# Patient Record
Sex: FEMALE | Race: WHITE | NOT HISPANIC OR LATINO | Employment: FULL TIME | URBAN - METROPOLITAN AREA
[De-identification: names, ages, dates, MRNs, and addresses within clinical notes are randomized per-mention and may not be internally consistent; named-entity substitution may affect disease eponyms.]

---

## 2017-01-10 NOTE — PROGRESS NOTES
SUBJECTIVE:                                                    Yesika Grant is a 26 year old female who presents to clinic today for the following health issues:      F/u concussion     Her HA have improved quite a bit.  Still gets some when she's pushing fairly hard or working hard on focusing.  Will tend to have a hard time focusing and concentrating at times.      Working 4 hours/day.  Rarely gets HA with this.        Depression Followup    Status since last visit: Worsened     See PHQ-9 for current symptoms.  Other associated symptoms: yes, not able to control it     Complicating factors:   Significant life event:  No   Current substance abuse:  None  Anxiety or Panic symptoms:  Yes-  But uses xanax as needed     PHQ-9  English PHQ-9   Any Language        Her depression seemed to worsen quite a bit about a week ago.  Just has no interest in doing anything.  Actually cut back to 4 hrs/day at work when this worsened.  Has been battling these symptoms for months, but her ability to cope has really been sapped of late.      Still smoking.  Has tried patch and gum in the past.      Problem list and histories reviewed & adjusted, as indicated.  Additional history: as documented    Current Outpatient Prescriptions   Medication Sig Dispense Refill     VITAMIN D, CHOLECALCIFEROL, PO Take by mouth daily       citalopram (CELEXA) 40 MG tablet Take 1 tablet (40 mg) by mouth daily Due for office visit 90 tablet 1     multivitamin, therapeutic with minerals (MULTI-VITAMIN) TABS Take 1 tablet by mouth daily       Ferrous Sulfate (IRON SUPPLEMENT PO)        tiZANidine (ZANAFLEX) 4 MG tablet Take 1 tablet (4 mg) by mouth 3 times daily as needed for muscle spasms 90 tablet 0     ALPRAZolam (XANAX) 0.25 MG tablet Take 1 tablet (0.25 mg) by mouth nightly as needed for anxiety 10 tablet 1     Recent Labs   Lab Test  12/12/16   1444  08/19/16   1408  08/18/16   1521  06/04/15   1700  12/06/14   1410   01/13/09   0952   LDL    "--    --    --    --    --    --   112   HDL   --    --    --    --    --    --   59   TRIG   --    --    --    --    --    --   92   ALT   --    --   21   --   14   --    --    CR  0.68   --   0.70   --   0.69   < >   --    GFRESTIMATED  >90  Non  GFR Calc     --   >90  Non  GFR Calc     --   >90  Non  GFR Calc     < >   --    GFRESTBLACK  >90   GFR Calc     --   >90   GFR Calc     --   >90   GFR Calc     < >   --    POTASSIUM  3.8   --   4.0   --   4.0   < >   --    TSH   --   4.88*   --   3.16   --    < >   --     < > = values in this interval not displayed.      BP Readings from Last 3 Encounters:   01/13/17 120/82   12/14/16 100/60   12/12/16 122/68    Wt Readings from Last 3 Encounters:   01/13/17 224 lb (101.606 kg)   12/14/16 224 lb 4.8 oz (101.742 kg)   12/12/16 223 lb (101.152 kg)                 ROS:  Constitutional, HEENT, cardiovascular, pulmonary, gi and gu systems are negative, except as otherwise noted.  Easily chilled.    OBJECTIVE:                                                    /82 mmHg  Pulse 88  Temp(Src) 98.1  F (36.7  C) (Temporal)  Resp 16  Ht 5' 6.93\" (1.7 m)  Wt 224 lb (101.606 kg)  BMI 35.16 kg/m2  LMP 01/13/2017  Breastfeeding? No  Body mass index is 35.16 kg/(m^2).  GENERAL: healthy, alert and no distress  NECK: no adenopathy, no asymmetry, masses, or scars and thyroid normal to palpation  NECK: mild left neck tenderness and muscle spasm.  Does seem to be holding her head at times during the exam.  RESP: lungs clear to auscultation - no rales, rhonchi or wheezes  CV: regular rate and rhythm, normal S1 S2, no S3 or S4, no murmur, click or rub, no peripheral edema and peripheral pulses strong  ABDOMEN: soft, nontender, no hepatosplenomegaly, no masses and bowel sounds normal  MS: no gross musculoskeletal defects noted, no edema    Diagnostic Test Results:  Results for orders " "placed or performed in visit on 12/12/16   CBC with platelets   Result Value Ref Range    WBC 10.6 4.0 - 11.0 10e9/L    RBC Count 4.31 3.8 - 5.2 10e12/L    Hemoglobin 13.0 11.7 - 15.7 g/dL    Hematocrit 39.3 35.0 - 47.0 %    MCV 91 78 - 100 fl    MCH 30.2 26.5 - 33.0 pg    MCHC 33.1 31.5 - 36.5 g/dL    RDW 14.0 10.0 - 15.0 %    Platelet Count 339 150 - 450 10e9/L   Basic metabolic panel  (Ca, Cl, CO2, Creat, Gluc, K, Na, BUN)   Result Value Ref Range    Sodium 142 133 - 144 mmol/L    Potassium 3.8 3.4 - 5.3 mmol/L    Chloride 106 94 - 109 mmol/L    Carbon Dioxide 22 20 - 32 mmol/L    Anion Gap 14 3 - 14 mmol/L    Glucose 92 70 - 99 mg/dL    Urea Nitrogen 7 7 - 30 mg/dL    Creatinine 0.68 0.52 - 1.04 mg/dL    GFR Estimate >90  Non  GFR Calc   >60 mL/min/1.7m2    GFR Estimate If Black >90   GFR Calc   >60 mL/min/1.7m2    Calcium 8.7 8.5 - 10.1 mg/dL        ASSESSMENT/PLAN:                                                      Tobacco Cessation:   reports that she has been smoking Cigarettes.  She has been smoking about 1.00 pack per day. She has never used smokeless tobacco.  Tobacco Cessation Action Plan: Information offered: Patient not interested at this time    BMI:   Estimated body mass index is 35.16 kg/(m^2) as calculated from the following:    Height as of this encounter: 5' 6.93\" (1.7 m).    Weight as of this encounter: 224 lb (101.606 kg).   Weight management plan: Discussed healthy diet and exercise guidelines and patient will follow up in 6 months in clinic to re-evaluate.        ICD-10-CM    1. Postconcussion syndrome F07.81 TSH with free T4 reflex   2. Major depression in complete remission (H) F32.5 TSH with free T4 reflex     buPROPion (WELLBUTRIN XL) 150 MG 24 hr tablet   3. Anxiety F41.9 TSH with free T4 reflex   4. Tobacco use disorder F17.200 TSH with free T4 reflex     buPROPion (WELLBUTRIN XL) 150 MG 24 hr tablet   5. TSH elevation R94.6 TSH with free T4 reflex     " JUST IN CASE   6. Vitamin D deficiency E55.9 JUST IN CASE     Vitamin D Deficiency   7. Panic attacks F41.0 ALPRAZolam (XANAX) 0.25 MG tablet     1.  Does seem to be improving.  Unclear if her worsening moods are related to this or not.  Discussed continued management of this.  Will refer to PT to help with this.  2,3.  Discussed options.  Pt would like to try Wellbutrin to help with this.  F/u in 1-2 months.  4.  Discussed smoking cessation.  Pt will see if Wellbutrin helps with this.    5.  Previously noted.  Will check labs again.  6.  Will check control, consider other labs if needed.  7.  Currently Controlled.  Discussed that more than occasional use would require alternate treatment approach.  Continue current regimen.  Call/return if any problems or questions arise.             Patient Instructions   Thank you for visiting [facility]    Let's try Wellbutrin to help with your attention and depression.  Ideally, stop smoking about a week into treatment with this.    We'll let you know your lab results as soon as we can.     Get in to PT as we discussed.    Please see me in 1  month for follow up.       If you had imaging scheduled please refer to your radiology prep sheet.    Appointment    Date_______________     Time_____________    Day:   M TU W TH F    With____________________________    Location_________________________    If you need medication refills, please contact your pharmacy 3 days before your prescriptions runs out. If you are out of refills, your pharmacy will contact contact the clinic.    Contact us or return if questions or concerns.     -Your Care Team:  MD Justine Kunz PA-C Folake Falaki, MD Anoshirvan Mazhari, MD Kelly White, ZIA    General information about your clinic      Clinic hours:     Lab hours:  Phone [phone]  Monday 7:30 am-7 pm    Monday 8:30 am-6:30 pm  Tuesday-Friday 7:30 am-5 pm   Tuesday-Friday 8:30 am-4:30 pm    Pharmacy  hours:  Phone [phone]  Monday 8:30 am-7pm  Tuesday-Friday 8:30am-6 pm                                       Mychart assistance [phone]        We would like to hear from you, how was your visit today?    Solange Castillo  Patient Information Supervisor   Patient Care Supervisor  [place], [place], and [facility], [facility]  [phone]           Young Fletcher MD, MD  [facility]

## 2017-01-13 ENCOUNTER — OFFICE VISIT (OUTPATIENT)
Dept: FAMILY MEDICINE | Facility: OTHER | Age: 27
End: 2017-01-13
Payer: COMMERCIAL

## 2017-01-13 VITALS
HEIGHT: 67 IN | RESPIRATION RATE: 16 BRPM | BODY MASS INDEX: 35.16 KG/M2 | DIASTOLIC BLOOD PRESSURE: 82 MMHG | WEIGHT: 224 LBS | HEART RATE: 88 BPM | TEMPERATURE: 98.1 F | SYSTOLIC BLOOD PRESSURE: 120 MMHG

## 2017-01-13 DIAGNOSIS — F07.81 POSTCONCUSSION SYNDROME: Primary | ICD-10-CM

## 2017-01-13 DIAGNOSIS — E55.9 VITAMIN D DEFICIENCY: ICD-10-CM

## 2017-01-13 DIAGNOSIS — F32.5 MAJOR DEPRESSION IN COMPLETE REMISSION (H): ICD-10-CM

## 2017-01-13 DIAGNOSIS — F41.0 PANIC ATTACKS: ICD-10-CM

## 2017-01-13 DIAGNOSIS — F17.200 TOBACCO USE DISORDER: ICD-10-CM

## 2017-01-13 DIAGNOSIS — R79.89 TSH ELEVATION: ICD-10-CM

## 2017-01-13 DIAGNOSIS — F41.9 ANXIETY: ICD-10-CM

## 2017-01-13 PROCEDURE — 99214 OFFICE O/P EST MOD 30 MIN: CPT | Performed by: FAMILY MEDICINE

## 2017-01-13 PROCEDURE — 82306 VITAMIN D 25 HYDROXY: CPT | Performed by: FAMILY MEDICINE

## 2017-01-13 PROCEDURE — 84443 ASSAY THYROID STIM HORMONE: CPT | Performed by: FAMILY MEDICINE

## 2017-01-13 PROCEDURE — 36415 COLL VENOUS BLD VENIPUNCTURE: CPT | Performed by: FAMILY MEDICINE

## 2017-01-13 RX ORDER — BUPROPION HYDROCHLORIDE 150 MG/1
150 TABLET ORAL EVERY MORNING
Qty: 30 TABLET | Refills: 1 | Status: SHIPPED | OUTPATIENT
Start: 2017-01-13 | End: 2017-02-08

## 2017-01-13 RX ORDER — ALPRAZOLAM 0.25 MG
0.25 TABLET ORAL
Qty: 10 TABLET | Refills: 1 | Status: SHIPPED | OUTPATIENT
Start: 2017-01-13 | End: 2017-09-08

## 2017-01-13 ASSESSMENT — PAIN SCALES - GENERAL: PAINLEVEL: NO PAIN (0)

## 2017-01-13 NOTE — PATIENT INSTRUCTIONS
Thank you for visiting [facility]    Let's try Wellbutrin to help with your attention and depression.  Ideally, stop smoking about a week into treatment with this.    We'll let you know your lab results as soon as we can.     Get in to PT as we discussed.    Please see me in 1  month for follow up.       If you had imaging scheduled please refer to your radiology prep sheet.    Appointment    Date_______________     Time_____________    Day:   M TU W TH F    With____________________________    Location_________________________    If you need medication refills, please contact your pharmacy 3 days before your prescriptions runs out. If you are out of refills, your pharmacy will contact contact the clinic.    Contact us or return if questions or concerns.     -Your Care Team:  MD Justine Kunz PA-C Folake Falaki, MD Anoshirvan Mazhari, MD Kelly White, ZIA    General information about your clinic      Clinic hours:     Lab hours:  Phone [phone]  Monday 7:30 am-7 pm    Monday 8:30 am-6:30 pm  Tuesday-Friday 7:30 am-5 pm   Tuesday-Friday 8:30 am-4:30 pm    Pharmacy hours:  Phone [phone]  Monday 8:30 am-7pm  Tuesday-Friday 8:30am-6 pm                                       Mychart assistance [phone]        We would like to hear from you, how was your visit today?    Solange Castillo  Patient Information Supervisor   Patient Care Supervisor  [facility], and [facility], and [facility]  [phone]

## 2017-01-13 NOTE — Clinical Note
[facility]  [id number] [place] [address]  Phone: [phone]    January 13, 2017        Yesika Grant  [address] [facility] [address]          To whom it may concern:    RE: Yesika Grant    Patient was seen and treated today at our clinic and missed work.  She is still recovering and will only gradually be able to return to full time work over the next 30-60 days.  She is capable of working 4-5 hrs/day at this time.  Can probably try increasing her time at work by one hr/day each week.  Stop increasing if any worsening of symptoms.    Please contact me for questions or concerns.      Sincerely,        Young Fletcher MD

## 2017-01-13 NOTE — NURSING NOTE
"Chief Complaint   Patient presents with     Head Injury     Panel Management     MyChart, Flu Shot, Tobacco Cessation, PHQ-9/ZAN       Initial /82 mmHg  Pulse 88  Temp(Src) 98.1  F (36.7  C) (Temporal)  Resp 16  Ht 5' 6.93\" (1.7 m)  Wt 224 lb (101.606 kg)  BMI 35.16 kg/m2  LMP 01/13/2017  Breastfeeding? No Estimated body mass index is 35.16 kg/(m^2) as calculated from the following:    Height as of this encounter: 5' 6.93\" (1.7 m).    Weight as of this encounter: 224 lb (101.606 kg).  BP completed using cuff size: abimael Gale LPN      "

## 2017-01-13 NOTE — MR AVS SNAPSHOT
After Visit Summary   1/13/2017    Yesika Grant    MRN: [id number]           Patient Information     Date Of Birth          1990        Visit Information        Provider Department      1/13/2017 4:15 PM Young Fletcher MD [facility]        Today's Diagnoses     Postconcussion syndrome    -  1     Major depression in complete remission (H)         Anxiety         Tobacco use disorder         TSH elevation         Vitamin D deficiency         Panic attacks           Care Instructions    Thank you for visiting [facility]    Let's try Wellbutrin to help with your attention and depression.  Ideally, stop smoking about a week into treatment with this.    We'll let you know your lab results as soon as we can.     Get in to PT as we discussed.    Please see me in 1  month for follow up.       If you had imaging scheduled please refer to your radiology prep sheet.    Appointment    Date_______________     Time_____________    Day:   M TU W TH F    With____________________________    Location_________________________    If you need medication refills, please contact your pharmacy 3 days before your prescriptions runs out. If you are out of refills, your pharmacy will contact contact the clinic.    Contact us or return if questions or concerns.     -Your Care Team:  MD Justine Kunz PA-C Folake Falaki, MD Anoshirvan Mazhari, MD Kelly White, CNP    General information about your clinic      Clinic hours:     Lab hours:  Phone [phone]  Monday 7:30 am-7 pm    Monday 8:30 am-6:30 pm  Tuesday-Friday 7:30 am-5 pm   Tuesday-Friday 8:30 am-4:30 pm    Pharmacy hours:  Phone [phone]  Monday 8:30 am-7pm  Tuesday-Friday 8:30am-6 pm                                       Mychart assistance [phone]        We would like to hear from you, how was your visit today?    Solange Castillo  Patient Information  "Supervisor   Patient Care Supervisor  [facility], [facility], and [facility]  [phone]           Follow-ups after your visit        Your next 10 appointments already scheduled     2017  3:00 PM   Evaluation with Lily Mendoza, PT   [facility] ([facility])    [address] [facility] [address] [phone]              Who to contact     If you have questions or need follow up information about today's clinic visit or your schedule please contact [facility] directly at [phone].  Normal or non-critical lab and imaging results will be communicated to you by [employer], letter or phone within 4 business days after the clinic has received the results. If you do not hear from us within 7 days, please contact the clinic through [employer] or phone. If you have a critical or abnormal lab result, we will notify you by phone as soon as possible.  Submit refill requests through [employer] or call your pharmacy and they will forward the refill request to us. Please allow 3 business days for your refill to be completed.          Additional Information About Your Visit        [employer] Information     [employer] lets you send messages to your doctor, view your test results, renew your prescriptions, schedule appointments and more. To sign up, go to [email or web address] . Click on \"Log in\" on the left side of the screen, which will take you to the Welcome page. Then click on \"Sign up Now\" on the right side of the page.     You will be asked to enter the access code listed below, as well as some personal information. Please follow the directions to create your username and password.     Your access code is: [email or web address]  Expires: 3/14/2017 11:23 AM     Your access code will  in 90 days. If you need help or a new code, please call your [facility] or [phone].        Care EveryWhere ID  " "   This is your Care EveryWhere ID. This could be used by other organizations to access your [facility] medical records  [id number]        Your Vitals Were     Pulse Temperature Respirations Height BMI (Body Mass Index) Last Period    88 98.1  F (36.7  C) (Temporal) 16 5' 6.93\" (1.7 m) 35.16 kg/m2 01/13/2017    Breastfeeding?                   No            Blood Pressure from Last 3 Encounters:   01/13/17 120/82   12/14/16 100/60   12/12/16 122/68    Weight from Last 3 Encounters:   01/13/17 224 lb (101.606 kg)   12/14/16 224 lb 4.8 oz (101.742 kg)   12/12/16 223 lb (101.152 kg)              We Performed the Following     JUST IN CASE     TSH with free T4 reflex     Vitamin D Deficiency          Today's Medication Changes          These changes are accurate as of: 1/13/17  4:57 PM.  If you have any questions, ask your nurse or doctor.               Start taking these medicines.        Dose/Directions    buPROPion 150 MG 24 hr tablet   Commonly known as:  WELLBUTRIN XL   Used for:  Major depression in complete remission (H), Tobacco use disorder   Started by:  Young Fletcher MD        Dose:  150 mg   Take 1 tablet (150 mg) by mouth every morning   Quantity:  30 tablet   Refills:  1            Where to get your medicines      These medications were sent to [facility] [place] - [address]   [address] [place] [address]     Phone:  [phone]    - buPROPion 150 MG 24 hr tablet      Some of these will need a paper prescription and others can be bought over the counter.  Ask your nurse if you have questions.     Bring a paper prescription for each of these medications    - ALPRAZolam 0.25 MG tablet             Primary Care Provider Office Phone # Fax #    Justine Sánchez PA-C [phone]       [facility] [address] [place] [address]        Thank you!     Thank you for choosing [facility]  for your care. Our goal is " always to provide you with excellent care. Hearing back from our patients is one way we can continue to improve our services. Please take a few minutes to complete the written survey that you may receive in the mail after your visit with us. Thank you!             Your Updated Medication List - Protect others around you: Learn how to safely use, store and throw away your medicines at www.disposemymeds.org.          This list is accurate as of: 1/13/17  4:57 PM.  Always use your most recent med list.                   Brand Name Dispense Instructions for use    ALPRAZolam 0.25 MG tablet    XANAX    10 tablet    Take 1 tablet (0.25 mg) by mouth nightly as needed for anxiety       buPROPion 150 MG 24 hr tablet    WELLBUTRIN XL    30 tablet    Take 1 tablet (150 mg) by mouth every morning       citalopram 40 MG tablet    celeXA    90 tablet    Take 1 tablet (40 mg) by mouth daily Due for office visit       IRON SUPPLEMENT PO          Multi-vitamin Tabs tablet      Take 1 tablet by mouth daily       tiZANidine 4 MG tablet    ZANAFLEX    90 tablet    Take 1 tablet (4 mg) by mouth 3 times daily as needed for muscle spasms       VITAMIN D (CHOLECALCIFEROL) PO      Take by mouth daily

## 2017-01-14 LAB — TSH SERPL DL<=0.005 MIU/L-ACNC: 1.72 MU/L (ref 0.4–4)

## 2017-01-16 LAB — DEPRECATED CALCIDIOL+CALCIFEROL SERPL-MC: 33 UG/L (ref 20–75)

## 2017-01-17 NOTE — PROGRESS NOTES
Quick Note:    All of your labs were normal for you.    Have a nice day!    Dr. Fletcher     ______

## 2017-01-18 ENCOUNTER — HOSPITAL ENCOUNTER (OUTPATIENT)
Dept: PHYSICAL THERAPY | Facility: OTHER | Age: 27
Setting detail: THERAPIES SERIES
End: 2017-01-18
Attending: FAMILY MEDICINE
Payer: COMMERCIAL

## 2017-01-18 PROCEDURE — 40000718 ZZHC STATISTIC PT DEPARTMENT ORTHO VISIT: Performed by: PHYSICAL THERAPIST

## 2017-01-18 PROCEDURE — 97112 NEUROMUSCULAR REEDUCATION: CPT | Mod: GP | Performed by: PHYSICAL THERAPIST

## 2017-01-18 PROCEDURE — 97110 THERAPEUTIC EXERCISES: CPT | Mod: GP | Performed by: PHYSICAL THERAPIST

## 2017-01-18 PROCEDURE — 97162 PT EVAL MOD COMPLEX 30 MIN: CPT | Mod: GP | Performed by: PHYSICAL THERAPIST

## 2017-01-18 ASSESSMENT — ANXIETY QUESTIONNAIRES
IF YOU CHECKED OFF ANY PROBLEMS ON THIS QUESTIONNAIRE, HOW DIFFICULT HAVE THESE PROBLEMS MADE IT FOR YOU TO DO YOUR WORK, TAKE CARE OF THINGS AT HOME, OR GET ALONG WITH OTHER PEOPLE: NOT DIFFICULT AT ALL
GAD7 TOTAL SCORE: 7
7. FEELING AFRAID AS IF SOMETHING AWFUL MIGHT HAPPEN: NOT AT ALL
3. WORRYING TOO MUCH ABOUT DIFFERENT THINGS: NOT AT ALL
2. NOT BEING ABLE TO STOP OR CONTROL WORRYING: NOT AT ALL
5. BEING SO RESTLESS THAT IT IS HARD TO SIT STILL: SEVERAL DAYS
6. BECOMING EASILY ANNOYED OR IRRITABLE: MORE THAN HALF THE DAYS
1. FEELING NERVOUS, ANXIOUS, OR ON EDGE: SEVERAL DAYS

## 2017-01-18 ASSESSMENT — PATIENT HEALTH QUESTIONNAIRE - PHQ9: 5. POOR APPETITE OR OVEREATING: NEARLY EVERY DAY

## 2017-01-19 ASSESSMENT — PATIENT HEALTH QUESTIONNAIRE - PHQ9: SUM OF ALL RESPONSES TO PHQ QUESTIONS 1-9: 16

## 2017-01-19 ASSESSMENT — ANXIETY QUESTIONNAIRES: GAD7 TOTAL SCORE: 7
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
## 2017-01-20 ENCOUNTER — HOSPITAL ENCOUNTER (OUTPATIENT)
Dept: PHYSICAL THERAPY | Facility: OTHER | Age: 27
Setting detail: THERAPIES SERIES
End: 2017-01-20
Attending: FAMILY MEDICINE
Payer: COMMERCIAL

## 2017-01-20 PROCEDURE — 97530 THERAPEUTIC ACTIVITIES: CPT | Mod: GP | Performed by: PHYSICAL THERAPIST

## 2017-01-20 PROCEDURE — 97112 NEUROMUSCULAR REEDUCATION: CPT | Mod: GP | Performed by: PHYSICAL THERAPIST

## 2017-01-20 PROCEDURE — 97110 THERAPEUTIC EXERCISES: CPT | Mod: GP | Performed by: PHYSICAL THERAPIST

## 2017-01-20 PROCEDURE — 40000718 ZZHC STATISTIC PT DEPARTMENT ORTHO VISIT: Performed by: PHYSICAL THERAPIST

## 2017-01-23 ENCOUNTER — HOSPITAL ENCOUNTER (OUTPATIENT)
Dept: PHYSICAL THERAPY | Facility: OTHER | Age: 27
Setting detail: THERAPIES SERIES
End: 2017-01-23
Attending: FAMILY MEDICINE
Payer: COMMERCIAL

## 2017-01-23 PROCEDURE — 97035 APP MDLTY 1+ULTRASOUND EA 15: CPT | Mod: GP | Performed by: PHYSICAL THERAPIST

## 2017-01-23 PROCEDURE — 97112 NEUROMUSCULAR REEDUCATION: CPT | Mod: GP | Performed by: PHYSICAL THERAPIST

## 2017-01-23 PROCEDURE — 40000718 ZZHC STATISTIC PT DEPARTMENT ORTHO VISIT: Performed by: PHYSICAL THERAPIST

## 2017-01-23 PROCEDURE — 97110 THERAPEUTIC EXERCISES: CPT | Mod: GP | Performed by: PHYSICAL THERAPIST

## 2017-01-25 ENCOUNTER — HOSPITAL ENCOUNTER (OUTPATIENT)
Dept: PHYSICAL THERAPY | Facility: OTHER | Age: 27
Setting detail: THERAPIES SERIES
End: 2017-01-25
Attending: FAMILY MEDICINE
Payer: COMMERCIAL

## 2017-01-25 PROCEDURE — 40000718 ZZHC STATISTIC PT DEPARTMENT ORTHO VISIT: Performed by: PHYSICAL THERAPIST

## 2017-01-25 PROCEDURE — 97112 NEUROMUSCULAR REEDUCATION: CPT | Mod: GP | Performed by: PHYSICAL THERAPIST

## 2017-01-25 PROCEDURE — 97035 APP MDLTY 1+ULTRASOUND EA 15: CPT | Mod: GP | Performed by: PHYSICAL THERAPIST

## 2017-01-25 PROCEDURE — 97110 THERAPEUTIC EXERCISES: CPT | Mod: GP | Performed by: PHYSICAL THERAPIST

## 2017-02-01 ENCOUNTER — HOSPITAL ENCOUNTER (OUTPATIENT)
Dept: PHYSICAL THERAPY | Facility: OTHER | Age: 27
Setting detail: THERAPIES SERIES
End: 2017-02-01
Attending: FAMILY MEDICINE
Payer: COMMERCIAL

## 2017-02-01 PROCEDURE — 97530 THERAPEUTIC ACTIVITIES: CPT | Mod: GP | Performed by: PHYSICAL THERAPIST

## 2017-02-01 PROCEDURE — 40000718 ZZHC STATISTIC PT DEPARTMENT ORTHO VISIT: Performed by: PHYSICAL THERAPIST

## 2017-02-01 PROCEDURE — 97112 NEUROMUSCULAR REEDUCATION: CPT | Mod: GP | Performed by: PHYSICAL THERAPIST

## 2017-02-01 PROCEDURE — 97110 THERAPEUTIC EXERCISES: CPT | Mod: GP | Performed by: PHYSICAL THERAPIST

## 2017-02-03 ENCOUNTER — HOSPITAL ENCOUNTER (OUTPATIENT)
Dept: PHYSICAL THERAPY | Facility: OTHER | Age: 27
Setting detail: THERAPIES SERIES
End: 2017-02-03
Attending: FAMILY MEDICINE
Payer: COMMERCIAL

## 2017-02-03 PROCEDURE — 97112 NEUROMUSCULAR REEDUCATION: CPT | Mod: GP | Performed by: PHYSICAL THERAPIST

## 2017-02-03 PROCEDURE — 97110 THERAPEUTIC EXERCISES: CPT | Mod: GP | Performed by: PHYSICAL THERAPIST

## 2017-02-03 PROCEDURE — 97035 APP MDLTY 1+ULTRASOUND EA 15: CPT | Mod: GP | Performed by: PHYSICAL THERAPIST

## 2017-02-03 PROCEDURE — 40000718 ZZHC STATISTIC PT DEPARTMENT ORTHO VISIT: Performed by: PHYSICAL THERAPIST

## 2017-02-03 NOTE — PROGRESS NOTES
SUBJECTIVE:                                                    Yesika Grant is a 26 year old female who presents to clinic today for the following health issues:      Follow up Concussion    Pt notes improvement in her mood with the Wellbutrin.  Also smoking less.    Still having some trouble with her headaches, confusion, inability to multi-task.    PT is helping with some of her symptoms, but not really helping with her concussion symptoms.    Getting HA nearly every day.      PHQ-9 SCORE 12/14/2016 1/18/2017 2/8/2017   Total Score - - -   Total Score 11 16 14         Problem list and histories reviewed & adjusted, as indicated.  Additional history: as documented    Current Outpatient Prescriptions   Medication Sig Dispense Refill     buPROPion (WELLBUTRIN XL) 150 MG 24 hr tablet Take 1 tablet (150 mg) by mouth every morning 30 tablet 1     ALPRAZolam (XANAX) 0.25 MG tablet Take 1 tablet (0.25 mg) by mouth nightly as needed for anxiety 10 tablet 1     tiZANidine (ZANAFLEX) 4 MG tablet Take 1 tablet (4 mg) by mouth 3 times daily as needed for muscle spasms 90 tablet 0     VITAMIN D, CHOLECALCIFEROL, PO Take by mouth daily       citalopram (CELEXA) 40 MG tablet Take 1 tablet (40 mg) by mouth daily Due for office visit 90 tablet 1     multivitamin, therapeutic with minerals (MULTI-VITAMIN) TABS Take 1 tablet by mouth daily       Ferrous Sulfate (IRON SUPPLEMENT PO)        Recent Labs   Lab Test  01/13/17   1700  12/12/16   1444  08/19/16   1408  08/18/16   1521   12/06/14   1410   01/13/09   0952   LDL   --    --    --    --    --    --    --   112   HDL   --    --    --    --    --    --    --   59   TRIG   --    --    --    --    --    --    --   92   ALT   --    --    --   21   --   14   --    --    CR   --   0.68   --   0.70   --   0.69   < >   --    GFRESTIMATED   --   >90  Non  GFR Calc     --   >90  Non  GFR Calc     --   >90  Non  GFR Calc     < >   --   "  GFRESTBLACK   --   >90   GFR Calc     --   >90   GFR Calc     --   >90   GFR Calc     < >   --    POTASSIUM   --   3.8   --   4.0   --   4.0   < >   --    TSH  1.72   --   4.88*   --    < >   --    < >   --     < > = values in this interval not displayed.      BP Readings from Last 3 Encounters:   02/08/17 106/66   01/13/17 120/82   12/14/16 100/60    Wt Readings from Last 3 Encounters:   02/08/17 221 lb 9.6 oz (100.517 kg)   01/13/17 224 lb (101.606 kg)   12/14/16 224 lb 4.8 oz (101.742 kg)                 ROS:  Constitutional, HEENT, cardiovascular, pulmonary, gi and gu systems are negative, except as otherwise noted.    OBJECTIVE:                                                    /66 mmHg  Pulse 92  Temp(Src) 98.4  F (36.9  C) (Temporal)  Resp 16  Ht 5' 7\" (1.702 m)  Wt 221 lb 9.6 oz (100.517 kg)  BMI 34.70 kg/m2  LMP 01/13/2017  Body mass index is 34.7 kg/(m^2).  GENERAL: healthy, alert and no distress  NECK: no adenopathy, no asymmetry, masses, or scars and thyroid normal to palpation  RESP: lungs clear to auscultation - no rales, rhonchi or wheezes  CV: regular rate and rhythm, normal S1 S2, no S3 or S4, no murmur, click or rub, no peripheral edema and peripheral pulses strong  ABDOMEN: soft, nontender, no hepatosplenomegaly, no masses and bowel sounds normal  MS: no gross musculoskeletal defects noted, no edema    Diagnostic Test Results:  Results for orders placed or performed in visit on 01/13/17   TSH with free T4 reflex   Result Value Ref Range    TSH 1.72 0.40 - 4.00 mU/L   Vitamin D Deficiency   Result Value Ref Range    Vitamin D Deficiency screening 33 20 - 75 ug/L        ASSESSMENT/PLAN:                                                      Tobacco Cessation:   reports that she has been smoking Cigarettes.  She has been smoking about 1.00 pack per day. She has never used smokeless tobacco.  Tobacco Cessation Action Plan: Pharmacotherapies " ": Zyban/Wellbutrin    BMI:   Estimated body mass index is 34.7 kg/(m^2) as calculated from the following:    Height as of this encounter: 5' 7\" (1.702 m).    Weight as of this encounter: 221 lb 9.6 oz (100.517 kg).   Weight management plan: Discussed healthy diet and exercise guidelines and patient will follow up in 3 months in clinic to re-evaluate.        ICD-10-CM    1. Postconcussion syndrome F07.81 CONCUSSION  REFERRAL     OCCUPATIONAL THERAPY REFERRAL     SPEECH THERAPY REFERRAL     topiramate (TOPAMAX) 25 MG tablet   2. Major depression in complete remission (H) F32.5 buPROPion (WELLBUTRIN XL) 300 MG 24 hr tablet   3. Tobacco use disorder F17.200 buPROPion (WELLBUTRIN XL) 300 MG 24 hr tablet   4. New daily persistent headache G44.52 topiramate (TOPAMAX) 25 MG tablet   5. Need for prophylactic vaccination and inoculation against influenza Z23 FLU VAC, SPLIT VIRUS IM > 3 YO (QUADRIVALENT) [25537]     Vaccine Administration, Initial [51761]     1,4.  Slowly improving, but not as quickly as I'd like.  Will refer to additional therapies to help with this.  Will also try Topamax to help with her HA.  Follow up in one month.  Filled out [facility] paperwork for her.  2.  Somewhat improved.  Discussed some options.  She'd like to increase Wellbutrin.  Follow up in one month.  3.  Cravings reduced.  Still hasn't quit.  Will see if she's able to do so more easily on higher dose of Wellbutrin.  5.  Immunized.              Patient Instructions   Thank you for visiting [facility]    Let's increase Wellbutrin to 300 mg daily to help with mood.    Start Topamax gradually to help with headaches.  You should also not increase dose each week if having concerning side effects.    See occupational therapy and speech therapy to help with concussion recovery.    Contact us or return if questions or concerns.     Please see me in 1  month for follow up.       If you had imaging scheduled please refer to your " radiology prep sheet.    Appointment    Date_______________     Time_____________    Day:   M   TU W TH F    With____________________________    Location_________________________    If you need medication refills, please contact your pharmacy 3 days before your prescriptions runs out. If you are out of refills, your pharmacy will contact contact the clinic.    Contact us or return if questions or concerns.     -Your Care Team:  MD Justine Kunz PA-C Folake Falaki, MD Anoshirvan Mazhari, MD Kelly White, CNP    General information about your clinic      Clinic hours:     Lab hours:  Phone [phone]  Monday 7:30 am-7 pm    Monday 8:30 am-6:30 pm  Tuesday-Friday 7:30 am-5 pm   Tuesday-Friday 8:30 am-4:30 pm    Pharmacy hours:  Phone [phone]  Monday 8:30 am-7pm  Tuesday-Friday 8:30am-6 pm                                       Mychart assistance [phone]        We would like to hear from you, how was your visit today?    Solange Castillo  Patient Information Supervisor   Patient Care Supervisor  [facility], and \[facility]\"", and [facility]  [phone]           Young Fletcher MD, MD  [facility]

## 2017-02-08 ENCOUNTER — TELEPHONE (OUTPATIENT)
Dept: FAMILY MEDICINE | Facility: OTHER | Age: 27
End: 2017-02-08

## 2017-02-08 ENCOUNTER — OFFICE VISIT (OUTPATIENT)
Dept: FAMILY MEDICINE | Facility: OTHER | Age: 27
End: 2017-02-08
Payer: COMMERCIAL

## 2017-02-08 VITALS
BODY MASS INDEX: 34.78 KG/M2 | HEART RATE: 92 BPM | HEIGHT: 67 IN | WEIGHT: 221.6 LBS | DIASTOLIC BLOOD PRESSURE: 66 MMHG | SYSTOLIC BLOOD PRESSURE: 106 MMHG | RESPIRATION RATE: 16 BRPM | TEMPERATURE: 98.4 F

## 2017-02-08 DIAGNOSIS — F07.81 POSTCONCUSSION SYNDROME: Primary | ICD-10-CM

## 2017-02-08 DIAGNOSIS — F32.5 MAJOR DEPRESSION IN COMPLETE REMISSION (H): ICD-10-CM

## 2017-02-08 DIAGNOSIS — G44.52 NEW DAILY PERSISTENT HEADACHE: ICD-10-CM

## 2017-02-08 DIAGNOSIS — F17.200 TOBACCO USE DISORDER: ICD-10-CM

## 2017-02-08 DIAGNOSIS — Z23 NEED FOR PROPHYLACTIC VACCINATION AND INOCULATION AGAINST INFLUENZA: ICD-10-CM

## 2017-02-08 PROCEDURE — 90471 IMMUNIZATION ADMIN: CPT | Performed by: FAMILY MEDICINE

## 2017-02-08 PROCEDURE — 99214 OFFICE O/P EST MOD 30 MIN: CPT | Mod: 25 | Performed by: FAMILY MEDICINE

## 2017-02-08 PROCEDURE — 90686 IIV4 VACC NO PRSV 0.5 ML IM: CPT | Performed by: FAMILY MEDICINE

## 2017-02-08 RX ORDER — TOPIRAMATE 25 MG/1
TABLET, FILM COATED ORAL
Qty: 70 TABLET | Refills: 0 | Status: SHIPPED | OUTPATIENT
Start: 2017-02-08 | End: 2017-03-08

## 2017-02-08 RX ORDER — BUPROPION HYDROCHLORIDE 300 MG/1
300 TABLET ORAL EVERY MORNING
Qty: 30 TABLET | Refills: 1 | Status: SHIPPED | OUTPATIENT
Start: 2017-02-08 | End: 2017-04-14

## 2017-02-08 ASSESSMENT — ANXIETY QUESTIONNAIRES
1. FEELING NERVOUS, ANXIOUS, OR ON EDGE: SEVERAL DAYS
5. BEING SO RESTLESS THAT IT IS HARD TO SIT STILL: NOT AT ALL
7. FEELING AFRAID AS IF SOMETHING AWFUL MIGHT HAPPEN: NOT AT ALL
GAD7 TOTAL SCORE: 2
2. NOT BEING ABLE TO STOP OR CONTROL WORRYING: NOT AT ALL
6. BECOMING EASILY ANNOYED OR IRRITABLE: NOT AT ALL
3. WORRYING TOO MUCH ABOUT DIFFERENT THINGS: NOT AT ALL
IF YOU CHECKED OFF ANY PROBLEMS ON THIS QUESTIONNAIRE, HOW DIFFICULT HAVE THESE PROBLEMS MADE IT FOR YOU TO DO YOUR WORK, TAKE CARE OF THINGS AT HOME, OR GET ALONG WITH OTHER PEOPLE: NOT DIFFICULT AT ALL

## 2017-02-08 ASSESSMENT — PAIN SCALES - GENERAL: PAINLEVEL: MODERATE PAIN (4)

## 2017-02-08 ASSESSMENT — PATIENT HEALTH QUESTIONNAIRE - PHQ9: 5. POOR APPETITE OR OVEREATING: SEVERAL DAYS

## 2017-02-08 NOTE — PATIENT INSTRUCTIONS
Thank you for visiting [facility]    Let's increase Wellbutrin to 300 mg daily to help with mood.    Start Topamax gradually to help with headaches.  You should also not increase dose each week if having concerning side effects.    See occupational therapy and speech therapy to help with concussion recovery.    Contact us or return if questions or concerns.     Please see me in 1  month for follow up.       If you had imaging scheduled please refer to your radiology prep sheet.    Appointment    Date_______________     Time_____________    Day:   M TU W TH F    With____________________________    Location_________________________    If you need medication refills, please contact your pharmacy 3 days before your prescriptions runs out. If you are out of refills, your pharmacy will contact contact the clinic.    Contact us or return if questions or concerns.     -Your Care Team:  MD Justine Kunz PA-C Folake Falaki, MD Anoshirvan Mazhari, MD Kelly White, CNP    General information about your clinic      Clinic hours:     Lab hours:  Phone [phone]  Monday 7:30 am-7 pm    Monday 8:30 am-6:30 pm  Tuesday-Friday 7:30 am-5 pm   Tuesday-Friday 8:30 am-4:30 pm    Pharmacy hours:  Phone [phone]  Monday 8:30 am-7pm  Tuesday-Friday 8:30am-6 pm                                       Mychart assistance [phone]        We would like to hear from you, how was your visit today?    Solange Castillo  Patient Information Supervisor   Patient Care Supervisor  [facility], and [facility], and [facility]  [phone]

## 2017-02-08 NOTE — NURSING NOTE
"Chief Complaint   Patient presents with     Concussion     follow up     Panel Management     Flu       Initial /66 mmHg  Pulse 92  Temp(Src) 98.4  F (36.9  C) (Temporal)  Resp 16  Ht 5' 7\" (1.702 m)  Wt 221 lb 9.6 oz (100.517 kg)  BMI 34.70 kg/m2  LMP 01/13/2017 Estimated body mass index is 34.7 kg/(m^2) as calculated from the following:    Height as of this encounter: 5' 7\" (1.702 m).    Weight as of this encounter: 221 lb 9.6 oz (100.517 kg).  Medication Reconciliation: complete  Daniel Michaud, GARY    "

## 2017-02-08 NOTE — PROGRESS NOTES
Injectable Influenza Immunization Documentation    1.  Is the person to be vaccinated sick today?  No    2. Does the person to be vaccinated have an allergy to eggs or to a component of the vaccine?  No    3. Has the person to be vaccinated today ever had a serious reaction to influenza vaccine in the past?  No    4. Has the person to be vaccinated ever had Guillain-Foreston syndrome?  No     Form completed by Daniel Michaud CMA  Prior to injection verified patient identity using patient's name and date of birth.  Per orders of Dr. Fletcher, injection of Fluzone given by Daniel Michaud. Patient instructed to remain in clinic for 20 minutes afterwards, and to report any adverse reaction to me immediately.

## 2017-02-08 NOTE — TELEPHONE ENCOUNTER
Our goal is to have forms completed with 72 hours, however some forms may require a visit or additional information.    Who is the form from?: Patient  Where the form came from: Patient or family brought in     What clinic location was the form placed at?: [place]  Where the form was placed: [facility]   What number is listed as a contact on the form?: Patient's number: [phone]    Phone call message- patient request for a letter, form or note: [facility] paperwork completed, will  2/9/17    Date needed: as soon as possible  Patient will  at the clinic when completed  Has the patient signed a consent form for release of information? Not Applicable    Additional comments: N/A    Call taken on 2/8/2017 at 2:36 PM by Daniel Michaud    Type of letter, form or note: [facility]

## 2017-02-09 ASSESSMENT — PATIENT HEALTH QUESTIONNAIRE - PHQ9: SUM OF ALL RESPONSES TO PHQ QUESTIONS 1-9: 14

## 2017-02-09 ASSESSMENT — ANXIETY QUESTIONNAIRES: GAD7 TOTAL SCORE: 2

## 2017-02-10 ENCOUNTER — HOSPITAL ENCOUNTER (OUTPATIENT)
Dept: PHYSICAL THERAPY | Facility: OTHER | Age: 27
Setting detail: THERAPIES SERIES
End: 2017-02-10
Attending: FAMILY MEDICINE
Payer: COMMERCIAL

## 2017-02-10 PROCEDURE — 97140 MANUAL THERAPY 1/> REGIONS: CPT | Mod: GP | Performed by: PHYSICAL THERAPIST

## 2017-02-10 PROCEDURE — 97035 APP MDLTY 1+ULTRASOUND EA 15: CPT | Mod: GP | Performed by: PHYSICAL THERAPIST

## 2017-02-10 PROCEDURE — 40000718 ZZHC STATISTIC PT DEPARTMENT ORTHO VISIT: Performed by: PHYSICAL THERAPIST

## 2017-02-10 PROCEDURE — 97110 THERAPEUTIC EXERCISES: CPT | Mod: GP | Performed by: PHYSICAL THERAPIST

## 2017-02-15 ENCOUNTER — HOSPITAL ENCOUNTER (OUTPATIENT)
Dept: OCCUPATIONAL THERAPY | Facility: CLINIC | Age: 27
Setting detail: THERAPIES SERIES
End: 2017-02-15
Attending: FAMILY MEDICINE
Payer: COMMERCIAL

## 2017-02-15 PROCEDURE — 97166 OT EVAL MOD COMPLEX 45 MIN: CPT | Mod: GO | Performed by: OCCUPATIONAL THERAPIST

## 2017-02-15 PROCEDURE — 97535 SELF CARE MNGMENT TRAINING: CPT | Mod: GO | Performed by: OCCUPATIONAL THERAPIST

## 2017-02-15 PROCEDURE — 40000768 ZZHC STATISTIC OT CONCUSSION VISIT: Performed by: OCCUPATIONAL THERAPIST
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
## 2017-02-17 ENCOUNTER — HOSPITAL ENCOUNTER (OUTPATIENT)
Dept: PHYSICAL THERAPY | Facility: OTHER | Age: 27
Setting detail: THERAPIES SERIES
End: 2017-02-17
Attending: FAMILY MEDICINE
Payer: COMMERCIAL

## 2017-02-17 PROCEDURE — 40000185 ZZHC STATISTIC PT OUTPT VISIT: Performed by: PHYSICAL THERAPIST

## 2017-02-17 PROCEDURE — 97140 MANUAL THERAPY 1/> REGIONS: CPT | Mod: GP | Performed by: PHYSICAL THERAPIST

## 2017-02-17 PROCEDURE — 97035 APP MDLTY 1+ULTRASOUND EA 15: CPT | Mod: GP | Performed by: PHYSICAL THERAPIST

## 2017-02-17 PROCEDURE — 97112 NEUROMUSCULAR REEDUCATION: CPT | Mod: GP | Performed by: PHYSICAL THERAPIST

## 2017-02-24 ENCOUNTER — HOSPITAL ENCOUNTER (OUTPATIENT)
Dept: PHYSICAL THERAPY | Facility: OTHER | Age: 27
Setting detail: THERAPIES SERIES
End: 2017-02-24
Attending: FAMILY MEDICINE
Payer: COMMERCIAL

## 2017-02-24 PROCEDURE — 97140 MANUAL THERAPY 1/> REGIONS: CPT | Mod: GP | Performed by: PHYSICAL THERAPIST

## 2017-02-24 PROCEDURE — 40000185 ZZHC STATISTIC PT OUTPT VISIT: Performed by: PHYSICAL THERAPIST

## 2017-02-24 PROCEDURE — 97110 THERAPEUTIC EXERCISES: CPT | Mod: GP | Performed by: PHYSICAL THERAPIST

## 2017-02-24 PROCEDURE — 97035 APP MDLTY 1+ULTRASOUND EA 15: CPT | Mod: GP | Performed by: PHYSICAL THERAPIST

## 2017-03-03 ENCOUNTER — HOSPITAL ENCOUNTER (OUTPATIENT)
Dept: PHYSICAL THERAPY | Facility: OTHER | Age: 27
Setting detail: THERAPIES SERIES
End: 2017-03-03
Attending: FAMILY MEDICINE
Payer: COMMERCIAL

## 2017-03-03 PROCEDURE — 97112 NEUROMUSCULAR REEDUCATION: CPT | Mod: GP | Performed by: PHYSICAL THERAPIST

## 2017-03-03 PROCEDURE — 97110 THERAPEUTIC EXERCISES: CPT | Mod: GP | Performed by: PHYSICAL THERAPIST

## 2017-03-03 PROCEDURE — 40000185 ZZHC STATISTIC PT OUTPT VISIT: Performed by: PHYSICAL THERAPIST

## 2017-03-06 ENCOUNTER — HOSPITAL ENCOUNTER (OUTPATIENT)
Dept: SPEECH THERAPY | Facility: CLINIC | Age: 27
Setting detail: THERAPIES SERIES
End: 2017-03-06
Attending: FAMILY MEDICINE
Payer: COMMERCIAL

## 2017-03-06 PROCEDURE — 92523 SPEECH SOUND LANG COMPREHEN: CPT | Mod: GN | Performed by: SPEECH-LANGUAGE PATHOLOGIST

## 2017-03-06 PROCEDURE — 40000769 ZZHC STATISTIC SLP CONCUSSION VISIT: Performed by: SPEECH-LANGUAGE PATHOLOGIST

## 2017-03-06 NOTE — PROGRESS NOTES
SUBJECTIVE:                                                    Yesika Grant is a 26 year old female who presents to clinic today for the following health issues:      One month follow up concussion    She's been doing well with PT.  Has started with the Occupational Therapy and Speech Therapy.    Topamax has really helped with her HA.  Denies tingling, dizziness.  Does have some confusion.  Currently taking 50 mg TWICE DAILY for the past week.  Didn't really notice improvement until she got to current dose.      Sleep is OK.  Still gets tired in the middle of the day.  Not able to nap most of the time.  IF able, feels much better.     Feeling like she is able to do more, but still frequently tired, easily gets worn out.  Just physically worn out.      Sleeping OK, but feels about as tired regardless of how much sleep she's gotten.    She feels her mood is a lot better. Has much more of a drive.    PHQ-9 SCORE 12/14/2016 1/18/2017 2/8/2017   Total Score - - -   Total Score 11 16 14     ZAN-7 SCORE 12/14/2016 1/18/2017 2/8/2017   Total Score - - -   Total Score 8 7 2           Problem list and histories reviewed & adjusted, as indicated.  Additional history: as documented    Current Outpatient Prescriptions   Medication Sig Dispense Refill     buPROPion (WELLBUTRIN XL) 300 MG 24 hr tablet Take 1 tablet (300 mg) by mouth every morning 30 tablet 1     topiramate (TOPAMAX) 25 MG tablet Take 1 tablet at bedtime for 1 week, then 1 tablet twice daily for 1 week, then 1 tablet in AM and 2 in PM for 1 week, then 2 tablets BID. 70 tablet 0     ALPRAZolam (XANAX) 0.25 MG tablet Take 1 tablet (0.25 mg) by mouth nightly as needed for anxiety 10 tablet 1     tiZANidine (ZANAFLEX) 4 MG tablet Take 1 tablet (4 mg) by mouth 3 times daily as needed for muscle spasms 90 tablet 0     VITAMIN D, CHOLECALCIFEROL, PO Take by mouth daily       citalopram (CELEXA) 40 MG tablet Take 1 tablet (40 mg) by mouth daily Due for office visit  "90 tablet 1     multivitamin, therapeutic with minerals (MULTI-VITAMIN) TABS Take 1 tablet by mouth daily       Ferrous Sulfate (IRON SUPPLEMENT PO)        Recent Labs   Lab Test  01/13/17   1700  12/12/16   1444  08/19/16   1408  08/18/16   1521   12/06/14   1410   01/13/09   0952   LDL   --    --    --    --    --    --    --   112   HDL   --    --    --    --    --    --    --   59   TRIG   --    --    --    --    --    --    --   92   ALT   --    --    --   21   --   14   --    --    CR   --   0.68   --   0.70   --   0.69   < >   --    GFRESTIMATED   --   >90  Non  GFR Calc     --   >90  Non  GFR Calc     --   >90  Non  GFR Calc     < >   --    GFRESTBLACK   --   >90   GFR Calc     --   >90   GFR Calc     --   >90   GFR Calc     < >   --    POTASSIUM   --   3.8   --   4.0   --   4.0   < >   --    TSH  1.72   --   4.88*   --    < >   --    < >   --     < > = values in this interval not displayed.      BP Readings from Last 3 Encounters:   03/08/17 120/76   02/08/17 106/66   01/13/17 120/82    Wt Readings from Last 3 Encounters:   03/08/17 221 lb 4.8 oz (100.4 kg)   02/08/17 221 lb 9.6 oz (100.5 kg)   01/13/17 224 lb (101.6 kg)                    Reviewed and updated as needed this visit by clinical staff       Reviewed and updated as needed this visit by Provider         ROS:  Constitutional, HEENT, cardiovascular, pulmonary, gi and gu systems are negative, except as otherwise noted.  Feels cold more often lately.  See above.  Had her period about 2 weeks later than normal, was shorter than normal as well.  Admits possibility of being pregnant.  Her LMP was March 2nd.      OBJECTIVE:                                                    /76 (BP Location: Left arm, Patient Position: Chair, Cuff Size: Adult Large)  Pulse 80  Temp 97.7  F (36.5  C) (Temporal)  Resp 20  Ht 5' 7\" (1.702 m)  Wt 221 lb 4.8 oz (100.4 kg)  " "BMI 34.66 kg/m2  Body mass index is 34.66 kg/(m^2).  GENERAL: healthy, alert and no distress  NECK: some stiffness and tenderness. Improved from before.  RESP: lungs clear to auscultation - no rales, rhonchi or wheezes  CV: regular rate and rhythm, normal S1 S2, no S3 or S4, no murmur, click or rub, no peripheral edema and peripheral pulses strong  ABDOMEN: soft, nontender, no hepatosplenomegaly, no masses and bowel sounds normal  MS: low back tenderness.    Diagnostic Test Results:  Results for orders placed or performed in visit on 01/13/17   TSH with free T4 reflex   Result Value Ref Range    TSH 1.72 0.40 - 4.00 mU/L   Vitamin D Deficiency   Result Value Ref Range    Vitamin D Deficiency screening 33 20 - 75 ug/L        ASSESSMENT/PLAN:                                                      Tobacco Cessation:   reports that she has been smoking Cigarettes.  She has been smoking about 1.00 pack per day. She has never used smokeless tobacco.  Tobacco Cessation Action Plan: Pharmacotherapies : Zyban/Wellbutrin    BMI:   Estimated body mass index is 34.66 kg/(m^2) as calculated from the following:    Height as of this encounter: 5' 7\" (1.702 m).    Weight as of this encounter: 221 lb 4.8 oz (100.4 kg).   Weight management plan: Discussed healthy diet and exercise guidelines and patient will follow up in 1 month in clinic to re-evaluate.        ICD-10-CM    1. Postconcussion syndrome F07.81 topiramate (TOPAMAX) 50 MG tablet   2. New daily persistent headache G44.52 topiramate (TOPAMAX) 50 MG tablet     cyclobenzaprine (FLEXERIL) 10 MG tablet   3. Strain of neck muscle, subsequent encounter S16.1XXD cyclobenzaprine (FLEXERIL) 10 MG tablet   4. Chronic midline low back pain without sciatica M54.5 cyclobenzaprine (FLEXERIL) 10 MG tablet    G89.29    5. Irregular menses N92.6 HCG, qualitative     1.  Some improvement.  Continue current therapies.  Continue Topamax for now, but if continued potential side effects, will " consider tapering off of it.  2.  Improved with Topamax.  Follow for now.  3,4.  Improving with PT.  Resume previously-effective muscle relaxant, etc.  5.  Will check pregnancy test since her period was late.            Patient Instructions   Thank you for visiting [facility]    Keep up with the therapies to get you back to where you need to be.    Let's continue with Topamax for now.    Can change from tizanidine back to flexeril.    Contact us or return if questions or concerns.     Please see me in 1 month for follow up, could extend to 3 months if doing great.       If you had imaging scheduled please refer to your radiology prep sheet.    Appointment    Date_______________     Time_____________    Day:   M TU W TH F    With____________________________    Location_________________________    If you need medication refills, please contact your pharmacy 3 days before your prescriptions runs out. If you are out of refills, your pharmacy will contact contact the clinic.    Contact us or return if questions or concerns.     -Your Care Team:  MD Justine Kunz PA-C Folake Falaki, MD Anoshirvan Mazhari, MD Kelly White, ZIA    General information about your clinic      Clinic hours:     Lab hours:  Phone [phone]  Monday 7:30 am-7 pm    Monday 8:30 am-6:30 pm  Tuesday-Friday 7:30 am-5 pm   Tuesday-Friday 8:30 am-4:30 pm    Pharmacy hours:  Phone [phone]  Monday 8:30 am-7pm  Tuesday-Friday 8:30am-6 pm                                       Mychart assistance [phone]        We would like to hear from you, how was your visit today?    Solange Castillo  Patient Information Supervisor   Patient Care Supervisor  [facility], and [facility] [place], and [facility]  [phone]         Young Fletcher MD, MD  [facility]
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
## 2017-03-08 ENCOUNTER — OFFICE VISIT (OUTPATIENT)
Dept: FAMILY MEDICINE | Facility: OTHER | Age: 27
End: 2017-03-08
Payer: COMMERCIAL

## 2017-03-08 VITALS
TEMPERATURE: 97.7 F | RESPIRATION RATE: 20 BRPM | HEIGHT: 67 IN | WEIGHT: 221.3 LBS | SYSTOLIC BLOOD PRESSURE: 120 MMHG | DIASTOLIC BLOOD PRESSURE: 76 MMHG | BODY MASS INDEX: 34.73 KG/M2 | HEART RATE: 80 BPM

## 2017-03-08 DIAGNOSIS — F07.81 POSTCONCUSSION SYNDROME: Primary | ICD-10-CM

## 2017-03-08 DIAGNOSIS — N92.6 IRREGULAR MENSES: ICD-10-CM

## 2017-03-08 DIAGNOSIS — G44.52 NEW DAILY PERSISTENT HEADACHE: ICD-10-CM

## 2017-03-08 DIAGNOSIS — G89.29 CHRONIC MIDLINE LOW BACK PAIN WITHOUT SCIATICA: ICD-10-CM

## 2017-03-08 DIAGNOSIS — M54.50 CHRONIC MIDLINE LOW BACK PAIN WITHOUT SCIATICA: ICD-10-CM

## 2017-03-08 DIAGNOSIS — S16.1XXD STRAIN OF NECK MUSCLE, SUBSEQUENT ENCOUNTER: ICD-10-CM

## 2017-03-08 PROCEDURE — 99214 OFFICE O/P EST MOD 30 MIN: CPT | Performed by: FAMILY MEDICINE

## 2017-03-08 PROCEDURE — 84703 CHORIONIC GONADOTROPIN ASSAY: CPT | Performed by: FAMILY MEDICINE

## 2017-03-08 RX ORDER — TOPIRAMATE 50 MG/1
50 TABLET, FILM COATED ORAL 2 TIMES DAILY
Qty: 60 TABLET | Refills: 3 | Status: SHIPPED | OUTPATIENT
Start: 2017-03-08 | End: 2017-05-24

## 2017-03-08 RX ORDER — CYCLOBENZAPRINE HCL 10 MG
10 TABLET ORAL 3 TIMES DAILY PRN
Qty: 90 TABLET | Refills: 1 | Status: SHIPPED | OUTPATIENT
Start: 2017-03-08 | End: 2018-04-09

## 2017-03-08 ASSESSMENT — PAIN SCALES - GENERAL: PAINLEVEL: MODERATE PAIN (4)

## 2017-03-08 NOTE — MR AVS SNAPSHOT
After Visit Summary   3/8/2017    Yesika Grant    MRN: [id number]           Patient Information     Date Of Birth          1990        Visit Information        Provider Department      3/8/2017 3:15 PM Young Fletcher MD [facility]        Today's Diagnoses     Postconcussion syndrome    -  1    New daily persistent headache        Strain of neck muscle, subsequent encounter        Chronic midline low back pain without sciatica        Irregular menses          Care Instructions    Thank you for visiting [facility]    Keep up with the therapies to get you back to where you need to be.    Let's continue with Topamax for now.    Can change from tizanidine back to flexeril.    Contact us or return if questions or concerns.     Please see me in 1 month for follow up, could extend to 3 months if doing great.       If you had imaging scheduled please refer to your radiology prep sheet.    Appointment    Date_______________     Time_____________    Day:   M TU W TH F    With____________________________    Location_________________________    If you need medication refills, please contact your pharmacy 3 days before your prescriptions runs out. If you are out of refills, your pharmacy will contact contact the clinic.    Contact us or return if questions or concerns.     -Your Care Team:  MD Justine Kunz PA-C Folake Falaki, MD Anoshirvan Mazhari, MD Kelly White, CNP    General information about your clinic      Clinic hours:     Lab hours:  Phone [phone]  Monday 7:30 am-7 pm    Monday 8:30 am-6:30 pm  Tuesday-Friday 7:30 am-5 pm   Tuesday-Friday 8:30 am-4:30 pm    Pharmacy hours:  Phone [phone]  Monday 8:30 am-7pm  Tuesday-Friday 8:30am-6 pm                                       Mychart assistance [phone]        We would like to hear from you, how was your visit today?    Solange Rodríguezazeb Castillo  Patient  Information Supervisor   Patient Care Supervisor  [facility], [facility], and [facility]  [phone]           Follow-ups after your visit        Your next 10 appointments already scheduled     Mar 09, 2017  3:00 PM CST   Return Concussion with Tiffanie Davenport OT   [facility] ([facility] [place])    [address] [facility] [address] [phone]            Mar 10, 2017  3:15 PM CST   Treatment 45 with Young Dockery, PT   [facility]    [address]  [facility] [address] [phone]            Mar 17, 2017  3:15 PM CDT   Treatment 45 with Young Dockery, PT   [facility]    [address] [facility] [address] [phone]            Mar 27, 2017  2:30 PM CDT   Treatment 45 with Anay Bustamante, SLP   [facility] ([facility] [place] [place])    [address] [facility] [address] [phone]              Who to contact     If you have questions or need follow up information about today's clinic visit or your schedule please contact [facility] directly at [phone].  Normal or non-critical lab and imaging results will be communicated to you by MyChart, letter or phone within 4 business days after the clinic has received the results. If you do not hear from us within 7 days, please contact the clinic through Weavedhart or phone. If you have a critical or abnormal lab result, we will notify you by phone as soon as possible.  Submit refill requests through Jodange or call your pharmacy and they will forward the refill request to us. Please allow 3 business days for your refill to be completed.          Additional Information About Your Visit        Weavedhar591wed Information     Jodange lets you send messages to your doctor, view your test results, renew your  "prescriptions, schedule appointments and more. To sign up, go to [email or web address] . Click on \"Log in\" on the left side of the screen, which will take you to the Welcome page. Then click on \"Sign up Now\" on the right side of the page.     You will be asked to enter the access code listed below, as well as some personal information. Please follow the directions to create your username and password.     Your access code is: [email or web address]  Expires: 3/14/2017 11:23 AM     Your access code will  in 90 days. If you need help or a new code, please call your [facility] clinic or [phone].        Care EveryWhere ID     This is your Care EveryWhere ID. This could be used by other organizations to access your [facility] medical records  [id number]        Your Vitals Were     Pulse Temperature Respirations Height BMI (Body Mass Index)       80 97.7  F (36.5  C) (Temporal) 20 5' 7\" (1.702 m) 34.66 kg/m2        Blood Pressure from Last 3 Encounters:   17 120/76   17 106/66   17 120/82    Weight from Last 3 Encounters:   17 221 lb 4.8 oz (100.4 kg)   17 221 lb 9.6 oz (100.5 kg)   17 224 lb (101.6 kg)              We Performed the Following     HCG, qualitative          Today's Medication Changes          These changes are accurate as of: 3/8/17  3:48 PM.  If you have any questions, ask your nurse or doctor.               Start taking these medicines.        Dose/Directions    cyclobenzaprine 10 MG tablet   Commonly known as:  FLEXERIL   Used for:  New daily persistent headache, Strain of neck muscle, subsequent encounter, Chronic midline low back pain without sciatica   Started by:  Young Fletcher MD        Dose:  10 mg   Take 1 tablet (10 mg) by mouth 3 times daily as needed for muscle spasms   Quantity:  90 tablet   Refills:  1         These medicines have changed or have updated prescriptions.        Dose/Directions    topiramate 50 MG tablet   Commonly known as:  TOPAMAX "   This may have changed:    - medication strength  - how much to take  - how to take this  - when to take this  - additional instructions   Used for:  Postconcussion syndrome, New daily persistent headache   Changed by:  Young Fletcher MD        Dose:  50 mg   Take 1 tablet (50 mg) by mouth 2 times daily   Quantity:  60 tablet   Refills:  3         Stop taking these medicines if you haven't already. Please contact your care team if you have questions.     tiZANidine 4 MG tablet   Commonly known as:  ZANAFLEX   Stopped by:  Young Fletcher MD                Where to get your medicines      These medications were sent to [facility] [place] - [address]   [address] [place] [address]     Phone:  [phone]     cyclobenzaprine 10 MG tablet    topiramate 50 MG tablet                Primary Care Provider Office Phone # Fax #    Justine Sánchez PA-C [phone]       [facility] [address] [place] [address]        Thank you!     Thank you for choosing [facility]  for your care. Our goal is always to provide you with excellent care. Hearing back from our patients is one way we can continue to improve our services. Please take a few minutes to complete the written survey that you may receive in the mail after your visit with us. Thank you!             Your Updated Medication List - Protect others around you: Learn how to safely use, store and throw away your medicines at www.disposemymeds.org.          This list is accurate as of: 3/8/17  3:48 PM.  Always use your most recent med list.                   Brand Name Dispense Instructions for use    ALPRAZolam 0.25 MG tablet    XANAX    10 tablet    Take 1 tablet (0.25 mg) by mouth nightly as needed for anxiety       buPROPion 300 MG 24 hr tablet    WELLBUTRIN XL    30 tablet    Take 1 tablet (300 mg) by mouth every morning       citalopram 40 MG tablet    celeXA     90 tablet    Take 1 tablet (40 mg) by mouth daily Due for office visit       cyclobenzaprine 10 MG tablet    FLEXERIL    90 tablet    Take 1 tablet (10 mg) by mouth 3 times daily as needed for muscle spasms       IRON SUPPLEMENT PO          Multi-vitamin Tabs tablet      Take 1 tablet by mouth daily       topiramate 50 MG tablet    TOPAMAX    60 tablet    Take 1 tablet (50 mg) by mouth 2 times daily       VITAMIN D (CHOLECALCIFEROL) PO      Take by mouth daily

## 2017-03-08 NOTE — NURSING NOTE
"Chief Complaint   Patient presents with     RECHECK     Panel Management     Rogeliot, phq, david       Initial /76 (BP Location: Left arm, Patient Position: Chair, Cuff Size: Adult Large)  Pulse 80  Temp 97.7  F (36.5  C) (Temporal)  Resp 20  Ht 5' 7\" (1.702 m)  Wt 221 lb 4.8 oz (100.4 kg)  BMI 34.66 kg/m2 Estimated body mass index is 34.66 kg/(m^2) as calculated from the following:    Height as of this encounter: 5' 7\" (1.702 m).    Weight as of this encounter: 221 lb 4.8 oz (100.4 kg).  Medication Reconciliation: complete  Daniel Michaud CMA    "

## 2017-03-08 NOTE — PATIENT INSTRUCTIONS
Thank you for visiting [facility]    Keep up with the therapies to get you back to where you need to be.    Let's continue with Topamax for now.    Can change from tizanidine back to flexeril.    Contact us or return if questions or concerns.     Please see me in 1 month for follow up, could extend to 3 months if doing great.       If you had imaging scheduled please refer to your radiology prep sheet.    Appointment    Date_______________     Time_____________    Day:   M TU W TH F    With____________________________    Location_________________________    If you need medication refills, please contact your pharmacy 3 days before your prescriptions runs out. If you are out of refills, your pharmacy will contact contact the clinic.    Contact us or return if questions or concerns.     -Your Care Team:  MD Justine Kunz PA-C Folake Falaki, MD Anoshirvan Mazhari, MD Kelly White, CNP    General information about your clinic      Clinic hours:     Lab hours:  Phone [phone]  Monday 7:30 am-7 pm    Monday 8:30 am-6:30 pm  Tuesday-Friday 7:30 am-5 pm   Tuesday-Friday 8:30 am-4:30 pm    Pharmacy hours:  Phone [phone]  Monday 8:30 am-7pm  Tuesday-Friday 8:30am-6 pm                                       Mychart assistance [phone]        We would like to hear from you, how was your visit today?    Solange Castillo  Patient Information Supervisor   Patient Care Supervisor  [facility], and hospitals, and [facility]  [phone]

## 2017-03-09 ENCOUNTER — HOSPITAL ENCOUNTER (OUTPATIENT)
Dept: OCCUPATIONAL THERAPY | Facility: CLINIC | Age: 27
Setting detail: THERAPIES SERIES
End: 2017-03-09
Attending: FAMILY MEDICINE
Payer: COMMERCIAL

## 2017-03-09 LAB — HCG SERPL QL: NEGATIVE

## 2017-03-09 PROCEDURE — 40000768 ZZHC STATISTIC OT CONCUSSION VISIT: Performed by: OCCUPATIONAL THERAPIST

## 2017-03-09 PROCEDURE — 97532 ZZHC OT COGNITIVE SKILLS EA 15 MIN: CPT | Mod: GO,59 | Performed by: OCCUPATIONAL THERAPIST

## 2017-03-09 PROCEDURE — 97530 THERAPEUTIC ACTIVITIES: CPT | Mod: GO | Performed by: OCCUPATIONAL THERAPIST

## 2017-03-10 ENCOUNTER — HOSPITAL ENCOUNTER (OUTPATIENT)
Dept: PHYSICAL THERAPY | Facility: OTHER | Age: 27
Setting detail: THERAPIES SERIES
End: 2017-03-10
Attending: FAMILY MEDICINE
Payer: COMMERCIAL

## 2017-03-10 PROCEDURE — 40000185 ZZHC STATISTIC PT OUTPT VISIT: Performed by: PHYSICAL THERAPIST

## 2017-03-10 PROCEDURE — 97110 THERAPEUTIC EXERCISES: CPT | Mod: GP | Performed by: PHYSICAL THERAPIST

## 2017-03-10 PROCEDURE — 97112 NEUROMUSCULAR REEDUCATION: CPT | Mod: GP | Performed by: PHYSICAL THERAPIST

## 2017-03-10 ASSESSMENT — ANXIETY QUESTIONNAIRES
6. BECOMING EASILY ANNOYED OR IRRITABLE: SEVERAL DAYS
7. FEELING AFRAID AS IF SOMETHING AWFUL MIGHT HAPPEN: NOT AT ALL
GAD7 TOTAL SCORE: 5
5. BEING SO RESTLESS THAT IT IS HARD TO SIT STILL: NOT AT ALL
2. NOT BEING ABLE TO STOP OR CONTROL WORRYING: NOT AT ALL
3. WORRYING TOO MUCH ABOUT DIFFERENT THINGS: NOT AT ALL
1. FEELING NERVOUS, ANXIOUS, OR ON EDGE: SEVERAL DAYS
IF YOU CHECKED OFF ANY PROBLEMS ON THIS QUESTIONNAIRE, HOW DIFFICULT HAVE THESE PROBLEMS MADE IT FOR YOU TO DO YOUR WORK, TAKE CARE OF THINGS AT HOME, OR GET ALONG WITH OTHER PEOPLE: NOT DIFFICULT AT ALL

## 2017-03-10 ASSESSMENT — PATIENT HEALTH QUESTIONNAIRE - PHQ9: 5. POOR APPETITE OR OVEREATING: NEARLY EVERY DAY

## 2017-03-11 ASSESSMENT — ANXIETY QUESTIONNAIRES: GAD7 TOTAL SCORE: 5

## 2017-03-11 ASSESSMENT — PATIENT HEALTH QUESTIONNAIRE - PHQ9: SUM OF ALL RESPONSES TO PHQ QUESTIONS 1-9: 13

## 2017-03-15 ENCOUNTER — HOSPITAL ENCOUNTER (OUTPATIENT)
Dept: OCCUPATIONAL THERAPY | Facility: CLINIC | Age: 27
Setting detail: THERAPIES SERIES
End: 2017-03-15
Attending: FAMILY MEDICINE
Payer: COMMERCIAL

## 2017-03-15 PROCEDURE — 40000768 ZZHC STATISTIC OT CONCUSSION VISIT: Performed by: OCCUPATIONAL THERAPIST

## 2017-03-15 PROCEDURE — 97532 ZZHC OT COGNITIVE SKILLS EA 15 MIN: CPT | Mod: GO | Performed by: OCCUPATIONAL THERAPIST

## 2017-03-15 NOTE — PROGRESS NOTES
Update on Progress during Occupational Therapy Sessions.      03/15/17 1507   Signing Clinician's Name / Credentials   Signing clinician's name / credentials Tiffanie Davenport OTR/L   Session Number   Session Number 3   Progress/Recertification   Progress Note Due 05/15/17   Adult OT Eval Goals   OT Eval Goals (Adult) 1;2;3;4   OT Goal 1   Goal Identifier environmental modifications   Goal Description Pt will verbalize at least 3 strategies for minimizing concussion symptoms, including headphones/earplugs, decreasing visual clutter, changing work schedule, etc, to promote brain healing.   Target Date 05/15/17   OT Goal 2   Goal Identifier Concussion symptom assessment   Goal Description Pt will score <20 on the Concussion Symtpom Assessment from her initial score of 31, demonstrating improved symptoms.   Target Date 05/15/17   Date Met 03/15/17.  Score of 8 today.  Initial score was 31.   OT Goal 3   Goal Identifier attention   Goal Description Pt will demo 20 min sustained attention to cognitive worksheets, for improved concentration needed for return to work.   Target Date 05/15/17   Pt reports this is still a challenge.   OT Goal 4   Goal Identifier visual scanning   Goal Description Pt will demonstrate 10 min of dynavision visual scanning activity with no vision symptoms or dizziness.   Target Date 05/15/17  Tolerance has been limited mostly by neck pain. Tried task last week with answering questions while walking and bouncing a tennis ball, and HA/neck pain were exacerbated for the next day.   Subjective Report   Subjective Report pt reports that this week has been much better.  She did have a rebound headache after last weeks' OT session, with a headache lasting into the next day.  However, her mental clarity and concentration has improved. She feels that the current combination of meds is working well for her.   Objective Measures   Objective Measures Objective Measure 1;Objective Measure 2   Objective Measure  1   Objective Measure PASAT (Paced Auditory Serial Addition Test).  Test of cognitive processing ability.    Details form A: score 37 at 3 second rate, 25 at 2 second rate, and then 47 at 3 second rate.  Final score was 47/60, (78%)   Score is now within 1 SD of mean, though slightly below the average score of 50.    Objective Measure 2   Objective Measure Concussion Symptom assessment score   Details 8 total score (met goal for symptom rating under 20)   Self Care/Home Management   Minutes 45 Minutes   Skilled Intervention cognitive assessmnet and training of cognitive processing speed; memory recall strategies; attention strategies   Treatment Detail Pt completed 3 sets of PASAT. See scores above. Educated on improvements noted since last week and encouraged increased cognitive challenges to promote recovery of cognitive processing speed. Educated on expectation for some increase in headache, but this is ok as long as headache decreases within 3 hours.  Educated on need for symptom monitoring with doing cognitively challenging tasks.  Completed word recall activity, with 4-word list and education on strategies for increased information encoding (visualization, use of a storyline, etc.)  Initially, pt recalled 2/4 words using no particular strategy. With strategies, pt able to recall 4/4 words across 1 trial.  Educated on the general process for improving attention span, with focus this week on doing 15-20 minutes of continuous cognitive exerises every day.  Monitor for increase of symptoms, but try to push self to stay focused on reading/worksheets for a full 20 min each day.  Pt was provided with a variety of cognitive exercises to implement each day, with help from family as needed (i.e. Having family read word lists that she tries to remember).    Education   Education Notes see above   Plan   Plan for next session check in on progress with cognitive exercises at home, update as needed. Cont with PASAT testing  and divided attn tasks. May decrease frequency after next week.   Total Session Time   Total Session Time 45 min

## 2017-03-17 ENCOUNTER — HOSPITAL ENCOUNTER (OUTPATIENT)
Dept: PHYSICAL THERAPY | Facility: OTHER | Age: 27
Setting detail: THERAPIES SERIES
End: 2017-03-17
Attending: FAMILY MEDICINE
Payer: COMMERCIAL

## 2017-03-17 PROCEDURE — 97140 MANUAL THERAPY 1/> REGIONS: CPT | Mod: GP | Performed by: PHYSICAL THERAPIST

## 2017-03-17 PROCEDURE — 97112 NEUROMUSCULAR REEDUCATION: CPT | Mod: GP | Performed by: PHYSICAL THERAPIST

## 2017-03-17 PROCEDURE — 97035 APP MDLTY 1+ULTRASOUND EA 15: CPT | Mod: GP | Performed by: PHYSICAL THERAPIST

## 2017-03-17 PROCEDURE — 40000185 ZZHC STATISTIC PT OUTPT VISIT: Performed by: PHYSICAL THERAPIST

## 2017-03-22 ENCOUNTER — HOSPITAL ENCOUNTER (OUTPATIENT)
Dept: OCCUPATIONAL THERAPY | Facility: CLINIC | Age: 27
Setting detail: THERAPIES SERIES
End: 2017-03-22
Attending: FAMILY MEDICINE
Payer: COMMERCIAL

## 2017-03-22 PROCEDURE — 40000125 ZZHC STATISTIC OT OUTPT VISIT: Performed by: OCCUPATIONAL THERAPIST

## 2017-03-22 PROCEDURE — 97532 ZZHC OT COGNITIVE SKILLS EA 15 MIN: CPT | Mod: GO | Performed by: OCCUPATIONAL THERAPIST

## 2017-03-22 PROCEDURE — 40000768 ZZHC STATISTIC OT CONCUSSION VISIT: Performed by: OCCUPATIONAL THERAPIST

## 2017-03-22 PROCEDURE — 40000444 ZZHC STATISTIC OT PEDS VISIT: Performed by: OCCUPATIONAL THERAPIST

## 2017-03-22 NOTE — ADDENDUM NOTE
Encounter addended by: Tiffanie Davenport OT on: 3/22/2017  3:57 PM<BR>     Actions taken: Flowsheet accepted

## 2017-03-22 NOTE — ADDENDUM NOTE
Encounter addended by: Tiffanie Davenport, OT on: 3/22/2017  3:56 PM<BR>     Actions taken: Flowsheet data copied forward, Flowsheet accepted

## 2017-03-29 ENCOUNTER — HOSPITAL ENCOUNTER (OUTPATIENT)
Dept: OCCUPATIONAL THERAPY | Facility: CLINIC | Age: 27
Setting detail: THERAPIES SERIES
End: 2017-03-29
Attending: FAMILY MEDICINE
Payer: COMMERCIAL

## 2017-03-29 ENCOUNTER — TELEPHONE (OUTPATIENT)
Dept: FAMILY MEDICINE | Facility: OTHER | Age: 27
End: 2017-03-29

## 2017-03-29 PROCEDURE — 40000768 ZZHC STATISTIC OT CONCUSSION VISIT: Performed by: OCCUPATIONAL THERAPIST

## 2017-03-29 PROCEDURE — 97530 THERAPEUTIC ACTIVITIES: CPT | Mod: GO | Performed by: OCCUPATIONAL THERAPIST

## 2017-03-29 PROCEDURE — 97110 THERAPEUTIC EXERCISES: CPT | Mod: GO | Performed by: OCCUPATIONAL THERAPIST

## 2017-03-29 NOTE — TELEPHONE ENCOUNTER
"----- Message from Tiffanie Davenport, OT sent at 3/29/2017  3:47 PM CDT -----  Regarding: checking in regarding OT progress  Hi Dr. Fletcher,   I just wanted to update you on [place].  She has made some progress in symptom reduction and with scores on cognitive processing tests that we have done.  Her mood and alertness, however, continues to fluctuate.  Last week, she said she felt very energetic and was looking more alert or \"on.\" Today she was much more groggy, disconnected in conversation. Says she has a hard time waking up in the mornings. Concentration has been difficult, and she hasn't been very consistent with the cognitive homework I've been giving her.    I suspect that she is close to baseline due to her history of depression.  At times, she presents to me like a person with Asperger's, though I do not know if she has been assessed for this in the past.   Regardless, I do think that next week will be our final OT visit, as I don't see much potential for continued improvement.  I just wanted to let you know. Please let me know if you have any questions.     Tiffanie Davenport  Occupational Therapist  [facility] ~ [facility]  [address] [place] [address]  [email or web address]  Phone: [phone]    "

## 2017-04-03 ENCOUNTER — HOSPITAL ENCOUNTER (OUTPATIENT)
Dept: PHYSICAL THERAPY | Facility: OTHER | Age: 27
Setting detail: THERAPIES SERIES
End: 2017-04-03
Attending: FAMILY MEDICINE
Payer: COMMERCIAL

## 2017-04-03 PROCEDURE — 97112 NEUROMUSCULAR REEDUCATION: CPT | Mod: GP | Performed by: PHYSICAL THERAPIST

## 2017-04-03 PROCEDURE — 97140 MANUAL THERAPY 1/> REGIONS: CPT | Mod: GP | Performed by: PHYSICAL THERAPIST

## 2017-04-03 PROCEDURE — 40000185 ZZHC STATISTIC PT OUTPT VISIT: Performed by: PHYSICAL THERAPIST

## 2017-04-14 DIAGNOSIS — F32.5 MAJOR DEPRESSION IN COMPLETE REMISSION (H): ICD-10-CM

## 2017-04-14 DIAGNOSIS — F17.200 TOBACCO USE DISORDER: ICD-10-CM

## 2017-04-14 RX ORDER — BUPROPION HYDROCHLORIDE 300 MG/1
TABLET ORAL
Qty: 30 TABLET | Refills: 0 | Status: SHIPPED | OUTPATIENT
Start: 2017-04-14 | End: 2017-05-01

## 2017-04-14 NOTE — TELEPHONE ENCOUNTER
buPROPion (WELLBUTRIN XL) 300 MG 24 hr tablet         Last Written Prescription Date: 2/8/2017  Last Fill Quantity: 30; # refills: 1  Last Office Visit with [facility], [facility] or [facility] prescribing provider:  3/8/2017   Next 5 appointments (look out 90 days)     May 01, 2017  3:30 PM CDT   [facility]   [facility]    [address] [facility] [address] [phone]                   Last PHQ-9 score on record=   PHQ-9 SCORE 3/10/2017   Total Score 13       Lab Results   Component Value Date    AST 14 08/18/2016     Lab Results   Component Value Date    ALT 21 08/18/2016

## 2017-04-14 NOTE — TELEPHONE ENCOUNTER
Prescription approved per [facility] Refill Protocol.  Demond Anderson RN

## 2017-04-18 ENCOUNTER — HOSPITAL ENCOUNTER (OUTPATIENT)
Dept: PHYSICAL THERAPY | Facility: OTHER | Age: 27
Setting detail: THERAPIES SERIES
End: 2017-04-18
Attending: FAMILY MEDICINE
Payer: COMMERCIAL

## 2017-04-18 PROCEDURE — 97112 NEUROMUSCULAR REEDUCATION: CPT | Mod: GP | Performed by: PHYSICAL THERAPIST

## 2017-04-18 PROCEDURE — 40000185 ZZHC STATISTIC PT OUTPT VISIT: Performed by: PHYSICAL THERAPIST

## 2017-04-18 PROCEDURE — 97110 THERAPEUTIC EXERCISES: CPT | Mod: GP | Performed by: PHYSICAL THERAPIST

## 2017-04-19 NOTE — PROGRESS NOTES
"Outpatient Physical Therapy Progress Note and Discharge Note     Patient: Yesika Grant  : 1990    Beginning/End Dates of Reporting Period:  17 to 2017 (pt seen for 14 PT visits in that time)    Referring Provider: Young Fletcher MD    Therapy Diagnosis: mechanical neck and low back pain with bilateral upper extremity and headache referral, decreased functional level     Client Self Report: Overall things are managed better when pt reported at last visit on 17. She has learned a lot in PT and has a decrease in her overall sx in her back and neck, UE's and LE's. No serious full blown knee \"spasms\" since she started correcting her back posture. Pt having a lot of tension and stress at work. Ready to try things on her own.  In 2 weeks pt will start taking belly dancing classes again.    Objective Measurements:17  Objective Measure: average pain level (at eval on 17 neck and head 4/10)  Details: 2/10 neck, low back 2/10, shins 2/10, L>R hand  Objective Measure: frequency (was 100% at eval for neck, head, low back and 50% for UE's)  Details: no longer ankle sx, B shins 20%, lateral hips 20%, low back 20%. UE sx not constant, neck/upper traps 40% of day  Objective Measure: number of times waking due to pain (was 1-2 at eval on 17)  Details: most nights none  Objective Measure: reported functional level (60% at [facility] on 17)  Details: better, will join exercise class  Objective Measure: ndi (54% on 17)  Details: 34% today    Goals:  Goal Identifier pain   Goal Description pt will note a decrease in average neck pain from a 4/10 to a 2-3/10 or less so she will no longer wake 1-2X per night due to pain but have 3 or more nights a week without waking due to pain   Target Date 17   Date Met  17   Progress:     Goal Identifier function   Goal Description pt will note further reduction in symptoms and carry over to improved functional level as reported on NDI by " decreasing score from 54% to 34% or less   Target Date 04/17/17   Date Met  04/18/17   Progress:       Progress Toward Goals:   Progress this reporting period: pt has progressed well with original goals. Pt had multiple pain complaints so initial focus was on neck, head and UE sx and over the last month or so addressed low back and LE sx. Pt with chronic pain so sx not abolished but much more manageable and should continue to get better if follows home program.    Plan:  Discharge from therapy.    Discharge:    Reason for Discharge: Patient has met all goals.    Equipment Issued: none    Discharge Plan: Patient to continue home program.

## 2017-04-24 NOTE — PROGRESS NOTES
"  SUBJECTIVE:                                                    Yesika Grant is a 26 year old female who presents to clinic today for the following health issues:      Concussion f/u      Onset: 12/10/16    Description:   Location: bilateral in the frontal area, \"more migraine\"   Character: throbbing pain, squeezing pain  Frequency:  4-5 days a week  Duration:  \"couple hours\"    Intensity: mild    Progression of Symptoms:  Improving with Topomax, intensity is less    Accompanying Signs & Symptoms:  Stiff neck: YES- very little  Neck or upper back pain: YES- mild  Fever: no  Sinus pressure: no  Nausea or vomiting: YES- nausea  Dizziness: no   Numbness: no  Weakness: no  Visual changes: YES- with Bad headaches   History:   Head trauma: YES  Family history of migraines: no  Previous tests for headaches: no  Neurologist evaluations: no  Able to do daily activities: no  Wake with a headaches: no  Do headaches wake you up: no  Daily pain medication use: no  Work/school stressors/changes: no    Precipitating factors:   Does light make it worse: YES  Does sound make it worse: no    Alleviating factors:  Does sleep help: YES         Therapies Tried and outcome: Topomax    Pt continues with frequent HA.  Worse later in the workday, especially after 6-7 hours unless it's a lower stress day.  Usually averaging about 6-7 hrs of work/day due to this.  Topamax is definitely helping her symptoms.  No further side effects from it.  These seem to have subsided.  Still seeing Occupational Therapy.  PT is nearly done.      She does note HA in a particular area if trying to remember certain things.      Overall, feels she's continuing to make slow, but steady progress.    Has been taking flexeril most nights.      She downloaded a brain game - lumosity.  Has been trying to use that to improve her cognition.    Mood has been good.  NO complaints/concerns about that.  Did have some issues with HR and a person in another department, " but that has improved over the last few weeks.  She'd used a lot more Xanax related to this for a bit.        Problem list and histories reviewed & adjusted, as indicated.  Additional history: as documented    Current Outpatient Prescriptions   Medication Sig Dispense Refill     buPROPion (WELLBUTRIN XL) 300 MG 24 hr tablet TAKE ONE TABLET BY MOUTH EVERY MORNING 30 tablet 0     topiramate (TOPAMAX) 50 MG tablet Take 1 tablet (50 mg) by mouth 2 times daily 60 tablet 3     cyclobenzaprine (FLEXERIL) 10 MG tablet Take 1 tablet (10 mg) by mouth 3 times daily as needed for muscle spasms 90 tablet 1     ALPRAZolam (XANAX) 0.25 MG tablet Take 1 tablet (0.25 mg) by mouth nightly as needed for anxiety 10 tablet 1     VITAMIN D, CHOLECALCIFEROL, PO Take by mouth daily       citalopram (CELEXA) 40 MG tablet Take 1 tablet (40 mg) by mouth daily Due for office visit 90 tablet 1     multivitamin, therapeutic with minerals (MULTI-VITAMIN) TABS Take 1 tablet by mouth daily       Ferrous Sulfate (IRON SUPPLEMENT PO)        Recent Labs   Lab Test  01/13/17   1700  12/12/16   1444  08/19/16   1408  08/18/16   1521   12/06/14   1410   ALT   --    --    --   21   --   14   CR   --   0.68   --   0.70   --   0.69   GFRESTIMATED   --   >90  Non  GFR Calc     --   >90  Non  GFR Calc     --   >90  Non  GFR Calc     GFRESTBLACK   --   >90   GFR Calc     --   >90   GFR Calc     --   >90   GFR Calc     POTASSIUM   --   3.8   --   4.0   --   4.0   TSH  1.72   --   4.88*   --    < >   --     < > = values in this interval not displayed.      BP Readings from Last 3 Encounters:   05/01/17 116/68   03/08/17 120/76   02/08/17 106/66    Wt Readings from Last 3 Encounters:   05/01/17 222 lb (100.7 kg)   03/08/17 221 lb 4.8 oz (100.4 kg)   02/08/17 221 lb 9.6 oz (100.5 kg)                    Reviewed and updated as needed this visit by clinical staff      "  Reviewed and updated as needed this visit by Provider         ROS:  Constitutional, HEENT, cardiovascular, pulmonary, gi and gu systems are negative, except as otherwise noted.    OBJECTIVE:                                                    /68 (Cuff Size: Adult Large)  Pulse 64  Temp 97.9  F (36.6  C) (Temporal)  Resp 16  Ht 5' 7\" (1.702 m)  Wt 222 lb (100.7 kg)  BMI 34.77 kg/m2  Body mass index is 34.77 kg/(m^2).  GENERAL: healthy, alert and no distress  NECK: no adenopathy, no asymmetry, masses, or scars and thyroid normal to palpation  RESP: lungs clear to auscultation - no rales, rhonchi or wheezes  CV: regular rate and rhythm, normal S1 S2, no S3 or S4, no murmur, click or rub, no peripheral edema and peripheral pulses strong  ABDOMEN: soft, nontender, no hepatosplenomegaly, no masses and bowel sounds normal  MS: no gross musculoskeletal defects noted, no edema    Diagnostic Test Results:  Results for orders placed or performed in visit on 03/08/17   HCG, qualitative   Result Value Ref Range    HCG Qualitative Serum Negative NEG        ASSESSMENT/PLAN:                                                      BMI:   Estimated body mass index is 34.77 kg/(m^2) as calculated from the following:    Height as of this encounter: 5' 7\" (1.702 m).    Weight as of this encounter: 222 lb (100.7 kg).   Weight management plan: Discussed healthy diet and exercise guidelines and patient will follow up in 6 months in clinic to re-evaluate.        ICD-10-CM    1. Postconcussion syndrome F07.81    2. Chronic post-traumatic headache, not intractable G44.329 ondansetron (ZOFRAN ODT) 4 MG ODT tab   3. Major depression in complete remission (H) F32.5 buPROPion (WELLBUTRIN XL) 300 MG 24 hr tablet   4. Nausea R11.0 ondansetron (ZOFRAN ODT) 4 MG ODT tab   5. ZAN (generalized anxiety disorder) F41.1 citalopram (CELEXA) 40 MG tablet   6. Tobacco use disorder F17.200 buPROPion (WELLBUTRIN XL) 300 MG 24 hr tablet   7. New " daily persistent headache G44.52      1,2,7.  Continues to make slow progress.  Nearly done with therapies.  Offered neurology consultation, but pt feels she's continuing to make progress.  Will Rx Zofran for occasional use.  Will also increase Topamax if she continues to tolerate.  Follow up in one month.  3,5.  Currently Controlled.  Continue current regimen.  Call/return if any problems or questions arise.   4.  Occasional Zofran.  6.  Encouraged cessation.                Patient Instructions   Thank you for visiting [facility] [place]    Can try to increase Topamax to 100 mg at night for 1-2 weeks.  If no side effects, can increase to 100 mg twice daily.    Can use Zofran as needed when severe nausea.      Please see me in 1-2 months for follow up.       If you had imaging scheduled please refer to your radiology prep sheet.    Appointment    Date_______________     Time_____________    Day:   M TU W TH F    With____________________________    Location_________________________    If you need medication refills, please contact your pharmacy 3 days before your prescriptions runs out. If you are out of refills, your pharmacy will contact contact the clinic.    Contact us or return if questions or concerns.     -Your Care Team:  MD Justine Kunz PA-C Folake Falaki, MD Anoshirvan Mazhari, MD Kelly White, CNP    General information about your clinic      Clinic hours:     Lab hours:  Phone [phone]  Monday 7:30 am-7 pm    Monday 8:30 am-6:30 pm  Tuesday-Friday 7:30 am-5 pm   Tuesday-Friday 8:30 am-4:30 pm    Pharmacy hours:  Phone [phone]  Monday 8:30 am-7pm  Tuesday-Friday 8:30am-6 pm                                       Mychart assistance [phone]        We would like to hear from you, how was your visit today?    Solange Castillo  Patient Information Supervisor   Patient Care Supervisor  [place], and [facility] [place]  [place] [facility]  [phone]         Young Fletcher MD, MD  [facility]

## 2017-05-01 ENCOUNTER — OFFICE VISIT (OUTPATIENT)
Dept: FAMILY MEDICINE | Facility: OTHER | Age: 27
End: 2017-05-01
Payer: COMMERCIAL

## 2017-05-01 VITALS
HEIGHT: 67 IN | WEIGHT: 222 LBS | SYSTOLIC BLOOD PRESSURE: 116 MMHG | RESPIRATION RATE: 16 BRPM | TEMPERATURE: 97.9 F | HEART RATE: 64 BPM | DIASTOLIC BLOOD PRESSURE: 68 MMHG | BODY MASS INDEX: 34.84 KG/M2

## 2017-05-01 DIAGNOSIS — F41.1 GAD (GENERALIZED ANXIETY DISORDER): ICD-10-CM

## 2017-05-01 DIAGNOSIS — R11.0 NAUSEA: ICD-10-CM

## 2017-05-01 DIAGNOSIS — F17.200 TOBACCO USE DISORDER: ICD-10-CM

## 2017-05-01 DIAGNOSIS — F32.5 MAJOR DEPRESSION IN COMPLETE REMISSION (H): ICD-10-CM

## 2017-05-01 DIAGNOSIS — G44.52 NEW DAILY PERSISTENT HEADACHE: ICD-10-CM

## 2017-05-01 DIAGNOSIS — F07.81 POSTCONCUSSION SYNDROME: Primary | ICD-10-CM

## 2017-05-01 DIAGNOSIS — G44.329 CHRONIC POST-TRAUMATIC HEADACHE, NOT INTRACTABLE: ICD-10-CM

## 2017-05-01 PROCEDURE — 99214 OFFICE O/P EST MOD 30 MIN: CPT | Performed by: FAMILY MEDICINE

## 2017-05-01 RX ORDER — CITALOPRAM HYDROBROMIDE 40 MG/1
40 TABLET ORAL DAILY
Qty: 90 TABLET | Refills: 1 | Status: SHIPPED | OUTPATIENT
Start: 2017-05-01 | End: 2017-09-05

## 2017-05-01 RX ORDER — ONDANSETRON 4 MG/1
4 TABLET, ORALLY DISINTEGRATING ORAL EVERY 8 HOURS PRN
Qty: 10 TABLET | Refills: 0 | Status: SHIPPED | OUTPATIENT
Start: 2017-05-01 | End: 2017-06-29

## 2017-05-01 RX ORDER — BUPROPION HYDROCHLORIDE 300 MG/1
300 TABLET ORAL EVERY MORNING
Qty: 90 TABLET | Refills: 1 | Status: SHIPPED | OUTPATIENT
Start: 2017-05-01 | End: 2017-06-29

## 2017-05-01 ASSESSMENT — PAIN SCALES - GENERAL: PAINLEVEL: MILD PAIN (3)

## 2017-05-01 NOTE — MR AVS SNAPSHOT
After Visit Summary   5/1/2017    Yesika Grant    MRN: [id number]           Patient Information     Date Of Birth          1990        Visit Information        Provider Department      5/1/2017 3:30 PM Young Fletcher MD [facility]        Today's Diagnoses     Postconcussion syndrome    -  1    Chronic post-traumatic headache, not intractable        Major depression in complete remission (H)        Nausea        ZAN (generalized anxiety disorder)        Tobacco use disorder        New daily persistent headache          Care Instructions    Thank you for visiting [facility] [place]    Can try to increase Topamax to 100 mg at night for 1-2 weeks.  If no side effects, can increase to 100 mg twice daily.    Can use Zofran as needed when severe nausea.      Please see me in 1-2 months for follow up.       If you had imaging scheduled please refer to your radiology prep sheet.    Appointment    Date_______________     Time_____________    Day:   M TU W TH F    With____________________________    Location_________________________    If you need medication refills, please contact your pharmacy 3 days before your prescriptions runs out. If you are out of refills, your pharmacy will contact contact the clinic.    Contact us or return if questions or concerns.     -Your Care Team:  MD Justine Kunz PA-C Folake Falaki, MD Anoshirvan Mazhari, MD Kelly White, CNP    General information about your clinic      Clinic hours:     Lab hours:  Phone [phone]  Monday 7:30 am-7 pm    Monday 8:30 am-6:30 pm  Tuesday-Friday 7:30 am-5 pm   Tuesday-Friday 8:30 am-4:30 pm    Pharmacy hours:  Phone [phone]  Monday 8:30 am-7pm  Tuesday-Friday 8:30am-6 pm                                       Mychart assistance [phone]        We would like to hear from you, how was your visit today?    Solange Castillo  Patient Information  "Supervisor   Patient Care Supervisor  [facility], [facility], and [facility]  [phone]           Follow-ups after your visit        Who to contact     If you have questions or need follow up information about today's clinic visit or your schedule please contact [facility] directly at [phone].  Normal or non-critical lab and imaging results will be communicated to you by Tagitohart, letter or phone within 4 business days after the clinic has received the results. If you do not hear from us within 7 days, please contact the clinic through R2integratedt or phone. If you have a critical or abnormal lab result, we will notify you by phone as soon as possible.  Submit refill requests through Garmor or call your pharmacy and they will forward the refill request to us. Please allow 3 business days for your refill to be completed.          Additional Information About Your Visit        Garmor Information     Garmor lets you send messages to your doctor, view your test results, renew your prescriptions, schedule appointments and more. To sign up, go to [email or web address] . Click on \"Log in\" on the left side of the screen, which will take you to the Welcome page. Then click on \"Sign up Now\" on the right side of the page.     You will be asked to enter the access code listed below, as well as some personal information. Please follow the directions to create your username and password.     Your access code is: [email or web address]  Expires: 2017  4:10 PM     Your access code will  in 90 days. If you need help or a new code, please call your [place] clinic or [phone].        Care EveryWhere ID     This is your Care EveryWhere ID. This could be used by other organizations to access your [place] medical records  [id number]        Your Vitals Were     Pulse Temperature Respirations Height BMI (Body Mass Index)       64 97.9  F (36.6 " " C) (Temporal) 16 5' 7\" (1.702 m) 34.77 kg/m2        Blood Pressure from Last 3 Encounters:   05/01/17 116/68   03/08/17 120/76   02/08/17 106/66    Weight from Last 3 Encounters:   05/01/17 222 lb (100.7 kg)   03/08/17 221 lb 4.8 oz (100.4 kg)   02/08/17 221 lb 9.6 oz (100.5 kg)              Today, you had the following     No orders found for display         Today's Medication Changes          These changes are accurate as of: 5/1/17  4:10 PM.  If you have any questions, ask your nurse or doctor.               Start taking these medicines.        Dose/Directions    ondansetron 4 MG ODT tab   Commonly known as:  ZOFRAN ODT   Used for:  Nausea, Chronic post-traumatic headache, not intractable   Started by:  Young Fletcher MD        Dose:  4 mg   Take 1 tablet (4 mg) by mouth every 8 hours as needed for nausea   Quantity:  10 tablet   Refills:  0         These medicines have changed or have updated prescriptions.        Dose/Directions    buPROPion 300 MG 24 hr tablet   Commonly known as:  WELLBUTRIN XL   This may have changed:  See the new instructions.   Used for:  Major depression in complete remission (H), Tobacco use disorder   Changed by:  Young Fletcher MD        Dose:  300 mg   Take 1 tablet (300 mg) by mouth every morning   Quantity:  90 tablet   Refills:  1            Where to get your medicines      These medications were sent to [facility] [place] - [address]   [address] [place] [address]     Phone:  [phone]     buPROPion 300 MG 24 hr tablet    citalopram 40 MG tablet    ondansetron 4 MG ODT tab                Primary Care Provider Office Phone # Fax #    Justine Sánchez PA-C [phone]       [facility] [address] [place] [address]        Thank you!     Thank you for choosing [facility]  for your care. Our goal is always to provide you with excellent care. Hearing back from our patients " is one way we can continue to improve our services. Please take a few minutes to complete the written survey that you may receive in the mail after your visit with us. Thank you!             Your Updated Medication List - Protect others around you: Learn how to safely use, store and throw away your medicines at www.disposemymeds.org.          This list is accurate as of: 5/1/17  4:10 PM.  Always use your most recent med list.                   Brand Name Dispense Instructions for use    ALPRAZolam 0.25 MG tablet    XANAX    10 tablet    Take 1 tablet (0.25 mg) by mouth nightly as needed for anxiety       buPROPion 300 MG 24 hr tablet    WELLBUTRIN XL    90 tablet    Take 1 tablet (300 mg) by mouth every morning       citalopram 40 MG tablet    celeXA    90 tablet    Take 1 tablet (40 mg) by mouth daily Due for office visit       cyclobenzaprine 10 MG tablet    FLEXERIL    90 tablet    Take 1 tablet (10 mg) by mouth 3 times daily as needed for muscle spasms       IRON SUPPLEMENT PO          Multi-vitamin Tabs tablet      Take 1 tablet by mouth daily       ondansetron 4 MG ODT tab    ZOFRAN ODT    10 tablet    Take 1 tablet (4 mg) by mouth every 8 hours as needed for nausea       topiramate 50 MG tablet    TOPAMAX    60 tablet    Take 1 tablet (50 mg) by mouth 2 times daily       VITAMIN D (CHOLECALCIFEROL) PO      Take by mouth daily

## 2017-05-01 NOTE — LETTER
[facility]  [address] [place] [address]  [place] [address]  Phone: [phone]    May 1, 2017        Yesika Grant  [address] [facility] [address]          To whom it may concern:    RE: Yesika Grant    Patient was seen and treated today at our clinic and missed work.  She continues to slowly recover from the effects of her concussion.  She's making progress, but this is slow at this point.  Will likely need to be able to leave work after 6-7 hours due to recurrent headaches, but this should gradually improve over the next 2-4 months.    Please contact me for questions or concerns.      Sincerely,        Young Fletcher MD

## 2017-05-01 NOTE — PATIENT INSTRUCTIONS
Thank you for visiting [facility]    Can try to increase Topamax to 100 mg at night for 1-2 weeks.  If no side effects, can increase to 100 mg twice daily.    Can use Zofran as needed when severe nausea.      Please see me in 1-2 months for follow up.       If you had imaging scheduled please refer to your radiology prep sheet.    Appointment    Date_______________     Time_____________    Day:   M TU W TH F    With____________________________    Location_________________________    If you need medication refills, please contact your pharmacy 3 days before your prescriptions runs out. If you are out of refills, your pharmacy will contact contact the clinic.    Contact us or return if questions or concerns.     -Your Care Team:  MD Justine Kunz PA-C Folake Falaki, MD Anoshirvan Mazhari, MD Kelly White, ZIA    General information about your clinic      Clinic hours:     Lab hours:  Phone [phone]  Monday 7:30 am-7 pm    Monday 8:30 am-6:30 pm  Tuesday-Friday 7:30 am-5 pm   Tuesday-Friday 8:30 am-4:30 pm    Pharmacy hours:  Phone [phone]  Monday 8:30 am-7pm  Tuesday-Friday 8:30am-6 pm                                       Mychart assistance [phone]        We would like to hear from you, how was your visit today?    Solange Castillo  Patient Information Supervisor   Patient Care Supervisor  [facility] [place], and [facility] [place], and [facility]  [phone]

## 2017-05-23 ENCOUNTER — TELEPHONE (OUTPATIENT)
Dept: FAMILY MEDICINE | Facility: OTHER | Age: 27
End: 2017-05-23

## 2017-05-23 ENCOUNTER — OFFICE VISIT (OUTPATIENT)
Dept: FAMILY MEDICINE | Facility: OTHER | Age: 27
End: 2017-05-23
Payer: COMMERCIAL

## 2017-05-23 VITALS
WEIGHT: 220 LBS | SYSTOLIC BLOOD PRESSURE: 130 MMHG | HEART RATE: 80 BPM | RESPIRATION RATE: 20 BRPM | TEMPERATURE: 99.1 F | DIASTOLIC BLOOD PRESSURE: 82 MMHG | BODY MASS INDEX: 34.46 KG/M2

## 2017-05-23 DIAGNOSIS — R41.3 MEMORY LOSS: ICD-10-CM

## 2017-05-23 DIAGNOSIS — R25.1 TREMOR: ICD-10-CM

## 2017-05-23 DIAGNOSIS — F07.81 POSTCONCUSSION SYNDROME: ICD-10-CM

## 2017-05-23 DIAGNOSIS — H65.01 RIGHT ACUTE SEROUS OTITIS MEDIA, RECURRENCE NOT SPECIFIED: Primary | ICD-10-CM

## 2017-05-23 PROCEDURE — 99214 OFFICE O/P EST MOD 30 MIN: CPT | Performed by: NURSE PRACTITIONER

## 2017-05-23 RX ORDER — AMOXICILLIN 500 MG/1
500 CAPSULE ORAL 3 TIMES DAILY
Qty: 30 CAPSULE | Refills: 0 | Status: SHIPPED | OUTPATIENT
Start: 2017-05-23 | End: 2017-06-03

## 2017-05-23 ASSESSMENT — PAIN SCALES - GENERAL: PAINLEVEL: SEVERE PAIN (6)

## 2017-05-23 NOTE — PROGRESS NOTES
SUBJECTIVE:                                                    Yesika Grant is a 26 year old female who presents to clinic today for the following health issues:      HPI    Concern - Ear infection     Onset: 6 days    Description:   Ringing, pressure in ears    Intensity: moderate    Progression of Symptoms:  worsening    Accompanying Signs & Symptoms:  none       Previous history of similar problem:   none    Precipitating factors:   Worsened by: none    Alleviating factors:  Improved by: none       Therapies Tried and outcome: none    Patient is concerned about possible seizure yesterday as she had uncontrollable shaking in her entire body for 30 sec, no loss of consciousness. Had a concussion about 6 months ago and still having some effects. She has history of 3 concussions one in 2013 she did not loose conciousness, 2014 did not loose consciousness and 2017 she fell twice and hit her head both times stating she lost consciousness once.  States has had tremors and jerking since a teenager. Has not seen neurology. Feels like she may have been having seizures just unknown to her.  Has been taking Topamax 200 mg daily since 3/17  Living with parents currently.      New symptoms include; left eye and lip twitching, ear pain, pressure and tinnitus, has always had tremors but have worsened in last 6 days. Increased confusion.     Problem list and histories reviewed & adjusted, as indicated.  Additional history: as documented    BP Readings from Last 3 Encounters:   05/23/17 130/82   05/01/17 116/68   03/08/17 120/76    Wt Readings from Last 3 Encounters:   05/23/17 220 lb (99.8 kg)   05/01/17 222 lb (100.7 kg)   03/08/17 221 lb 4.8 oz (100.4 kg)        Labs reviewed in EPIC    ROS:  Constitutional, HEENT, cardiovascular, pulmonary, gi and gu systems are negative, except as otherwise noted.    OBJECTIVE:                                                    /82  Pulse 80  Temp 99.1  F (37.3  C) (Temporal)   Resp 20  Wt 220 lb (99.8 kg)  BMI 34.46 kg/m2  Body mass index is 34.46 kg/(m^2).  GENERAL: healthy, alert and no distress  EYES: Eyes grossly normal to inspection, PERRL and conjunctivae and sclerae normal  HENT: normal cephalic/atraumatic, right ear: erythematous, bulging membrane and mucopurulent effusion, nose and mouth without ulcers or lesions, nasal mucosa edematous , rhinorrhea green, oropharynx clear and oral mucous membranes moist  NECK: bilateral anterior cervical adenopathy, no asymmetry, masses, or scars and thyroid normal to palpation  RESP: lungs clear to auscultation - no rales, rhonchi or wheezes  CV: regular rate and rhythm, normal S1 S2, no S3 or S4, no murmur, click or rub, no peripheral edema and peripheral pulses strong  ABDOMEN: soft, nontender, no hepatosplenomegaly, no masses and bowel sounds normal  MS: no gross musculoskeletal defects noted, no edema  NEURO:  equal  strength, positive Romberg, DTR II/IV bilaterally (UE and LE), finger to nose normal, CRANIAL NERVES: Discs flat. Pupils equal, round and reactive to light. Extraocular movements full. Visual fields full. Face moves symmetrically. Tongue midline. Hearing intact to finger rubbing. CARDIOVASCULAR: S1, S2.   NEUROLOGIC: Motor strength 5/5, reflexes 2/4. Toe signs are down-going. Good finger-nose-finger, fine finger movement, and heel-shin maneuvers. Gait normal-based.  Positive tremor.   Diagnostic Test Results:  none      ASSESSMENT/PLAN:                                                      1. Right acute serous otitis media, recurrence not specified    - amoxicillin (AMOXIL) 500 MG capsule; Take 1 capsule (500 mg) by mouth 3 times daily  Dispense: 30 capsule; Refill: 0    2. Tremor    - CT Head w/o Contrast; Future  - NEUROLOGY ADULT REFERRAL    3. Memory loss    - CT Head w/o Contrast; Future  - NEUROLOGY ADULT REFERRAL    4. Postconcussion syndrome    - NEUROLOGY ADULT REFERRAL      Discussed concern for increasing  symptoms of tremors, jerking, memory loss. She has been in to see multiple provides making it difficult to determine symptoms changes. I recommend she see neurology for further evaluation.   Will treat her for the acute illness otitis this may ease her symptoms. She lives with her family (parents). She will go to ED or call 911 if symptoms worsen.     Patient Instructions   Please follow up with CT and neurology referral due to your recent changes.   Take antibiotic with probiotic or yogurt to protect the good bacteria in colon.    Return to clinic or emergency department if symptoms worsen or do not improve with treatment.     Tylenol for discomfort.     Thank  You  Clarissa Melendrez CNP

## 2017-05-23 NOTE — TELEPHONE ENCOUNTER
spoke with patient    Was at home last night.  Was shaking violently.  Dropped a cigarette  [place] like she didn't have control of muscles. Last about a minute. No symptoms following it.   Searched on the Internet on what a seizure was, thought might be epilepsy?  Thought she may have had more seizures in the past and didn't know it.  Does have some ear issues this past week.  Has appointment today for that.  Will bring up possible seizure with provider, but we did schedule her follow up with Dr. Fletcher in June.      Demond Anderson, RN, BSN

## 2017-05-23 NOTE — NURSING NOTE
"Chief Complaint   Patient presents with     Ear Problem     possible seizure       Initial /82  Pulse 80  Temp 99.1  F (37.3  C) (Temporal)  Resp 20  Wt 220 lb (99.8 kg)  BMI 34.46 kg/m2 Estimated body mass index is 34.46 kg/(m^2) as calculated from the following:    Height as of 5/1/17: 5' 7\" (1.702 m).    Weight as of this encounter: 220 lb (99.8 kg).  Medication Reconciliation: complete   Karla Humphries CMA    "
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
## 2017-05-23 NOTE — PATIENT INSTRUCTIONS
Please follow up with CT and neurology referral due to your recent changes.   Take antibiotic with probiotic or yogurt to protect the good bacteria in colon.    Return to clinic or emergency department if symptoms worsen or do not improve with treatment.     Tylenol for discomfort.     Thank  You  Clarissa Melendrez CNP

## 2017-05-24 ENCOUNTER — TELEPHONE (OUTPATIENT)
Dept: FAMILY MEDICINE | Facility: OTHER | Age: 27
End: 2017-05-24

## 2017-05-24 DIAGNOSIS — F07.81 POSTCONCUSSION SYNDROME: ICD-10-CM

## 2017-05-24 DIAGNOSIS — G44.52 NEW DAILY PERSISTENT HEADACHE: ICD-10-CM

## 2017-05-24 RX ORDER — TOPIRAMATE 100 MG/1
100 TABLET, FILM COATED ORAL 2 TIMES DAILY
Qty: 60 TABLET | Refills: 3 | Status: SHIPPED | OUTPATIENT
Start: 2017-05-24 | End: 2017-10-03

## 2017-05-24 NOTE — TELEPHONE ENCOUNTER
Per Jade from [facility], patient states she is taking 2 tablets by mouth twice daily, but Rx has sig listed as     topiramate (TOPAMAX) 50 MG tablet 60 tablet 3 3/8/2017  No   Sig: Take 1 tablet (50 mg) by mouth 2 times daily     Please clarify if necessary send new Rx, thanks  Iman Dias RT (R)

## 2017-05-26 ENCOUNTER — HOSPITAL ENCOUNTER (OUTPATIENT)
Dept: CT IMAGING | Facility: CLINIC | Age: 27
Discharge: HOME | End: 2017-05-26
Attending: NURSE PRACTITIONER | Admitting: NURSE PRACTITIONER
Payer: COMMERCIAL

## 2017-05-26 DIAGNOSIS — R41.3 MEMORY LOSS: ICD-10-CM

## 2017-05-26 DIAGNOSIS — R25.1 TREMOR: ICD-10-CM

## 2017-05-26 PROCEDURE — 70450 CT HEAD/BRAIN W/O DYE: CPT

## 2017-05-28 ENCOUNTER — APPOINTMENT (OUTPATIENT)
Dept: GENERAL RADIOLOGY | Facility: CLINIC | Age: 27
End: 2017-05-28
Attending: FAMILY MEDICINE
Payer: COMMERCIAL

## 2017-05-28 ENCOUNTER — HOSPITAL ENCOUNTER (EMERGENCY)
Facility: CLINIC | Age: 27
Discharge: HOME | End: 2017-05-28
Attending: FAMILY MEDICINE | Admitting: FAMILY MEDICINE
Payer: COMMERCIAL

## 2017-05-28 VITALS
HEART RATE: 101 BPM | TEMPERATURE: 99.1 F | DIASTOLIC BLOOD PRESSURE: 88 MMHG | OXYGEN SATURATION: 96 % | SYSTOLIC BLOOD PRESSURE: 113 MMHG | WEIGHT: 220 LBS | RESPIRATION RATE: 20 BRPM | BODY MASS INDEX: 34.46 KG/M2

## 2017-05-28 DIAGNOSIS — R55 SYNCOPE, UNSPECIFIED SYNCOPE TYPE: ICD-10-CM

## 2017-05-28 DIAGNOSIS — S82.831A CLOSED FRACTURE OF DISTAL END OF RIGHT FIBULA, UNSPECIFIED FRACTURE MORPHOLOGY, INITIAL ENCOUNTER: ICD-10-CM
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
## 2017-05-28 PROCEDURE — 25000132 ZZH RX MED GY IP 250 OP 250 PS 637: Performed by: FAMILY MEDICINE

## 2017-05-28 PROCEDURE — 29515 APPLICATION SHORT LEG SPLINT: CPT | Mod: RT | Performed by: FAMILY MEDICINE

## 2017-05-28 PROCEDURE — 99284 EMERGENCY DEPT VISIT MOD MDM: CPT | Mod: Z6 | Performed by: FAMILY MEDICINE

## 2017-05-28 PROCEDURE — 29515 APPLICATION SHORT LEG SPLINT: CPT | Mod: LT | Performed by: FAMILY MEDICINE

## 2017-05-28 PROCEDURE — 73610 X-RAY EXAM OF ANKLE: CPT | Mod: TC,RT

## 2017-05-28 PROCEDURE — 99284 EMERGENCY DEPT VISIT MOD MDM: CPT | Mod: 25 | Performed by: FAMILY MEDICINE

## 2017-05-28 RX ORDER — OXYCODONE AND ACETAMINOPHEN 5; 325 MG/1; MG/1
2 TABLET ORAL ONCE
Status: COMPLETED | OUTPATIENT
Start: 2017-05-28 | End: 2017-05-28

## 2017-05-28 RX ORDER — OXYCODONE AND ACETAMINOPHEN 5; 325 MG/1; MG/1
1-2 TABLET ORAL EVERY 6 HOURS PRN
Qty: 20 TABLET | Refills: 0 | Status: SHIPPED | OUTPATIENT
Start: 2017-05-28 | End: 2017-06-03

## 2017-05-28 RX ORDER — IBUPROFEN 800 MG/1
800 TABLET, FILM COATED ORAL ONCE
Status: COMPLETED | OUTPATIENT
Start: 2017-05-28 | End: 2017-05-28

## 2017-05-28 RX ADMIN — IBUPROFEN 800 MG: 800 TABLET ORAL at 22:39

## 2017-05-28 RX ADMIN — OXYCODONE HYDROCHLORIDE AND ACETAMINOPHEN 2 TABLET: 5; 325 TABLET ORAL at 22:39

## 2017-05-28 NOTE — ED AVS SNAPSHOT
[facility] Emergency Department    [address] [place] [address]    Phone:  [phone]    Fax:  [phone]                                       Yesika Grant   MRN: [id number]    Department:  [facility]   Date of Visit:  5/28/2017           Patient Information     Date Of Birth          1990        Your diagnoses for this visit were:     Syncope     Closed fracture of distal end of right fibula        You were seen by Janice Velásquez MD.      Follow-up Information     Follow up with Orthopedic surgery In 3 days.        Follow up with [facility].    Specialty:  EMERGENCY MEDICINE    Why:  If symptoms worsen    Contact information:    [address]   [place] [address] [phone]    Additional information:    From [address]: Exit at [address] on south side of [place]. Turn right on [address]. Turn left at stoplight on [address]. [facility] will be in view two blocks ahead        Discharge Instructions       Thank you for giving us the opportunity to see you. The impression is that you had a episode of syncope which resulted in a distal fibula fracture.  Please see the handout below.    Keep the right leg elevated as much as possible.  Keep the splint clean and dry.  Do not attempt to bear weight on the right leg.  Use crutches to help ambulate.    Take ibuprofen up to 800 mg 3 times a day with food.  Add Percocet for moderate to severe breakthrough pain.    Follow up with orthopedic surgery in 3-5 days.  A referral has been made to podiatry and orthopedic surgery, and he should receive a phone call in 2-3 days to schedule an appointment.    The following medications were given during your stay: Ibuprofen 800 mg, 2 Percocet tablets    After discharge, please closely monitor for any new or worsening symptoms. Return to the Emergency Department at any time if your symptoms  worsen.          Ankle Fracture, Distal Fibula  You have a fracture, or broken bone, of the end of the fibula bone. The fibula is one of two bones that support the ankle joint.    Home care    You will be given a splint, cast, or special boot to prevent movement at the injury site. Do not put weight on a splint. It will break. Follow your healthcare provider s advice about when to begin bearing weight on a cast or boot.    Keep your leg elevated when sitting or lying down. When sleeping, place a pillow under the injured leg. When sitting, support the injured leg so it is level with your waist. This is very important during the first 48 hours.    Keep the cast or splint completely dry at all times. When bathing, protect the cast or splint with 2 large plastic bags. Place 1 bag outside of the other. Tape each bag with duct tape at the top end. Water can still leak in even when the foot is covered. So it's best to keep the cast, splint, or boot away from water. If a fiberglass cast or splint gets wet, dry it with a hair dryer on a cool setting.    Place an ice pack over the injured area for no more than 15 to 20 minutes. Do this every 3 to 6 hours for the first 24 to 48 hours. Continue this 3 to 4 times a day as needed. To make an ice pack, put ice cubes in a plastic bag that seals at the top. Wrap the bag in a clean, thin towel or cloth. Never put ice or an ice pack directly on the skin. The ice pack can be put right on the cast or splint. As the ice melts, be careful that the cast or splint doesn t get wet.    You may use over-the-counter pain medicine to control pain, unless another pain medicine was prescribed. If you have chronic liver or kidney disease or ever had a stomach ulcer or GI bleeding, talk with your provider before using these medicines.  Follow-up care  Follow up with your healthcare provider in 1 week, or as advised. This is to be sure the bone is healing properly. If you were given a splint, it may  be changed to a cast after the swelling goes down.  If X-rays were taken, you will be told of any new findings that may affect your care.  When to seek medical advice  Call your healthcare provider right away if any of these occur:    The plaster cast or splint becomes wet or soft    The fiberglass cast or splint stays wet for more than 24 hours    There is increased tightness or pain under the cast or splint    Your toes become swollen, cold, blue, numb, or tingly    The cast becomes loose    The cast has a bad smell    Sore areas develop under the cast    The cast develops cracks or breaks     1259-3204 [employer]. [address] [place] [address]. All rights reserved. This information is not intended as a substitute for professional medical care. Always follow your healthcare professional's instructions.          Future Appointments        Provider Department Dept Phone Center    6/14/2017 2:45 PM Young Fletcher MD, MD [facility] [phone] [facility]    7/25/2017 1:00 PM Clifton Ames MD [facility] [phone] [place]      24 Hour Appointment Hotline       To make an appointment at any [facility], call [phone] ([phone]). If you don't have a family doctor or clinic, we will help you find one. [facility] clinics are conveniently located to serve the needs of you and your family.          ED Discharge Orders     Crutches       Use gait belt during crutch training.            ORTHO  REFERRAL       [facility] is referring you to the Orthopedic  Services at [facility].       The  Representative will assist you in the coordination of your Orthopedic and Musculoskeletal Care as prescribed by your physician.    The  Representative will call you within 1 business day to help schedule your appointment, or you may contact the  Representative  at:    All areas ~ [phone]     Type of Referral : [facility] / Foot & Ankle Surgery       Timeframe requested: 3 - 5 days    Coverage of these services is subject to the terms and limitations of your health insurance plan.  Please call member services at your health plan with any benefit or coverage questions.      If X-rays, CT or MRI's have been performed, please contact the facility where they were done to arrange for , prior to your scheduled appointment.  Please bring this referral request to your appointment and present it to your specialist.                     Review of your medicines      START taking        Dose / Directions Last dose taken    oxyCODONE-acetaminophen 5-325 MG per tablet   Commonly known as:  PERCOCET   Dose:  1-2 tablet   Quantity:  20 tablet        Take 1-2 tablets by mouth every 6 hours as needed for pain   Refills:  0          Our records show that you are taking the medicines listed below. If these are incorrect, please call your family doctor or clinic.        Dose / Directions Last dose taken    ALPRAZolam 0.25 MG tablet   Commonly known as:  XANAX   Dose:  0.25 mg   Quantity:  10 tablet        Take 1 tablet (0.25 mg) by mouth nightly as needed for anxiety   Refills:  1        amoxicillin 500 MG capsule   Commonly known as:  AMOXIL   Dose:  500 mg   Quantity:  30 capsule        Take 1 capsule (500 mg) by mouth 3 times daily   Refills:  0        buPROPion 300 MG 24 hr tablet   Commonly known as:  WELLBUTRIN XL   Dose:  300 mg   Quantity:  90 tablet        Take 1 tablet (300 mg) by mouth every morning   Refills:  1        citalopram 40 MG tablet   Commonly known as:  celeXA   Dose:  40 mg   Quantity:  90 tablet        Take 1 tablet (40 mg) by mouth daily Due for office visit   Refills:  1        cyclobenzaprine 10 MG tablet   Commonly known as:  FLEXERIL   Dose:  10 mg   Quantity:  90 tablet        Take 1 tablet (10 mg) by mouth 3 times daily as needed for muscle  spasms   Refills:  1        IRON SUPPLEMENT PO        Reported on 5/23/2017   Refills:  0        Multi-vitamin Tabs tablet   Dose:  1 tablet        Take 1 tablet by mouth daily Reported on 5/23/2017   Refills:  0        ondansetron 4 MG ODT tab   Commonly known as:  ZOFRAN ODT   Dose:  4 mg   Quantity:  10 tablet        Take 1 tablet (4 mg) by mouth every 8 hours as needed for nausea   Refills:  0        topiramate 100 MG tablet   Commonly known as:  TOPAMAX   Dose:  100 mg   Quantity:  60 tablet        Take 1 tablet (100 mg) by mouth 2 times daily   Refills:  3        VITAMIN D (CHOLECALCIFEROL) PO        Take by mouth daily   Refills:  0                Prescriptions were sent or printed at these locations (1 Prescription)                   [facility]   [address] [address]    Telephone:  [phone]   Fax:  [phone]   Hours:                  Printed at Department/Unit printer (1 of 1)         oxyCODONE-acetaminophen (PERCOCET) 5-325 MG per tablet                Procedures and tests performed during your visit     XR Ankle Right G/E 3 Views      Orders Needing Specimen Collection     None      Pending Results     No orders found from 5/26/2017 to 5/29/2017.            Pending Culture Results     No orders found from 5/26/2017 to 5/29/2017.            Pending Results Instructions     If you had any lab results that were not finalized at the time of your Discharge, you can call the ED Lab Result RN at [phone]. You will be contacted by this team for any positive Lab results or changes in treatment. The nurses are available 7 days a week from 10A to 6:30P.  You can leave a message 24 hours per day and they will return your call.        Thank you for choosing [facility]       Thank you for choosing [facility] for your care. Our goal is always to provide you with excellent care. Hearing back from our patients is one way we can continue to improve our services.  "Please take a few minutes to complete the written survey that you may receive in the mail after you visit with us. Thank you!        [employer] Information     Kingmaker lets you send messages to your doctor, view your test results, renew your prescriptions, schedule appointments and more. To sign up, go to [email or web address] . Click on \"Log in\" on the left side of the screen, which will take you to the Welcome page. Then click on \"Sign up Now\" on the right side of the page.     You will be asked to enter the access code listed below, as well as some personal information. Please follow the directions to create your username and password.     Your access code is: [email or web address]  Expires: 2017  4:10 PM     Your access code will  in 90 days. If you need help or a new code, please call your [facility] clinic or [phone].        Care EveryWhere ID     This is your Care EveryWhere ID. This could be used by other organizations to access your [facility] medical records  [id number]        After Visit Summary       This is your record. Keep this with you and show to your community pharmacist(s) and doctor(s) at your next visit.                  "

## 2017-05-28 NOTE — ED AVS SNAPSHOT
[facility] Emergency Department    [address] [place] [address]    Phone:  [phone]    Fax:  [phone]                                       Yesika Grant   MRN: [id number]    Department:  [facility] Emergency Department   Date of Visit:  5/28/2017           After Visit Summary Signature Page     I have received my discharge instructions, and my questions have been answered. I have discussed any challenges I see with this plan with the nurse or doctor.    ..........................................................................................................................................  Patient/Patient Representative Signature      ..........................................................................................................................................  Patient Representative Print Name and Relationship to Patient    ..................................................               ................................................  Date                                            Time    ..........................................................................................................................................  Reviewed by Signature/Title    ...................................................              ..............................................  Date                                                            Time

## 2017-05-29 NOTE — DISCHARGE INSTRUCTIONS
Thank you for giving us the opportunity to see you. The impression is that you had a episode of syncope which resulted in a distal fibula fracture.  Please see the handout below.    Keep the right leg elevated as much as possible.  Keep the splint clean and dry.  Do not attempt to bear weight on the right leg.  Use crutches to help ambulate.    Take ibuprofen up to 800 mg 3 times a day with food.  Add Percocet for moderate to severe breakthrough pain.    Follow up with orthopedic surgery in 3-5 days.  A referral has been made to podiatry and orthopedic surgery, and he should receive a phone call in 2-3 days to schedule an appointment.    The following medications were given during your stay: Ibuprofen 800 mg, 2 Percocet tablets    After discharge, please closely monitor for any new or worsening symptoms. Return to the Emergency Department at any time if your symptoms worsen.          Ankle Fracture, Distal Fibula  You have a fracture, or broken bone, of the end of the fibula bone. The fibula is one of two bones that support the ankle joint.    Home care    You will be given a splint, cast, or special boot to prevent movement at the injury site. Do not put weight on a splint. It will break. Follow your healthcare provider s advice about when to begin bearing weight on a cast or boot.    Keep your leg elevated when sitting or lying down. When sleeping, place a pillow under the injured leg. When sitting, support the injured leg so it is level with your waist. This is very important during the first 48 hours.    Keep the cast or splint completely dry at all times. When bathing, protect the cast or splint with 2 large plastic bags. Place 1 bag outside of the other. Tape each bag with duct tape at the top end. Water can still leak in even when the foot is covered. So it's best to keep the cast, splint, or boot away from water. If a fiberglass cast or splint gets wet, dry it with a hair dryer on a cool setting.    Place an  ice pack over the injured area for no more than 15 to 20 minutes. Do this every 3 to 6 hours for the first 24 to 48 hours. Continue this 3 to 4 times a day as needed. To make an ice pack, put ice cubes in a plastic bag that seals at the top. Wrap the bag in a clean, thin towel or cloth. Never put ice or an ice pack directly on the skin. The ice pack can be put right on the cast or splint. As the ice melts, be careful that the cast or splint doesn t get wet.    You may use over-the-counter pain medicine to control pain, unless another pain medicine was prescribed. If you have chronic liver or kidney disease or ever had a stomach ulcer or GI bleeding, talk with your provider before using these medicines.  Follow-up care  Follow up with your healthcare provider in 1 week, or as advised. This is to be sure the bone is healing properly. If you were given a splint, it may be changed to a cast after the swelling goes down.  If X-rays were taken, you will be told of any new findings that may affect your care.  When to seek medical advice  Call your healthcare provider right away if any of these occur:    The plaster cast or splint becomes wet or soft    The fiberglass cast or splint stays wet for more than 24 hours    There is increased tightness or pain under the cast or splint    Your toes become swollen, cold, blue, numb, or tingly    The cast becomes loose    The cast has a bad smell    Sore areas develop under the cast    The cast develops cracks or breaks     7185-7043 [employer]. [address] [place] [address], [place] [address]. All rights reserved. This information is not intended as a substitute for professional medical care. Always follow your healthcare professional's instructions.
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
## 2017-05-29 NOTE — ED PROVIDER NOTES
"                                                            [facility] ED Provider Note   CC:     Chief Complaint   Patient presents with     Loss of Consciousness     HPI:  Yesika Grant is a 26 year old female who presented to the emergency department with an apparent syncopal episode that occurred while she was in the shower this evening around 8:15 PM.  Patient was in the shower, and plugged her nose to try to equalize the pressure in her ears.  She has been feeling congested and has had ear pressure and pain for the past week.  She is currently on amoxicillin for \"ear infections.\"  Patient found herself on the floor of the shower stall, looking at her feet, with a dazed feeling in her head.  Her mother and boyfriend heard her fall, and she was ready awake when they entered the bathroom.  Patient does not think she hit her head and does not have pain other than for her right ankle.  She has a history of multiple ankle sprains in the past with the worse one 6 years ago.  She has not been able to walk on the right leg since tonight when she passed out.  She has a lot of swelling and pain over the right lateral ankle but denies any pain proximally or into the feet.  Patient is also in the process of being evaluated for possible seizure disorder.  She had a concussion 6 months ago when she was dancing and passed out.  She was drinking alcohol at that time and her passing out was attributed to that at that time.  She has been experiencing postconcussion headaches and is currently on Topamax for control and prevention of her headache.  Patient had a recent CT scan of the head 2 days ago showing no hemorrhage, mass lesion or focal area of acute infarction.  There was minimal membrane thickening in the ethmoid air cells.  Patient rates her current ankle pain 8/10 at rest.    Problem List:  Patient Active Problem List    Diagnosis     Vitamin D deficiency     Contraception     Concussion injury of body " "structure     Major depression in complete remission (H)     Anxiety     Tobacco use disorder     CARDIOVASCULAR SCREENING; LDL GOAL LESS THAN 160       MEDS:   Previous Medications    ALPRAZOLAM (XANAX) 0.25 MG TABLET    Take 1 tablet (0.25 mg) by mouth nightly as needed for anxiety    AMOXICILLIN (AMOXIL) 500 MG CAPSULE    Take 1 capsule (500 mg) by mouth 3 times daily    BUPROPION (WELLBUTRIN XL) 300 MG 24 HR TABLET    Take 1 tablet (300 mg) by mouth every morning    CITALOPRAM (CELEXA) 40 MG TABLET    Take 1 tablet (40 mg) by mouth daily Due for office visit    CYCLOBENZAPRINE (FLEXERIL) 10 MG TABLET    Take 1 tablet (10 mg) by mouth 3 times daily as needed for muscle spasms    FERROUS SULFATE (IRON SUPPLEMENT PO)    Reported on 5/23/2017    MULTIVITAMIN, THERAPEUTIC WITH MINERALS (MULTI-VITAMIN) TABS    Take 1 tablet by mouth daily Reported on 5/23/2017    ONDANSETRON (ZOFRAN ODT) 4 MG ODT TAB    Take 1 tablet (4 mg) by mouth every 8 hours as needed for nausea    TOPIRAMATE (TOPAMAX) 100 MG TABLET    Take 1 tablet (100 mg) by mouth 2 times daily    VITAMIN D, CHOLECALCIFEROL, PO    Take by mouth daily       ALLERGIES:    Allergies   Allergen Reactions     Atarax [Hydroxyzine] Other (See Comments)     rage     Vicodin [Hydrocodone-Acetaminophen] Nausea     \"violently angry\"     Zithromax [Azithromycin Dihydrate] Hives and Swelling       Past medical, surgical, family and social histories, triage and nursing notes were all reviewed.    Review of Systems   All other systems were reviewed and are negative    Physical Exam     Vitals were reviewed  Patient Vitals for the past 8 hrs:   BP Temp Temp src Pulse Resp SpO2 Weight   05/28/17 2150 (!) 141/98 99.1  F (37.3  C) Temporal 101 20 100 % 99.8 kg (220 lb)     GENERAL APPEARANCE: Alert, oriented ×3  HEAD: Atraumatic: No cephalhematoma  FACE: normal facies  EYES: Pupils are equal; reactive to light  HENT: normal external exam  NECK: no adenopathy or asymmetry; no " midline tenderness  RESP: normal respiratory effort; clear breath sounds bilaterally  CV: regular rate and rhythm; no significant murmurs, gallops or rubs  ABD: soft, obese, no tenderness; no rebound or guarding; bowel sounds are normal  MS: no gross deformities noted; normal muscle tone.  EXT: Right ankle swelling with exquisite tenderness.  No significant ecchymosis at this time.  Tenderness especially over the right lateral malleolus.  SKIN: no worrisome rash  NEURO: no facial droop; no focal deficits, speech is normal        Available Lab/Imaging Results     Results for orders placed or performed during the hospital encounter of 05/28/17 (from the past 24 hour(s))   XR Ankle Right G/E 3 Views    Narrative    RIGHT ANKLE THREE OR MORE VIEWS   5/28/2017 10:34 PM     HISTORY: Trauma.    COMPARISON: None.      Impression    IMPRESSION:  There is an oblique distal fibular fracture with  approximately 0.2 cm dorsal displacement and mild overriding of the  distal fragment. No other fractures. Soft tissue swelling is seen  around the lateral aspect of the ankle.     JASSI DE LA CRUZ MD         Impression     Final diagnoses:   Syncope   Closed fracture of distal end of right fibula       ED Course & Medical Decision Making   Yesika Grant is a 26 year old female who presented to the emergency department with an apparent syncopal episode this evening after she plugged her nose and try to equalize pressure in her ears.  She passed out in the shower, and woke up with pain in her right ankle.  She has not been able to bear weight since then.  She has significant tenderness over the lateral malleolus and reports pain level 8/10 even at rest.  Patient was seen shortly after arrival.  She had a history of possible seizures and is currently in the process of workup.  She saw her primary care doctor and has an adult neurology referral upcoming.  Patient has no evidence for head and neck injury.  She has no other injuries  except for her right ankle.  X-rays of the right ankle reveal a oblique distal fibular fracture with approximately 0.2 cm dorsal displacement and mild overriding of the distal fragment.  X-rays were personal reviewed by me and reviewed with the patient as well.  Patient was placed in a sugar tong lower leg splint using Ortho-Glass material.  Patient was instructed to keep the splint clean and dry, and to not bear any weight at all.  Patient was fitted with crutches.  She received ibuprofen and Percocet in the emergency department.  Keep the leg elevated at home and continue ibuprofen and Percocet for moderate to severe pain.  Follow-up with orthopedics versus podiatry in the next 3-5 days.  An orthopedic consult ears referral was initiated.  Return to the ED if symptoms worsen.  Patient has low risk for compartment syndrome given the mechanism of her injury but she is urged to return with any new or worsening symptoms.      Written after-visit summary and instructions were given at the time of discharge.      New Prescriptions    OXYCODONE-ACETAMINOPHEN (PERCOCET) 5-325 MG PER TABLET    Take 1-2 tablets by mouth every 6 hours as needed for pain         This note was completed in part using Dragon voice recognition, and may contain word and grammatical errors.        Janice Velásquez MD  05/28/17 6529

## 2017-05-30 ENCOUNTER — TELEPHONE (OUTPATIENT)
Dept: FAMILY MEDICINE | Facility: OTHER | Age: 27
End: 2017-05-30

## 2017-05-30 ENCOUNTER — HOSPITAL ENCOUNTER (OUTPATIENT)
Dept: PHYSICAL THERAPY | Facility: OTHER | Age: 27
Setting detail: THERAPIES SERIES
End: 2017-05-30
Attending: FAMILY MEDICINE
Payer: COMMERCIAL

## 2017-05-30 PROCEDURE — 40000185 ZZHC STATISTIC PT OUTPT VISIT: Performed by: PHYSICAL THERAPIST

## 2017-05-30 PROCEDURE — 97110 THERAPEUTIC EXERCISES: CPT | Mod: GP | Performed by: PHYSICAL THERAPIST

## 2017-05-30 PROCEDURE — 97140 MANUAL THERAPY 1/> REGIONS: CPT | Mod: GP | Performed by: PHYSICAL THERAPIST

## 2017-05-30 NOTE — TELEPHONE ENCOUNTER
----- Message from LASHAE Bell CNP sent at 5/26/2017  5:19 PM CDT -----  Please notify [place] that her CT scan is negative. Please follow up with neurology as discussed.     Thank you  Clarissa Melendrez CNP

## 2017-06-01 ENCOUNTER — TELEPHONE (OUTPATIENT)
Dept: FAMILY MEDICINE | Facility: OTHER | Age: 27
End: 2017-06-01

## 2017-06-01 NOTE — TELEPHONE ENCOUNTER
Unfortunately, due to the nature of this medication, would need to see pt.  ER MD or podiatry may be able to fill, but I haven't seen her for this problem and so would need to do so.  She can take ibuprofen for pain in the meantime.  Should continue with splinting and not bear weight at all.  May need to move up her podiatry appointment if pain isn't improving.

## 2017-06-01 NOTE — TELEPHONE ENCOUNTER
Oxycodone 5/325 mg    Patient has broken ankle and has appointment with Podiatry on 6/8/2017 but will run out before then. She is hoping you can give her a supply to last.   Last Written Prescription Date:  5/28/2017  Last Fill Quantity: 20,   # refills: 0  Last Office Visit with [facility] or [facility] prescribing provider: 5/23/2017  Future Office visit:    Next 5 appointments (look out 90 days)     Jun 14, 2017  2:45 PM CDT   Office Visit with Young Fletcher MD   [facility] ([facility])    [address] [address] [phone]                   Routing refill request to provider for review/approval because:  Drug not on the [facility] or [facility] refill protocol or controlled substance

## 2017-06-01 NOTE — TELEPHONE ENCOUNTER
Reason for Call:  Medication or medication refill:    Do you use a [facility]?  Name of the pharmacy and phone number for the current request:  [facility] [place] - [phone]    Name of the medication requested: Oxycodone     Other request: Pt has a broken ankle, has an appt with Podiatry on 6/8/17 but will run out before then. She is hoping you can give her a supply to last.     Can we leave a detailed message on this number? YES    Phone number patient can be reached at: [phone]    Best Time: any     Call taken on 6/1/2017 at 2:43 PM by Sadaf Coughlin

## 2017-06-03 ENCOUNTER — HOSPITAL ENCOUNTER (EMERGENCY)
Facility: CLINIC | Age: 27
Discharge: HOME | End: 2017-06-04
Attending: FAMILY MEDICINE | Admitting: FAMILY MEDICINE
Payer: COMMERCIAL

## 2017-06-03 DIAGNOSIS — M25.571 ACUTE RIGHT ANKLE PAIN: ICD-10-CM

## 2017-06-03 PROCEDURE — 29515 APPLICATION SHORT LEG SPLINT: CPT | Mod: RT | Performed by: FAMILY MEDICINE

## 2017-06-03 PROCEDURE — 99284 EMERGENCY DEPT VISIT MOD MDM: CPT | Mod: Z6 | Performed by: FAMILY MEDICINE

## 2017-06-03 PROCEDURE — 99284 EMERGENCY DEPT VISIT MOD MDM: CPT | Mod: 25 | Performed by: FAMILY MEDICINE

## 2017-06-03 RX ORDER — IBUPROFEN 200 MG
600 TABLET ORAL EVERY 6 HOURS PRN
COMMUNITY
Start: 2017-06-03 | End: 2020-01-23

## 2017-06-03 RX ORDER — OXYCODONE AND ACETAMINOPHEN 5; 325 MG/1; MG/1
1-2 TABLET ORAL EVERY 6 HOURS PRN
Qty: 20 TABLET | Refills: 0 | Status: SHIPPED | OUTPATIENT
Start: 2017-06-03 | End: 2017-06-08

## 2017-06-03 NOTE — ED AVS SNAPSHOT
[facility] Emergency Department    [address] [place] [address]    Phone:  [phone]    Fax:  [phone]                                       Yesika Grant   MRN: [id number]    Department:  [facility] Emergency Department   Date of Visit:  6/3/2017           Patient Information     Date Of Birth          1990        Your diagnoses for this visit were:     Acute right ankle pain due to tight, ill-fitting splint treating underlying fibula fracture       You were seen by Julian Schulz MD.      Follow-up Information     Follow up with Young Fletcher MD.    Specialty:  Family Practice    Contact information:    [facility]  [address] [place] [id number] [phone]          Follow up with Nate Beck DPM On 6/8/2017.    Specialty:  Podiatry    Why:  as scheduled    Contact information:    [address] [place] [address]  [phone]          Discharge Instructions       Nonweightbearing.  Use crutches as needed to get around.  You should not put any weight on the splint.  Elevate your right leg above heart level as much as possible to help decrease the swelling.  Keep your appointment with Dr. Beck on June 8 as scheduled.  Ibuprofen as needed for pain.  You may use the oxycodone sparingly as needed for more severe pain.  It was nice visiting with you tonight.   I hope you feel better soon.     Thank you for choosing [facility]. We appreciate the opportunity to meet your urgent medical needs. Please let us know if we could have done anything to make your stay more satisfying.    After discharge, please closely monitor for any new or worsening symptoms. Return to the Emergency Department if you develop any acute worsening signs or symptoms.    If you had lab work, cultures or imaging studies done during your stay, the final results may still be pending. We will call you if your plan of  care needs to change. However, if you are not improving as expected, please follow up with your primary care provider or clinic.     Start any prescription medications that were prescribed to you and take them as directed.     Please see additional handouts that may be pertinent to your condition.        Future Appointments        Provider Department Dept Phone Center    6/8/2017 8:00 AM Nate Beck DPM [facility] [phone] [facility]    6/14/2017 2:45 PM Young Fletcher MD, MD [facility] [phone] [facility]    7/25/2017 1:00 PM Clifton Ames MD [facility] [phone] [place]      24 Hour Appointment Hotline       To make an appointment at any [facility], call [phone] ([phone]). If you don't have a family doctor or clinic, we will help you find one. [facility] are conveniently located to serve the needs of you and your family.             Review of your medicines      START taking        Dose / Directions Last dose taken    ibuprofen 200 MG tablet   Commonly known as:  ADVIL/MOTRIN   Dose:  600 mg        Take 3 tablets (600 mg) by mouth every 6 hours as needed for pain   Refills:  0          Our records show that you are taking the medicines listed below. If these are incorrect, please call your family doctor or clinic.        Dose / Directions Last dose taken    ALPRAZolam 0.25 MG tablet   Commonly known as:  XANAX   Dose:  0.25 mg   Quantity:  10 tablet        Take 1 tablet (0.25 mg) by mouth nightly as needed for anxiety   Refills:  1        buPROPion 300 MG 24 hr tablet   Commonly known as:  WELLBUTRIN XL   Dose:  300 mg   Quantity:  90 tablet        Take 1 tablet (300 mg) by mouth every morning   Refills:  1        citalopram 40 MG tablet   Commonly known as:  celeXA   Dose:  40 mg   Quantity:  90 tablet        Take 1 tablet (40 mg) by mouth daily Due for office visit   Refills:  1         cyclobenzaprine 10 MG tablet   Commonly known as:  FLEXERIL   Dose:  10 mg   Quantity:  90 tablet        Take 1 tablet (10 mg) by mouth 3 times daily as needed for muscle spasms   Refills:  1        IRON SUPPLEMENT PO        Reported on 5/23/2017   Refills:  0        Multi-vitamin Tabs tablet   Dose:  1 tablet        Take 1 tablet by mouth daily Reported on 5/23/2017   Refills:  0        ondansetron 4 MG ODT tab   Commonly known as:  ZOFRAN ODT   Dose:  4 mg   Quantity:  10 tablet        Take 1 tablet (4 mg) by mouth every 8 hours as needed for nausea   Refills:  0        oxyCODONE-acetaminophen 5-325 MG per tablet   Commonly known as:  PERCOCET   Dose:  1-2 tablet   Quantity:  20 tablet        Take 1-2 tablets by mouth every 6 hours as needed for pain   Refills:  0        topiramate 100 MG tablet   Commonly known as:  TOPAMAX   Dose:  100 mg   Quantity:  60 tablet        Take 1 tablet (100 mg) by mouth 2 times daily   Refills:  3        VITAMIN D (CHOLECALCIFEROL) PO        Take by mouth daily   Refills:  0                Prescriptions were sent or printed at these locations (2 Prescriptions)                   [facility]   [address] [address]    Telephone:  [phone]   Fax:  [phone]   Hours:                  Not Printed or Sent (1 of 2)         ibuprofen (ADVIL/MOTRIN) 200 MG tablet                 Printed at Department/Unit printer (1 of 2)         oxyCODONE-acetaminophen (PERCOCET) 5-325 MG per tablet                Orders Needing Specimen Collection     None      Pending Results     No orders found from 6/1/2017 to 6/4/2017.            Pending Culture Results     No orders found from 6/1/2017 to 6/4/2017.            Pending Results Instructions     If you had any lab results that were not finalized at the time of your Discharge, you can call the ED Lab Result RN at [phone]. You will be contacted by this team for any positive Lab results or changes  "in treatment. The nurses are available 7 days a week from 10A to 6:30P.  You can leave a message 24 hours per day and they will return your call.        Thank you for choosing [facility]       Thank you for choosing [facility] for your care. Our goal is always to provide you with excellent care. Hearing back from our patients is one way we can continue to improve our services. Please take a few minutes to complete the written survey that you may receive in the mail after you visit with us. Thank you!        [employer] Information     [employer] lets you send messages to your doctor, view your test results, renew your prescriptions, schedule appointments and more. To sign up, go to [email or web address] . Click on \"Log in\" on the left side of the screen, which will take you to the Welcome page. Then click on \"Sign up Now\" on the right side of the page.     You will be asked to enter the access code listed below, as well as some personal information. Please follow the directions to create your username and password.     Your access code is: [email or web address]  Expires: 2017  4:10 PM     Your access code will  in 90 days. If you need help or a new code, please call your [facility] clinic or [phone].        Care EveryWhere ID     This is your Care EveryWhere ID. This could be used by other organizations to access your [facility] medical records  [id number]        After Visit Summary       This is your record. Keep this with you and show to your community pharmacist(s) and doctor(s) at your next visit.                  "

## 2017-06-03 NOTE — ED AVS SNAPSHOT
[facility] Emergency Department    [address] [place] [address]    Phone:  [phone]    Fax:  [phone]                                       Yesika Grant   MRN: [id number]    Department:  [facility] Emergency Department   Date of Visit:  6/3/2017           After Visit Summary Signature Page     I have received my discharge instructions, and my questions have been answered. I have discussed any challenges I see with this plan with the nurse or doctor.    ..........................................................................................................................................  Patient/Patient Representative Signature      ..........................................................................................................................................  Patient Representative Print Name and Relationship to Patient    ..................................................               ................................................  Date                                            Time    ..........................................................................................................................................  Reviewed by Signature/Title    ...................................................              ..............................................  Date                                                            Time

## 2017-06-04 VITALS
DIASTOLIC BLOOD PRESSURE: 87 MMHG | WEIGHT: 220 LBS | SYSTOLIC BLOOD PRESSURE: 116 MMHG | HEART RATE: 94 BPM | TEMPERATURE: 97.8 F | HEIGHT: 67 IN | BODY MASS INDEX: 34.53 KG/M2 | RESPIRATION RATE: 16 BRPM | OXYGEN SATURATION: 96 %

## 2017-06-04 NOTE — ED PROVIDER NOTES
History     Chief Complaint   Patient presents with     Ankle Pain     HPI  Yesika Grant is a 26 year old female who presents to ED with right ankle and foot pain.  She has been in a splint since suffering a distal fibular fracture on May 28.  She is slated to see orthopedics on June 8.  Hasn't been real diligent with elevation.  Has tried remain nonweightbearing and using crutches.  Pain is so severe that she hasn't been able to get out of bed.  Ran out of her Percocet.  No shortness of breath.  No calf pain.  No fevers.    Past Medical History:   Diagnosis Date     Moderate major depression (H) 5/3/2012     Ovarian cyst 11/05    small 2 cm simple left ovarian cyst vs dominant follicle on US     Tonsillitis      Urticaria     ?due to oat straw allergy       Past Surgical History:   Procedure Laterality Date     HC TOOTH EXTRACTION W/FORCEP Bilateral      TONSILLECTOMY, ADENOIDECTOMY ADULT, COMBINED  12/1/2011    Procedure:COMBINED TONSILLECTOMY, ADENOIDECTOMY ADULT; Adult Tonsillectomy & Adenoidectomy, Cauterization Of       TURBINOPLASTY  12/1/2011    Procedure:TURBINOPLASTY; Surgeon:GISELE SWENSON; Location: OR       Social History     Social History     Marital status: Single     Spouse name: N/A     Number of children: 0     Years of education: N/A     Occupational History      Unemployed     Social History Main Topics     Smoking status: Current Every Day Smoker     Packs/day: 1.00     Types: Cigarettes     Last attempt to quit: 5/4/2015     Smokeless tobacco: Never Used     Alcohol use Yes      Comment: monthly-2-3 drinks     Drug use: No     Sexual activity: Yes     Partners: Male     Birth control/ protection: Condom, IUD      Comment: mirena 5/13     Other Topics Concern     Parent/Sibling W/ Cabg, Mi Or Angioplasty Before 65f 55m? No     Social History Narrative    Home schooling, planning to obtain GED.          Allergies   Allergen Reactions     Atarax [Hydroxyzine] Other (See Comments)     " rage     Vicodin [Hydrocodone-Acetaminophen] Nausea     \"violently angry\"     Zithromax [Azithromycin Dihydrate] Hives and Swelling       Med List Reviewed         I have reviewed the Medications, Allergies, Past Medical and Surgical History, and Social History in the Epic system.    Review of Systems   All other systems reviewed and are negative.      Physical Exam   BP: (!) 141/105  Pulse: 97  Temp: 98.1  F (36.7  C)  Resp: (!) 156  Height: 170.2 cm (5' 7\")  Weight: 99.8 kg (220 lb)  SpO2: 97 %  Physical Exam   Constitutional: She is oriented to person, place, and time. She appears well-developed and well-nourished. She appears distressed (mild).   Cardiovascular: Normal rate, regular rhythm and normal heart sounds.    Pulses:       Dorsalis pedis pulses are 2+ on the right side        Posterior tibial pulses are 2+ on the right side   Pulmonary/Chest: Effort normal and breath sounds normal. No respiratory distress.   Musculoskeletal:        Right ankle: She exhibits swelling (mmoderate) and ecchymosis (Dependent).        Right lower leg: She exhibits no tenderness, no swelling and no edema.        Right foot: There is swelling.        Feet:    Neurological: She is alert and oriented to person, place, and time.   Skin: Skin is warm.   Dependent ecchymosis of the right foot and toes   Psychiatric: She has a normal mood and affect.       ED Course    I removed the splint and examined her foot.  There is no evidence of skin breakdown.  She still is a moderate amount of swelling and ecchymosis.      Short leg stirrup splint was replaced with Ortho-Glass with copious padding.  She was splinted with her foot dorsiflexed to 90 .  She states that position felt much better as she was fairly plantarflexed in the old splint.           ED Course     Procedures              Assessments & Plan (with Medical Decision Making)    (M25.723) Acute right ankle pain  Comment: due to tight, ill-fitting splint treating underlying " fibula fracture  Plan: Her splint was replaced and she reported improvement in her pain in a more neutral position.  I stressed the importance of elevation as much as possible above heart level.  She will keep her appointment with podiatry later this week.  She has excellent distal perfusion.  I am not concerned about DVT or compartment syndrome.        I have reviewed the nursing notes.    I have reviewed the findings, diagnosis, plan and need for follow up with the patient.    Discharge Medication List as of 6/3/2017 11:50 PM      START taking these medications    Details   ibuprofen (ADVIL/MOTRIN) 200 MG tablet Take 3 tablets (600 mg) by mouth every 6 hours as needed for pain, OTC             Final diagnoses:   Acute right ankle pain - due to tight, ill-fitting splint treating underlying fibula fracture       6/3/2017   [facility] EMERGENCY DEPARTMENT     Julian Schulz MD  06/04/17 0763

## 2017-06-04 NOTE — DISCHARGE INSTRUCTIONS
Nonweightbearing.  Use crutches as needed to get around.  You should not put any weight on the splint.  Elevate your right leg above heart level as much as possible to help decrease the swelling.  Keep your appointment with Dr. Beck on June 8 as scheduled.  Ibuprofen as needed for pain.  You may use the oxycodone sparingly as needed for more severe pain.  It was nice visiting with you tonight.   I hope you feel better soon.     Thank you for choosing [facility]. We appreciate the opportunity to meet your urgent medical needs. Please let us know if we could have done anything to make your stay more satisfying.    After discharge, please closely monitor for any new or worsening symptoms. Return to the Emergency Department if you develop any acute worsening signs or symptoms.    If you had lab work, cultures or imaging studies done during your stay, the final results may still be pending. We will call you if your plan of care needs to change. However, if you are not improving as expected, please follow up with your primary care provider or clinic.     Start any prescription medications that were prescribed to you and take them as directed.     Please see additional handouts that may be pertinent to your condition.

## 2017-06-04 NOTE — ED NOTES
Presents with right ankle in splint from breaking her ankle last week, did not call to schedule appt with ortho until 2 days later and states now they cannot get her in until 6/8 and she has been having an increase in pain.  Has not been able to get out of bed.

## 2017-06-04 NOTE — ED NOTES
Patient came in tonight for increase in pain, has not been into see orthopedics, appointment for June 8, toes are swollen and bruised, patient has pain when toes are touched.

## 2017-06-08 ENCOUNTER — TELEPHONE (OUTPATIENT)
Dept: PODIATRY | Facility: CLINIC | Age: 27
End: 2017-06-08

## 2017-06-08 ENCOUNTER — OFFICE VISIT (OUTPATIENT)
Dept: FAMILY MEDICINE | Facility: OTHER | Age: 27
End: 2017-06-08
Payer: COMMERCIAL

## 2017-06-08 ENCOUNTER — OFFICE VISIT (OUTPATIENT)
Dept: PODIATRY | Facility: CLINIC | Age: 27
End: 2017-06-08
Payer: COMMERCIAL

## 2017-06-08 VITALS
OXYGEN SATURATION: 98 % | DIASTOLIC BLOOD PRESSURE: 84 MMHG | HEART RATE: 85 BPM | TEMPERATURE: 98 F | BODY MASS INDEX: 34.46 KG/M2 | SYSTOLIC BLOOD PRESSURE: 118 MMHG | WEIGHT: 220 LBS | RESPIRATION RATE: 20 BRPM

## 2017-06-08 VITALS — TEMPERATURE: 97 F | BODY MASS INDEX: 34.53 KG/M2 | HEIGHT: 67 IN | WEIGHT: 220 LBS

## 2017-06-08 DIAGNOSIS — S06.0XAA: ICD-10-CM

## 2017-06-08 DIAGNOSIS — S82.891A ANKLE FRACTURE, RIGHT, CLOSED, INITIAL ENCOUNTER: Primary | ICD-10-CM

## 2017-06-08 DIAGNOSIS — S99.911A ANKLE INJURY, RIGHT, INITIAL ENCOUNTER: ICD-10-CM

## 2017-06-08 DIAGNOSIS — Z86.69 HX OF MIGRAINE HEADACHES: ICD-10-CM

## 2017-06-08 DIAGNOSIS — F32.5 MAJOR DEPRESSION IN COMPLETE REMISSION (H): ICD-10-CM

## 2017-06-08 DIAGNOSIS — Z87.820 HX OF CONCUSSION: ICD-10-CM

## 2017-06-08 DIAGNOSIS — F41.9 ANXIETY: ICD-10-CM

## 2017-06-08 DIAGNOSIS — Z01.818 PREOP GENERAL PHYSICAL EXAM: Primary | ICD-10-CM

## 2017-06-08 LAB
HBA1C MFR BLD: 5.2 % (ref 4.3–6)
HGB BLD-MCNC: 12.1 G/DL (ref 11.7–15.7)

## 2017-06-08 PROCEDURE — 83036 HEMOGLOBIN GLYCOSYLATED A1C: CPT | Performed by: FAMILY MEDICINE

## 2017-06-08 PROCEDURE — 36415 COLL VENOUS BLD VENIPUNCTURE: CPT | Performed by: FAMILY MEDICINE

## 2017-06-08 PROCEDURE — 99203 OFFICE O/P NEW LOW 30 MIN: CPT | Mod: 57 | Performed by: PODIATRIST

## 2017-06-08 PROCEDURE — 99214 OFFICE O/P EST MOD 30 MIN: CPT | Performed by: FAMILY MEDICINE

## 2017-06-08 PROCEDURE — 85018 HEMOGLOBIN: CPT | Performed by: FAMILY MEDICINE

## 2017-06-08 RX ORDER — CEFAZOLIN SODIUM 2 G/100ML
2 INJECTION, SOLUTION INTRAVENOUS
Status: CANCELLED | OUTPATIENT
Start: 2017-06-08

## 2017-06-08 RX ORDER — CEFAZOLIN SODIUM 1 G/3ML
1 INJECTION, POWDER, FOR SOLUTION INTRAMUSCULAR; INTRAVENOUS SEE ADMIN INSTRUCTIONS
Status: CANCELLED | OUTPATIENT
Start: 2017-06-08

## 2017-06-08 RX ORDER — OXYCODONE AND ACETAMINOPHEN 5; 325 MG/1; MG/1
1-2 TABLET ORAL EVERY 6 HOURS PRN
Qty: 20 TABLET | Refills: 0 | Status: SHIPPED | OUTPATIENT
Start: 2017-06-08 | End: 2017-06-15

## 2017-06-08 ASSESSMENT — PAIN SCALES - GENERAL: PAINLEVEL: SEVERE PAIN (7)

## 2017-06-08 NOTE — NURSING NOTE
"Chief Complaint   Patient presents with     Pre-Op Exam       Initial /84 (Cuff Size: Adult Regular)  Pulse 85  Temp 98  F (36.7  C) (Temporal)  Resp 20  Wt 220 lb (99.8 kg)  LMP 05/05/2017 (Approximate)  SpO2 98%  BMI 34.46 kg/m2 Estimated body mass index is 34.46 kg/(m^2) as calculated from the following:    Height as of an earlier encounter on 6/8/17: 5' 7\" (1.702 m).    Weight as of this encounter: 220 lb (99.8 kg).  Medication Reconciliation: complete   Karen Gtz CMA (AAMA)    "

## 2017-06-08 NOTE — PROGRESS NOTES
HPI:  Yesika Grant is a 26 year old female who is seen in consultation at the request of ED Dept - Julian Schulz MD.    Pt presents for eval of:   (Onset, Location, L/R, Character, Treatments, Injury if yes)     XR Right ankle 5/28/2017    DOI 5/28/2017, patient passed out and fell in shower and twisted Right ankle. Unable to weight bear since onset.   Stabbing, burning, throbbing, swelling, pain 7    NWB w/splint, elevation, percocet, ibuprofen    Works as a .    Weight management plan: Patient was referred to their PCP to discuss a diet and exercise plan.     ROS:  10 point ROS neg other than the symptoms noted above in the HPI.    PAST MEDICAL HISTORY:   Past Medical History:   Diagnosis Date     Moderate major depression (H) 5/3/2012     Ovarian cyst 11/05    small 2 cm simple left ovarian cyst vs dominant follicle on US     Tonsillitis      Urticaria     ?due to oat straw allergy        PAST SURGICAL HISTORY:   Past Surgical History:   Procedure Laterality Date     HC TOOTH EXTRACTION W/FORCEP Bilateral      TONSILLECTOMY, ADENOIDECTOMY ADULT, COMBINED  12/1/2011    Procedure:COMBINED TONSILLECTOMY, ADENOIDECTOMY ADULT; Adult Tonsillectomy & Adenoidectomy, Cauterization Of       TURBINOPLASTY  12/1/2011    Procedure:TURBINOPLASTY; Surgeon:GISELE SWENSON; Location:[facility] OR        MEDICATIONS:   Current Outpatient Prescriptions:      oxyCODONE-acetaminophen (PERCOCET) 5-325 MG per tablet, Take 1-2 tablets by mouth every 6 hours as needed for pain, Disp: 20 tablet, Rfl: 0     ibuprofen (ADVIL/MOTRIN) 200 MG tablet, Take 3 tablets (600 mg) by mouth every 6 hours as needed for pain, Disp: , Rfl:      topiramate (TOPAMAX) 100 MG tablet, Take 1 tablet (100 mg) by mouth 2 times daily, Disp: 60 tablet, Rfl: 3     ondansetron (ZOFRAN ODT) 4 MG ODT tab, Take 1 tablet (4 mg) by mouth every 8 hours as needed for nausea, Disp: 10 tablet, Rfl: 0     buPROPion (WELLBUTRIN XL) 300 MG 24 hr tablet,  "Take 1 tablet (300 mg) by mouth every morning, Disp: 90 tablet, Rfl: 1     citalopram (CELEXA) 40 MG tablet, Take 1 tablet (40 mg) by mouth daily Due for office visit, Disp: 90 tablet, Rfl: 1     cyclobenzaprine (FLEXERIL) 10 MG tablet, Take 1 tablet (10 mg) by mouth 3 times daily as needed for muscle spasms, Disp: 90 tablet, Rfl: 1     ALPRAZolam (XANAX) 0.25 MG tablet, Take 1 tablet (0.25 mg) by mouth nightly as needed for anxiety, Disp: 10 tablet, Rfl: 1     VITAMIN D, CHOLECALCIFEROL, PO, Take by mouth daily, Disp: , Rfl:      multivitamin, therapeutic with minerals (MULTI-VITAMIN) TABS, Take 1 tablet by mouth daily Reported on 5/23/2017, Disp: , Rfl:      Ferrous Sulfate (IRON SUPPLEMENT PO), Reported on 5/23/2017, Disp: , Rfl:      ALLERGIES:    Allergies   Allergen Reactions     Atarax [Hydroxyzine] Other (See Comments)     rage     Vicodin [Hydrocodone-Acetaminophen] Nausea     \"violently angry\"     Zithromax [Azithromycin Dihydrate] Hives and Swelling        SOCIAL HISTORY:   Social History     Social History     Marital status: Single     Spouse name: N/A     Number of children: 0     Years of education: N/A     Occupational History      Unemployed     Social History Main Topics     Smoking status: Current Every Day Smoker     Packs/day: 1.00     Types: Cigarettes     Last attempt to quit: 5/4/2015     Smokeless tobacco: Never Used     Alcohol use Yes      Comment: monthly-2-3 drinks     Drug use: No     Sexual activity: Yes     Partners: Male     Birth control/ protection: Condom, IUD      Comment: mirena 5/13     Other Topics Concern     Parent/Sibling W/ Cabg, Mi Or Angioplasty Before 65f 55m? No     Social History Narrative    Home schooling, planning to obtain GED.        FAMILY HISTORY:   Family History   Problem Relation Age of Onset     Alzheimer Disease Maternal Grandmother      Hypertension Maternal Grandmother      Thyroid Disease Maternal Grandmother      Arthritis Maternal Grandfather      " "CANCER Paternal Grandmother      Breast Cancer     Genitourinary Problems Paternal Grandmother      HEART DISEASE Paternal Grandfather      Hypertension Mother      Genitourinary Problems Sister      endometriosis     Respiratory Father 49     Pulmonary Embolism     DIABETES Paternal Uncle         EXAM:Vitals: Temp 97  F (36.1  C) (Temporal)  Ht 5' 7\" (1.702 m)  Wt 220 lb (99.8 kg)  LMP 05/05/2017 (Approximate)  BMI 34.46 kg/m2  BMI= Body mass index is 34.46 kg/(m^2).    General appearance: Patient is alert and fully cooperative with history & exam.  No sign of distress is noted during the visit.     Psychiatric: Affect is pleasant & appropriate.  Patient appears motivated to improve health.     Respiratory: Breathing is regular & unlabored while sitting.     HEENT: Hearing is intact to spoken word.  Speech is clear.  No gross evidence of visual impairment that would impact ambulation.     Vascular: DP & PT pulses are intact & regular bilaterally.  No significant edema or varicosities noted.  CFT and skin temperature is normal to both lower extremities.     Neurologic: Lower extremity sensation is intact to light touch.  No evidence of weakness or contracture in the lower extremities.  No evidence of neuropathy.    Dermatologic: Skin is intact to both lower extremities with adequate texture, turgor and tone about the integument.  No paronychia or evidence of soft tissue infection is noted.     Musculoskeletal: Patient is nonambulatory presenting in a sugar tong length of the right ankle. Moderate edema noted about the lateral right ankle. Pain with any palpation of the distal fibula anterior ankle. No pain throughout the foot Achilles or posterior tibial tendons.    Radiographs demonstrate oblique fracture starting at the joint line with posterior distraction and shortening of the fibula.     ASSESSMENT:       ICD-10-CM    1. Ankle fracture, right, closed, initial encounter S82.891A oxyCODONE-acetaminophen " (PERCOCET) 5-325 MG per tablet        PLAN:  Reviewed patient's chart in [facility].      6/8/2017   Is 10 days out.   We discussed treatment options and she would like to pursue any reasonable anatomic alignment to obtain best possible long-term outcome therefore we will  move forward with open reduction internal fixation of the right ankle fracture fibula.    All potential risks and complications were discussed with her. Postoperative care was discussed and all questions answered. Alternative treatment options also reviewed as well as expected outcomes. All questions were answered    Nate Beck DPM    Please schedule for surgery, pre op H&P, and post ops.    Surgery:  Patient Name:  Yesika Grant ([id number])  Procedure:   Right ankle open reduction internal fixation  Diagnosis:    Right ankle fracture  Assistant: first assist  Surgeon:  Nate Beck DPM  Anesthesia:  General with post op pop block  PT type:  Same Day Surgery  Time needed: 90 minutes  Patient position:  lying supine and with the calcaneus just off the end of table  Mini fluoro:  Yes  C-arm:  no  Equipment:  [employer] ankle fracture   Anticoagulation:  No  Vendor:  Request equipment rep Edwin Gupta with [employer] will be present for case, office [phone], cell [phone]  Surgeon Notes:     Post op appts:    5-7 days po  12-15 days po  approx 4 weeks  approx 8 weeks  just before 12 weeks    Expected work restrictions:  strict no weight bearing in cast or splint for one month followed by one month of protected weight bearing in a fracture boot    [facility] Discussed:  Not Applicable

## 2017-06-08 NOTE — MR AVS SNAPSHOT
After Visit Summary   6/8/2017    Yesika Grant    MRN: [id number]           Patient Information     Date Of Birth          1990        Visit Information        Provider Department      6/8/2017 8:00 AM Nate Beck DPM [facility]        Today's Diagnoses     Ankle fracture, right, closed, initial encounter    -  1      Care Instructions    Keep foot elevated above heart.          Follow-ups after your visit        Your next 10 appointments already scheduled     Jun 08, 2017  2:20 PM CDT   Pre-Op physical with Tammy [facility] MD   [facility]    [address] [facility] [address]   [phone]            Jun 14, 2017  2:45 PM CDT   Office Visit with Young Fletcher MD   [facility]    [address] [place] [address]   [phone]           Bring a current list of meds and any records pertaining to this visit.  For Physicals, please bring immunization records and any forms needing to be filled out.  Please arrive 10 minutes early to complete paperwork.            Justin 15, 2017  8:00 AM CDT   Return Visit with Nate Beck DPM   [facility]    [address] [place] [address]   [phone]            Jun 22, 2017  7:45 AM CDT   Return Visit with Nate Beck DPM   [facility]    [address] [place] [address]   [phone]            Jul 06, 2017  7:45 AM CDT   Return Visit with Nate Beck DPM   [facility]    [address] [place] [address]   [phone]            Jul 25, 2017  1:00 PM CDT   New Visit with Clifton Ames MD   [facility]    [address] [place] [address]   [phone]  "           Aug 03, 2017  7:45 AM CDT   [facility] Visit with Nate Beck DPM   [facility]    [address] [place] [address] [phone]            Aug 31, 2017  7:45 AM CDT   Return Visit with Nate Beck DPM   [facility]    [address] [place] [address] [phone]              Who to contact     If you have questions or need follow up information about today's clinic visit or your schedule please contact [facility] directly at [phone].  Normal or non-critical lab and imaging results will be communicated to you by SnoopWallhart, letter or phone within 4 business days after the clinic has received the results. If you do not hear from us within 7 days, please contact the clinic through SnoopWallhart or phone. If you have a critical or abnormal lab result, we will notify you by phone as soon as possible.  Submit refill requests through [employer] or call your pharmacy and they will forward the refill request to us. Please allow 3 business days for your refill to be completed.          Additional Information About Your Visit        MyChart Information     [employer] lets you send messages to your doctor, view your test results, renew your prescriptions, schedule appointments and more. To sign up, go to [email or web address] . Click on \"Log in\" on the left side of the screen, which will take you to the Welcome page. Then click on \"Sign up Now\" on the right side of the page.     You will be asked to enter the access code listed below, as well as some personal information. Please follow the directions to create your username and password.     Your access code is: [email or web address]  Expires: 2017  4:10 PM     Your access code will  in 90 days. If you need help or a new code, please call your [place] clinic or [phone].        Care EveryWhere ID     This is your Care " "EveryWhere ID. This could be used by other organizations to access your [facility] medical records  [id number]        Your Vitals Were     Temperature Height Last Period BMI (Body Mass Index)          97  F (36.1  C) (Temporal) 5' 7\" (1.702 m) 05/05/2017 (Approximate) 34.46 kg/m2         Blood Pressure from Last 3 Encounters:   06/04/17 116/87   05/28/17 113/88   05/23/17 130/82    Weight from Last 3 Encounters:   06/08/17 220 lb (99.8 kg)   06/03/17 220 lb (99.8 kg)   05/28/17 220 lb (99.8 kg)              Today, you had the following     No orders found for display         Where to get your medicines      Some of these will need a paper prescription and others can be bought over the counter.  Ask your nurse if you have questions.     Bring a paper prescription for each of these medications     oxyCODONE-acetaminophen 5-325 MG per tablet          Primary Care Provider Office Phone # Fax #    Young Fletcher [phone]       [facility] [address] [place] [address]        Thank you!     Thank you for choosing [facility]  for your care. Our goal is always to provide you with excellent care. Hearing back from our patients is one way we can continue to improve our services. Please take a few minutes to complete the written survey that you may receive in the mail after your visit with us. Thank you!             Your Updated Medication List - Protect others around you: Learn how to safely use, store and throw away your medicines at www.disposemymeds.org.          This list is accurate as of: 6/8/17  8:40 AM.  Always use your most recent med list.                   Brand Name Dispense Instructions for use    ALPRAZolam 0.25 MG tablet    XANAX    10 tablet    Take 1 tablet (0.25 mg) by mouth nightly as needed for anxiety       buPROPion 300 MG 24 hr tablet    WELLBUTRIN XL    90 tablet    Take 1 tablet (300 mg) by mouth every morning       citalopram 40 MG tablet    " celeXA    90 tablet    Take 1 tablet (40 mg) by mouth daily Due for office visit       cyclobenzaprine 10 MG tablet    FLEXERIL    90 tablet    Take 1 tablet (10 mg) by mouth 3 times daily as needed for muscle spasms       ibuprofen 200 MG tablet    ADVIL/MOTRIN     Take 3 tablets (600 mg) by mouth every 6 hours as needed for pain       IRON SUPPLEMENT PO      Reported on 5/23/2017       Multi-vitamin Tabs tablet      Take 1 tablet by mouth daily Reported on 5/23/2017       ondansetron 4 MG ODT tab    ZOFRAN ODT    10 tablet    Take 1 tablet (4 mg) by mouth every 8 hours as needed for nausea       oxyCODONE-acetaminophen 5-325 MG per tablet    PERCOCET    20 tablet    Take 1-2 tablets by mouth every 6 hours as needed for pain       topiramate 100 MG tablet    TOPAMAX    60 tablet    Take 1 tablet (100 mg) by mouth 2 times daily       VITAMIN D (CHOLECALCIFEROL) PO      Take by mouth daily

## 2017-06-08 NOTE — PROGRESS NOTES
[facility]  [address] [facility], [place], operated by [facility] [address]  [phone]  Dept: [phone]    PRE-OP EVALUATION:  Today's date: 2017    Yesika Grant (: 1990) presents for pre-operative evaluation assessment as requested by Dr. Beck.  She requires evaluation and anesthesia risk assessment prior to undergoing surgery/procedure for treatment of FX of the ankle .  Proposed procedure: OPEN REDUCTION INTERNAL FIXATION ANKLE- right    Date of Surgery/ Procedure: 17  Time of Surgery/ Procedure: 1045  Hospital/Surgical Facility: [facility]  Fax number for surgical facility:   Primary Physician: Young Fletcher  Type of Anesthesia Anticipated: General    Patient has a Health Care Directive or Living Will:  NO    1. NO - Do you have a history of heart attack, stroke, stent, bypass or surgery on an artery in the head, neck, heart or legs?  2. NO - Do you ever have any pain or discomfort in your chest?  3. NO - Do you have a history of  Heart Failure?  4. YES - ARE YOUR TROUBLED BY SHORTNESS OF BREATH WHEN WALKING ON THE LEVEL, UP A SLIGHT HILL OR AT NIGHT?   Sob when when overexert her self  5. NO - Do you currently have a cold, bronchitis or other respiratory infection?  6. YES - DO YOU HAVE A COUGH, SHORTNESS OF BREATH OR WHEEZING? Has had cough and sore throat for past couple weeks, getting a little bit better, no wheezing  7. YES - DO YOU SOMETIMES GET PAINS IN THE CALVES OF YOUR LEGS WHEN YOU WALK? burning in back of the legs, not too frequently, only go on long distances, try for a long walk  8. YES - DO YOU OR ANYONE IN YOUR FAMILY HAVE PREVIOUS HISTORY OF BLOOD CLOTS? Father had blood in lung, age 50, 5 yrars ago, not sure why he had blood clot, heavy smoker,   9. YES - DO YOU OR DOES ANYONE IN YOUR FAMILY HAVE A SERIOUS BLEEDING PROBLEM SUCH AS PROLONGED BLEEDING FOLLOWING SURGERIES OR CUTS? Father, in blood thinner  10. YES - HAVE YOU EVER HAD PROBLEMS WITH ANEMIA OR  BEEN TOLD TO TAKE IRON PILLS? Take Iron regularly, border line anemic  11. NO - Have you had any abnormal blood loss such as black, tarry or bloody stools, or abnormal vaginal bleeding?  12. NO - Have you ever had a blood transfusion?  13. YES - HAVE YOU OR ANY OF YOUR RELATIVES EVER HAD PROBLEMS WITH ANESTHESIA? Mom tells , she gets anxious with it and vomiting when had tonsilectomy  14. YES - DO YOU HAVE SLEEP APNEA, EXCESSIVE SNORING OR DAYTIME DROWSINESS? Snoring and drowsy during the day  15. NO - Do you have any prosthetic heart valves?  16. NO - Do you have prosthetic joints?  17. NO - Is there any chance that you may be pregnant? LMP yesterday 6/7/17      HPI:                                                      Brief HPI related to upcoming procedure:  undergoing surgery/procedure for treatment of FX of the ankle .  Proposed procedure: OPEN REDUCTION INTERNAL FIXATION ANKLE- right     See problem list for active medical problems.  Problems all longstanding and stable, except as noted/documented.  See ROS for pertinent symptoms related to these conditions.                                                                                                  .    MEDICAL HISTORY:                                                      Patient Active Problem List    Diagnosis Date Noted     Vitamin D deficiency 04/20/2016     Priority: Medium     Contraception 09/16/2015     Priority: Medium     Concussion injury of body structure 01/10/2014     Priority: Medium     Major depression in complete remission (H) 12/05/2012     Priority: Medium     Anxiety 05/03/2012     Priority: Medium     Tobacco use disorder 02/23/2012     Priority: Medium     CARDIOVASCULAR SCREENING; LDL GOAL LESS THAN 160 04/20/2011     Priority: Medium      Past Medical History:   Diagnosis Date     Moderate major depression (H) 5/3/2012     Ovarian cyst 11/05    small 2 cm simple left ovarian cyst vs dominant follicle on US     Tonsillitis       "Urticaria     ?due to oat straw allergy     Past Surgical History:   Procedure Laterality Date     HC TOOTH EXTRACTION W/FORCEP Bilateral      TONSILLECTOMY, ADENOIDECTOMY ADULT, COMBINED  12/1/2011    Procedure:COMBINED TONSILLECTOMY, ADENOIDECTOMY ADULT; Adult Tonsillectomy & Adenoidectomy, Cauterization Of       TURBINOPLASTY  12/1/2011    Procedure:TURBINOPLASTY; Surgeon:GISELE SWENSON; Location:[facility]     Current Outpatient Prescriptions   Medication Sig Dispense Refill     oxyCODONE-acetaminophen (PERCOCET) 5-325 MG per tablet Take 1-2 tablets by mouth every 6 hours as needed for pain 20 tablet 0     ibuprofen (ADVIL/MOTRIN) 200 MG tablet Take 3 tablets (600 mg) by mouth every 6 hours as needed for pain       topiramate (TOPAMAX) 100 MG tablet Take 1 tablet (100 mg) by mouth 2 times daily 60 tablet 3     ondansetron (ZOFRAN ODT) 4 MG ODT tab Take 1 tablet (4 mg) by mouth every 8 hours as needed for nausea 10 tablet 0     buPROPion (WELLBUTRIN XL) 300 MG 24 hr tablet Take 1 tablet (300 mg) by mouth every morning 90 tablet 1     citalopram (CELEXA) 40 MG tablet Take 1 tablet (40 mg) by mouth daily Due for office visit 90 tablet 1     cyclobenzaprine (FLEXERIL) 10 MG tablet Take 1 tablet (10 mg) by mouth 3 times daily as needed for muscle spasms 90 tablet 1     ALPRAZolam (XANAX) 0.25 MG tablet Take 1 tablet (0.25 mg) by mouth nightly as needed for anxiety 10 tablet 1     VITAMIN D, CHOLECALCIFEROL, PO Take by mouth daily       multivitamin, therapeutic with minerals (MULTI-VITAMIN) TABS Take 1 tablet by mouth daily Reported on 5/23/2017       Ferrous Sulfate (IRON SUPPLEMENT PO) Reported on 5/23/2017       OTC products: None, except as noted above    Allergies   Allergen Reactions     Atarax [Hydroxyzine] Other (See Comments)     rage     Vicodin [Hydrocodone-Acetaminophen] Nausea     \"violently angry\".  Patient states she does tolerate regular Tylenol/Acetaminophen     Zithromax [Azithromycin Dihydrate] " Hives and Swelling      Latex Allergy: NO    Social History   Substance Use Topics     Smoking status: Current Every Day Smoker     Packs/day: 1.00     Types: Cigarettes     Last attempt to quit: 5/4/2015     Smokeless tobacco: Never Used     Alcohol use Yes      Comment: monthly-2-3 drinks     History   Drug Use No       REVIEW OF SYSTEMS:                                                    C: NEGATIVE for fever, chills, change in weight  E/M: NEGATIVE for ear, mouth and throat problems  R: NEGATIVE for significant cough or SOB  CV: NEGATIVE for chest pain, palpitations or peripheral edema  GI: NEGATIVE for nausea, abdominal pain, heartburn, or change in bowel habits  : negative for, dysuria and hematuria  NEURO: NEGATIVE for weakness, dizziness or paresthesias  HEME/ALLERGY/IMMUNE: NEGATIVE for bleeding problems    EXAM:                                                    /84 (Cuff Size: Adult Regular)  Pulse 85  Temp 98  F (36.7  C) (Temporal)  Resp 20  Wt 220 lb (99.8 kg)  LMP 05/05/2017 (Approximate)  SpO2 98%  BMI 34.46 kg/m2  GENERAL APPEARANCE: healthy, alert and no distress  HENT: ear canals and TM's normal and nose and mouth without ulcers or lesions  RESP: lungs clear to auscultation - no rales, rhonchi or wheezes  CV: regular rate and rhythm, normal S1 S2, no S3 or S4 and no murmur, click or rub   ABDOMEN: soft, nontender, no HSM or masses and bowel sounds normal  NEURO: Normal strength and tone, sensory exam grossly normal, mentation intact and speech normal    DIAGNOSTICS:                                                    EKG: Not indicated due to non-vascular surgery and low risk of event (age <65 and without cardiac risk factors)    Recent Labs   Lab Test  12/12/16   1444  08/18/16   1521   HGB  13.0  14.0   PLT  339  325   NA  142  138   POTASSIUM  3.8  4.0   CR  0.68  0.70      IMPRESSION:                                                    Reason for surgery/procedure: ankle  surgery  Diagnosis/reason for consult: Preop exam    The proposed surgical procedure is considered INTERMEDIATE risk.    REVISED CARDIAC RISK INDEX  The patient has the following serious cardiovascular risks for perioperative complications such as (MI, PE, VFib and 3  AV Block):  No serious cardiac risks  INTERPRETATION: 0 risks: Class I (very low risk - 0.4% complication rate)    The patient has the following additional risks for perioperative complications:  No identified additional risks  The ASCVD Risk score (Danielgentry ARAUJO Jr, et al., 2013) failed to calculate for the following reasons:    The 2013 ASCVD risk score is only valid for ages 40 to 79      ICD-10-CM    1. Preop general physical exam Z01.818 Hemoglobin A1c     Hemoglobin   2. Anxiety F41.9    3. Ankle injury, right, initial encounter S99.911A    4. Major depression in complete remission (H) F32.5    5. Concussion injury of body structure S06.0X9A    6. Hx of migraine headaches Z86.69    7. Hx of concussion Z87.820        RECOMMENDATIONS:                                                      --Consult hospital rounder / IM to assist post-op medical management  --Topamax ( due to headaches and Hx of concussion, patient can take in morning, if ok with Anesthesiologist.  --Patient is to take all scheduled medications on the day of surgery.    APPROVAL GIVEN to proceed with proposed procedure, without further diagnostic evaluation       Signed Electronically by: Tammy Butler MD    Copy of this evaluation report is provided to requesting physician.    [place] Preop Guidelines

## 2017-06-08 NOTE — LETTER
[address] [address]  [phone]    2017      RE:  Yesika Grant  : 1990      To whom it may concern:    This patient must be released from work 17 through 6/15/17.  Then she may be able to tolerate light duty work at that time.  She may have challenges or limitations of driving for up to one month.  Will reevaluate 6/15/17.       Sincerely,          Nate Beck DPM

## 2017-06-08 NOTE — NURSING NOTE
"Chief Complaint   Patient presents with     Consult     Right fibula fracture, DOI 5/28/2017; new pt       Initial Temp 97  F (36.1  C) (Temporal)  Ht 5' 7\" (1.702 m)  Wt 220 lb (99.8 kg)  LMP 05/05/2017 (Approximate)  BMI 34.46 kg/m2 Estimated body mass index is 34.46 kg/(m^2) as calculated from the following:    Height as of this encounter: 5' 7\" (1.702 m).    Weight as of this encounter: 220 lb (99.8 kg).  BP completed using cuff size: NA (Not Taken)  Medication Reconciliation: complete    Teressa Buitrago CMA, June 8, 2017    "

## 2017-06-08 NOTE — LETTER
6/8/2017       RE: Yesika Grant  [address] [facility] [address]           Dear Colleague,    Thank you for referring your patient, Yesika Grant, to the [facility]. Please see a copy of my visit note below.    HPI:  Yesika Grant is a 26 year old female who is seen in consultation at the request of ED Dept - Julian Schulz MD.    Pt presents for eval of:   (Onset, Location, L/R, Character, Treatments, Injury if yes)     XR Right ankle 5/28/2017    DOI 5/28/2017, patient passed out and fell in shower and twisted Right ankle. Unable to weight bear since onset.   Stabbing, burning, throbbing, swelling, pain 7    NWB w/splint, elevation, percocet, ibuprofen    Works as a .    Weight management plan: Patient was referred to their PCP to discuss a diet and exercise plan.     ROS:  10 point ROS neg other than the symptoms noted above in the HPI.    PAST MEDICAL HISTORY:   Past Medical History:   Diagnosis Date     Moderate major depression (H) 5/3/2012     Ovarian cyst 11/05    small 2 cm simple left ovarian cyst vs dominant follicle on US     Tonsillitis      Urticaria     ?due to oat straw allergy        PAST SURGICAL HISTORY:   Past Surgical History:   Procedure Laterality Date     HC TOOTH EXTRACTION W/FORCEP Bilateral      TONSILLECTOMY, ADENOIDECTOMY ADULT, COMBINED  12/1/2011    Procedure:COMBINED TONSILLECTOMY, ADENOIDECTOMY ADULT; Adult Tonsillectomy & Adenoidectomy, Cauterization Of       TURBINOPLASTY  12/1/2011    Procedure:TURBINOPLASTY; Surgeon:GISELE SWENSON; Location[facility]        MEDICATIONS:   Current Outpatient Prescriptions:      oxyCODONE-acetaminophen (PERCOCET) 5-325 MG per tablet, Take 1-2 tablets by mouth every 6 hours as needed for pain, Disp: 20 tablet, Rfl: 0     ibuprofen (ADVIL/MOTRIN) 200 MG tablet, Take 3 tablets (600 mg) by mouth every 6 hours as needed for pain, Disp: , Rfl:      topiramate (TOPAMAX) 100 MG tablet, Take 1  "tablet (100 mg) by mouth 2 times daily, Disp: 60 tablet, Rfl: 3     ondansetron (ZOFRAN ODT) 4 MG ODT tab, Take 1 tablet (4 mg) by mouth every 8 hours as needed for nausea, Disp: 10 tablet, Rfl: 0     buPROPion (WELLBUTRIN XL) 300 MG 24 hr tablet, Take 1 tablet (300 mg) by mouth every morning, Disp: 90 tablet, Rfl: 1     citalopram (CELEXA) 40 MG tablet, Take 1 tablet (40 mg) by mouth daily Due for office visit, Disp: 90 tablet, Rfl: 1     cyclobenzaprine (FLEXERIL) 10 MG tablet, Take 1 tablet (10 mg) by mouth 3 times daily as needed for muscle spasms, Disp: 90 tablet, Rfl: 1     ALPRAZolam (XANAX) 0.25 MG tablet, Take 1 tablet (0.25 mg) by mouth nightly as needed for anxiety, Disp: 10 tablet, Rfl: 1     VITAMIN D, CHOLECALCIFEROL, PO, Take by mouth daily, Disp: , Rfl:      multivitamin, therapeutic with minerals (MULTI-VITAMIN) TABS, Take 1 tablet by mouth daily Reported on 5/23/2017, Disp: , Rfl:      Ferrous Sulfate (IRON SUPPLEMENT PO), Reported on 5/23/2017, Disp: , Rfl:      ALLERGIES:    Allergies   Allergen Reactions     Atarax [Hydroxyzine] Other (See Comments)     rage     Vicodin [Hydrocodone-Acetaminophen] Nausea     \"violently angry\"     Zithromax [Azithromycin Dihydrate] Hives and Swelling        SOCIAL HISTORY:   Social History     Social History     Marital status: Single     Spouse name: N/A     Number of children: 0     Years of education: N/A     Occupational History      Unemployed     Social History Main Topics     Smoking status: Current Every Day Smoker     Packs/day: 1.00     Types: Cigarettes     Last attempt to quit: 5/4/2015     Smokeless tobacco: Never Used     Alcohol use Yes      Comment: monthly-2-3 drinks     Drug use: No     Sexual activity: Yes     Partners: Male     Birth control/ protection: Condom, IUD      Comment: mirena 5/13     Other Topics Concern     Parent/Sibling W/ Cabg, Mi Or Angioplasty Before 65f 55m? No     Social History Narrative    Home schooling, planning to " "obtain GED.        FAMILY HISTORY:   Family History   Problem Relation Age of Onset     Alzheimer Disease Maternal Grandmother      Hypertension Maternal Grandmother      Thyroid Disease Maternal Grandmother      Arthritis Maternal Grandfather      CANCER Paternal Grandmother      Breast Cancer     Genitourinary Problems Paternal Grandmother      HEART DISEASE Paternal Grandfather      Hypertension Mother      Genitourinary Problems Sister      endometriosis     Respiratory Father 49     Pulmonary Embolism     DIABETES Paternal Uncle         EXAM:Vitals: Temp 97  F (36.1  C) (Temporal)  Ht 5' 7\" (1.702 m)  Wt 220 lb (99.8 kg)  LMP 05/05/2017 (Approximate)  BMI 34.46 kg/m2  BMI= Body mass index is 34.46 kg/(m^2).    General appearance: Patient is alert and fully cooperative with history & exam.  No sign of distress is noted during the visit.     Psychiatric: Affect is pleasant & appropriate.  Patient appears motivated to improve health.     Respiratory: Breathing is regular & unlabored while sitting.     HEENT: Hearing is intact to spoken word.  Speech is clear.  No gross evidence of visual impairment that would impact ambulation.     Vascular: DP & PT pulses are intact & regular bilaterally.  No significant edema or varicosities noted.  CFT and skin temperature is normal to both lower extremities.     Neurologic: Lower extremity sensation is intact to light touch.  No evidence of weakness or contracture in the lower extremities.  No evidence of neuropathy.    Dermatologic: Skin is intact to both lower extremities with adequate texture, turgor and tone about the integument.  No paronychia or evidence of soft tissue infection is noted.     Musculoskeletal: Patient is nonambulatory presenting in a sugar tong length of the right ankle. Moderate edema noted about the lateral right ankle. Pain with any palpation of the distal fibula anterior ankle. No pain throughout the foot Achilles or posterior tibial " tendons.    Radiographs demonstrate oblique fracture starting at the joint line with posterior distraction and shortening of the fibula.     ASSESSMENT:       ICD-10-CM    1. Ankle fracture, right, closed, initial encounter S82.891A oxyCODONE-acetaminophen (PERCOCET) 5-325 MG per tablet        PLAN:  Reviewed patient[facility] chart in [facility].      6/8/2017   Is 10 days out.   We discussed treatment options and she would like to pursue any reasonable anatomic alignment to obtain best possible long-term outcome therefore we will  move forward with open reduction internal fixation of the right ankle fracture fibula.    All potential risks and complications were discussed with her. Postoperative care was discussed and all questions answered. Alternative treatment options also reviewed as well as expected outcomes. All questions were answered    Nate Beck DPM    Please schedule for surgery, pre op H&P, and post ops.    Surgery:  Patient Name:  Yesika Grant ([id number])  Procedure:   Right ankle open reduction internal fixation  Diagnosis:    Right ankle fracture  Assistant: first assist  Surgeon:  Nate Beck DPM  Anesthesia:  General  PT type:  Same Day Surgery  Time needed: 90 minutes  Patient position:  lying supine and with the calcaneus just off the end of table  Mini fluoro:  Yes  C-arm:  no  Equipment:  [employer] ankle fracture   Anticoagulation:  No  Vendor:  Request equipment rep Edwin Gupta with [employer] will be present for case, office [phone], cell [phone]  Surgeon Notes:     Post op appts:    5-7 days po  12-15 days po  approx 4 weeks  approx 8 weeks  just before 12 weeks    Expected work restrictions:  strict no weight bearing in cast or splint for one month followed by one month of protected weight bearing in a fracture boot    [facility] Discussed:  Not Applicable        Again, thank you for allowing me to participate in the care of your patient.         Sincerely,              Nate Beck DPM

## 2017-06-08 NOTE — TELEPHONE ENCOUNTER
Surgery Scheduled    Date of Surgery 6/9/17 Time of Surgery   Procedure: ORIF right ankle  [facility]/Surgical Facility: [place]  Surgeon: Dr. Beck  Type of Anesthesia : General  Pre-op 6/8/17 with Dr. Butler          Surgery packet mailed to patient's home address. Patients instructed to arrive 1 hours prior to surgery. Patient understood and agrees to plan.    Swati Jones  Surgery Scheduler

## 2017-06-08 NOTE — MR AVS SNAPSHOT
After Visit Summary   6/8/2017    Yesika Grant    MRN: [id number]           Patient Information     Date Of Birth          1990        Visit Information        Provider Department      6/8/2017 2:20 PM Tammy Butler MD [facility]'s Diagnoses     Preop general physical exam    -  1      Care Instructions      Before Your Surgery      Call your surgeon if there is any change in your health. This includes signs of a cold or flu (such as a sore throat, runny nose, cough, rash or fever).    Do not smoke, drink alcohol or take over the counter medicine (unless your surgeon or primary care doctor tells you to) for the 24 hours before and after surgery.    If you take prescribed drugs: Follow your doctor s orders about which medicines to take and which to stop until after surgery.    Eating and drinking prior to surgery: follow the instructions from your surgeon    Take a shower or bath the night before surgery. Use the soap your surgeon gave you to gently clean your skin. If you do not have soap from your surgeon, use your regular soap. Do not shave or scrub the surgery site.  Wear clean pajamas and have clean sheets on your bed.           Follow-ups after your visit        Your next 10 appointments already scheduled     Jun 09, 2017   Procedure with Nate Beck DPM   [facility] ([facility])    [address] [place] [address] Dr Cyndy [place] [phone] [phone]           From [facility] [address]: Exit at [address] on [address] side of [place]. Turn right on [address]. Turn left at stoplight on [address]. [facility] will be in view two blocks ahead            Jun 09, 2017 10:45 AM CDT   XR SURGERY MILA LESS THAN 5 MIN FLUORO W STILLS with PHMINI   [facility] ([facility])    [address]  Cyn[place] [id number] [phone]           Please bring a list of your current  medicines to your exam. (Include vitamins, minerals and over-thecounter medicines.) Leave your valuables at home.  Tell your doctor if there is a chance you may be pregnant.  You do not need to do anything special for this exam.            Jun 14, 2017  2:45 PM CDT   Office Visit with Young Fletcher MD   [facility] ([facility])    [id number] [place] [id number] [phone]           Bring a current list of meds and any records pertaining to this visit.  For Physicals, please bring immunization records and any forms needing to be filled out.  Please arrive 10 minutes early to complete paperwork.            Justin 15, 2017  8:00 AM CDT   [facility] Visit with Nate Beck DPM   [facility]    [address] [place] [id number]   [phone]            Jun 22, 2017  7:45 AM CDT   Return Visit with Nate Beck DPM   [facility]    [address] [place] [address]   [phone]            Jul 06, 2017  7:45 AM CDT   Return Visit with Nate Beck DPM   [facility]    [address] [place] [phone]   [phone]            Jul 25, 2017  1:00 PM CDT   New Visit with Clifton Ames MD   [facility] ([facility])    [address] [place] [phone]   [phone]            Aug 03, 2017  7:45 AM CDT   Return Visit with Nate Beck DPM   [facility]    [address] [place] [address]   [phone]            Aug 31, 2017  7:45 AM CDT   Return Visit with Nate Beck DPM   [facility]    [address] [place] [phone]   [phone]              Future tests that were ordered for you today     Open Future  "Orders        Priority Expected Expires Ordered    XR Surgery MILA L/T 5 Min Fluoro w Stills Routine 2017            Who to contact     If you have questions or need follow up information about today's clinic visit or your schedule please contact [facility] directly at [phone].  Normal or non-critical lab and imaging results will be communicated to you by MyChart, letter or phone within 4 business days after the clinic has received the results. If you do not hear from us within 7 days, please contact the clinic through [facility] or phone. If you have a critical or abnormal lab result, we will notify you by phone as soon as possible.  Submit refill requests through [employer] or call your pharmacy and they will forward the refill request to us. Please allow 3 business days for your refill to be completed.          Additional Information About Your Visit        [employer] Information     [employer] lets you send messages to your doctor, view your test results, renew your prescriptions, schedule appointments and more. To sign up, go to [email or web address] . Click on \"Log in\" on the left side of the screen, which will take you to the Welcome page. Then click on \"Sign up Now\" on the right side of the page.     You will be asked to enter the access code listed below, as well as some personal information. Please follow the directions to create your username and password.     Your access code is: [email or web address]  Expires: 2017  4:10 PM     Your access code will  in 90 days. If you need help or a new code, please call your [facility] clinic or [phone].        Care EveryWhere ID     This is your Care EveryWhere ID. This could be used by other organizations to access your [facility] medical records  [id number]        Your Vitals Were     Pulse Temperature Respirations Last Period Pulse Oximetry BMI (Body Mass Index)    85 98  F (36.7  C) (Temporal) 20 2017 (Approximate) 98% " 34.46 kg/m2       Blood Pressure from Last 3 Encounters:   06/08/17 118/84   06/04/17 116/87   05/28/17 113/88    Weight from Last 3 Encounters:   06/08/17 220 lb (99.8 kg)   06/08/17 220 lb (99.8 kg)   06/03/17 220 lb (99.8 kg)              We Performed the Following     Hemoglobin A1c     Hemoglobin          Where to get your medicines      Some of these will need a paper prescription and others can be bought over the counter.  Ask your nurse if you have questions.     Bring a paper prescription for each of these medications     oxyCODONE-acetaminophen 5-325 MG per tablet          Primary Care Provider Office Phone # Fax #    Young Fletcher [phone]       [facility] [address] [place] [address]        Thank you!     Thank you for choosing [facility]  for your care. Our goal is always to provide you with excellent care. Hearing back from our patients is one way we can continue to improve our services. Please take a few minutes to complete the written survey that you may receive in the mail after your visit with us. Thank you!             Your Updated Medication List - Protect others around you: Learn how to safely use, store and throw away your medicines at www.disposemymeds.org.          This list is accurate as of: 6/8/17  3:23 PM.  Always use your most recent med list.                   Brand Name Dispense Instructions for use    ALPRAZolam 0.25 MG tablet    XANAX    10 tablet    Take 1 tablet (0.25 mg) by mouth nightly as needed for anxiety       buPROPion 300 MG 24 hr tablet    WELLBUTRIN XL    90 tablet    Take 1 tablet (300 mg) by mouth every morning       citalopram 40 MG tablet    celeXA    90 tablet    Take 1 tablet (40 mg) by mouth daily Due for office visit       cyclobenzaprine 10 MG tablet    FLEXERIL    90 tablet    Take 1 tablet (10 mg) by mouth 3 times daily as needed for muscle spasms       ibuprofen 200 MG tablet    ADVIL/MOTRIN     Take  3 tablets (600 mg) by mouth every 6 hours as needed for pain       IRON SUPPLEMENT PO      Reported on 5/23/2017       Multi-vitamin Tabs tablet      Take 1 tablet by mouth daily Reported on 5/23/2017       ondansetron 4 MG ODT tab    ZOFRAN ODT    10 tablet    Take 1 tablet (4 mg) by mouth every 8 hours as needed for nausea       oxyCODONE-acetaminophen 5-325 MG per tablet    PERCOCET    20 tablet    Take 1-2 tablets by mouth every 6 hours as needed for pain       topiramate 100 MG tablet    TOPAMAX    60 tablet    Take 1 tablet (100 mg) by mouth 2 times daily       VITAMIN D (CHOLECALCIFEROL) PO      Take by mouth daily

## 2017-06-08 NOTE — TELEPHONE ENCOUNTER
Successfully emailed 6/8/2017 workability letter from Dr. Beck  to the following email addresses that were provided and requested by patient:    [email or web address]    . Teressa Buitrago CMA, June 8, 2017

## 2017-06-09 ENCOUNTER — ANESTHESIA (OUTPATIENT)
Dept: SURGERY | Facility: CLINIC | Age: 27
End: 2017-06-09
Payer: COMMERCIAL

## 2017-06-09 ENCOUNTER — HOSPITAL ENCOUNTER (OUTPATIENT)
Facility: CLINIC | Age: 27
Discharge: HOME | End: 2017-06-09
Attending: PODIATRIST | Admitting: PODIATRIST
Payer: COMMERCIAL

## 2017-06-09 ENCOUNTER — ANESTHESIA EVENT (OUTPATIENT)
Dept: SURGERY | Facility: CLINIC | Age: 27
End: 2017-06-09
Payer: COMMERCIAL

## 2017-06-09 ENCOUNTER — HOSPITAL ENCOUNTER (OUTPATIENT)
Dept: GENERAL RADIOLOGY | Facility: CLINIC | Age: 27
End: 2017-06-09
Attending: PODIATRIST | Admitting: PODIATRIST
Payer: COMMERCIAL

## 2017-06-09 VITALS
DIASTOLIC BLOOD PRESSURE: 74 MMHG | HEIGHT: 67 IN | WEIGHT: 220 LBS | RESPIRATION RATE: 16 BRPM | BODY MASS INDEX: 34.53 KG/M2 | SYSTOLIC BLOOD PRESSURE: 114 MMHG | OXYGEN SATURATION: 96 % | TEMPERATURE: 97.8 F

## 2017-06-09 DIAGNOSIS — S82.891A CLOSED RIGHT ANKLE FRACTURE, INITIAL ENCOUNTER: Primary | ICD-10-CM

## 2017-06-09 DIAGNOSIS — S82.891A CLOSED RIGHT ANKLE FRACTURE: ICD-10-CM

## 2017-06-09 LAB — HCG UR QL: NEGATIVE

## 2017-06-09 PROCEDURE — 27211024 ZZHC OR SUPPLY OTHER OPNP: Performed by: PODIATRIST

## 2017-06-09 PROCEDURE — 27210794 ZZH OR GENERAL SUPPLY STERILE: Performed by: PODIATRIST

## 2017-06-09 PROCEDURE — 37000009 ZZH ANESTHESIA TECHNICAL FEE, EACH ADDTL 15 MIN: Performed by: PODIATRIST

## 2017-06-09 PROCEDURE — 81025 URINE PREGNANCY TEST: CPT | Performed by: NURSE ANESTHETIST, CERTIFIED REGISTERED

## 2017-06-09 PROCEDURE — S0020 INJECTION, BUPIVICAINE HYDRO: HCPCS | Performed by: PODIATRIST

## 2017-06-09 PROCEDURE — 25000125 ZZHC RX 250: Performed by: NURSE ANESTHETIST, CERTIFIED REGISTERED

## 2017-06-09 PROCEDURE — 71000014 ZZH RECOVERY PHASE 1 LEVEL 2 FIRST HR: Performed by: PODIATRIST

## 2017-06-09 PROCEDURE — 37000008 ZZH ANESTHESIA TECHNICAL FEE, 1ST 30 MIN: Performed by: PODIATRIST

## 2017-06-09 PROCEDURE — 25000566 ZZH SEVOFLURANE, EA 15 MIN: Performed by: PODIATRIST

## 2017-06-09 PROCEDURE — 27792 TREATMENT OF ANKLE FRACTURE: CPT | Mod: RT | Performed by: PODIATRIST

## 2017-06-09 PROCEDURE — 36000065 ZZH SURGERY LEVEL 4 W FLUORO 1ST 30 MIN: Performed by: PODIATRIST

## 2017-06-09 PROCEDURE — C1713 ANCHOR/SCREW BN/BN,TIS/BN: HCPCS | Performed by: PODIATRIST

## 2017-06-09 PROCEDURE — 25000128 H RX IP 250 OP 636: Performed by: NURSE ANESTHETIST, CERTIFIED REGISTERED

## 2017-06-09 PROCEDURE — 25000125 ZZHC RX 250: Performed by: PODIATRIST

## 2017-06-09 PROCEDURE — 25000128 H RX IP 250 OP 636: Performed by: PODIATRIST

## 2017-06-09 PROCEDURE — 71000027 ZZH RECOVERY PHASE 2 EACH 15 MINS: Performed by: PODIATRIST

## 2017-06-09 PROCEDURE — 40000277 XR SURGERY CARM FLUORO LESS THAN 5 MIN W STILLS

## 2017-06-09 PROCEDURE — 36000063 ZZH SURGERY LEVEL 4 EA 15 ADDTL MIN: Performed by: PODIATRIST

## 2017-06-09 PROCEDURE — 25000132 ZZH RX MED GY IP 250 OP 250 PS 637: Performed by: PODIATRIST

## 2017-06-09 PROCEDURE — 40000306 ZZH STATISTIC PRE PROC ASSESS II: Performed by: PODIATRIST

## 2017-06-09 DEVICE — IMPLANTABLE DEVICE
Type: IMPLANTABLE DEVICE | Site: FIBULA | Status: NON-FUNCTIONAL
Removed: 2018-08-31

## 2017-06-09 RX ORDER — METOCLOPRAMIDE 5 MG/1
10 TABLET ORAL EVERY 6 HOURS PRN
Status: DISCONTINUED | OUTPATIENT
Start: 2017-06-09 | End: 2017-06-09 | Stop reason: HOSPADM

## 2017-06-09 RX ORDER — NALOXONE HYDROCHLORIDE 0.4 MG/ML
.1-.4 INJECTION, SOLUTION INTRAMUSCULAR; INTRAVENOUS; SUBCUTANEOUS
Status: DISCONTINUED | OUTPATIENT
Start: 2017-06-09 | End: 2017-06-09 | Stop reason: HOSPADM

## 2017-06-09 RX ORDER — DEXAMETHASONE SODIUM PHOSPHATE 10 MG/ML
INJECTION, SOLUTION INTRAMUSCULAR; INTRAVENOUS PRN
Status: DISCONTINUED | OUTPATIENT
Start: 2017-06-09 | End: 2017-06-09

## 2017-06-09 RX ORDER — LIDOCAINE HYDROCHLORIDE 20 MG/ML
INJECTION, SOLUTION INFILTRATION; PERINEURAL PRN
Status: DISCONTINUED | OUTPATIENT
Start: 2017-06-09 | End: 2017-06-09

## 2017-06-09 RX ORDER — EPHEDRINE SULFATE 50 MG/ML
INJECTION, SOLUTION INTRAMUSCULAR; INTRAVENOUS; SUBCUTANEOUS PRN
Status: DISCONTINUED | OUTPATIENT
Start: 2017-06-09 | End: 2017-06-09

## 2017-06-09 RX ORDER — HYDRALAZINE HYDROCHLORIDE 20 MG/ML
2.5-5 INJECTION INTRAMUSCULAR; INTRAVENOUS EVERY 10 MIN PRN
Status: DISCONTINUED | OUTPATIENT
Start: 2017-06-09 | End: 2017-06-09 | Stop reason: HOSPADM

## 2017-06-09 RX ORDER — ONDANSETRON 4 MG/1
4 TABLET, ORALLY DISINTEGRATING ORAL
Status: DISCONTINUED | OUTPATIENT
Start: 2017-06-09 | End: 2017-06-09 | Stop reason: HOSPADM

## 2017-06-09 RX ORDER — MEPERIDINE HYDROCHLORIDE 50 MG/ML
12.5 INJECTION INTRAMUSCULAR; INTRAVENOUS; SUBCUTANEOUS
Status: DISCONTINUED | OUTPATIENT
Start: 2017-06-09 | End: 2017-06-09 | Stop reason: HOSPADM

## 2017-06-09 RX ORDER — KETOROLAC TROMETHAMINE 30 MG/ML
INJECTION, SOLUTION INTRAMUSCULAR; INTRAVENOUS PRN
Status: DISCONTINUED | OUTPATIENT
Start: 2017-06-09 | End: 2017-06-09

## 2017-06-09 RX ORDER — FENTANYL CITRATE 50 UG/ML
INJECTION, SOLUTION INTRAMUSCULAR; INTRAVENOUS PRN
Status: DISCONTINUED | OUTPATIENT
Start: 2017-06-09 | End: 2017-06-09

## 2017-06-09 RX ORDER — PROMETHAZINE HYDROCHLORIDE 25 MG/ML
12.5 INJECTION, SOLUTION INTRAMUSCULAR; INTRAVENOUS
Status: DISCONTINUED | OUTPATIENT
Start: 2017-06-09 | End: 2017-06-09 | Stop reason: HOSPADM

## 2017-06-09 RX ORDER — OXYCODONE AND ACETAMINOPHEN 5; 325 MG/1; MG/1
1-2 TABLET ORAL
Status: COMPLETED | OUTPATIENT
Start: 2017-06-09 | End: 2017-06-09

## 2017-06-09 RX ORDER — SODIUM CHLORIDE, SODIUM LACTATE, POTASSIUM CHLORIDE, CALCIUM CHLORIDE 600; 310; 30; 20 MG/100ML; MG/100ML; MG/100ML; MG/100ML
INJECTION, SOLUTION INTRAVENOUS CONTINUOUS
Status: DISCONTINUED | OUTPATIENT
Start: 2017-06-09 | End: 2017-06-09 | Stop reason: HOSPADM

## 2017-06-09 RX ORDER — CEFAZOLIN SODIUM 1 G/3ML
1 INJECTION, POWDER, FOR SOLUTION INTRAMUSCULAR; INTRAVENOUS SEE ADMIN INSTRUCTIONS
Status: DISCONTINUED | OUTPATIENT
Start: 2017-06-09 | End: 2017-06-09 | Stop reason: HOSPADM

## 2017-06-09 RX ORDER — ONDANSETRON 4 MG/1
4-8 TABLET, ORALLY DISINTEGRATING ORAL EVERY 8 HOURS PRN
Qty: 10 TABLET | Refills: 2 | Status: SHIPPED | OUTPATIENT
Start: 2017-06-09 | End: 2017-06-29

## 2017-06-09 RX ORDER — PROPOFOL 10 MG/ML
INJECTION, EMULSION INTRAVENOUS CONTINUOUS PRN
Status: DISCONTINUED | OUTPATIENT
Start: 2017-06-09 | End: 2017-06-09

## 2017-06-09 RX ORDER — ONDANSETRON 2 MG/ML
4 INJECTION INTRAMUSCULAR; INTRAVENOUS EVERY 30 MIN PRN
Status: DISCONTINUED | OUTPATIENT
Start: 2017-06-09 | End: 2017-06-09 | Stop reason: HOSPADM

## 2017-06-09 RX ORDER — ONDANSETRON 2 MG/ML
INJECTION INTRAMUSCULAR; INTRAVENOUS PRN
Status: DISCONTINUED | OUTPATIENT
Start: 2017-06-09 | End: 2017-06-09

## 2017-06-09 RX ORDER — ALBUTEROL SULFATE 0.83 MG/ML
2.5 SOLUTION RESPIRATORY (INHALATION) EVERY 4 HOURS PRN
Status: DISCONTINUED | OUTPATIENT
Start: 2017-06-09 | End: 2017-06-09 | Stop reason: HOSPADM

## 2017-06-09 RX ORDER — METOCLOPRAMIDE HYDROCHLORIDE 5 MG/ML
10 INJECTION INTRAMUSCULAR; INTRAVENOUS EVERY 6 HOURS PRN
Status: DISCONTINUED | OUTPATIENT
Start: 2017-06-09 | End: 2017-06-09 | Stop reason: HOSPADM

## 2017-06-09 RX ORDER — FENTANYL CITRATE 50 UG/ML
25-50 INJECTION, SOLUTION INTRAMUSCULAR; INTRAVENOUS
Status: DISCONTINUED | OUTPATIENT
Start: 2017-06-09 | End: 2017-06-09 | Stop reason: HOSPADM

## 2017-06-09 RX ORDER — OXYCODONE AND ACETAMINOPHEN 5; 325 MG/1; MG/1
1-2 TABLET ORAL EVERY 4 HOURS PRN
Qty: 60 TABLET | Refills: 0 | Status: SHIPPED | OUTPATIENT
Start: 2017-06-09 | End: 2017-06-22

## 2017-06-09 RX ORDER — CEFAZOLIN SODIUM 2 G/100ML
2 INJECTION, SOLUTION INTRAVENOUS
Status: COMPLETED | OUTPATIENT
Start: 2017-06-09 | End: 2017-06-09

## 2017-06-09 RX ORDER — DIMENHYDRINATE 50 MG/ML
INJECTION, SOLUTION INTRAMUSCULAR; INTRAVENOUS PRN
Status: DISCONTINUED | OUTPATIENT
Start: 2017-06-09 | End: 2017-06-09

## 2017-06-09 RX ORDER — AMOXICILLIN 250 MG
1-2 CAPSULE ORAL 2 TIMES DAILY
Qty: 30 TABLET | Refills: 3 | Status: SHIPPED | OUTPATIENT
Start: 2017-06-09 | End: 2017-08-03

## 2017-06-09 RX ORDER — PROPOFOL 10 MG/ML
INJECTION, EMULSION INTRAVENOUS PRN
Status: DISCONTINUED | OUTPATIENT
Start: 2017-06-09 | End: 2017-06-09

## 2017-06-09 RX ORDER — BUPIVACAINE HYDROCHLORIDE 5 MG/ML
INJECTION, SOLUTION PERINEURAL PRN
Status: DISCONTINUED | OUTPATIENT
Start: 2017-06-09 | End: 2017-06-09 | Stop reason: HOSPADM

## 2017-06-09 RX ORDER — ONDANSETRON 4 MG/1
4 TABLET, ORALLY DISINTEGRATING ORAL EVERY 30 MIN PRN
Status: DISCONTINUED | OUTPATIENT
Start: 2017-06-09 | End: 2017-06-09 | Stop reason: HOSPADM

## 2017-06-09 RX ADMIN — FENTANYL CITRATE 100 MCG: 50 INJECTION, SOLUTION INTRAMUSCULAR; INTRAVENOUS at 10:54

## 2017-06-09 RX ADMIN — SODIUM CHLORIDE, POTASSIUM CHLORIDE, SODIUM LACTATE AND CALCIUM CHLORIDE: 600; 310; 30; 20 INJECTION, SOLUTION INTRAVENOUS at 10:40

## 2017-06-09 RX ADMIN — SUCCINYLCHOLINE CHLORIDE 100 MG: 20 INJECTION, SOLUTION INTRAMUSCULAR; INTRAVENOUS at 10:50

## 2017-06-09 RX ADMIN — PROPOFOL 200 MG: 10 INJECTION, EMULSION INTRAVENOUS at 10:50

## 2017-06-09 RX ADMIN — Medication 10 MG: at 11:22

## 2017-06-09 RX ADMIN — LIDOCAINE HYDROCHLORIDE 40 MG: 20 INJECTION, SOLUTION INFILTRATION; PERINEURAL at 10:50

## 2017-06-09 RX ADMIN — FENTANYL CITRATE 50 MCG: 50 INJECTION, SOLUTION INTRAMUSCULAR; INTRAVENOUS at 13:45

## 2017-06-09 RX ADMIN — DEXAMETHASONE SODIUM PHOSPHATE 10 MG: 10 INJECTION, SOLUTION INTRAMUSCULAR; INTRAVENOUS at 10:56

## 2017-06-09 RX ADMIN — HYDROMORPHONE HYDROCHLORIDE 0.5 MG: 1 INJECTION, SOLUTION INTRAMUSCULAR; INTRAVENOUS; SUBCUTANEOUS at 12:01

## 2017-06-09 RX ADMIN — FENTANYL CITRATE 50 MCG: 50 INJECTION, SOLUTION INTRAMUSCULAR; INTRAVENOUS at 11:04

## 2017-06-09 RX ADMIN — SODIUM CHLORIDE, POTASSIUM CHLORIDE, SODIUM LACTATE AND CALCIUM CHLORIDE: 600; 310; 30; 20 INJECTION, SOLUTION INTRAVENOUS at 12:56

## 2017-06-09 RX ADMIN — ONDANSETRON 4 MG: 2 INJECTION INTRAMUSCULAR; INTRAVENOUS at 10:56

## 2017-06-09 RX ADMIN — HYDROMORPHONE HYDROCHLORIDE 0.5 MG: 1 INJECTION, SOLUTION INTRAMUSCULAR; INTRAVENOUS; SUBCUTANEOUS at 12:12

## 2017-06-09 RX ADMIN — PROPOFOL 50 MCG/KG/MIN: 10 INJECTION, EMULSION INTRAVENOUS at 10:53

## 2017-06-09 RX ADMIN — MIDAZOLAM HYDROCHLORIDE 2 MG: 1 INJECTION, SOLUTION INTRAMUSCULAR; INTRAVENOUS at 10:42

## 2017-06-09 RX ADMIN — Medication 10 MG: at 11:18

## 2017-06-09 RX ADMIN — KETOROLAC TROMETHAMINE 30 MG: 30 INJECTION, SOLUTION INTRAMUSCULAR at 12:01

## 2017-06-09 RX ADMIN — FENTANYL CITRATE 50 MCG: 50 INJECTION, SOLUTION INTRAMUSCULAR; INTRAVENOUS at 13:18

## 2017-06-09 RX ADMIN — CEFAZOLIN SODIUM 2 G: 2 INJECTION, SOLUTION INTRAVENOUS at 10:56

## 2017-06-09 RX ADMIN — OXYCODONE HYDROCHLORIDE AND ACETAMINOPHEN 2 TABLET: 5; 325 TABLET ORAL at 13:20

## 2017-06-09 RX ADMIN — DIMENHYDRINATE 25 MG: 50 INJECTION, SOLUTION INTRAMUSCULAR; INTRAVENOUS at 10:56

## 2017-06-09 RX ADMIN — FENTANYL CITRATE 100 MCG: 50 INJECTION, SOLUTION INTRAMUSCULAR; INTRAVENOUS at 10:48

## 2017-06-09 ASSESSMENT — LIFESTYLE VARIABLES: TOBACCO_USE: 1

## 2017-06-09 NOTE — ANESTHESIA POSTPROCEDURE EVALUATION
Patient: Yesika Grant    Procedure(s):  Open Reduction Internal Fixation Right Ankle - Wound Class: I-Clean    Diagnosis:right ankle fracture  Diagnosis Additional Information: No value filed.    Anesthesia Type:  General    Note:  Anesthesia Post Evaluation    Patient location during evaluation: Phase 2 and Bedside  Patient participation: Able to fully participate in evaluation  Level of consciousness: awake  Pain management: adequate  Airway patency: patent  Cardiovascular status: acceptable and hemodynamically stable  Respiratory status: acceptable, room air and nonlabored ventilation  Hydration status: stable  PONV: none     Anesthetic complications: None    Comments: Patient was happy with the anesthesia care received and no anesthesia related complications were noted.  I will follow up with the patient again if it is needed.        Last vitals:  Vitals:    06/09/17 1345 06/09/17 1400 06/09/17 1415   BP:  114/72 114/74   Resp:  16 16   Temp:      SpO2: 97% 96% 96%         Electronically Signed By: LASHAE Peres CRNA  June 9, 2017  3:53 PM

## 2017-06-09 NOTE — IP AVS SNAPSHOT
[facility] Phase [facility]    [address]     [place] [address]    Phone:  [phone]                                       After Visit Summary   6/9/2017    Yesika Grant    MRN: [id number]           After Visit Summary Signature Page     I have received my discharge instructions, and my questions have been answered. I have discussed any challenges I see with this plan with the nurse or doctor.    ..........................................................................................................................................  Patient/Patient Representative Signature      ..........................................................................................................................................  Patient Representative Print Name and Relationship to Patient    ..................................................               ................................................  Date                                            Time    ..........................................................................................................................................  Reviewed by Signature/Title    ...................................................              ..............................................  Date                                                            Time

## 2017-06-09 NOTE — DISCHARGE INSTRUCTIONS
[facility]  Same-Day Surgery  Adult Discharge Orders & Instructions    For 24 hours after surgery    1. Get plenty of rest.  A responsible adult must stay with you for at least 24 hours after you leave the hospital.   2. Do not drive or use heavy equipment.    3. Do not drink alcohol.  4. Avoid strenuous or risky activities.  Ask for help when climbing stairs.   5. You may feel lightheaded.  IF so, sit for a few minutes before standing.  Have someone help you get up.   6. If you have nausea (feel sick to your stomach): Drink only clear liquids such as apple juice, ginger ale, broth or 7-Up.  Rest may also help.  Be sure to drink enough fluids.  Move to a regular diet as you feel able.  7. You may have a slight fever. Call the doctor if your fever is over 100 F (37.7 C) (taken under the tongue) or lasts longer than 24 hours.  8. You may have a dry mouth, a sore throat, muscle aches or trouble sleeping.  These should go away after 24 hours.  9. Do not make important or legal decisions.   Call your doctor for any of the followin.  Signs of infection (fever, growing tenderness at the surgery site, a large amount of drainage or bleeding, severe pain, foul-smelling drainage, redness, swelling).    2. It has been over 8 to 10 hours since surgery and you are still not able to urinate (pass water).    3.  Headache for over 24 hours.      To contact your surgeon, call [phone] (Bone and Joint Service Line) or:      [phone] or [phone] Nurse Advice Line (answered 24 hours a day)

## 2017-06-09 NOTE — ANESTHESIA PREPROCEDURE EVALUATION
Anesthesia Evaluation     . Pt has had prior anesthetic.            ROS/MED HX    ENT/Pulmonary:     (+)MAJOR risk factors snores loudly, obese, daytime somnolence, tobacco use, Current use , . .    Neurologic:  - neg neurologic ROS     Cardiovascular:     (+) Dyslipidemia, ----. : . . . :. . No previous cardiac testing       METS/Exercise Tolerance:     Hematologic:     (+) Anemia, -      Musculoskeletal:  - neg musculoskeletal ROS       GI/Hepatic:  - neg GI/hepatic ROS       Renal/Genitourinary:  - ROS Renal section negative       Endo:  - neg endo ROS       Psychiatric:     (+) psychiatric history anxiety and depression      Infectious Disease:  - neg infectious disease ROS       Malignancy:      - no malignancy   Other:                     Physical Exam  Normal systems: cardiovascular, pulmonary and dental    Airway   Mallampati: II  TM distance: >3 FB  Neck ROM: full    Dental     Cardiovascular   Rhythm and rate: regular and normal      Pulmonary                     Anesthesia Plan      History & Physical Review  History and physical reviewed and following examination; no interval change.    ASA Status:  2 .    NPO Status:  > 8 hours    Plan for General and ETT with Propofol induction. Maintenance will be Balanced.    PONV prophylaxis:  Ondansetron (or other 5HT-3)  Pt verbalizes understanding of GETA anesthesia; denies further questions.        Postoperative Care  Postoperative pain management:  IV analgesics and Oral pain medications.      Consents  Anesthetic plan, risks, benefits and alternatives discussed with:  Patient.  Use of blood products discussed: No .   .                          .

## 2017-06-09 NOTE — IP AVS SNAPSHOT
MRN:[id number]                      After Visit Summary   6/9/2017    Yesika Grant    MRN: [id number]           Thank you!     Thank you for choosing [facility] for your care. Our goal is always to provide you with excellent care. Hearing back from our patients is one way we can continue to improve our services. Please take a few minutes to complete the written survey that you may receive in the mail after you visit with us. Thank you!        Patient Information     Date Of Birth          1990        About your hospital stay     You were admitted on:  June 9, 2017 You last received care in the:  [facility]    You were discharged on:  June 9, 2017       Who to Call     For medical emergencies, please call 911.  For non-urgent questions about your medical care, please call your primary care provider or clinic, [phone]  For questions related to your surgery, please call your surgery clinic        Attending Provider     Provider Nate Hampton DPM Podiatry       Primary Care Provider Office Phone # Fax #    Young Fletcher [phone]      After Care Instructions     Discharge Instructions       Review discharge instructions as directed by Provider.            Elevate affected extremity           Ice to affected area       Ice pack to affected area PRN (as needed).            No Alcohol       for 24 hours post-procedure            No dressing change       until follow up clinic appointment.            No driving or operating machinery       until the day after procedure            No weight bearing                 Your next 10 appointments already scheduled     Jun 14, 2017  2:45 PM CDT   Office Visit with Young Fletcher MD   [facility]    [address] [address] [phone]           Bring a current list of meds and any records pertaining to this visit.   For Physicals, please bring immunization records and any forms needing to be filled out.  Please arrive 10 minutes early to complete paperwork.            Justin 15, 2017  8:00 AM CDT   Return Visit with Nate Beck DPM   [facility]    [address] [place] [address]   [phone]            Jun 22, 2017  7:45 AM CDT   Return Visit with Nate Beck DPM   [facility]    [address] [place] [address]   [phone]            Jul 06, 2017  7:45 AM CDT   Return Visit with Nate Beck DPM   [facility]    [address] [place] [address]   [phone]            Jul 25, 2017  1:00 PM CDT   New Visit with Clifton Ames MD   [facility] ([facility])    [address] [place] [address]   [phone]            Aug 03, 2017  7:45 AM CDT   Return Visit with Nate Beck DPM   [facility])    [address] [place] [address]   [phone]            Aug 31, 2017  7:45 AM CDT   Return Visit with Nate Beck DPM   [facility] ([facility])    [address] [place] [address]   [phone]              Further instructions from your care team       [facility]  Same-Day Surgery  Adult Discharge Orders & Instructions    For 24 hours after surgery    1. Get plenty of rest.  A responsible adult must stay with you for at least 24 hours after you leave the hospital.   2. Do not drive or use heavy equipment.    3. Do not drink alcohol.  4. Avoid strenuous or risky activities.  Ask for help when climbing stairs.   5. You may feel lightheaded.  IF so, sit for a few minutes before standing.  Have someone help you get up.   6. If you have nausea (feel sick  "to your stomach): Drink only clear liquids such as apple juice, ginger ale, broth or 7-Up.  Rest may also help.  Be sure to drink enough fluids.  Move to a regular diet as you feel able.  7. You may have a slight fever. Call the doctor if your fever is over 100 F (37.7 C) (taken under the tongue) or lasts longer than 24 hours.  8. You may have a dry mouth, a sore throat, muscle aches or trouble sleeping.  These should go away after 24 hours.  9. Do not make important or legal decisions.   Call your doctor for any of the followin.  Signs of infection (fever, growing tenderness at the surgery site, a large amount of drainage or bleeding, severe pain, foul-smelling drainage, redness, swelling).    2. It has been over 8 to 10 hours since surgery and you are still not able to urinate (pass water).    3.  Headache for over 24 hours.      To contact your surgeon, call [phone] (Bone and Joint Service Line) or:      [phone] or [phone] Nurse Advice Line (answered 24 hours a day)          Pending Results     Date and Time Order Name Status Description    2017 1043 XR Surgery MILA L/T 5 Min Fluoro w Stills Preliminary             Admission Information     Date & Time Provider Department Dept. Phone    2017 Nate Beck DPM [facility] Phase [phone]      Your Vitals Were     Blood Pressure Temperature Respirations Height Weight Last Period    110/67 97.8  F (36.6  C) (Oral) 17 1.702 m (5' 7\") 99.8 kg (220 lb) 2017 (Approximate)    Pulse Oximetry BMI (Body Mass Index)                97% 34.46 kg/m2          [employer] Information     [employer] lets you send messages to your doctor, view your test results, renew your prescriptions, schedule appointments and more. To sign up, go to [email or web address] . Click on \"Log in\" on the left side of the screen, which will take you to the Welcome page. Then click on \"Sign up Now\" on the right side of the page.     You will be " asked to enter the access code listed below, as well as some personal information. Please follow the directions to create your username and password.     Your access code is: [email or web address]  Expires: 2017  4:10 PM     Your access code will  in 90 days. If you need help or a new code, please call your [facility] clinic or [phone].        Care EveryWhere ID     This is your Care EveryWhere ID. This could be used by other organizations to access your [facility] medical records  [id number]           Review of your medicines      START taking        Dose / Directions    senna-docusate 8.6-50 MG per tablet   Commonly known as:  SENOKOT-S;PERICOLACE   Used for:  Closed right ankle fracture, initial encounter   Notes to Patient:  Start the night of surgery.        Dose:  1-2 tablet   Take 1-2 tablets by mouth 2 times daily Take while on oral narcotics to prevent or treat constipation.   Quantity:  30 tablet   Refills:  3         CONTINUE these medicines which may have CHANGED, or have new prescriptions. If we are uncertain of the size of tablets/capsules you have at home, strength may be listed as something that might have changed.        Dose / Directions    * ondansetron 4 MG ODT tab   Commonly known as:  ZOFRAN ODT   This may have changed:  Another medication with the same name was added. Make sure you understand how and when to take each.   Used for:  Nausea, Chronic post-traumatic headache, not intractable        Dose:  4 mg   Take 1 tablet (4 mg) by mouth every 8 hours as needed for nausea   Quantity:  10 tablet   Refills:  0       * ondansetron 4 MG ODT tab   Commonly known as:  ZOFRAN-ODT   This may have changed:  You were already taking a medication with the same name, and this prescription was added. Make sure you understand how and when to take each.   Used for:  Closed right ankle fracture, initial encounter        Dose:  4-8 mg   Take 1-2 tablets (4-8 mg) by mouth every 8 hours as needed for nausea  Dissolve ON the tongue.   Quantity:  10 tablet   Refills:  2       * oxyCODONE-acetaminophen 5-325 MG per tablet   Commonly known as:  PERCOCET   This may have changed:  Another medication with the same name was added. Make sure you understand how and when to take each.   Used for:  Ankle fracture, right, closed, initial encounter        Dose:  1-2 tablet   Take 1-2 tablets by mouth every 6 hours as needed for pain   Quantity:  20 tablet   Refills:  0       * oxyCODONE-acetaminophen 5-325 MG per tablet   Commonly known as:  PERCOCET   This may have changed:  You were already taking a medication with the same name, and this prescription was added. Make sure you understand how and when to take each.   Used for:  Closed right ankle fracture, initial encounter        Dose:  1-2 tablet   Take 1-2 tablets by mouth every 4 hours as needed for pain (moderate to severe)   Quantity:  60 tablet   Refills:  0       * Notice:  This list has 4 medication(s) that are the same as other medications prescribed for you. Read the directions carefully, and ask your doctor or other care provider to review them with you.      CONTINUE these medicines which have NOT CHANGED        Dose / Directions    ALPRAZolam 0.25 MG tablet   Commonly known as:  XANAX   Used for:  Panic attacks        Dose:  0.25 mg   Take 1 tablet (0.25 mg) by mouth nightly as needed for anxiety   Quantity:  10 tablet   Refills:  1       buPROPion 300 MG 24 hr tablet   Commonly known as:  WELLBUTRIN XL   Used for:  Major depression in complete remission (H), Tobacco use disorder        Dose:  300 mg   Take 1 tablet (300 mg) by mouth every morning   Quantity:  90 tablet   Refills:  1       citalopram 40 MG tablet   Commonly known as:  celeXA   Used for:  ZAN (generalized anxiety disorder)        Dose:  40 mg   Take 1 tablet (40 mg) by mouth daily Due for office visit   Quantity:  90 tablet   Refills:  1       cyclobenzaprine 10 MG tablet   Commonly known as:  FLEXERIL    Used for:  New daily persistent headache, Strain of neck muscle, subsequent encounter, Chronic midline low back pain without sciatica        Dose:  10 mg   Take 1 tablet (10 mg) by mouth 3 times daily as needed for muscle spasms   Quantity:  90 tablet   Refills:  1       ibuprofen 200 MG tablet   Commonly known as:  ADVIL/MOTRIN   Notes to Patient:  May start anytime after surgery.        Dose:  600 mg   Take 3 tablets (600 mg) by mouth every 6 hours as needed for pain   Refills:  0       IRON SUPPLEMENT PO        Reported on 5/23/2017   Refills:  0       Multi-vitamin Tabs tablet        Dose:  1 tablet   Take 1 tablet by mouth daily Reported on 5/23/2017   Refills:  0       topiramate 100 MG tablet   Commonly known as:  TOPAMAX   Used for:  Postconcussion syndrome, New daily persistent headache        Dose:  100 mg   Take 1 tablet (100 mg) by mouth 2 times daily   Quantity:  60 tablet   Refills:  3       VITAMIN D (CHOLECALCIFEROL) PO        Take by mouth daily   Refills:  0            Where to get your medicines      These medications were sent to [facility] - [id number]  [address] [address]     Phone:  [phone]     ondansetron 4 MG ODT tab    senna-docusate 8.6-50 MG per tablet         Some of these will need a paper prescription and others can be bought over the counter. Ask your nurse if you have questions.     Bring a paper prescription for each of these medications     oxyCODONE-acetaminophen 5-325 MG per tablet                Protect others around you: Learn how to safely use, store and throw away your medicines at www.disposemymeds.org.             Medication List: This is a list of all your medications and when to take them. Check marks below indicate your daily home schedule. Keep this list as a reference.      Medications           Morning Afternoon Evening Bedtime As Needed    ALPRAZolam 0.25 MG tablet   Commonly known as:  XANAX   Take  1 tablet (0.25 mg) by mouth nightly as needed for anxiety                                buPROPion 300 MG 24 hr tablet   Commonly known as:  WELLBUTRIN XL   Take 1 tablet (300 mg) by mouth every morning                                citalopram 40 MG tablet   Commonly known as:  celeXA   Take 1 tablet (40 mg) by mouth daily Due for office visit                                cyclobenzaprine 10 MG tablet   Commonly known as:  FLEXERIL   Take 1 tablet (10 mg) by mouth 3 times daily as needed for muscle spasms                                ibuprofen 200 MG tablet   Commonly known as:  ADVIL/MOTRIN   Take 3 tablets (600 mg) by mouth every 6 hours as needed for pain   Notes to Patient:  May start anytime after surgery.                                IRON SUPPLEMENT PO   Reported on 5/23/2017                                Multi-vitamin Tabs tablet   Take 1 tablet by mouth daily Reported on 5/23/2017                                * ondansetron 4 MG ODT tab   Commonly known as:  ZOFRAN ODT   Take 1 tablet (4 mg) by mouth every 8 hours as needed for nausea                                * ondansetron 4 MG ODT tab   Commonly known as:  ZOFRAN-ODT   Take 1-2 tablets (4-8 mg) by mouth every 8 hours as needed for nausea Dissolve ON the tongue.                                * oxyCODONE-acetaminophen 5-325 MG per tablet   Commonly known as:  PERCOCET   Take 1-2 tablets by mouth every 6 hours as needed for pain   Last time this was given:  2 tablets on 6/9/2017  1:20 PM                 5:20PM               * oxyCODONE-acetaminophen 5-325 MG per tablet   Commonly known as:  PERCOCET   Take 1-2 tablets by mouth every 4 hours as needed for pain (moderate to severe)   Last time this was given:  2 tablets on 6/9/2017  1:20 PM                                senna-docusate 8.6-50 MG per tablet   Commonly known as:  SENOKOT-S;PERICOLACE   Take 1-2 tablets by mouth 2 times daily Take while on oral narcotics to prevent or treat  constipation.   Notes to Patient:  Start the night of surgery.                                topiramate 100 MG tablet   Commonly known as:  TOPAMAX   Take 1 tablet (100 mg) by mouth 2 times daily                                VITAMIN D (CHOLECALCIFEROL) PO   Take by mouth daily                                * Notice:  This list has 4 medication(s) that are the same as other medications prescribed for you. Read the directions carefully, and ask your doctor or other care provider to review them with you.              More Information        Having Ankle Fracture Open Reduction and Internal Fixation (ORIF)  Open reduction and internal fixation (ORIF) is a type of treatment to fix a broken bone. It puts the pieces of a broken bone back together so they can heal. Open reduction means the bones are put back in place during a surgery. Internal fixation means that special hardware is used to hold the bone pieces together. This helps the bone heals correctly. The procedure is done by an orthopedic surgeon. This is a doctor with special training in treating bone, joint, and muscle problems.  What to tell your healthcare provider  Make sure you tell the healthcare provider about all medicines you take, including over-the-counter medicines, such as aspirin. Tell him or her about all vitamins, herbs, and other supplements you take. Also tell the provider the last time you had something to eat or drink. And tell your provider if you:    Have had any recent changes in your health, such as an infection or fever    Are sensitive or allergic to any medicines, latex, tape, or anesthetic medicines (local and general)    Are pregnant or think you may be pregnant  Tests before your surgery  You may have an X-ray or a CT scan to look at your tibia and fibula.  Getting ready for your surgery  ORIF often takes place as emergency surgery after an accident or injury. Before this procedure, a healthcare provider will ask about your health  history and give you a physical exam.  In some cases, ankle fracture ORIF is planned. Your surgery may be done after the swelling in your ankle has gone down. You might need to have your ankle held in place while you wait for your surgery. Talk with your healthcare provider how to get ready for your surgery. You may need to stop taking some medicines before the procedure, such as blood thinners and aspirin. If you smoke, you may need to stop before your surgery. Smoking can delay healing. Talk with your healthcare provider if you need help to stop smoking.  Also, make sure to:    Ask a family member or friend to take you home from the hospital. You cannot drive yourself.    Plan some changes at home to help you recover. You may need help at home.    Not eat or drink after midnight the night before your surgery.    Follow all other instructions from your healthcare provider.  You may be asked to sign a consent form that gives your permission to do the procedure. Read the form carefully. Ask questions if something is not clear.  On the day of surgery  Your surgeon will explain the details of your surgery. These details will depend on where your injury is and how serious it is. An orthopedic surgeon and a team of specialized nurses will do the surgery. The preparation and surgery may take a couple of hours. In general, you can expect the following:    You will likely have general anesthesia.This is medicine to prevent pain and make you sleep through the surgery. Or you may have regional anesthesia to numb the area and medicine to help you relax and sleep through the surgery.    A healthcare provider watches your vital signs, like your heart rate and blood pressure, during the surgery.    After cleaning the skin, your surgeon will make a cut (incision) through the skin and muscle of your ankle.    The surgeon will put the pieces of your ankle bones back into alignment (reduction).    The pieces of the broken bones will  be secured to each other (fixation). Your doctor may use screws, metal plates, wires, or pins.    Other repairs are made to the area as needed.    The layers of muscle and skin around your ankle will be closed with stitches (sutures).  After your surgery  Talk with your surgeon about what you can expect after your surgery. You may go home the same day. Or you may stay overnight in the hospital. Before leaving the hospital, you will likely have X-rays taken of your ankle. This is to check the repair.  You will have some pain after the surgery. Your doctor will tell you what pain medicine you can take to help reduce the pain. Avoid certain over-the-counter medicines for pain as instructed. Some of these may interfere with bone healing. You can also use ice packs to help lessen pain and swelling.  You may be told to keep your ankle elevated for a period of time after your surgery. You ll also need to not move your ankle for a while. Often, this means wearing a brace, perhaps for several weeks. You ll get instructions about how to move your leg and when you can put weight on it. Your surgeon may also tell you to eat foods high in calcium and vitamin D to help with bone healing. You may need to take medicine to prevent blood clots (blood thinner) for a little while after your surgery. Follow all your doctor s instructions carefully.  Follow-up care  Make sure to keep all of your follow-up appointments. You may need to have your stitches removed a week or so after your surgery.  You may have physical therapy to improve the strength and movement of your ankle. The therapy may include treatments and exercises. The therapy improves your chances of a full recovery. Most people are able to return to all their normal activities within a few months.     When to call your healthcare provider  Call your healthcare provider right away if you have any of these:    Fever of 100.4 F (38 C) or higher    Redness, swelling, or fluid  leaking from your incision that gets worse    Pain that gets worse    Loss of feeling in your foot or leg     9569-7205 [employer]. [address] [place] [address], [place] [address]. All rights reserved. This information is not intended as a substitute for professional medical care. Always follow your healthcare professional's instructions.

## 2017-06-09 NOTE — ANESTHESIA CARE TRANSFER NOTE
Patient: Yesika Grant    Procedure(s):  Open Reduction Internal Fixation Right Ankle - Wound Class: I-Clean    Diagnosis: right ankle fracture  Diagnosis Additional Information: No value filed.    Anesthesia Type:   General     Note:  Airway :Face Mask  Patient transferred to:PACU        Vitals: (Last set prior to Anesthesia Care Transfer)    CRNA VITALS  6/9/2017 1142 - 6/9/2017 1219      6/9/2017             Pulse: 74    SpO2: 94 %                Electronically Signed By: LASHAE Hawkins CRNA  June 9, 2017  12:19 PM

## 2017-06-09 NOTE — OP NOTE
DATE OF PROCEDURE:  06/09/2017      PREOPERATIVE DIAGNOSIS:  Right ankle fracture.      POSTOPERATIVE DIAGNOSIS:  Right ankle fracture.      PLANNED PROCEDURE:  Open reduction internal fixation, right ankle.      SURGEON:  Nate Beck DPM      DESCRIPTION OF PROCEDURE:  With Ms. Yesika Grant in the preoperative holding area  informed consent was obtained.  We discussed potential risks and complications and alternative treatment options.  No guarantees were given or implied.  We discussed alternative options as well as postoperative care.  She wishes to proceed with surgery.  No contraindications were noted.  She was brought in the operating room, placed on the operating table in supine position.  She was given general anesthesia by the Anesthesia Department.  A well-padded right thigh cuff was placed.  Foot was prepped and draped in the usual sterile fashion.  Foot was exsanguinated with an Esmarch, the cuff was inflated to 350 mmHg, and the following procedure was performed.        A 15 blade was utilized to make a linear incision over the fibula from skin through bone approximately 1 cm distal to the lateral malleolus extending proximally 7 cm.  Neurovascular structures were retracted medially and laterally.  Small areas of bleeding were cauterized utilizing a Bovie.. A 15 blade was utilized to reflect the periosteum from the fibula, anterior and posteriorly.  The fracture site was identified immediately.  The peroneal tendons were identified and protected throughout the procedure.  A sharp bone clamp was utilized to grasp the distal fragment and distract the ankle.  A curet was utilized to remove the hematoma from the fracture site.  Wound was flushed with copious amounts of sterile saline being certain to remove all hematoma from the fracture site.  The mechanism of injury was reversed under distraction and the fracture was reduced quite easily; a sharp bone clamp was then utilized to hold this position.   An interfragmentary compression screw was then applied from anterior to posterior on the lateral fibula.  Good compression was achieved.  The screw was not aggravating the peroneal tendon.  An Arthrex plate was then placed across the fibula with 3 screws proximally with purchasing 6 cortices and 3 locking screws.   The two 7 screws were placed distally.  Fluoroscan was then utilized to confirm position of hardware and reduction of the ankle.  Under live fluoroscopy, the ankle was stressed and negative hook test was noted negative diastasis throughout the distal tib-fib syndesmosis.  Surgical site was then flushed with copious amounts of sterile saline and 0 Vicryl was utilized to close deep soft tissue structures, 3-0 Vicryl was utilized to close the skin and a 4-0 Prolene, 30 cc of 0.5% Marcaine plain were injected in and around the surgical site and ankle joint.  Adaptic and a bulky sterile compression dressing and a posterior splint were applied.  Tourniquet was released after 48 minutes followed by immediate hyperemia to all 5 digits of the right foot.        The patient tolerated the procedure and anesthesia well.         NASREEN BLANCAS DPM             D: 2017 11:50   T: 2017 13:29   MT: EMELINA#[id number]      Name:     ENRIQUE BARROS   MRN:      [id number]        Account:        [id number]   :      1990           Procedure Date: 2017      Document: [id number]

## 2017-06-12 ENCOUNTER — TELEPHONE (OUTPATIENT)
Dept: FAMILY MEDICINE | Facility: OTHER | Age: 27
End: 2017-06-12

## 2017-06-12 NOTE — LETTER
[facility]  [address] [place] [address]  [place] [address]  Phone: [phone]          June 13, 2017    Yesika Grant  [address] [facility] [address]          Dear [place],      LAB RESULTS:     The results of your recent Hemoglobin and A1C were NORMAL.  If you have any further questions or problems, please contact our office.          Sincerely,      Tammy Butler MD

## 2017-06-12 NOTE — TELEPHONE ENCOUNTER
LMTC, please advise message below, thanks  Iman Dias RT (R)      Notes Recorded by Tammy Butler MD on 6/9/2017 at 7:55 PM  Lab test were with in normal range.    If any question, I can call.    Electronically signed:  Tammy Butler M.D.

## 2017-06-14 ENCOUNTER — TELEPHONE (OUTPATIENT)
Dept: FAMILY MEDICINE | Facility: OTHER | Age: 27
End: 2017-06-14

## 2017-06-14 NOTE — LETTER
[facility]  [address] [place] [address]  [place] [address] [phone]        Lenore 15, 2017    Yesika Grant  [address] [facility] [address]              Dear Yesika Grant    This is to remind you that you have an appointment scheduled with Dr Fletcher on 6/29/2017 at 2:30 pm    You may call our office at [phone] if you have any questions or need to reschedule.        Sincerely,        Young Fletcher MD

## 2017-06-14 NOTE — TELEPHONE ENCOUNTER
Reason for Call:  Same Day Appointment, Requested Provider:  Young Fletcher MD    PCP: Young Fletcher    Reason for visit: to discuss changing medication/fu on seizure    Duration of symptoms: n/a    Have you been treated for this in the past? Yes    Additional comments: patient would like to get worked in     Can we leave a detailed message on this number? YES    Phone number patient can be reached at: Home number on file [phone] (home)    Best Time: any    Call taken on 6/14/2017 at 3:54 PM by Alissa Bustamante

## 2017-06-15 ENCOUNTER — OFFICE VISIT (OUTPATIENT)
Dept: PODIATRY | Facility: CLINIC | Age: 27
End: 2017-06-15
Payer: COMMERCIAL

## 2017-06-15 VITALS — BODY MASS INDEX: 34.53 KG/M2 | TEMPERATURE: 97 F | WEIGHT: 220 LBS | HEIGHT: 67 IN

## 2017-06-15 DIAGNOSIS — S82.891A CLOSED RIGHT ANKLE FRACTURE, INITIAL ENCOUNTER: Primary | ICD-10-CM

## 2017-06-15 PROCEDURE — 99024 POSTOP FOLLOW-UP VISIT: CPT | Performed by: PODIATRIST

## 2017-06-15 ASSESSMENT — PAIN SCALES - GENERAL: PAINLEVEL: SEVERE PAIN (6)

## 2017-06-15 NOTE — TELEPHONE ENCOUNTER
Appointment scheduled for 6/29/2017 at 2:30pm with Dr Fletcher, appointment reminder letter has been sent   Closing encounter  Iman Dias RT (R)

## 2017-06-15 NOTE — NURSING NOTE
"Chief Complaint   Patient presents with     Surgical Followup     (6 days) NWB, pain 6, 2 falls, no fever; DOS 6/9/2017 - Open Reduction Internal Fixation Right Ankle       Initial Temp 97  F (36.1  C) (Temporal)  Ht 5' 7\" (1.702 m)  Wt 220 lb (99.8 kg)  LMP 05/05/2017 (Approximate)  BMI 34.46 kg/m2 Estimated body mass index is 34.46 kg/(m^2) as calculated from the following:    Height as of this encounter: 5' 7\" (1.702 m).    Weight as of this encounter: 220 lb (99.8 kg).  BP completed using cuff size: NA (Not Taken)  Medication Reconciliation: complete    Teressa Buitrago CMA, Lenore 15, 2017    "

## 2017-06-15 NOTE — MR AVS SNAPSHOT
After Visit Summary   6/15/2017    Yesika Grant    MRN: [id number]           Patient Information     Date Of Birth          1990        Visit Information        Provider Department      6/15/2017 8:15 AM Nate Beck DPM [facility]        Today's Diagnoses     Closed right ankle fracture, initial encounter    -  1       Follow-ups after your visit        Your next 10 appointments already scheduled     Jun 22, 2017  7:45 AM CDT   Return Visit with Nate Beck DPM   [facility] ([facility])    [address] [place] [address] [phone]            Jul 06, 2017  7:45 AM CDT   Return Visit with Nate Beck DPM   [facility] ([facility])    [address] [place] [address] [phone]            Jul 25, 2017  1:00 PM CDT   New Visit with Clifton Ames MD   [facility] ([facility])    [address] [place] [address]   [phone]            Aug 03, 2017  7:45 AM CDT   Return Visit with Nate Beck DPM   [facility] ([facility])    [address] [place] [id number] [phone]            Aug 31, 2017  7:45 AM CDT   Return Visit with Nate Beck DPM   [facility] ([facility])    [address] [place] [id number] [phone]              Who to contact     If you have questions or need follow up information about today's clinic visit or your schedule please contact [facility] directly at [phone].  Normal or non-critical lab and imaging results will be communicated to you by MyChart, letter or phone within 4 business days after the clinic has received the results. If you do not hear from us within 7 days, please contact the clinic through MyChart or phone. If you have a critical or  "abnormal lab result, we will notify you by phone as soon as possible.  Submit refill requests through [employer] or call your pharmacy and they will forward the refill request to us. Please allow 3 business days for your refill to be completed.          Additional Information About Your Visit        MyChart Information     [employer] lets you send messages to your doctor, view your test results, renew your prescriptions, schedule appointments and more. To sign up, go to [email or web address] . Click on \"Log in\" on the left side of the screen, which will take you to the Welcome page. Then click on \"Sign up Now\" on the right side of the page.     You will be asked to enter the access code listed below, as well as some personal information. Please follow the directions to create your username and password.     Your access code is: [email or web address]  Expires: 2017  4:10 PM     Your access code will  in 90 days. If you need help or a new code, please call your [facility] or [phone].        Care EveryWhere ID     This is your Care EveryWhere ID. This could be used by other organizations to access your [place] medical records  [id number]        Your Vitals Were     Temperature Height Last Period BMI (Body Mass Index)          97  F (36.1  C) (Temporal) 5' 7\" (1.702 m) 2017 (Approximate) 34.46 kg/m2         Blood Pressure from Last 3 Encounters:   17 114/74   17 118/84   17 116/87    Weight from Last 3 Encounters:   06/15/17 220 lb (99.8 kg)   17 220 lb (99.8 kg)   17 220 lb (99.8 kg)              Today, you had the following     No orders found for display       Primary Care Provider Office Phone # Fax #    Young Fletcher [phone]       [facility] [address] [place] [address]        Thank you!     Thank you for choosing [facility]  for your care. Our goal is always to provide you with excellent care. Hearing back " from our patients is one way we can continue to improve our services. Please take a few minutes to complete the written survey that you may receive in the mail after your visit with us. Thank you!             Your Updated Medication List - Protect others around you: Learn how to safely use, store and throw away your medicines at www.disposemymeds.org.          This list is accurate as of: 6/15/17  8:24 AM.  Always use your most recent med list.                   Brand Name Dispense Instructions for use    ALPRAZolam 0.25 MG tablet    XANAX    10 tablet    Take 1 tablet (0.25 mg) by mouth nightly as needed for anxiety       buPROPion 300 MG 24 hr tablet    WELLBUTRIN XL    90 tablet    Take 1 tablet (300 mg) by mouth every morning       citalopram 40 MG tablet    celeXA    90 tablet    Take 1 tablet (40 mg) by mouth daily Due for office visit       cyclobenzaprine 10 MG tablet    FLEXERIL    90 tablet    Take 1 tablet (10 mg) by mouth 3 times daily as needed for muscle spasms       ibuprofen 200 MG tablet    ADVIL/MOTRIN     Take 3 tablets (600 mg) by mouth every 6 hours as needed for pain       IRON SUPPLEMENT PO      Reported on 5/23/2017       Multi-vitamin Tabs tablet      Take 1 tablet by mouth daily Reported on 5/23/2017       * ondansetron 4 MG ODT tab    ZOFRAN ODT    10 tablet    Take 1 tablet (4 mg) by mouth every 8 hours as needed for nausea       * ondansetron 4 MG ODT tab    ZOFRAN-ODT    10 tablet    Take 1-2 tablets (4-8 mg) by mouth every 8 hours as needed for nausea Dissolve ON the tongue.       oxyCODONE-acetaminophen 5-325 MG per tablet    PERCOCET    60 tablet    Take 1-2 tablets by mouth every 4 hours as needed for pain (moderate to severe)       senna-docusate 8.6-50 MG per tablet    SENOKOT-S;PERICOLACE    30 tablet    Take 1-2 tablets by mouth 2 times daily Take while on oral narcotics to prevent or treat constipation.       topiramate 100 MG tablet    TOPAMAX    60 tablet    Take 1 tablet  (100 mg) by mouth 2 times daily       VITAMIN D (CHOLECALCIFEROL) PO      Take by mouth daily       * Notice:  This list has 2 medication(s) that are the same as other medications prescribed for you. Read the directions carefully, and ask your doctor or other care provider to review them with you.

## 2017-06-15 NOTE — TELEPHONE ENCOUNTER
Pt no-showed for her appointment 6/14.  I have appointments available in 2 weeks.  If needed, we can order an EEG.  Has she seen neurology yet?

## 2017-06-15 NOTE — LETTER
"  6/15/2017       RE: Yesika Grant  [address] [facility] [address]           Dear Colleague,    Thank you for referring your patient, Yesika Grant, to the [facility]. Please see a copy of my visit note below.    Chief Complaint   Patient presents with     Surgical Followup     (6 days) NWB, pain 6, 2 falls, no fever; DOS 6/9/2017 - Open Reduction Internal Fixation Right Ankle     Weight management plan: Patient was referred to their PCP to discuss a diet and exercise plan.    Subjective: Patient reports for followup of DOS 6/9/2017 - Open Reduction Internal Fixation Right Ankle procedure.  Patient continues pain medication but is tapering off. Patient denies nausea vomiting fever or chills.     EXAM:  No apparent distress, patient is relaxed and comfortable.   Vitals: Temp 97  F (36.1  C) (Temporal)  Ht 5' 7\" (1.702 m)  Wt 220 lb (99.8 kg)  LMP 05/05/2017 (Approximate)  BMI 34.46 kg/m2  Vasc:  DP and PT pulses palpable bilateral, CFT immediate to all digits and surrounding the surgical site.  Neuro:  Light touch sensation intact about the digits. There is minimal to no appreciable numbness around the surgical incision with examination.  Derm: The incision is well approximated and dry. Sutures are intact. Mild edema as expected. There is no surrounding erythema, heat, drainage or other signs of infection or hematoma.  Musc: Adequate reduction of deformity identified. No complications.    Radiographs: Intraoperative/postoperative films reviewed with the patient. No complications noted.     Assessment:      ICD-10-CM    1. Closed right ankle fracture, initial encounter S82.891A        Plan:    6/15/2017  The dressing was removed and surgical site inspected.   A bulky sterile dressing was applied with compression.   Patient instructed to reduce or discontinue pain medications as tolerated. Manage pain with reduced activity.   Continue ice and elevation as tolerated.   Continue " restrictions of  nonweightbearing.  Follow-up in one week for suture removal.      Nate Beck DPM      Again, thank you for allowing me to participate in the care of your patient.        Sincerely,              Nate Beck DPM

## 2017-06-15 NOTE — PROGRESS NOTES
"Chief Complaint   Patient presents with     Surgical Followup     (6 days) NWB, pain 6, 2 falls, no fever; DOS 6/9/2017 - Open Reduction Internal Fixation Right Ankle     Weight management plan: Patient was referred to their PCP to discuss a diet and exercise plan.    Subjective: Patient reports for followup of DOS 6/9/2017 - Open Reduction Internal Fixation Right Ankle procedure.  Patient continues pain medication but is tapering off. Patient denies nausea vomiting fever or chills.     EXAM:  No apparent distress, patient is relaxed and comfortable.   Vitals: Temp 97  F (36.1  C) (Temporal)  Ht 5' 7\" (1.702 m)  Wt 220 lb (99.8 kg)  LMP 05/05/2017 (Approximate)  BMI 34.46 kg/m2  Vasc:  DP and PT pulses palpable bilateral, CFT immediate to all digits and surrounding the surgical site.  Neuro:  Light touch sensation intact about the digits. There is minimal to no appreciable numbness around the surgical incision with examination.  Derm: The incision is well approximated and dry. Sutures are intact. Mild edema as expected. There is no surrounding erythema, heat, drainage or other signs of infection or hematoma.  Musc: Adequate reduction of deformity identified. No complications.    Radiographs: Intraoperative/postoperative films reviewed with the patient. No complications noted.     Assessment:      ICD-10-CM    1. Closed right ankle fracture, initial encounter S82.891A        Plan:    6/15/2017  The dressing was removed and surgical site inspected.   A bulky sterile dressing was applied with compression.   Patient instructed to reduce or discontinue pain medications as tolerated. Manage pain with reduced activity.   Continue ice and elevation as tolerated.   Continue restrictions of  nonweightbearing.  Follow-up in one week for suture removal.      Nate Beck DPM    "

## 2017-06-22 ENCOUNTER — OFFICE VISIT (OUTPATIENT)
Dept: PODIATRY | Facility: CLINIC | Age: 27
End: 2017-06-22
Payer: COMMERCIAL

## 2017-06-22 ENCOUNTER — TELEPHONE (OUTPATIENT)
Dept: PODIATRY | Facility: CLINIC | Age: 27
End: 2017-06-22

## 2017-06-22 VITALS — BODY MASS INDEX: 34.53 KG/M2 | HEIGHT: 67 IN | WEIGHT: 220 LBS | TEMPERATURE: 96.4 F

## 2017-06-22 DIAGNOSIS — S82.891A CLOSED RIGHT ANKLE FRACTURE, INITIAL ENCOUNTER: ICD-10-CM

## 2017-06-22 PROCEDURE — 99024 POSTOP FOLLOW-UP VISIT: CPT | Performed by: PODIATRIST

## 2017-06-22 RX ORDER — OXYCODONE AND ACETAMINOPHEN 5; 325 MG/1; MG/1
1-2 TABLET ORAL EVERY 4 HOURS PRN
Qty: 40 TABLET | Refills: 0 | Status: SHIPPED | OUTPATIENT
Start: 2017-06-22 | End: 2017-08-03

## 2017-06-22 ASSESSMENT — PAIN SCALES - GENERAL: PAINLEVEL: MODERATE PAIN (4)

## 2017-06-22 NOTE — MR AVS SNAPSHOT
After Visit Summary   6/22/2017    Yesika Grant    MRN: [id number]           Patient Information     Date Of Birth          1990        Visit Information        Provider Department      6/22/2017 7:45 AM Nate Beck DPM [facility]        Today's Diagnoses     Closed right ankle fracture, initial encounter          Care Instructions    Kindig and partial weightbearing after discontinuing narcotics.          Follow-ups after your visit        Your next 10 appointments already scheduled     Jun 29, 2017  2:30 PM CDT   Office Visit with Young Fletcher MD   [facility] ([facility])    [address] [place] [id number] [phone]           Bring a current list of meds and any records pertaining to this visit.  For Physicals, please bring immunization records and any forms needing to be filled out.  Please arrive 10 minutes early to complete paperwork.            Jul 06, 2017  7:45 AM CDT   Return Visit with Nate Beck DPM   [facility]    [address] [place] [phone]            Jul 25, 2017  1:00 PM CDT   New Visit with Clifton Ames MD   [facility] ([facility])    [address] [place] [phone] [phone]            Aug 03, 2017  7:45 AM CDT   Return Visit with Nate Beck DPM   [facility]    [address] [place] [address] [phone]            Aug 31, 2017  7:45 AM CDT   Return Visit with Nate Beck DPM   [facility]    [address] [place] [address] [phone]              Who to contact     If you have questions or need follow up information about today's clinic visit or your schedule please contact [facility]  "directly at [phone].  Normal or non-critical lab and imaging results will be communicated to you by [employer], letter or phone within 4 business days after the clinic has received the results. If you do not hear from us within 7 days, please contact the clinic through [employer] or phone. If you have a critical or abnormal lab result, we will notify you by phone as soon as possible.  Submit refill requests through [employer] or call your pharmacy and they will forward the refill request to us. Please allow 3 business days for your refill to be completed.          Additional Information About Your Visit        [employer] Information     [employer] lets you send messages to your doctor, view your test results, renew your prescriptions, schedule appointments and more. To sign up, go to [email or web address] . Click on \"Log in\" on the left side of the screen, which will take you to the Welcome page. Then click on \"Sign up Now\" on the right side of the page.     You will be asked to enter the access code listed below, as well as some personal information. Please follow the directions to create your username and password.     Your access code is: [email or web address]  Expires: 2017  4:10 PM     Your access code will  in 90 days. If you need help or a new code, please call your [place] clinic or [phone].        Care EveryWhere ID     This is your Care EveryWhere ID. This could be used by other organizations to access your [place] medical records  [id number]        Your Vitals Were     Temperature Height Last Period BMI (Body Mass Index)          96.4  F (35.8  C) (Temporal) 5' 7\" (1.702 m) 2017 (Approximate) 34.46 kg/m2         Blood Pressure from Last 3 Encounters:   17 114/74   17 118/84   17 116/87    Weight from Last 3 Encounters:   17 220 lb (99.8 kg)   06/15/17 220 lb (99.8 kg)   17 220 lb (99.8 kg)              Today, you had the following     No orders found for display    "      Today's Medication Changes          These changes are accurate as of: 6/22/17  8:54 AM.  If you have any questions, ask your nurse or doctor.               Start taking these medicines.        Dose/Directions    order for DME   Used for:  Closed right ankle fracture, initial encounter   Started by:  Nate Beck DPM        One - rollabout or bent knee scooter/wheeled walker with bent knee for non weight bearing status.  Please call [phone] to schedule delivery of the Roll About.   Quantity:  1 Units   Refills:  0            Where to get your medicines      Some of these will need a paper prescription and others can be bought over the counter.  Ask your nurse if you have questions.     Bring a paper prescription for each of these medications     order for DME    oxyCODONE-acetaminophen 5-325 MG per tablet                Primary Care Provider Office Phone # Fax #    Young Fletcher [phone]        [facility] [address] [place] [address]        Equal Access to Services     [facility] Center[facility] AH: Hadii arina ku hadasho Soomaali, waaxda luqadaha, qaybta kaalmada adeegyada, waxay nadiain haynickn alyssia deleon . So [facility] [phone].    ATENCIÓN: Si habla español, tiene a palacios disposición servicios gratuitos de asistencia lingüística. Llame al [phone].    We comply with applicable federal civil rights laws and [place] laws. We do not discriminate on the basis of race, color, national origin, age, disability sex, sexual orientation or gender identity.            Thank you!     Thank you for choosing [facility]  for your care. Our goal is always to provide you with excellent care. Hearing back from our patients is one way we can continue to improve our services. Please take a few minutes to complete the written survey that you may receive in the mail after your visit with us. Thank you!             Your Updated Medication List - Protect others around  you: Learn how to safely use, store and throw away your medicines at www.disposemymeds.org.          This list is accurate as of: 6/22/17  8:54 AM.  Always use your most recent med list.                   Brand Name Dispense Instructions for use Diagnosis    ALPRAZolam 0.25 MG tablet    XANAX    10 tablet    Take 1 tablet (0.25 mg) by mouth nightly as needed for anxiety    Panic attacks       buPROPion 300 MG 24 hr tablet    WELLBUTRIN XL    90 tablet    Take 1 tablet (300 mg) by mouth every morning    Major depression in complete remission (H), Tobacco use disorder       citalopram 40 MG tablet    celeXA    90 tablet    Take 1 tablet (40 mg) by mouth daily Due for office visit    ZAN (generalized anxiety disorder)       cyclobenzaprine 10 MG tablet    FLEXERIL    90 tablet    Take 1 tablet (10 mg) by mouth 3 times daily as needed for muscle spasms    New daily persistent headache, Strain of neck muscle, subsequent encounter, Chronic midline low back pain without sciatica       ibuprofen 200 MG tablet    ADVIL/MOTRIN     Take 3 tablets (600 mg) by mouth every 6 hours as needed for pain        IRON SUPPLEMENT PO      Reported on 5/23/2017        Multi-vitamin Tabs tablet      Take 1 tablet by mouth daily Reported on 5/23/2017        * ondansetron 4 MG ODT tab    ZOFRAN ODT    10 tablet    Take 1 tablet (4 mg) by mouth every 8 hours as needed for nausea    Nausea, Chronic post-traumatic headache, not intractable       * ondansetron 4 MG ODT tab    ZOFRAN-ODT    10 tablet    Take 1-2 tablets (4-8 mg) by mouth every 8 hours as needed for nausea Dissolve ON the tongue.    Closed right ankle fracture, initial encounter       order for DME     1 Units    One - rollabout or bent knee scooter/wheeled walker with bent knee for non weight bearing status.  Please call [phone] to schedule delivery of the Roll About.    Closed right ankle fracture, initial encounter       oxyCODONE-acetaminophen 5-325 MG per tablet     PERCOCET    40 tablet    Take 1-2 tablets by mouth every 4 hours as needed for pain (moderate to severe)    Closed right ankle fracture, initial encounter       senna-docusate 8.6-50 MG per tablet    SENOKOT-S;PERICOLACE    30 tablet    Take 1-2 tablets by mouth 2 times daily Take while on oral narcotics to prevent or treat constipation.    Closed right ankle fracture, initial encounter       topiramate 100 MG tablet    TOPAMAX    60 tablet    Take 1 tablet (100 mg) by mouth 2 times daily    Postconcussion syndrome, New daily persistent headache       VITAMIN D (CHOLECALCIFEROL) PO      Take by mouth daily        * Notice:  This list has 2 medication(s) that are the same as other medications prescribed for you. Read the directions carefully, and ask your doctor or other care provider to review them with you.

## 2017-06-22 NOTE — NURSING NOTE
Dispensed 1 Pneumatic Walking Brace, Size Medium, with [facility] agreement signed by patient. Teressa Buitrago CMA, June 22, 2017

## 2017-06-22 NOTE — PROGRESS NOTES
"Chief Complaint   Patient presents with     Surgical Followup     (1 week, 6 days) NWB w/splint, pain 4, 2 falls, no fever; DOS 6/9/2017 - Open Reduction Internal Fixation Right Ankle; LOV 6/15/2017       Weight management plan: Patient was referred to their PCP to discuss a diet and exercise plan.     Subjective: Patient reports for followup of DOS 6/9/2017 - Open Reduction Internal Fixation Right Ankle procedure.  Patient continues pain medication but is tapering off. Patient denies nausea vomiting fever or chills.     EXAM:  No apparent distress, patient is relaxed and comfortable.   Vitals: Temp 96.4  F (35.8  C) (Temporal)  Ht 5' 7\" (1.702 m)  Wt 220 lb (99.8 kg)  LMP 05/05/2017 (Approximate)  BMI 34.46 kg/m2  Vasc:  DP and PT pulses palpable bilateral, CFT immediate to all digits and surrounding the surgical site.  Neuro:  Light touch sensation intact about the digits. There is minimal to no appreciable numbness around the surgical incision with examination.  Derm: The incision is well approximated and dry. Sutures are intact. Mild edema as expected. There is no surrounding erythema, heat, drainage or other signs of infection or hematoma.  Musc: Adequate reduction of deformity identified. No complications.    Assessment:      ICD-10-CM    1. Closed right ankle fracture, initial encounter S82.891A oxyCODONE-acetaminophen (PERCOCET) 5-325 MG per tablet     order for DME       Plan:    6/15/2017  The dressing was removed and surgical site inspected.   A bulky sterile dressing was applied with compression.   Patient instructed to reduce or discontinue pain medications as tolerated. Manage pain with reduced activity.   Continue ice and elevation as tolerated.   Continue restrictions of  nonweightbearing.  Follow-up in one week for suture removal.      6/22/2017  Sutures were removed  Refill Atarax and narcotic  Kindig and weightbearing when no longer utilizing pain medication in the fracture boot dispensed " today  Continue compression dressing  The fracture boot in place even during sleep to be removed only for bathing or showering but no soaking  Follow-up in 2 weeks  Or a Roll-A-Bout as she is quite frustrated by crutches.      Nate Beck DPM

## 2017-06-22 NOTE — NURSING NOTE
"Chief Complaint   Patient presents with     Surgical Followup     (1 week, 6 days) NWB w/splint, pain 4, 2 falls, no fever; DOS 6/9/2017 - Open Reduction Internal Fixation Right Ankle; LOV 6/15/2017       Initial Temp 96.4  F (35.8  C) (Temporal)  Ht 5' 7\" (1.702 m)  Wt 220 lb (99.8 kg)  LMP 05/05/2017 (Approximate)  BMI 34.46 kg/m2 Estimated body mass index is 34.46 kg/(m^2) as calculated from the following:    Height as of this encounter: 5' 7\" (1.702 m).    Weight as of this encounter: 220 lb (99.8 kg).  BP completed using cuff size: NA (Not Taken)  Medication Reconciliation: complete    Teressa Buitrago CMA, June 22, 2017    "

## 2017-06-23 NOTE — TELEPHONE ENCOUNTER
From 6/22/17 note: 6/22/2017  Sutures were removed  Refill Atarax and narcotic  Kindig and weightbearing when no longer utilizing pain medication in the fracture boot dispensed today  Continue compression dressing  The fracture boot in place even during sleep to be removed only for bathing or showering but no soaking  Follow-up in 2 weeks  Or a Roll-A-Bout as she is quite frustrated by crutches.      Tried calling insurance now. Office was closed. Will need to try again Monday.

## 2017-06-23 NOTE — TELEPHONE ENCOUNTER
Reason for Call:  Other call back    Detailed comments: Patients home care called regarding the scooter order. States patient insurance has changes and being the crutches were already billed through the insurance, the scooter will not be covered unless a specific reason is specified why the crutches are not working. Please call Kamila at [phone] - hit option to speak with Customer Service.  -     Phone Number Patient can be reached at: Other phone number:  [phone]    Best Time: today if possible     Can we leave a detailed message on this number? YES    Call taken on 6/23/2017 at 12:30 PM by Fartun Amaro

## 2017-06-27 NOTE — PROGRESS NOTES
"  SUBJECTIVE:                                                    Yesika Grant is a 26 year old female who presents to clinic today for the following health issues:    Has been home for the past month too not working.  Concern - seizure follow up  Onset: First one she noticed was on May 22, 2017    Description:   Whole upper body locked up and her arms were shaking, no control over whole body, was just sitting there smoking a cigarette, had dropped that    Intensity: mild    Progression of Symptoms:  A little less lately, her life has been very unstable the past month    Accompanying Signs & Symptoms:  Talking mid sentence and will lose ability to speak, mind goes completely blank, and wont know what someone is saying when they're talking to her and when she comes back she goes to where she left off, gets random body jerks, leg spasms at night, controlled by topiramate and flexeril    Previous history of similar problem:   Maybe as a teenager but doesn't think she really had them again until recently    Precipitating factors:   Worsened by: stress triggers    Alleviating factors:  Improved by: none    Therapies Tried and outcome: none    Pt has had a very eventful time since she last saw me.  Crashed her car.  Passed out in the shower, injuring/fracturing her ankle.      Associated this with vasovagal episode after trying to clear her sinuses in the shower.  Had been taking some     There was at least 10 seconds between the fall and her screaming.      She had surgery for her ankle.  This is improving adequately.      Has appointment with neurology on July 25.      Her headaches have largely resolved.  She had initially felt better when she increased her Topamax to 100 mg twice daily, but most of these issues did seem to start within a month of when this was increased.  Does have word-finding difficulty.  Will feel like things \"pause\" at times.      Feels her panic attacks have worsened of late.  Not taking " Xanax if she can avoid it.      Her boyfriend's father , then her uncle passed away.  Then, her boyfriend dumped her.      She's been sleeping a lot, not eating well.      She hasn't been working since her ankle fracture.        Problem list and histories reviewed & adjusted, as indicated.  Additional history: as documented    Current Outpatient Prescriptions   Medication Sig Dispense Refill     oxyCODONE-acetaminophen (PERCOCET) 5-325 MG per tablet Take 1-2 tablets by mouth every 4 hours as needed for pain (moderate to severe) 40 tablet 0     order for [employer] - rollabout or bent knee scooter/wheeled walker with bent knee for non weight bearing status.    Please call [phone] to schedule delivery of the Roll About. 1 Units 0     senna-docusate (SENOKOT-S;PERICOLACE) 8.6-50 MG per tablet Take 1-2 tablets by mouth 2 times daily Take while on oral narcotics to prevent or treat constipation. 30 tablet 3     ibuprofen (ADVIL/MOTRIN) 200 MG tablet Take 3 tablets (600 mg) by mouth every 6 hours as needed for pain       topiramate (TOPAMAX) 100 MG tablet Take 1 tablet (100 mg) by mouth 2 times daily 60 tablet 3     buPROPion (WELLBUTRIN XL) 300 MG 24 hr tablet Take 1 tablet (300 mg) by mouth every morning 90 tablet 1     citalopram (CELEXA) 40 MG tablet Take 1 tablet (40 mg) by mouth daily Due for office visit 90 tablet 1     cyclobenzaprine (FLEXERIL) 10 MG tablet Take 1 tablet (10 mg) by mouth 3 times daily as needed for muscle spasms 90 tablet 1     ALPRAZolam (XANAX) 0.25 MG tablet Take 1 tablet (0.25 mg) by mouth nightly as needed for anxiety 10 tablet 1     VITAMIN D, CHOLECALCIFEROL, PO Take by mouth daily       multivitamin, therapeutic with minerals (MULTI-VITAMIN) TABS Take 1 tablet by mouth daily Reported on 2017       Ferrous Sulfate (IRON SUPPLEMENT PO) Reported on 2017       Recent Labs   Lab Test  17   1525  17   1700  16   1444  16   1408  16   1521    12/06/14   1410   A1C  5.2   --    --    --    --    --    --    ALT   --    --    --    --   21   --   14   CR   --    --   0.68   --   0.70   --   0.69   GFRESTIMATED   --    --   >90  Non  GFR Calc     --   >90  Non  GFR Calc     --   >90  Non  GFR Calc     GFRESTBLACK   --    --   >90   GFR Calc     --   >90   GFR Calc     --   >90   GFR Calc     POTASSIUM   --    --   3.8   --   4.0   --   4.0   TSH   --   1.72   --   4.88*   --    < >   --     < > = values in this interval not displayed.      BP Readings from Last 3 Encounters:   06/29/17 114/80   06/09/17 114/74   06/08/17 118/84    Wt Readings from Last 3 Encounters:   06/22/17 220 lb (99.8 kg)   06/15/17 220 lb (99.8 kg)   06/09/17 220 lb (99.8 kg)                    Reviewed and updated as needed this visit by clinical staff       Reviewed and updated as needed this visit by Provider         ROS:  Constitutional, HEENT, cardiovascular, pulmonary, gi and gu systems are negative, except as otherwise noted.  GI upset, hot flashes from time to time.    OBJECTIVE:     /80 (BP Location: Left arm, Patient Position: Chair, Cuff Size: Adult Large)  Pulse 104  Temp 98.2  F (36.8  C) (Temporal)  Resp 16  LMP 06/01/2017 (Approximate)  There is no height or weight on file to calculate BMI.  GENERAL: healthy, alert and no distress  NECK: no adenopathy, no asymmetry, masses, or scars and thyroid normal to palpation  RESP: lungs clear to auscultation - no rales, rhonchi or wheezes  CV: regular rate and rhythm, normal S1 S2, no S3 or S4, no murmur, click or rub, no peripheral edema and peripheral pulses strong  ABDOMEN: soft, nontender, no hepatosplenomegaly, no masses and bowel sounds normal  MS: no gross musculoskeletal defects noted, no edema    Diagnostic Test Results:  Results for orders placed or performed during the hospital encounter of 06/09/17   HCG  "qualitative urine   Result Value Ref Range    HCG Qual Urine Negative NEG       ASSESSMENT/PLAN:     Tobacco Cessation:   reports that she has been smoking Cigarettes.  She has been smoking about 1.00 pack per day. She has never used smokeless tobacco.  Tobacco Cessation Action Plan: Pharmacotherapies : Zyban/Wellbutrin    BMI:   Estimated body mass index is 34.46 kg/(m^2) as calculated from the following:    Height as of 6/22/17: 5' 7\" (1.702 m).    Weight as of 6/22/17: 220 lb (99.8 kg).   Weight management plan: Discussed healthy diet and exercise guidelines and patient will follow up in 3 months in clinic to re-evaluate.        ICD-10-CM    1. Moderate episode of recurrent major depressive disorder (H) F33.1 MENTAL HEALTH REFERRAL     FLUoxetine (PROZAC) 20 MG capsule   2. Anxiety F41.9 MENTAL HEALTH REFERRAL     FLUoxetine (PROZAC) 20 MG capsule   3. Vasovagal syncope R55    4. Postconcussion syndrome F07.81    5. Tobacco use disorder F17.200 buPROPion (WELLBUTRIN XL) 150 MG 24 hr tablet     1,2.  Discussed various options to getting her mood under better control.  She would like to try a different SSRI.  Will transition from citalopram to fluoxetine.  Will also reduce her dose of Wellbutrin due to minor concern for possible seizures being present, but I am not convinced of that.  Hasn't been particularly helpful for quitting smoking yet either.  Follow up in a few weeks.  3.  Will see what neurology has to say on this.  I am not convinced that this is seizure disorder.  Could be simple syncope (most likely for the event that let to the ER visit), but other episodes may be medication side effects.  Will continue to follow and adjust her other medications to attempt to minimize side effects while maximizing pt well-being.  4.  Symptoms seem to gradually be improving.  Neurology may have something further to offer with this as well.  As symptoms improve, can try reducing her Topamax dosing over time.  5.  " "Encouraged smoking cessation efforts.              Patient Instructions   Thank you for visiting [facility]    Let's try cutting your Wellbutrin back to 150 mg daily.    If you don't notice any improvement in your word-finding and confusion symptoms with this within a week, then we can cut your Topamax to 50 mg twice daily.    For your mood, let's add fluoxetine to your current regimen and cut your citalopram dose to 20 mg daily.  If you'd like, we can increase your fluoxetine dose to 40 mg in 2-4 weeks.    Let's what neurology has to say.     Please see me in 1 month for follow up. Options are       (a) office visit       (b) scheduled phone visit (check with your insurance about coverage)       (c) E-visit (need to \"request an E-visit\" through [employer])      If you had imaging scheduled please refer to your radiology prep sheet.    Appointment    Date_______________     Time_____________    Day:   M TU W TH F    With____________________________    Location_________________________    If you need medication refills, please contact your pharmacy 3 days before your prescriptions runs out. If you are out of refills, your pharmacy will contact contact the clinic.    Contact us or return if questions or concerns.     -Your Care Team:  MD Justine Kunz PA-C Folake Falaki, MD Anoshirvan Mazhari, MD Kelly White, CNP    General information about your clinic      Clinic hours:     Lab hours:  Phone [phone]  Monday 7:30 am-7 pm    Monday 8:30 am-6:30 pm  Tuesday-Friday 7:30 am-5 pm   Tuesday-Friday 8:30 am-4:30 pm    Pharmacy hours:  Phone [phone]  Monday 8:30 am-7pm  Tuesday-Friday 8:30am-6 pm                                       Mychart assistance [phone]        We would like to hear from you, how was your visit today?    Solange Castillo  Patient Information Supervisor   Patient Care Supervisor  [facility] [place] " [facility], [facility], and [facility]  [phone]         Young Fletcher MD, MD  [facility]

## 2017-06-29 ENCOUNTER — OFFICE VISIT (OUTPATIENT)
Dept: FAMILY MEDICINE | Facility: OTHER | Age: 27
End: 2017-06-29
Payer: COMMERCIAL

## 2017-06-29 VITALS
SYSTOLIC BLOOD PRESSURE: 114 MMHG | RESPIRATION RATE: 16 BRPM | HEART RATE: 104 BPM | DIASTOLIC BLOOD PRESSURE: 80 MMHG | TEMPERATURE: 98.2 F

## 2017-06-29 DIAGNOSIS — F33.1 MODERATE EPISODE OF RECURRENT MAJOR DEPRESSIVE DISORDER (H): Primary | ICD-10-CM

## 2017-06-29 DIAGNOSIS — F41.9 ANXIETY: ICD-10-CM

## 2017-06-29 DIAGNOSIS — F17.200 TOBACCO USE DISORDER: ICD-10-CM

## 2017-06-29 DIAGNOSIS — R55 VASOVAGAL SYNCOPE: ICD-10-CM

## 2017-06-29 DIAGNOSIS — F07.81 POSTCONCUSSION SYNDROME: ICD-10-CM

## 2017-06-29 PROCEDURE — 99214 OFFICE O/P EST MOD 30 MIN: CPT | Performed by: FAMILY MEDICINE

## 2017-06-29 RX ORDER — BUPROPION HYDROCHLORIDE 150 MG/1
150 TABLET ORAL EVERY MORNING
Qty: 30 TABLET | Refills: 1 | Status: SHIPPED | OUTPATIENT
Start: 2017-06-29 | End: 2017-09-05

## 2017-06-29 ASSESSMENT — PAIN SCALES - GENERAL: PAINLEVEL: MILD PAIN (3)

## 2017-06-29 NOTE — NURSING NOTE
"Chief Complaint   Patient presents with     Seizures     Panel Management     mychart        Initial /80 (BP Location: Left arm, Patient Position: Chair, Cuff Size: Adult Large)  Pulse 104  Temp 98.2  F (36.8  C) (Temporal)  Resp 16  LMP 06/01/2017 (Approximate) Estimated body mass index is 34.46 kg/(m^2) as calculated from the following:    Height as of 6/22/17: 5' 7\" (1.702 m).    Weight as of 6/22/17: 220 lb (99.8 kg).  Medication Reconciliation: complete  Daniel Michaud, CMA    "

## 2017-06-29 NOTE — PATIENT INSTRUCTIONS
"Thank you for visiting [facility]    Let's try cutting your Wellbutrin back to 150 mg daily.    If you don't notice any improvement in your word-finding and confusion symptoms with this within a week, then we can cut your Topamax to 50 mg twice daily.    For your mood, let's add fluoxetine to your current regimen and cut your citalopram dose to 20 mg daily.  If you'd like, we can increase your fluoxetine dose to 40 mg in 2-4 weeks.    Let's what neurology has to say.     Please see me in 1 month for follow up. Options are       (a) office visit       (b) scheduled phone visit (check with your insurance about coverage)       (c) E-visit (need to \"request an E-visit\" through [employer])      If you had imaging scheduled please refer to your radiology prep sheet.    Appointment    Date_______________     Time_____________    Day:   M TU W TH F    With____________________________    Location_________________________    If you need medication refills, please contact your pharmacy 3 days before your prescriptions runs out. If you are out of refills, your pharmacy will contact contact the clinic.    Contact us or return if questions or concerns.     -Your Care Team:  MD Justine Kunz PA-C Folake Falaki, MD Anoshirvan Mazhari, MD Kelly White, CNP    General information about your clinic      Clinic hours:     Lab hours:  Phone [phone]  Monday 7:30 am-7 pm    Monday 8:30 am-6:30 pm  Tuesday-Friday 7:30 am-5 pm   Tuesday-Friday 8:30 am-4:30 pm    Pharmacy hours:  Phone [phone]  Monday 8:30 am-7pm  Tuesday-Friday 8:30am-6 pm                                       Mychart assistance [phone]        We would like to hear from you, how was your visit today?    Solange Castillo  Patient Information Supervisor   Patient Care Supervisor  [facility] [place], and [facility], [place], and [facility]  [phone]     "

## 2017-06-29 NOTE — LETTER
[facility]  [address] [facility] [address]  Phone: [phone]    June 29, 2017        Yesika Grant  [address] [place] [address]          To whom it may concern:    RE: Yesika Grant    Patient was seen and treated today at our clinic.  Patient may return to work 6/29/2017  with the following:  No working or lifting restrictions (unless indicated by podiatry - must have clearance for her ankle from them)    Please contact me for questions or concerns.      Sincerely,        Young Fletcher MD

## 2017-06-29 NOTE — MR AVS SNAPSHOT
"              After Visit Summary   6/29/2017    Yesika Grant    MRN: [id number]           Patient Information     Date Of Birth          1990        Visit Information        Provider Department      6/29/2017 2:30 PM Young Fletcher MD [facility]        Today's Diagnoses     Moderate episode of recurrent major depressive disorder (H)    -  1    Anxiety        Vasovagal syncope        Postconcussion syndrome        Major depression in complete remission (H)        Tobacco use disorder          Care Instructions    Thank you for visiting [facility]    Let's try cutting your Wellbutrin back to 150 mg daily.    If you don't notice any improvement in your word-finding and confusion symptoms with this within a week, then we can cut your Topamax to 50 mg twice daily.    For your mood, let's add fluoxetine to your current regimen and cut your citalopram dose to 20 mg daily.  If you'd like, we can increase your fluoxetine dose to 40 mg in 2-4 weeks.    Let's what neurology has to say.     Please see me in 1 month for follow up. Options are       (a) office visit       (b) scheduled phone visit (check with your insurance about coverage)       (c) E-visit (need to \"request an E-visit\" through [employer] ([email or web address]))      If you had imaging scheduled please refer to your radiology prep sheet.    Appointment    Date_______________     Time_____________    Day:   M TU W TH F    With____________________________    Location_________________________    If you need medication refills, please contact your pharmacy 3 days before your prescriptions runs out. If you are out of refills, your pharmacy will contact contact the clinic.    Contact us or return if questions or concerns.     -Your Care Team:  MD Justine Kunz PA-C Folake Falaki, MD Anoshirvan Mazhari, MD Kelly White, ZIA    General information about your clinic      Clinic hours:     Lab hours:  Phone " [phone]  Monday 7:30 am-7 pm    Monday 8:30 am-6:30 pm  Tuesday-Friday 7:30 am-5 pm   Tuesday-Friday 8:30 am-4:30 pm    Pharmacy hours:  Phone [phone]  Monday 8:30 am-7pm  Tuesday-Friday 8:30am-6 pm                                       Anival assistance [phone]        We would like to hear from you, how was your visit today?    Solange Castillo  Patient Information Supervisor   Patient Care Supervisor  [facility] and [facility]  [phone]             Follow-ups after your visit        Additional Services     MENTAL HEALTH REFERRAL       Your provider has referred you to: [facility]: [facility] - Counseling (Individual/Couples/Family) - [facility] [phone]   [email or web address]   *Please call to schedule an appointment.  [facility] [phone]   [email or web address]   *Please call to schedule an appointment.    All scheduling is subject to the client's specific insurance plan & benefits, provider/location availability, and provider clinical specialities.  Please arrive 15 minutes early for your first appointment and bring your completed paperwork.    Please be aware that coverage of these services is subject to the terms and limitations of your health insurance plan.  Call member services at your health plan with any benefit or coverage questions.                  Your next 10 appointments already scheduled     Jul 06, 2017  7:45 AM CDT   [facility] D[facility]    [address] [place] [address] [phone]            Jul 25, 2017  1:00 PM CDT   [facility]    [id number]  "[facility] [place] [address]   [phone]            Aug 03, 2017  7:45 AM CDT   [facility] Visit with [facility] Beck DPM   [facility]    [address] [place] [address] [phone]            Aug 31, 2017  7:45 AM CDT   Return Visit with Nate Beck DPM   [facility]    [address] [place] [address] [phone]              Who to contact     If you have questions or need follow up information about today's clinic visit or your schedule please contact [facility] directly at [phone].  Normal or non-critical lab and imaging results will be communicated to you by MyChart, letter or phone within 4 business days after the clinic has received the results. If you do not hear from us within 7 days, please contact the clinic through MyChart or phone. If you have a critical or abnormal lab result, we will notify you by phone as soon as possible.  Submit refill requests through Rogate or call your pharmacy and they will forward the refill request to us. Please allow 3 business days for your refill to be completed.          Additional Information About Your Visit        [facility] Information     Rogate lets you send messages to your doctor, view your test results, renew your prescriptions, schedule appointments and more. To sign up, go to [email or web address] . Click on \"Log in\" on the left side of the screen, which will take you to the Welcome page. Then click on \"Sign up Now\" on the right side of the page.     You will be asked to enter the access code listed below, as well as some personal information. Please follow the directions to create your username and password.     Your access code is: [email or web address]  Expires: 2017  4:10 PM     Your access code will  in 90 days. If you need help or a new code, please call your [place] clinic or [phone].   "      Care EveryWhere ID     This is your Care EveryWhere ID. This could be used by other organizations to access your [place] medical records  [id number]        Your Vitals Were     Pulse Temperature Respirations Last Period          104 98.2  F (36.8  C) (Temporal) 16 06/01/2017 (Approximate)         Blood Pressure from Last 3 Encounters:   06/29/17 114/80   06/09/17 114/74   06/08/17 118/84    Weight from Last 3 Encounters:   06/22/17 220 lb (99.8 kg)   06/15/17 220 lb (99.8 kg)   06/09/17 220 lb (99.8 kg)              We Performed the Following     MENTAL HEALTH REFERRAL          Today's Medication Changes          These changes are accurate as of: 6/29/17  3:12 PM.  If you have any questions, ask your nurse or doctor.               Start taking these medicines.        Dose/Directions    FLUoxetine 20 MG capsule   Commonly known as:  PROzac   Used for:  Moderate episode of recurrent major depressive disorder (H), Anxiety   Started by:  Young Fletcher MD        Dose:  20 mg   Take 1 capsule (20 mg) by mouth daily   Quantity:  30 capsule   Refills:  1         These medicines have changed or have updated prescriptions.        Dose/Directions    buPROPion 150 MG 24 hr tablet   Commonly known as:  WELLBUTRIN XL   This may have changed:    - medication strength  - how much to take   Used for:  Major depression in complete remission (H), Tobacco use disorder   Changed by:  Young Fletcher MD        Dose:  150 mg   Take 1 tablet (150 mg) by mouth every morning   Quantity:  30 tablet   Refills:  1            Where to get your medicines      These medications were sent to [facility]  [facility] - [address]  [address] [place] [address]     Phone:  [phone]     buPROPion 150 MG 24 hr tablet    FLUoxetine 20 MG capsule                Primary Care Provider Office Phone # Fax #    Young Fletcher [phone]        [facility] [address] [place] [address]        Equal Access to Services     [facility] : Hadii arina cassidy briarenita Soberylali, waaxda luqadaha, qaybta kaalmapo newby, monica beckham. So [facility] [phone].    ATENCIÓN: Si habla español, tiene a palacios disposición servicios gratuitos de asistencia lingüística. [facility] [phone].    We comply with applicable federal civil rights laws and [place] laws. We do not discriminate on the basis of race, color, national origin, age, disability sex, sexual orientation or gender identity.            Thank you!     Thank you for choosing [facility]  for your care. Our goal is always to provide you with excellent care. Hearing back from our patients is one way we can continue to improve our services. Please take a few minutes to complete the written survey that you may receive in the mail after your visit with us. Thank you!             Your Updated Medication List - Protect others around you: Learn how to safely use, store and throw away your medicines at www.disposemymeds.org.          This list is accurate as of: 6/29/17  3:12 PM.  Always use your most recent med list.                   Brand Name Dispense Instructions for use Diagnosis    ALPRAZolam 0.25 MG tablet    XANAX    10 tablet    Take 1 tablet (0.25 mg) by mouth nightly as needed for anxiety    Panic attacks       buPROPion 150 MG 24 hr tablet    WELLBUTRIN XL    30 tablet    Take 1 tablet (150 mg) by mouth every morning    Major depression in complete remission (H), Tobacco use disorder       citalopram 40 MG tablet    celeXA    90 tablet    Take 1 tablet (40 mg) by mouth daily Due for office visit    ZAN (generalized anxiety disorder)       cyclobenzaprine 10 MG tablet    FLEXERIL    90 tablet    Take 1 tablet (10 mg) by mouth 3 times daily as needed for muscle spasms    New daily persistent headache, Strain of neck muscle, subsequent encounter, Chronic midline low back pain without  sciatica       FLUoxetine 20 MG capsule    PROzac    30 capsule    Take 1 capsule (20 mg) by mouth daily    Moderate episode of recurrent major depressive disorder (H), Anxiety       ibuprofen 200 MG tablet    ADVIL/MOTRIN     Take 3 tablets (600 mg) by mouth every 6 hours as needed for pain        IRON SUPPLEMENT PO      Reported on 5/23/2017        Multi-vitamin Tabs tablet      Take 1 tablet by mouth daily Reported on 5/23/2017        order for DME     1 Units    One - rollabout or bent knee scooter/wheeled walker with bent knee for non weight bearing status.  Please call [phone] to schedule delivery of the Roll About.    Closed right ankle fracture, initial encounter       oxyCODONE-acetaminophen 5-325 MG per tablet    PERCOCET    40 tablet    Take 1-2 tablets by mouth every 4 hours as needed for pain (moderate to severe)    Closed right ankle fracture, initial encounter       senna-docusate 8.6-50 MG per tablet    SENOKOT-S;PERICOLACE    30 tablet    Take 1-2 tablets by mouth 2 times daily Take while on oral narcotics to prevent or treat constipation.    Closed right ankle fracture, initial encounter       topiramate 100 MG tablet    TOPAMAX    60 tablet    Take 1 tablet (100 mg) by mouth 2 times daily    Postconcussion syndrome, New daily persistent headache       VITAMIN D (CHOLECALCIFEROL) PO      Take by mouth daily

## 2017-06-30 ASSESSMENT — ANXIETY QUESTIONNAIRES
6. BECOMING EASILY ANNOYED OR IRRITABLE: SEVERAL DAYS
GAD7 TOTAL SCORE: 13
3. WORRYING TOO MUCH ABOUT DIFFERENT THINGS: SEVERAL DAYS
7. FEELING AFRAID AS IF SOMETHING AWFUL MIGHT HAPPEN: SEVERAL DAYS
5. BEING SO RESTLESS THAT IT IS HARD TO SIT STILL: NEARLY EVERY DAY
1. FEELING NERVOUS, ANXIOUS, OR ON EDGE: MORE THAN HALF THE DAYS
IF YOU CHECKED OFF ANY PROBLEMS ON THIS QUESTIONNAIRE, HOW DIFFICULT HAVE THESE PROBLEMS MADE IT FOR YOU TO DO YOUR WORK, TAKE CARE OF THINGS AT HOME, OR GET ALONG WITH OTHER PEOPLE: VERY DIFFICULT
2. NOT BEING ABLE TO STOP OR CONTROL WORRYING: MORE THAN HALF THE DAYS

## 2017-06-30 ASSESSMENT — PATIENT HEALTH QUESTIONNAIRE - PHQ9: 5. POOR APPETITE OR OVEREATING: NEARLY EVERY DAY

## 2017-07-01 ASSESSMENT — ANXIETY QUESTIONNAIRES: GAD7 TOTAL SCORE: 13

## 2017-07-01 ASSESSMENT — PATIENT HEALTH QUESTIONNAIRE - PHQ9: SUM OF ALL RESPONSES TO PHQ QUESTIONS 1-9: 15

## 2017-07-06 ENCOUNTER — OFFICE VISIT (OUTPATIENT)
Dept: PODIATRY | Facility: CLINIC | Age: 27
End: 2017-07-06
Payer: COMMERCIAL

## 2017-07-06 VITALS — WEIGHT: 220 LBS | HEIGHT: 67 IN | BODY MASS INDEX: 34.53 KG/M2 | TEMPERATURE: 97 F

## 2017-07-06 DIAGNOSIS — S82.891A CLOSED RIGHT ANKLE FRACTURE, INITIAL ENCOUNTER: Primary | ICD-10-CM

## 2017-07-06 PROCEDURE — 99024 POSTOP FOLLOW-UP VISIT: CPT | Performed by: PODIATRIST

## 2017-07-06 ASSESSMENT — PAIN SCALES - GENERAL: PAINLEVEL: MILD PAIN (3)

## 2017-07-06 NOTE — MR AVS SNAPSHOT
After Visit Summary   7/6/2017    Yesika Grant    MRN: [id number]           Patient Information     Date Of Birth          1990        Visit Information        Provider Department      7/6/2017 7:45 AM Nate Beck DPM [facility]        Today's Diagnoses     Closed right ankle fracture, initial encounter    -  1       Follow-ups after your visit        Your next 10 appointments already scheduled     Jul 25, 2017  1:00 PM CDT   [facility] Visit with Cl[facility]ton [facility]   [facility]    [address] [place] [address]   [phone]            Aug 03, 2017  7:45 AM CDT   Return Visit with Nate Beck DPM   [facility]    [address] [place] [id number] [phone]            Aug 04, 2017  2:00 PM CDT   [facility] Visit with Young Fletcher MD   [facility]    [id number] [place] [address] [phone]           Bring a current list of meds and any records pertaining to this visit.  For Physicals, please bring immunization records and any forms needing to be filled out.  Please arrive 10 minutes early to complete paperwork.            Aug 31, 2017  7:45 AM CDT   Return Visit with Nate Beck DPM   [facility]    [address] [place] [id number] [phone]              Who to contact     If you have questions or need follow up information about today's clinic visit or your schedule please contact [facility] directly at [phone].  Normal or non-critical lab and imaging results will be communicated to you by MyChart, letter or phone within 4 business days after the clinic has received the results. If you do not hear from us within 7 days, please contact the clinic through MyChart or phone. If you  "have a critical or abnormal lab result, we will notify you by phone as soon as possible.  Submit refill requests through Jooce or call your pharmacy and they will forward the refill request to us. Please allow 3 business days for your refill to be completed.          Additional Information About Your Visit        [employer] Information     Jooce lets you send messages to your doctor, view your test results, renew your prescriptions, schedule appointments and more. To sign up, go to [email or web address] . Click on \"Log in\" on the left side of the screen, which will take you to the Welcome page. Then click on \"Sign up Now\" on the right side of the page.     You will be asked to enter the access code listed below, as well as some personal information. Please follow the directions to create your username and password.     Your access code is: [email or web address]  Expires: 2017  4:10 PM     Your access code will  in 90 days. If you need help or a new code, please call your [place] clinic or [phone].        Care EveryWhere ID     This is your Care EveryWhere ID. This could be used by other organizations to access your [place] medical records  [id number]        Your Vitals Were     Temperature Height Last Period BMI (Body Mass Index)          97  F (36.1  C) (Temporal) 5' 7\" (1.702 m) 2017 (Approximate) 34.46 kg/m2         Blood Pressure from Last 3 Encounters:   17 114/80   17 114/74   17 118/84    Weight from Last 3 Encounters:   17 220 lb (99.8 kg)   17 220 lb (99.8 kg)   06/15/17 220 lb (99.8 kg)              Today, you had the following     No orders found for display       Primary Care Provider Office Phone # Fax #    Young Fletcher [phone]       [facility] [address] [place] [address]        Equal Access to Services     [facility] CELESTE : Reggie Leiva, malik mckee, monica mitchell " eugenie kharidaryl karopallavi lacharmainespike ah. So [facility] [phone].    ATENCIÓN: Si korila kalin, tiene a palacios disposición servicios gratuitos de asistencia lingüística. [address] [phone].    We comply with applicable federal civil rights laws and [place] laws. We do not discriminate on the basis of race, color, national origin, age, disability sex, sexual orientation or gender identity.            Thank you!     Thank you for choosing [facility]  for your care. Our goal is always to provide you with excellent care. Hearing back from our patients is one way we can continue to improve our services. Please take a few minutes to complete the written survey that you may receive in the mail after your visit with us. Thank you!             Your Updated Medication List - Protect others around you: Learn how to safely use, store and throw away your medicines at www.disposemymeds.org.          This list is accurate as of: 7/6/17  8:20 AM.  Always use your most recent med list.                   Brand Name Dispense Instructions for use Diagnosis    ALPRAZolam 0.25 MG tablet    XANAX    10 tablet    Take 1 tablet (0.25 mg) by mouth nightly as needed for anxiety    Panic attacks       buPROPion 150 MG 24 hr tablet    WELLBUTRIN XL    30 tablet    Take 1 tablet (150 mg) by mouth every morning    Tobacco use disorder       citalopram 40 MG tablet    celeXA    90 tablet    Take 1 tablet (40 mg) by mouth daily Due for office visit    ZAN (generalized anxiety disorder)       cyclobenzaprine 10 MG tablet    FLEXERIL    90 tablet    Take 1 tablet (10 mg) by mouth 3 times daily as needed for muscle spasms    New daily persistent headache, Strain of neck muscle, subsequent encounter, Chronic midline low back pain without sciatica       FLUoxetine 20 MG capsule    PROzac    30 capsule    Take 1 capsule (20 mg) by mouth daily    Moderate episode of recurrent major depressive disorder (H), Anxiety       ibuprofen 200 MG tablet     ADVIL/MOTRIN     Take 3 tablets (600 mg) by mouth every 6 hours as needed for pain        IRON SUPPLEMENT PO      Reported on 5/23/2017        Multi-vitamin Tabs tablet      Take 1 tablet by mouth daily Reported on 5/23/2017        order for DME     1 Units    One - rollabout or bent knee scooter/wheeled walker with bent knee for non weight bearing status.  Please call [phone] to schedule delivery of the Roll About.    Closed right ankle fracture, initial encounter       oxyCODONE-acetaminophen 5-325 MG per tablet    PERCOCET    40 tablet    Take 1-2 tablets by mouth every 4 hours as needed for pain (moderate to severe)    Closed right ankle fracture, initial encounter       senna-docusate 8.6-50 MG per tablet    SENOKOT-S;PERICOLACE    30 tablet    Take 1-2 tablets by mouth 2 times daily Take while on oral narcotics to prevent or treat constipation.    Closed right ankle fracture, initial encounter       topiramate 100 MG tablet    TOPAMAX    60 tablet    Take 1 tablet (100 mg) by mouth 2 times daily    Postconcussion syndrome, New daily persistent headache       VITAMIN D (CHOLECALCIFEROL) PO      Take by mouth daily

## 2017-07-06 NOTE — LETTER
[address] [address]  [phone]    2017      RE:  Yesika Grant  : 1990      To whom it may concern:    This patient may return to work now.  She must remain in the fracture boot mostly light duty work for 4 weeks and then no restrictions.   She will require PT visits over the next month or two.     Sincerely,          Nate Beck DPM

## 2017-07-06 NOTE — PROGRESS NOTES
"Chief Complaint   Patient presents with     Surgical Followup     (3 week, 6 days) NWB w/fracture boot with some WB w/fracture boot, pain 3, no fever; DOS 6/9/2017 - Open Reduction Internal Fixation Right Ankle; LOV 6/22/2017       Weight management plan: Patient was referred to their PCP to discuss a diet and exercise plan.    Subjective: Patient reports for followup of DOS 6/9/2017 - Open Reduction Internal Fixation Right Ankle procedure.  Patient continues pain medication but is tapering off. Patient denies nausea vomiting fever or chills.     EXAM:  No apparent distress, patient is relaxed and comfortable.   Vitals: Temp 97  F (36.1  C) (Temporal)  Ht 5' 7\" (1.702 m)  Wt 220 lb (99.8 kg)  LMP 06/01/2017 (Approximate)  BMI 34.46 kg/m2  Vasc:  DP and PT pulses palpable bilateral, CFT immediate to all digits and surrounding the surgical site.  Neuro:  Light touch sensation intact about the digits. There is minimal to no appreciable numbness around the surgical incision with examination.  Derm: The incision is well approximated and dry. Sutures are intact. Mild edema as expected. There is no surrounding erythema, heat, drainage or other signs of infection or hematoma.  Musc: Adequate reduction of deformity identified. No complications.    Assessment:      ICD-10-CM    1. Closed right ankle fracture, initial encounter S82.891A        Plan:    6/15/2017  The dressing was removed and surgical site inspected.   A bulky sterile dressing was applied with compression.   Patient instructed to reduce or discontinue pain medications as tolerated. Manage pain with reduced activity.   Continue ice and elevation as tolerated.   Continue restrictions of  nonweightbearing.  Follow-up in one week for suture removal.      6/22/2017  Sutures were removed  Refill Atarax and narcotic  Kindig and weightbearing when no longer utilizing pain medication in the fracture boot dispensed today  Continue compression dressing  The fracture " boot in place even during sleep to be removed only for bathing or showering but no soaking  Follow-up in 2 weeks  Or a Roll-A-Bout as she is quite frustrated by crutches.    7/6/2017  Begin PT and progress WB as tolerated. At home she is to remain in the boot for WB and sleep until 6 weeks then progress out of boot at tolerated.    By week 6 start coming out of boot at home.   Works as a  would like to return to desk work. Letter today to return light duty.  Expect no restrictions or limitations 10-12 weeks.       Nate Beck DPM

## 2017-07-06 NOTE — NURSING NOTE
"Chief Complaint   Patient presents with     Surgical Followup     (3 week, 6 days) NWB w/fracture boot with some WB w/fracture boot, pain 3, no fever; DOS 6/9/2017 - Open Reduction Internal Fixation Right Ankle; LOV 6/22/2017       Initial Temp 97  F (36.1  C) (Temporal)  Ht 5' 7\" (1.702 m)  Wt 220 lb (99.8 kg)  LMP 06/01/2017 (Approximate)  BMI 34.46 kg/m2 Estimated body mass index is 34.46 kg/(m^2) as calculated from the following:    Height as of this encounter: 5' 7\" (1.702 m).    Weight as of this encounter: 220 lb (99.8 kg).  BP completed using cuff size: NA (Not Taken)  Medication Reconciliation: complete    Teressa Buitrago CMA, July 6, 2017    "

## 2017-07-17 ENCOUNTER — HOSPITAL ENCOUNTER (OUTPATIENT)
Dept: PHYSICAL THERAPY | Facility: OTHER | Age: 27
Setting detail: THERAPIES SERIES
End: 2017-07-17
Attending: PODIATRIST
Payer: COMMERCIAL

## 2017-07-17 PROCEDURE — 40000718 ZZHC STATISTIC PT DEPARTMENT ORTHO VISIT: Performed by: PHYSICAL THERAPIST

## 2017-07-17 PROCEDURE — 97110 THERAPEUTIC EXERCISES: CPT | Mod: GP | Performed by: PHYSICAL THERAPIST

## 2017-07-17 PROCEDURE — 97161 PT EVAL LOW COMPLEX 20 MIN: CPT | Mod: GP | Performed by: PHYSICAL THERAPIST

## 2017-07-17 NOTE — PROGRESS NOTES
07/17/17 0900   General Information   Type of Visit Initial OP Ortho PT Evaluation   Start of Care Date 07/17/17   Referring Physician Nate Beck DPM   Patient/Family Goals Statement get back to walking normally, eventually running   Orders Evaluate and Treat   Date of Order 07/06/17   Insurance Type [employer]   Medical Diagnosis closed right ankle fracture   Body Part(s)   Body Part(s) Ankle/Foot   Presentation and Etiology   Pertinent history of current problem (include personal factors and/or comorbidities that impact the POC) pt passed out in shower on 5/28/17 and eventually led to ORIF R ankle on 6/9/17 and notes she has a plate and 6 screws. Pt has been off crutches X 1 week. She is no longer wearing boot to sleep. Wearing boot most of the day but will take it off at night for short walking in house.  Pt is familiar to PT as she has been seen post concussion/neck/HA's 12/10/16.   Impairments A. Pain;B. Decreased WB tolerance;D. Decreased ROM;F. Decreased strength and endurance;G. Impaired balance;H. Impaired gait   Functional Limitations perform required work activities;perform activities of daily living;perform desired leisure / sports activities   Symptom Location mainly R posterior ankle, some medial at times, cramping/spasms R calf   How/Where did it occur With a fall;At home   Onset date of current episode/exacerbation 06/09/17   Chronicity New   Pain rating (0-10 point scale) Best (/10)   Best (/10) average pain 3/10   Worst (/10) 5/10   Pain quality A. Sharp;B. Dull;C. Aching;G. Cramping   Frequency of pain/symptoms B. Intermittent  (sx 60% of day)   Pain/symptoms are: Other   Pain symptoms comment the end of day is worst or if prolonged sitting at computer   Pain/symptoms exacerbated by A. Sitting;B. Walking;G. Certain positions;J. ADL;K. Home tasks;L. Work tasks;M. Other   Pain exacerbation comment standing   Pain/symptoms eased by E. Changing positions;J. Braces/supports;I. OTC  medication(s);H. Cold   Progression of symptoms since onset: Improved   Prior Level of Function   Functional Level Prior Comment no concerns prior   Current Level of Function   Patient role/employment history A. Employed   Employment Comments , back to full time   Fall Risk Screen   Fall screen completed by PT   Per patient - Fall 2 or more times in past year? Yes   Per patient - Fall with injury in past year? Yes   Is patient a fall risk? No   Functional Scales   Functional Scales Other   Other Scales  LEFS 32/80, reported functional level 50%   Ankle/Foot Objective Findings   Side (if bilateral, select both right and left) Right;Left   Observation minimal pain behaviors   Integumentary scar well healed, minimal swelling, atrophy R calf, quad. LE soft tissue   Gait/Locomotion no assistive device, wearing boot   Right DF (Knee Ext) AROM 0 degrees   Right PF AROM 40   Right Calcanceal Inversion AROM 22   Right Calcaneal Eversion AROM 5   Right DF/Inversion Strength 4/5   Right DF/Eversion Strength 4/5   Right PF/Inversion Strength 4/5   Right PF/Eversion Strength 4/5   Right PF Strength not tested today in standing   Left DF (Knee Ext) AROM 15   Left PF AROM 75   Left Calcanceal Inversion AROM 48   Left Calcaneal Eversion AROM 22   Left DF/Inversion Strength 5/5   Left DF/Eversion Strength 5/5   Left PF/Inversion Strength 5/5   Left PF/Eversion Strength 5/5   Left PF Strength 5/5   Planned Therapy Interventions   Planned Therapy Interventions gait training;balance training;ROM;strengthening;neuromuscular re-education   Planned Therapy Interventions Comment manual therapy if needed   Clinical Impression   Criteria for Skilled Therapeutic Interventions Met yes, treatment indicated   PT Diagnosis s/p R ankle ORIF 6/9/17 leading to decreased R ankle ROM, weakness, altered gait pattern and decreased functional level   Influenced by the following impairments pain, ROM, weakness, imbalance   Functional  "limitations due to impairments standing, walking, wt bearing, adl's, increased activity   Clinical Presentation Stable/Uncomplicated   Clinical Presentation Rationale 32/80 LEFS, 26 y.o.approximately 6 weeks post op   Clinical Decision Making (Complexity) Low complexity   Therapy Frequency 1 time/week   Predicted Duration of Therapy Intervention (days/wks) 8 visits over 3 months, decrease as able   Risk & Benefits of therapy have been explained Yes   Patient, Family & other staff in agreement with plan of care Yes   Education Assessment   Preferred Learning Style Listening;Reading   Barriers to Learning No barriers   ORTHO GOALS   PT Ortho Eval Goals 1;2   Ortho Goal 1   Goal Identifier balance   Goal Description pt will improve SLS to 30\" or greater to help with balance and decrease fall risk in future   Target Date 08/17/17   Ortho Goal 2   Goal Identifier function   Goal Description pt will imrpove R ankle ROM and strength and carry over to improved functional level as reported by improved LEFS score from 32/80 to 52/80 or higher   Target Date 08/28/17   Total Evaluation Time   Total Evaluation Time 20     "

## 2017-07-18 ENCOUNTER — OFFICE VISIT (OUTPATIENT)
Dept: URGENT CARE | Facility: URGENT CARE | Age: 27
End: 2017-07-18
Payer: COMMERCIAL

## 2017-07-18 ENCOUNTER — PRE VISIT (OUTPATIENT)
Dept: NEUROLOGY | Facility: CLINIC | Age: 27
End: 2017-07-18

## 2017-07-18 VITALS
WEIGHT: 210.9 LBS | BODY MASS INDEX: 33.03 KG/M2 | DIASTOLIC BLOOD PRESSURE: 94 MMHG | SYSTOLIC BLOOD PRESSURE: 141 MMHG | HEART RATE: 98 BPM | OXYGEN SATURATION: 97 % | TEMPERATURE: 98.5 F
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
## 2017-07-18 DIAGNOSIS — T19.2XXA RETAINED TAMPON, INITIAL ENCOUNTER: Primary | ICD-10-CM

## 2017-07-18 PROCEDURE — 99212 OFFICE O/P EST SF 10 MIN: CPT | Performed by: FAMILY MEDICINE

## 2017-07-18 ASSESSMENT — PAIN SCALES - GENERAL: PAINLEVEL: SEVERE PAIN (6)

## 2017-07-18 NOTE — NURSING NOTE
"Chief Complaint   Patient presents with     Personal Problem     Patient inserted tampon into vagina area and now she cant find it.       Initial BP (!) 141/94 (BP Location: Right arm, Patient Position: Chair, Cuff Size: Adult Regular)  Pulse 98  Temp 98.5  F (36.9  C) (Oral)  Wt 210 lb 14.4 oz (95.7 kg)  LMP 06/01/2017 (Approximate)  SpO2 97%  BMI 33.03 kg/m2 Estimated body mass index is 33.03 kg/(m^2) as calculated from the following:    Height as of 7/6/17: 5' 7\" (1.702 m).    Weight as of this encounter: 210 lb 14.4 oz (95.7 kg).  Medication Reconciliation: complete         Maggy Dunn    "

## 2017-07-18 NOTE — MR AVS SNAPSHOT
After Visit Summary   7/18/2017    Yesika Grant    MRN: [id number]           Patient Information     Date Of Birth          1990        Visit Information        Provider Department      7/18/2017 11:15 AM Shan Naranjo MD [facility]         Follow-ups after your visit        Your next 10 appointments already scheduled     Jul 24, 2017  2:30 PM CDT   Ortho Treatment with Young Dockery, JONAH   [facility]    [address] [address]   [phone]            Jul 25, 2017  1:00 PM CDT   New Visit with Clifton [facility]es MD   [facility]    [address] [place] [address]   [phone]            Jul 31, 2017  2:30 PM CDT   Ortho Treatment with Young Dockery, PT   [facility]    [address] [address]   [phone]            Aug 03, 2017  7:45 AM CDT   Return Visit with Nate Beck DPM   [facility] ([address] [address]   [phone]            Aug 04, 2017  2:00 PM CDT   Office Visit with Young Fletcher MD   [facility])    [address] [address]   [phone]           Bring a current list of meds and any records pertaining to this visit.  For Physicals, please bring immunization records and any forms needing to be filled out.  Please arrive 10 minutes early to complete paperwork.            Aug 07, 2017  2:30 PM CDT   Ortho Treatment with Young Dockery PT   [facility]    [address] [address]   [phone]            Aug 31, 2017  7:45 AM CDT   Return Visit with Nate Beck DPM   [facility] ([place] [address]  "[facility] [id number] [phone]              Who to contact     If you have questions or need follow up information about today's clinic visit or your schedule please contact [facility] directly at [phone].  Normal or non-critical lab and imaging results will be communicated to you by MyChart, letter or phone within 4 business days after the clinic has received the results. If you do not hear from us within 7 days, please contact the clinic through MyChart or phone. If you have a critical or abnormal lab result, we will notify you by phone as soon as possible.  Submit refill requests through [employer] or call your pharmacy and they will forward the refill request to us. Please allow 3 business days for your refill to be completed.          Additional Information About Your Visit        MyChart Information     Mapp lets you send messages to your doctor, view your test results, renew your prescriptions, schedule appointments and more. To sign up, go to [email or web address] . Click on \"Log in\" on the left side of the screen, which will take you to the Welcome page. Then click on \"Sign up Now\" on the right side of the page.     You will be asked to enter the access code listed below, as well as some personal information. Please follow the directions to create your username and password.     Your access code is: [email or web address]  Expires: 2017  4:10 PM     Your access code will  in 90 days. If you need help or a new code, please call your [facility] or [phone].        Care EveryWhere ID     This is your Care EveryWhere ID. This could be used by other organizations to access your [facility] medical records  [id number]        Your Vitals Were     Pulse Temperature Last Period Pulse Oximetry BMI (Body Mass Index)       98 98.5  F (36.9  C) (Oral) 2017 (Approximate) 97% 33.03 kg/m2        Blood Pressure from Last 3 Encounters:   17 (!) 141/94 "   06/29/17 114/80   06/09/17 114/74    Weight from Last 3 Encounters:   07/18/17 210 lb 14.4 oz (95.7 kg)   07/06/17 220 lb (99.8 kg)   06/22/17 220 lb (99.8 kg)              Today, you had the following     No orders found for display       Primary Care Provider Office Phone # Fax #    Young Roly Fletcher [phone]       [facility] [address] [place] [address]        Equal Access to Services     [facility]: Hadii aad ku hadasho Soomaali, waaxda luqadaha, qaybta kaalmada adeegyada, waxay idiin hayaan alyssia deleon . So [facility] [phone].    ATENCIÓN: Si habla español, tiene a palacios disposición servicios gratuitos de asistencia lingüística. [facility] [phone].    We comply with applicable federal civil rights laws and [place] laws. We do not discriminate on the basis of race, color, national origin, age, disability sex, sexual orientation or gender identity.            Thank you!     Thank you for choosing [facility]  for your care. Our goal is always to provide you with excellent care. Hearing back from our patients is one way we can continue to improve our services. Please take a few minutes to complete the written survey that you may receive in the mail after your visit with us. Thank you!             Your Updated Medication List - Protect others around you: Learn how to safely use, store and throw away your medicines at www.disposemymeds.org.          This list is accurate as of: 7/18/17 11:56 AM.  Always use your most recent med list.                   Brand Name Dispense Instructions for use Diagnosis    ALPRAZolam 0.25 MG tablet    XANAX    10 tablet    Take 1 tablet (0.25 mg) by mouth nightly as needed for anxiety    Panic attacks       buPROPion 150 MG 24 hr tablet    WELLBUTRIN XL    30 tablet    Take 1 tablet (150 mg) by mouth every morning    Tobacco use disorder       citalopram 40 MG tablet    celeXA    90 tablet    Take 1 tablet (40  mg) by mouth daily Due for office visit    ZAN (generalized anxiety disorder)       cyclobenzaprine 10 MG tablet    FLEXERIL    90 tablet    Take 1 tablet (10 mg) by mouth 3 times daily as needed for muscle spasms    New daily persistent headache, Strain of neck muscle, subsequent encounter, Chronic midline low back pain without sciatica       FLUoxetine 20 MG capsule    PROzac    30 capsule    Take 1 capsule (20 mg) by mouth daily    Moderate episode of recurrent major depressive disorder (H), Anxiety       ibuprofen 200 MG tablet    ADVIL/MOTRIN     Take 3 tablets (600 mg) by mouth every 6 hours as needed for pain        IRON SUPPLEMENT PO      Reported on 5/23/2017        Multi-vitamin Tabs tablet      Take 1 tablet by mouth daily Reported on 5/23/2017        order for DME     1 Units    One - rollabout or bent knee scooter/wheeled walker with bent knee for non weight bearing status.  Please call [phone] to schedule delivery of the Roll About.    Closed right ankle fracture, initial encounter       oxyCODONE-acetaminophen 5-325 MG per tablet    PERCOCET    40 tablet    Take 1-2 tablets by mouth every 4 hours as needed for pain (moderate to severe)    Closed right ankle fracture, initial encounter       senna-docusate 8.6-50 MG per tablet    SENOKOT-S;PERICOLACE    30 tablet    Take 1-2 tablets by mouth 2 times daily Take while on oral narcotics to prevent or treat constipation.    Closed right ankle fracture, initial encounter       topiramate 100 MG tablet    TOPAMAX    60 tablet    Take 1 tablet (100 mg) by mouth 2 times daily    Postconcussion syndrome, New daily persistent headache       VITAMIN D (CHOLECALCIFEROL) PO      Take by mouth daily

## 2017-07-18 NOTE — PROGRESS NOTES
Some of this note was populated by a medical assistant.        SUBJECTIVE:                                                    Yesika Grant is a 26 year old female who presents to clinic today for the following health issues:    PERSONAL PROBLEM      Duration: 8 hours    Description (location/character/radiation): vaginal bleeding day 2 of menstrual cycle. Placed tampon last evening however could not find this morning.     Intensity:  moderate    Accompanying signs and symptoms: cramps, nausea, dizziness    History (similar episodes/previous evaluation): None    Precipitating or alleviating factors: None    Therapies tried and outcome: Tried to feel for tampon.     New partner recently over the past week.   Was a couple weeks late on her LMP this month but has not been for the past month.   Monogamous with previous partner of 3 years but broke up on June 15th.       Problem list and histories reviewed & adjusted, as indicated.  Additional history: as documented    Patient Active Problem List   Diagnosis     CARDIOVASCULAR SCREENING; LDL GOAL LESS THAN 160     Tobacco use disorder     Anxiety     Major depression in complete remission (H)     Contraception     Vitamin D deficiency     Concussion injury of body structure     Past Surgical History:   Procedure Laterality Date     HC TOOTH EXTRACTION W/FORCEP Bilateral      OPEN REDUCTION INTERNAL FIXATION ANKLE Right 6/9/2017    Procedure: OPEN REDUCTION INTERNAL FIXATION ANKLE;  Open Reduction Internal Fixation Right Ankle;  Surgeon: Nate Beck DPM;  Location:  OR     TONSILLECTOMY, ADENOIDECTOMY ADULT, COMBINED  12/1/2011    Procedure:COMBINED TONSILLECTOMY, ADENOIDECTOMY ADULT; Adult Tonsillectomy & Adenoidectomy, Cauterization Of       TURBINOPLASTY  12/1/2011    Procedure:TURBINOPLASTY; Surgeon:GISELE SWENSON; Location: OR       Social History   Substance Use Topics     Smoking status: Current Every Day Smoker     Packs/day: 1.00     Types:  Cigarettes     Last attempt to quit: 5/4/2015     Smokeless tobacco: Never Used     Alcohol use Yes      Comment: monthly-2-3 drinks     Family History   Problem Relation Age of Onset     Alzheimer Disease Maternal Grandmother      Hypertension Maternal Grandmother      Thyroid Disease Maternal Grandmother      Arthritis Maternal Grandfather      CANCER Paternal Grandmother      Breast Cancer     Genitourinary Problems Paternal Grandmother      HEART DISEASE Paternal Grandfather      Hypertension Mother      Genitourinary Problems Sister      endometriosis     Respiratory Father 49     Pulmonary Embolism     DIABETES Paternal Uncle          Current Outpatient Prescriptions   Medication Sig Dispense Refill     FLUoxetine (PROZAC) 20 MG capsule Take 1 capsule (20 mg) by mouth daily 30 capsule 1     buPROPion (WELLBUTRIN XL) 150 MG 24 hr tablet Take 1 tablet (150 mg) by mouth every morning 30 tablet 1     oxyCODONE-acetaminophen (PERCOCET) 5-325 MG per tablet Take 1-2 tablets by mouth every 4 hours as needed for pain (moderate to severe) 40 tablet 0     order for DME One - rollabout or bent knee scooter/wheeled walker with bent knee for non weight bearing status.    Please call [phone] to schedule delivery of the Roll About. 1 Units 0     senna-docusate (SENOKOT-S;PERICOLACE) 8.6-50 MG per tablet Take 1-2 tablets by mouth 2 times daily Take while on oral narcotics to prevent or treat constipation. 30 tablet 3     ibuprofen (ADVIL/MOTRIN) 200 MG tablet Take 3 tablets (600 mg) by mouth every 6 hours as needed for pain       topiramate (TOPAMAX) 100 MG tablet Take 1 tablet (100 mg) by mouth 2 times daily 60 tablet 3     citalopram (CELEXA) 40 MG tablet Take 1 tablet (40 mg) by mouth daily Due for office visit 90 tablet 1     cyclobenzaprine (FLEXERIL) 10 MG tablet Take 1 tablet (10 mg) by mouth 3 times daily as needed for muscle spasms 90 tablet 1     ALPRAZolam (XANAX) 0.25 MG tablet Take 1 tablet (0.25 mg) by mouth  "nightly as needed for anxiety 10 tablet 1     VITAMIN D, CHOLECALCIFEROL, PO Take by mouth daily       multivitamin, therapeutic with minerals (MULTI-VITAMIN) TABS Take 1 tablet by mouth daily Reported on 5/23/2017       Ferrous Sulfate (IRON SUPPLEMENT PO) Reported on 5/23/2017       Allergies   Allergen Reactions     Atarax [Hydroxyzine] Other (See Comments)     rage     Vicodin [Hydrocodone-Acetaminophen] Nausea     \"violently angry\".  Patient states she does tolerate regular Tylenol/Acetaminophen     Zithromax [Azithromycin Dihydrate] Hives and Swelling       Reviewed and updated as needed this visit by clinical staff       Reviewed and updated as needed this visit by Provider         ROS:  Constitutional, HEENT, cardiovascular, pulmonary, gi and gu systems are negative, except as otherwise noted.    OBJECTIVE:     BP (!) 141/94 (BP Location: Right arm, Patient Position: Chair, Cuff Size: Adult Regular)  Pulse 98  Temp 98.5  F (36.9  C) (Oral)  Wt 210 lb 14.4 oz (95.7 kg)  LMP 06/01/2017 (Approximate)  SpO2 97%  BMI 33.03 kg/m2  Body mass index is 33.03 kg/(m^2).  GENERAL: healthy, alert and no distress  EYES: Eyes grossly normal to inspection, PERRL and conjunctivae and sclerae normal  HENT: ear canals and TM's normal, nose and mouth without ulcers or lesions  NECK: no adenopathy, no asymmetry, masses, or scars and thyroid normal to palpation  RESP: lungs clear to auscultation - no rales, rhonchi or wheezes  CV: regular rate and rhythm, normal S1 S2, no S3 or S4, no murmur, click or rub, no peripheral edema and peripheral pulses strong  ABDOMEN: soft, nontender, no hepatosplenomegaly, no masses and bowel sounds normal  MS: no gross musculoskeletal defects noted, no edema  : noted vaginal bleeding consistent with menstrual cycle. Speculum exam did not show evidence of tampon.   Digital exam performed and unable to feel tampon.     Diagnostic Test Results:  none     ASSESSMENT/PLAN:       ICD-10-CM    1. " Retained tampon, initial encounter T19.2XXA         PLAN  Patient educational/instructional material provided including reasons for follow-up   The patient indicates understanding of these issues and agrees with the plan.  Shan Naranjo MD      [facility]

## 2017-07-18 NOTE — TELEPHONE ENCOUNTER
Attempted to contact pt.  No answer.  Message left on Voicemail to return call to [facility] [place] at [phone].    Need to complete Previsit.    Darshana Faith LPN      PREVISIT INFORMATION                                                    Yesika Grant scheduled for future visit at [facility].    Patient is scheduled to see Dr. Ames on 7/25/17  Reason for visit: Memory loss,Tremor,Postconcussion syndrome  Referring provider Clarissa Melendrez APRN CNP  Has patient seen previous specialist? ?  Medical Records:  Available in chart.  Patient was previously seen at a [place] or [facility] facility.    REVIEW                                                      New patient packet mailed to patient: ?  Medication reconciliation complete: ?      Current Outpatient Prescriptions   Medication Sig Dispense Refill     FLUoxetine (PROZAC) 20 MG capsule Take 1 capsule (20 mg) by mouth daily 30 capsule 1     buPROPion (WELLBUTRIN XL) 150 MG 24 hr tablet Take 1 tablet (150 mg) by mouth every morning 30 tablet 1     oxyCODONE-acetaminophen (PERCOCET) 5-325 MG per tablet Take 1-2 tablets by mouth every 4 hours as needed for pain (moderate to severe) 40 tablet 0     order for DME One - rollabout or bent knee scooter/wheeled walker with bent knee for non weight bearing status.    Please call [phone] to schedule delivery of the Roll About. 1 Units 0     senna-docusate (SENOKOT-S;PERICOLACE) 8.6-50 MG per tablet Take 1-2 tablets by mouth 2 times daily Take while on oral narcotics to prevent or treat constipation. 30 tablet 3     ibuprofen (ADVIL/MOTRIN) 200 MG tablet Take 3 tablets (600 mg) by mouth every 6 hours as needed for pain       topiramate (TOPAMAX) 100 MG tablet Take 1 tablet (100 mg) by mouth 2 times daily 60 tablet 3     citalopram (CELEXA) 40 MG tablet Take 1 tablet (40 mg) by mouth daily Due for office visit 90 tablet 1     cyclobenzaprine  (FLEXERIL) 10 MG tablet Take 1 tablet (10 mg) by mouth 3 times daily as needed for muscle spasms 90 tablet 1     ALPRAZolam (XANAX) 0.25 MG tablet Take 1 tablet (0.25 mg) by mouth nightly as needed for anxiety 10 tablet 1     VITAMIN D, CHOLECALCIFEROL, PO Take by mouth daily       multivitamin, therapeutic with minerals (MULTI-VITAMIN) TABS Take 1 tablet by mouth daily Reported on 5/23/2017       Ferrous Sulfate (IRON SUPPLEMENT PO) Reported on 5/23/2017         Allergies: Atarax [hydroxyzine]; Vicodin [hydrocodone-acetaminophen]; and Zithromax [azithromycin dihydrate]      PLAN/FOLLOW-UP NEEDED                                                        Patient Reminders Given:  Please, make sure you bring an updated list of your medications.   If you are having a procedure, please, present 15 minutes early.  If you need to cancel or reschedule,please call [phone].    Darshana Faith LPN

## 2017-07-20 ENCOUNTER — TELEPHONE (OUTPATIENT)
Dept: PODIATRY | Facility: CLINIC | Age: 27
End: 2017-07-20

## 2017-07-20 ENCOUNTER — HOSPITAL ENCOUNTER (OUTPATIENT)
Dept: GENERAL RADIOLOGY | Facility: CLINIC | Age: 27
Discharge: HOME | End: 2017-07-20
Attending: PODIATRIST | Admitting: PODIATRIST
Payer: COMMERCIAL

## 2017-07-20 DIAGNOSIS — S82.891A ANKLE FRACTURE, RIGHT, CLOSED, INITIAL ENCOUNTER: Primary | ICD-10-CM

## 2017-07-20 DIAGNOSIS — S82.891A ANKLE FRACTURE, RIGHT, CLOSED, INITIAL ENCOUNTER: ICD-10-CM

## 2017-07-20 PROCEDURE — 73610 X-RAY EXAM OF ANKLE: CPT | Mod: TC

## 2017-07-20 NOTE — TELEPHONE ENCOUNTER
Called patient and she is schedule at [facility] for XRAY at 5:30pm today, 7/20/2017. Will request for radiologist to review findings before she leaves. She was appreciative of this plan. Teressa Buitrago CMA, July 20, 2017

## 2017-07-20 NOTE — TELEPHONE ENCOUNTER
I spoke with patient and reviewed xray and she can return to boot and follow up in clinic Monday.

## 2017-07-20 NOTE — TELEPHONE ENCOUNTER
Patient calls today at approximately 4:00pm, 7/20/2017,  with concern that she may have re-fractured her Right ankle.  (5 weeks, 6 days); DOS 6/9/2017 - Open Reduction Internal Fixation Right Ankle  She was walking w/o her fracture boot and suddenly had a sharp pain and could hardly walk. She put her fracture boot back on and is still having pain and swelling. Wondering if she should come in to have it looked at. The earliest she could arrive in [place] was 5:00pm, which would be too late to be seen in clinic. Per Dr Beck, recommend that she keep her fracture boot on, elevate, compression and rest for next few days, if continue to have pain should present to ED Dept or be seen by Dr. Beck's Ortho colleagues tomorrow, since he is not in clinic on Friday's. She was in agreement with this plan. Teressa Buitrago CMA, July 20, 2017

## 2017-07-24 ENCOUNTER — HOSPITAL ENCOUNTER (OUTPATIENT)
Dept: PHYSICAL THERAPY | Facility: OTHER | Age: 27
Setting detail: THERAPIES SERIES
End: 2017-07-24
Attending: PODIATRIST
Payer: COMMERCIAL

## 2017-07-24 PROCEDURE — 97110 THERAPEUTIC EXERCISES: CPT | Mod: GP | Performed by: PHYSICAL THERAPIST

## 2017-07-24 PROCEDURE — 97112 NEUROMUSCULAR REEDUCATION: CPT | Mod: GP | Performed by: PHYSICAL THERAPIST

## 2017-07-24 PROCEDURE — 40000718 ZZHC STATISTIC PT DEPARTMENT ORTHO VISIT: Performed by: PHYSICAL THERAPIST

## 2017-07-25 ENCOUNTER — OFFICE VISIT (OUTPATIENT)
Dept: NEUROLOGY | Facility: CLINIC | Age: 27
End: 2017-07-25
Attending: NURSE PRACTITIONER
Payer: COMMERCIAL

## 2017-07-25 VITALS
DIASTOLIC BLOOD PRESSURE: 82 MMHG | SYSTOLIC BLOOD PRESSURE: 120 MMHG | HEART RATE: 72 BPM | TEMPERATURE: 97 F | BODY MASS INDEX: 32.26 KG/M2 | WEIGHT: 206 LBS

## 2017-07-25 DIAGNOSIS — G44.221 CHRONIC TENSION-TYPE HEADACHE, INTRACTABLE: Primary | ICD-10-CM

## 2017-07-25 DIAGNOSIS — R68.89 SPELLS OF DECREASED ATTENTIVENESS: ICD-10-CM

## 2017-07-25 DIAGNOSIS — R41.3 MEMORY LOSS: ICD-10-CM

## 2017-07-25 LAB — VIT B12 SERPL-MCNC: 452 PG/ML (ref 193–986)

## 2017-07-25 PROCEDURE — 99000 SPECIMEN HANDLING OFFICE-LAB: CPT | Performed by: PSYCHIATRY & NEUROLOGY

## 2017-07-25 PROCEDURE — 36415 COLL VENOUS BLD VENIPUNCTURE: CPT | Performed by: PSYCHIATRY & NEUROLOGY

## 2017-07-25 PROCEDURE — 82607 VITAMIN B-12: CPT | Performed by: PSYCHIATRY & NEUROLOGY

## 2017-07-25 PROCEDURE — 83921 ORGANIC ACID SINGLE QUANT: CPT | Mod: 90 | Performed by: PSYCHIATRY & NEUROLOGY

## 2017-07-25 PROCEDURE — 99203 OFFICE O/P NEW LOW 30 MIN: CPT | Performed by: PSYCHIATRY & NEUROLOGY

## 2017-07-25 NOTE — LETTER
7/25/2017       RE: Yesika Grant  [address]  [facility] [address]     Dear Colleague,    Thank you for referring your patient, Yesika Grant, to the [facility]. Please see a copy of my visit note below.    This patient is a 26-year-old right-handed woman seen for multiple issues including memory problems, headache, closed head injury, difficulty with concentration, and episodes of altered responsiveness. She reports that she has had several closed head injuries. In July 2013 she hit her head on the top of a tractor door. She did not suffer loss of consciousness but did see stars. 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 She did not suffer loss of consciousness. In December 2016 she was dancing and she fainted. Later on that night she was playing a game on chairs and fell out of a chair and hit her head on the wall. She reports that her headache is frontal. The harder she works the more she feels the headache. She is not working in an office at a computer. She does miss work sometimes with the headache. She also complains of visual blurring. She has a hard time keeping conversations going and has a difficult time concentrating. She does have some vague photophobia and phonophobia but no nausea. She has been on topiramate 200 mg but now the doses 100 mg. She does have ringing in the ears. At the end of the year last year she was started on Wellbutrin, but the headaches had begun before the Wellbutrin was started. Wellbutrin does have a side effect of headache. She is on Wellbutrin for depression and anxiety. The dose of Wellbutrin has been cut from 300-150 in the Celexa has been cut from 40-20. Prozac has been started. The long-term plan is to go with Prozac. She does take Flexeril at night for muscle spasm. It helps her get to sleep. She  uses an occasional oxycodone at night. In May 2017 she fainted in the shower and the circumstances around this are rather vague. She does not have symptoms of numbness tingling or weakness in her arms or legs. She does have some chronic urinary urgency and occasional incontinence. She had an episode where she was sitting in the car and her arms began to shake. She did not lose consciousness. It is not really clear what that episode was about. She's been on her job for a year. She has gone through extensive physical therapy for her neck. She does note that too much thinking provokes headache.    Her past medical history is otherwise largely noncontributory. She does not have high blood pressure diabetes thyroid or asthma. Her medicines are listed in the EMR she has not had pertinent surgery to the head or neck. She is not pregnant she rarely drinks alcohol per she does smoke a half pack per day. She is allergic to Vicodin and azithromycin.    Social history is that she is unmarried without children.  at least    Family history is noncontributory.    On examination  the patient is cooperative and in no distress. Her blood pressure is 120/82. There are no carotid bruits. Auscultation of the heart shows S1-S2 without murmur rub or gallop. Chest is clear to auscultation. Neurologic exam patient is alert oriented and lucid pitches score 30 out of 30 on Mini-Mental status. Cranial nerve testing shows full visual fields to confrontation. Funduscopic exam shows sharp disks bilaterally. Visual acuity is 20 over 20 bilaterally. Eye movements are complete and conjugate without nystagmus. Pupils react to light. Faces are symmetric and tongue protrudes in the midline. There is tenderness in the bioccipital region and also in the posterior cervical region bilaterally. Motor evaluation shows no pronator drift normal finger tapping finger-nose-finger and heel-knee-shin. She has good strength in the arms hands and legs. Muscle  stretch reflexes are normal and symmetric. Toes are downgoing. Sensory exam shows preserved vibration and temperature. Romberg sign is absent. She is clumsy walking on her heels and toes. She cannot perform a tandem gait.    She did have a normal TSH checked in January.    Assessment: 1) numerous neurologic concerns including headache, impaired concentration, episode of shaking, episode of syncope in May    2) numerous minor closed head injuries    Discussion: the patient is seen with the above problem list. Her exam does show some difficulty with balance and also tenderness in the bioccipital and posterior cervical region. Otherwise she is neurologically intact. Some of the symptoms could related to postconcussion syndrome. That could've contributed to the headache and Wellbutrin also could've contributed to the headache. She is on topiramate and that helps but she also feels impaired concentration. In addition she had an episode of uncontrolled shaking in May and also an episode of syncope in May. In addition she had an episode of syncope in December.    At this point I recommended to the patient MRI imaging of the brain to make sure there is no structural lesion. She did have a CT scan of the brain performed in May that is unremarkable. I'm also going to obtain a 3 hour video EEG. It is my recommendation that she get off the topiramate. It could be contributing to her complaints of mental fogginess and difficulty concentrating. In addition I would have her get off Wellbutrin, as that could be contributing to the headache. If the headaches escalated trial of gabapentin could be undertaken. I discussed a referral to physical therapy for renewed exercise program which addresses soon do it on her own for now.    In addition I would recommend consideration be given to a referral to cardiology for 2 episodes of syncope that are unexplained. I'll be seeing her in follow-up in the next 3-4 weeks.      7/28 addendum:  reviewed normal B12 450 with pt.  Awaiting other tests.     Again, thank you for allowing me to participate in the care of your patient.      Sincerely,    Clifton Ames MD

## 2017-07-25 NOTE — PROGRESS NOTES
This patient is a 26-year-old right-handed woman seen for multiple issues including memory problems, headache, closed head injury, difficulty with concentration, and episodes of altered responsiveness. She reports that she has had several closed head injuries. In July 2013 she hit her head on the top of a tractor door. She did not suffer loss of consciousness but did see stars. 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 She did not suffer loss of consciousness. In December 2016 she was dancing and she fainted. Later on that night she was playing a game on chairs and fell out of a chair and hit her head on the wall. She reports that her headache is frontal. The harder she works the more she feels the headache. She is not working in an office at a computer. She does miss work sometimes with the headache. She also complains of visual blurring. She has a hard time keeping conversations going and has a difficult time concentrating. She does have some vague photophobia and phonophobia but no nausea. She has been on topiramate 200 mg but now the doses 100 mg. She does have ringing in the ears. At the end of the year last year she was started on Wellbutrin, but the headaches had begun before the Wellbutrin was started. Wellbutrin does have a side effect of headache. She is on Wellbutrin for depression and anxiety. The dose of Wellbutrin has been cut from 300-150 in the Celexa has been cut from 40-20. Prozac has been started. The long-term plan is to go with Prozac. She does take Flexeril at night for muscle spasm. It helps her get to sleep. She uses an occasional oxycodone at night. In May 2017 she fainted in the shower and the circumstances around this are rather vague. She does not have symptoms of numbness tingling or weakness in her arms or legs. She does have some chronic urinary urgency and occasional incontinence. She had an  episode where she was sitting in the car and her arms began to shake. She did not lose consciousness. It is not really clear what that episode was about. She's been on her job for a year. She has gone through extensive physical therapy for her neck. She does note that too much thinking provokes headache.    Her past medical history is otherwise largely noncontributory. She does not have high blood pressure diabetes thyroid or asthma. Her medicines are listed in the EMR she has not had pertinent surgery to the head or neck. She is not pregnant she rarely drinks alcohol per she does smoke a half pack per day. She is allergic to Vicodin and azithromycin.    Social history is that she is unmarried without children.  at least    Family history is noncontributory.    On examination  the patient is cooperative and in no distress. Her blood pressure is 120/82. There are no carotid bruits. Auscultation of the heart shows S1-S2 without murmur rub or gallop. Chest is clear to auscultation. Neurologic exam patient is alert oriented and lucid pitches score 30 out of 30 on Mini-Mental status. Cranial nerve testing shows full visual fields to confrontation. Funduscopic exam shows sharp disks bilaterally. Visual acuity is 20 over 20 bilaterally. Eye movements are complete and conjugate without nystagmus. Pupils react to light. Faces are symmetric and tongue protrudes in the midline. There is tenderness in the bioccipital region and also in the posterior cervical region bilaterally. Motor evaluation shows no pronator drift normal finger tapping finger-nose-finger and heel-knee-shin. She has good strength in the arms hands and legs. Muscle stretch reflexes are normal and symmetric. Toes are downgoing. Sensory exam shows preserved vibration and temperature. Romberg sign is absent. She is clumsy walking on her heels and toes. She cannot perform a tandem gait.    She did have a normal TSH checked in January.    Assessment: 1) numerous  neurologic concerns including headache, impaired concentration, episode of shaking, episode of syncope in May    2) numerous minor closed head injuries    Discussion: the patient is seen with the above problem list. Her exam does show some difficulty with balance and also tenderness in the bioccipital and posterior cervical region. Otherwise she is neurologically intact. Some of the symptoms could related to postconcussion syndrome. That could've contributed to the headache and Wellbutrin also could've contributed to the headache. She is on topiramate and that helps but she also feels impaired concentration. In addition she had an episode of uncontrolled shaking in May and also an episode of syncope in May. In addition she had an episode of syncope in December.    At this point I recommended to the patient MRI imaging of the brain to make sure there is no structural lesion. She did have a CT scan of the brain performed in May that is unremarkable. I'm also going to obtain a 3 hour video EEG. It is my recommendation that she get off the topiramate. It could be contributing to her complaints of mental fogginess and difficulty concentrating. In addition I would have her get off Wellbutrin, as that could be contributing to the headache. If the headaches escalated trial of gabapentin could be undertaken. I discussed a referral to physical therapy for renewed exercise program which addresses soon do it on her own for now.    In addition I would recommend consideration be given to a referral to cardiology for 2 episodes of syncope that are unexplained. I'll be seeing her in follow-up in the next 3-4 weeks.      7/28 addendum: reviewed normal B12 450 with pt.  Awaiting other tests.       8/3 addendum: reviewed with pt normal MRI brain. Awaiting EEG.

## 2017-07-25 NOTE — MR AVS SNAPSHOT
After Visit Summary   7/25/2017    Yesika Grant    MRN: [id number]           Patient Information     Date Of Birth          1990        Visit Information        Provider Department      7/25/2017 1:00 PM Clifton Ames MD [facility]        Today's Diagnoses     Chronic tension-type headache, intractable    -  1    Spells of decreased attentiveness        Memory loss          Care Instructions    No caffeine after 7:00 the night before EEG. Please wash and dry hair the morning of EEG and do not use any product such as hairspray or gel.           Follow-ups after your visit        Follow-up notes from your care team     Return in about 3 weeks (around 8/15/2017).      Your next 10 appointments already scheduled     Jul 31, 2017  8:30 AM CDT   EEG with [facility] EEG 2   [facility])    [address] [place] [facility] [address]  [facility] [address] [phone]            Jul 31, 2017  2:30 PM CDT   [facility]   [facility]    [address] [place] [address] [phone]            Aug 03, 2017  7:45 AM CDT   Return Visit with JUSTINO PollardM   [facility]    [address] [facility] [address] [phone]            Aug 03, 2017  2:00 PM CDT   MR BRAIN W/O & W CONTRAST with MGMR1   [facility] ([facility])    [address] [place] [address] [phone]           Take your medicines as usual, unless your doctor tells you not to. Bring a list of your current medicines to your exam (including vitamins, minerals and over-the-counter drugs).  You will be given intravenous contrast for this exam. To prepare:   The day before your exam, drink extra fluids at least six 8-ounce glasses (unless your doctor tells you to restrict your fluids).   Have a  blood test (creatinine test) within 30 days of your exam. Go to your clinic or Diagnostic Imaging Department for this test.  The MRI machine uses a strong magnet. Please wear clothes without metal (snaps, zippers). A sweatsuit works well, or we may give you a hospital gown.  Please remove any body piercings and hair extensions before you arrive. You will also remove watches, jewelry, hairpins, wallets, dentures, partial dental plates and hearing aids. You may wear contact lenses, and you may be able to wear your rings. We have a safe place to keep your personal items, but it is safer to leave them at home.   **IMPORTANT** THE INSTRUCTIONS BELOW ARE ONLY FOR THOSE PATIENTS WHO HAVE BEEN TOLD THEY WILL RECEIVE SEDATION OR GENERAL ANESTHESIA DURING THEIR MRI PROCEDURE:  IF YOU WILL RECEIVE SEDATION (take medicine to help you relax during your exam):   You must get the medicine from your doctor before you arrive. Bring the medicine to the exam. Do not take it at home.   Arrive one hour early. Bring someone who can take you home after the test. Your medicine will make you sleepy. After the exam, you may not drive, take a bus or take a taxi by yourself.   No eating 8 hours before your exam. You may have clear liquids up until 4 hours before your exam. (Clear liquids include water, clear tea, black coffee and fruit juice without pulp.)  IF YOU WILL RECEIVE ANESTHESIA (be asleep for your exam):   Arrive 1 1/2 hours early. Bring someone who can take you home after the test. You may not drive, take a bus or take a taxi by yourself.   No eating 8 hours before your exam. You may have clear liquids up until 4 hours before your exam. (Clear liquids include water, clear tea, black coffee and fruit juice without pulp.)  Please call the Imaging Department at your exam site with any questions.            Aug 04, 2017  2:00 PM CDT   Office Visit with Young Fletcher MD   [facility]     [address] [facility] [address] [phone]           Bring a current list of meds and any records pertaining to this visit.  For Physicals, please bring immunization records and any forms needing to be filled out.  Please arrive 10 minutes early to complete paperwork.            Aug 07, 2017  2:30 PM CDT   [facility] Treatment [facility] Dockery, PT   [facility]    [address] [address] [phone]            Aug 31, 2017  7:45 AM CDT   Return Visit with Nate Beck DPM   [facility] ([facility])    [address] [facility] [address] [phone]            Sep 05, 2017 12:30 PM CDT   Return Visit with Clifton Ames MD   [facility]    [address] [place] [id number] [phone]              Future tests that were ordered for you today     Open Future Orders        Priority Expected Expires Ordered    ORDER:  EEG video monitoring Routine  10/23/2017 7/25/2017    MR Brain w/o & w Contrast Routine  7/25/2018 7/25/2017            Who to contact     If you have questions or need follow up information about today's clinic visit or your schedule please contact [facility] directly at [phone].  Normal or non-critical lab and imaging results will be communicated to you by MyChart, letter or phone within 4 business days after the clinic has received the results. If you do not hear from us within 7 days, please contact the clinic through MessageGate[employer] or phone. If you have a critical or abnormal lab result, we will notify you by phone as soon as possible.  Submit refill requests through [email or web address] or call your pharmacy and they will forward the refill request to us. Please allow 3 business days for your refill to be completed.          Additional Information About Your Visit        MyChart Information      [employer] is an electronic gateway that provides easy, online access to your medical records. With [employer] [facility], you can request a clinic appointment, read your test results, renew a prescription or communicate with your care team.     To sign up for [employer] visit the website at [email or web address]   You will be asked to enter the access code listed below, as well as some personal information. Please follow the directions to create your username and password.     Your access code is: [email or web address]  Expires: 2017  4:10 PM     Your access code will  in 90 days. If you need help or a new code, please contact your [facility] or call [phone] for assistance.        Care EveryWhere ID     This is your Care EveryWhere ID. This could be used by other organizations to access your [facility] medical records  [id number]        Your Vitals Were     Temperature Last Period BMI (Body Mass Index)             72  F (22.2  C) 2017 (Exact Date) 32.26 kg/m2          Blood Pressure from Last 3 Encounters:   17 120/82   17 (!) 141/94   17 114/80    Weight from Last 3 Encounters:   17 93.4 kg (206 lb)   17 95.7 kg (210 lb 14.4 oz)   17 99.8 kg (220 lb)              We Performed the Following     Methylmalonic acid     Vitamin B12        Primary Care Provider Office Phone # Fax #    Young Fletcher [phone]       [facility] [address] [place] [address]        Equal Access to Services     [facility] : Hadii aad khanh hadasho Soomaali, waaxda luqadaha, qaybta kaalmada keyonna, monica beckham. So [facility] [phone].    ATENCIÓN: Si habla español, tiene a palacios disposición servicios gratuitos de asistencia lingüística. Llame al [phone].    We comply with applicable federal civil rights laws and [place] laws. We do not discriminate on the basis of race, color, national origin, age,  disability sex, sexual orientation or gender identity.            Thank you!     Thank you for choosing [facility]  for your care. Our goal is always to provide you with excellent care. Hearing back from our patients is one way we can continue to improve our services. Please take a few minutes to complete the written survey that you may receive in the mail after your visit with us. Thank you!             Your Updated Medication List - Protect others around you: Learn how to safely use, store and throw away your medicines at www.disposemymeds.org.          This list is accurate as of: 7/25/17  2:10 PM.  Always use your most recent med list.                   Brand Name Dispense Instructions for use Diagnosis    ALPRAZolam 0.25 MG tablet    XANAX    10 tablet    Take 1 tablet (0.25 mg) by mouth nightly as needed for anxiety    Panic attacks       buPROPion 150 MG 24 hr tablet    WELLBUTRIN XL    30 tablet    Take 1 tablet (150 mg) by mouth every morning    Tobacco use disorder       citalopram 40 MG tablet    celeXA    90 tablet    Take 1 tablet (40 mg) by mouth daily Due for office visit    ZAN (generalized anxiety disorder)       cyclobenzaprine 10 MG tablet    FLEXERIL    90 tablet    Take 1 tablet (10 mg) by mouth 3 times daily as needed for muscle spasms    New daily persistent headache, Strain of neck muscle, subsequent encounter, Chronic midline low back pain without sciatica       FLUoxetine 20 MG capsule    PROzac    30 capsule    Take 1 capsule (20 mg) by mouth daily    Moderate episode of recurrent major depressive disorder (H), Anxiety       ibuprofen 200 MG tablet    ADVIL/MOTRIN     Take 3 tablets (600 mg) by mouth every 6 hours as needed for pain        IRON SUPPLEMENT PO      Reported on 5/23/2017        Multi-vitamin Tabs tablet      Take 1 tablet by mouth daily Reported on 5/23/2017        order for DME     1 Units    One - rollabout or bent knee scooter/wheeled walker with bent  knee for non weight bearing status.  Please call [phone] to schedule delivery of the Roll About.    Closed right ankle fracture, initial encounter       oxyCODONE-acetaminophen 5-325 MG per tablet    PERCOCET    40 tablet    Take 1-2 tablets by mouth every 4 hours as needed for pain (moderate to severe)    Closed right ankle fracture, initial encounter       senna-docusate 8.6-50 MG per tablet    SENOKOT-S;PERICOLACE    30 tablet    Take 1-2 tablets by mouth 2 times daily Take while on oral narcotics to prevent or treat constipation.    Closed right ankle fracture, initial encounter       topiramate 100 MG tablet    TOPAMAX    60 tablet    Take 1 tablet (100 mg) by mouth 2 times daily    Postconcussion syndrome, New daily persistent headache       VITAMIN D (CHOLECALCIFEROL) PO      Take by mouth daily

## 2017-07-25 NOTE — NURSING NOTE
"Yesika Grant's goals for this visit include:   Chief Complaint   Patient presents with     Consult     Memory loss, tremor transient cerebral ischemia, unspecified type concussion with loss of consciousness unspecified duration        She requests these members of her care team be copied on today's visit information: yes     PCP: Young Fletcher    Referring Provider:  LASHAE [facility]  [address]  [place] [address]    Chief Complaint   Patient presents with     Consult     Memory loss, tremor transient cerebral ischemia, unspecified type concussion with loss of consciousness unspecified duration        Initial /82  Temp (!) 72  F (22.2  C)  Wt 93.4 kg (206 lb)  LMP 07/24/2017 (Exact Date)  BMI 32.26 kg/m2 Estimated body mass index is 32.26 kg/(m^2) as calculated from the following:    Height as of 7/6/17: 1.702 m (5' 7\").    Weight as of this encounter: 93.4 kg (206 lb).  Medication Reconciliation: complete    Do you need any medication refills at today's visit?     "

## 2017-07-25 NOTE — PATIENT INSTRUCTIONS
No caffeine after 7:00 the night before EEG. Please wash and dry hair the morning of EEG and do not use any product such as hairspray or gel.

## 2017-07-28 LAB — METHYLMALONATE SERPL-SCNC: NORMAL

## 2017-07-31 ENCOUNTER — ALLIED HEALTH/NURSE VISIT (OUTPATIENT)
Dept: NEUROLOGY | Facility: CLINIC | Age: 27
End: 2017-07-31

## 2017-07-31 DIAGNOSIS — R68.89 SPELLS OF DECREASED ATTENTIVENESS: ICD-10-CM

## 2017-07-31 NOTE — PROGRESS NOTES
CPT: 56090 mod 52  [facility]3 hour Video EEG  Reading MD: Abdiel

## 2017-07-31 NOTE — MR AVS SNAPSHOT
After Visit Summary   7/31/2017    Yesika Grant    MRN: [id number]           Patient Information     Date Of Birth          1990        Visit Information        Provider Department      7/31/2017 8:30 AM ME [facility] EEG 2 [facility]        Today's Diagnoses     Spells of decreased attentiveness           Follow-ups after your visit        Your next 10 appointments already scheduled     Jul 31, 2017  2:30 PM CDT   Ortho Treatment with Young Dockery, PT   [facility]    [address] [facility] [id number] [phone]            Aug 03, 2017  7:45 AM CDT   Return Visit with Nate Beck DPM   [facility]    [address] [facility] [address] [phone]            Aug 03, 2017  2:00 PM CDT   MR BRAIN W/O & W CONTRAST with MGMR1   [facility] ([facility])    [address] [place] [address] [phone]           Take your medicines as usual, unless your doctor tells you not to. Bring a list of your current medicines to your exam (including vitamins, minerals and over-the-counter drugs).  You will be given intravenous contrast for this exam. To prepare:   The day before your exam, drink extra fluids at least six 8-ounce glasses (unless your doctor tells you to restrict your fluids).   Have a blood test (creatinine test) within 30 days of your exam. Go to your clinic or Diagnostic Imaging Department for this test.  The MRI machine uses a strong magnet. Please wear clothes without metal (snaps, zippers). A sweatsuit works well, or we may give you a hospital gown.  Please remove any body piercings and hair extensions before you arrive. You will also remove watches, jewelry, hairpins, wallets, dentures, partial dental plates and hearing aids. You may wear contact lenses, and you may be able to wear your rings. We have a safe  place to keep your personal items, but it is safer to leave them at home.   **IMPORTANT** THE INSTRUCTIONS BELOW ARE ONLY FOR THOSE PATIENTS WHO HAVE BEEN TOLD THEY WILL RECEIVE SEDATION OR GENERAL ANESTHESIA DURING THEIR MRI PROCEDURE:  IF YOU WILL RECEIVE SEDATION (take medicine to help you relax during your exam):   You must get the medicine from your doctor before you arrive. Bring the medicine to the exam. Do not take it at home.   Arrive one hour early. Bring someone who can take you home after the test. Your medicine will make you sleepy. After the exam, you may not drive, take a bus or take a taxi by yourself.   No eating 8 hours before your exam. You may have clear liquids up until 4 hours before your exam. (Clear liquids include water, clear tea, black coffee and fruit juice without pulp.)  IF YOU WILL RECEIVE ANESTHESIA (be asleep for your exam):   Arrive 1 1/2 hours early. Bring someone who can take you home after the test. You may not drive, take a bus or take a taxi by yourself.   No eating 8 hours before your exam. You may have clear liquids up until 4 hours before your exam. (Clear liquids include water, clear tea, black coffee and fruit juice without pulp.)  Please call the Imaging Department at your exam site with any questions.            Aug 04, 2017  2:00 PM CDT   Office Visit with Young Fletcher MD   [facility]    [address] [facility] [place] [address]   [phone]           Bring a current list of meds and any records pertaining to this visit. For Physicals, please bring immunization records and any forms needing to be filled out. Please arrive 10 minutes early to complete paperwork.            Aug 07, 2017  2:30 PM CDT   Ortho Treatment with Young Dockery PT   [facility])    [address] [facility] [place] [address] [phone]            Aug 31, 2017  7:45 AM CDT   Return Visit  with Nate Beck DPM   [facility] ([facility])    [address] [facility] [address] [phone]            Sep 05, 2017 12:30 PM CDT   Return Visit with Clifton Ames MD   [facility] ([facility])    [address] [place] [address] [phone]              Who to contact     Please call your clinic at [phone] to:    Ask questions about your health    Make or cancel appointments    Discuss your medicines    Learn about your test results    Speak to your doctor   If you have compliments or concerns about an experience at your clinic, or if you wish to file a complaint, please contact [facility] Patient Relations at [phone] or email us at [email or web address]         Additional Information About Your Visit        [employer] Information     [employer] is an electronic gateway that provides easy, online access to your medical records. With [employer], you can request a clinic appointment, read your test results, renew a prescription or communicate with your care team.     To sign up for [employer] visit the website at [email or web address]   You will be asked to enter the access code listed below, as well as some personal information. Please follow the directions to create your username and password.     Your access code is: [email or web address]  Expires: 10/29/2017 12:37 PM     Your access code will  in 90 days. If you need help or a new code, please contact your [facility] or call [phone] for assistance.        Care EveryWhere ID     This is your Care EveryWhere ID. This could be used by other organizations to access your [place] medical records  [id number]        Your Vitals Were     Last Period                   2017 (Exact Date)            Blood Pressure from Last 3 Encounters:   17 120/82   17 (!) 141/94    06/29/17 114/80    Weight from Last 3 Encounters:   07/25/17 206 lb (93.4 kg)   07/18/17 210 lb 14.4 oz (95.7 kg)   07/06/17 220 lb (99.8 kg)              We Performed the Following     CHARGE: Video EEG  < 12 hours (28359-32)     ORDER:  EEG video monitoring        Primary Care Provider Office Phone # Fax #    Young Fletcher [phone]       [facility] [address] [place] [address]        Equal Access to Services     [facility]: Hadii aad ku hadasho Soomaali, waaxda luqadaha, qaybta kaalmada adeegyada, waxay nadiain hayaan alyssia deleon . So [facility] [phone].    ATENCIÓN: Si habla español, tiene a palacios disposición servicios gratuitos de asistencia lingüística. [facility] [phone].    We comply with applicable federal civil rights laws and [place] laws. We do not discriminate on the basis of race, color, national origin, age, disability sex, sexual orientation or gender identity.            Thank you!     Thank you for choosing [facility]  for your care. Our goal is always to provide you with excellent care. Hearing back from our patients is one way we can continue to improve our services. Please take a few minutes to complete the written survey that you may receive in the mail after your visit with us. Thank you!             Your Updated Medication List - Protect others around you: Learn how to safely use, store and throw away your medicines at www.disposemymeds.org.          This list is accurate as of: 7/31/17 12:37 PM.  Always use your most recent med list.                   Brand Name Dispense Instructions for use Diagnosis    ALPRAZolam 0.25 MG tablet    XANAX    10 tablet    Take 1 tablet (0.25 mg) by mouth nightly as needed for anxiety    Panic attacks       buPROPion 150 MG 24 hr tablet    WELLBUTRIN XL    30 tablet    Take 1 tablet (150 mg) by mouth every morning    Tobacco use disorder       citalopram 40 MG tablet    celeXA    90 tablet     Take 1 tablet (40 mg) by mouth daily Due for office visit    ZAN (generalized anxiety disorder)       cyclobenzaprine 10 MG tablet    FLEXERIL    90 tablet    Take 1 tablet (10 mg) by mouth 3 times daily as needed for muscle spasms    New daily persistent headache, Strain of neck muscle, subsequent encounter, Chronic midline low back pain without sciatica       FLUoxetine 20 MG capsule    PROzac    30 capsule    Take 1 capsule (20 mg) by mouth daily    Moderate episode of recurrent major depressive disorder (H), Anxiety       ibuprofen 200 MG tablet    ADVIL/MOTRIN     Take 3 tablets (600 mg) by mouth every 6 hours as needed for pain        IRON SUPPLEMENT PO      Reported on 5/23/2017        Multi-vitamin Tabs tablet      Take 1 tablet by mouth daily Reported on 5/23/2017        order for DME     1 Units    One - rollabout or bent knee scooter/wheeled walker with bent knee for non weight bearing status.  Please call [phone] to schedule delivery of the Roll About.    Closed right ankle fracture, initial encounter       oxyCODONE-acetaminophen 5-325 MG per tablet    PERCOCET    40 tablet    Take 1-2 tablets by mouth every 4 hours as needed for pain (moderate to severe)    Closed right ankle fracture, initial encounter       senna-docusate 8.6-50 MG per tablet    SENOKOT-S;PERICOLACE    30 tablet    Take 1-2 tablets by mouth 2 times daily Take while on oral narcotics to prevent or treat constipation.    Closed right ankle fracture, initial encounter       topiramate 100 MG tablet    TOPAMAX    60 tablet    Take 1 tablet (100 mg) by mouth 2 times daily    Postconcussion syndrome, New daily persistent headache       VITAMIN D (CHOLECALCIFEROL) PO      Take by mouth daily

## 2017-07-31 NOTE — Clinical Note
As house MD at [facility] this morning, please read this OP 3 hr vEEG. [facility] is [facility] on [facility]  for you to review. Dr Ames ordered the EEG and the patient will be seeing him for a follow-up in September. Thanks!

## 2017-08-03 ENCOUNTER — OFFICE VISIT (OUTPATIENT)
Dept: PODIATRY | Facility: CLINIC | Age: 27
End: 2017-08-03
Payer: COMMERCIAL

## 2017-08-03 ENCOUNTER — RADIANT APPOINTMENT (OUTPATIENT)
Dept: MRI IMAGING | Facility: CLINIC | Age: 27
End: 2017-08-03
Attending: PSYCHIATRY & NEUROLOGY
Payer: COMMERCIAL

## 2017-08-03 VITALS — BODY MASS INDEX: 32.33 KG/M2 | TEMPERATURE: 96.4 F | WEIGHT: 206 LBS | HEIGHT: 67 IN

## 2017-08-03 DIAGNOSIS — S82.891A ANKLE FRACTURE, RIGHT, CLOSED, INITIAL ENCOUNTER: Primary | ICD-10-CM

## 2017-08-03 DIAGNOSIS — G44.221 CHRONIC TENSION-TYPE HEADACHE, INTRACTABLE: ICD-10-CM

## 2017-08-03 PROCEDURE — 99024 POSTOP FOLLOW-UP VISIT: CPT | Performed by: PODIATRIST

## 2017-08-03 PROCEDURE — 70551 MRI BRAIN STEM W/O DYE: CPT | Performed by: RADIOLOGY

## 2017-08-03 ASSESSMENT — PAIN SCALES - GENERAL: PAINLEVEL: MODERATE PAIN (5)

## 2017-08-03 NOTE — NURSING NOTE
"Chief Complaint   Patient presents with     Surgical Followup     (7 week, 6 days) WB w/casual boots, walking w/limp, pain 5, no fever; DOS 6/9/2017 - Open Reduction Internal Fixation Right Ankle; LOV 7/6/2017       Initial Temp 96.4  F (35.8  C) (Temporal)  Ht 5' 7\" (1.702 m)  Wt 206 lb (93.4 kg)  LMP 07/24/2017 (Exact Date)  BMI 32.26 kg/m2 Estimated body mass index is 32.26 kg/(m^2) as calculated from the following:    Height as of this encounter: 5' 7\" (1.702 m).    Weight as of this encounter: 206 lb (93.4 kg).  BP completed using cuff size: NA (Not Taken)  Medication Reconciliation: complete    Teressa Buitrago CMA, August 3, 2017    "

## 2017-08-03 NOTE — MR AVS SNAPSHOT
After Visit Summary   8/3/2017    Yesika Grant    MRN: [id number]           Patient Information     Date Of Birth          1990        Visit Information        Provider Department      8/3/2017 8:15 AM Nate Beck DPM [facility]        Today's Diagnoses     Ankle fracture, right, closed, initial encounter    -  1      Care Instructions    Follow-up in one month, continue PT, activities as tolerated          Follow-ups after your visit        Your next 10 appointments already scheduled     Aug 03, 2017  2:00 PM CDT   MR BRAIN W/O & W CONTRAST with MGMR1   [facility] ([facility])    [address] [place] [address] [phone]           Take your medicines as usual, unless your doctor tells you not to. Bring a list of your current medicines to your exam (including vitamins, minerals and over-the-counter drugs).  You will be given intravenous contrast for this exam. To prepare:   The day before your exam, drink extra fluids at least six 8-ounce glasses (unless your doctor tells you to restrict your fluids).   Have a blood test (creatinine test) within 30 days of your exam. Go to your clinic or Diagnostic Imaging Department for this test.  The MRI machine uses a strong magnet. Please wear clothes without metal (snaps, zippers). A sweatsuit works well, or we may give you a hospital gown.  Please remove any body piercings and hair extensions before you arrive. You will also remove watches, jewelry, hairpins, wallets, dentures, partial dental plates and hearing aids. You may wear contact lenses, and you may be able to wear your rings. We have a safe place to keep your personal items, but it is safer to leave them at home.   **IMPORTANT** THE INSTRUCTIONS BELOW ARE ONLY FOR THOSE PATIENTS WHO HAVE BEEN TOLD THEY WILL RECEIVE SEDATION OR GENERAL ANESTHESIA DURING THEIR MRI PROCEDURE:  IF YOU WILL RECEIVE SEDATION (take  medicine to help you relax during your exam):   You must get the medicine from your doctor before you arrive. Bring the medicine to the exam. Do not take it at home.   Arrive one hour early. Bring someone who can take you home after the test. Your medicine will make you sleepy. After the exam, you may not drive, take a bus or take a taxi by yourself.   No eating 8 hours before your exam. You may have clear liquids up until 4 hours before your exam. (Clear liquids include water, clear tea, black coffee and fruit juice without pulp.)  IF YOU WILL RECEIVE ANESTHESIA (be asleep for your exam):   Arrive 1 1/2 hours early. Bring someone who can take you home after the test. You may not drive, take a bus or take a taxi by yourself.   No eating 8 hours before your exam. You may have clear liquids up until 4 hours before your exam. (Clear liquids include water, clear tea, black coffee and fruit juice without pulp.)  Please call the Imaging Department at your exam site with any questions.            Aug 04, 2017  8:00 AM CDT   Ortho Treatment with Young Dockery, JONAH   [facility]    [address] [facility] [place] [address]   [phone]            Aug 04, 2017  2:00 PM CDT   Office Visit with Young Fletcher MD   [facility]    [address] [facility] [place] [address]   [phone]           Bring a current list of meds and any records pertaining to this visit. For Physicals, please bring immunization records and any forms needing to be filled out. Please arrive 10 minutes early to complete paperwork.            Aug 07, 2017  2:30 PM CDT   Ortho Treatment with Young Dockery PT   [facility]    [address] [facility] [place] [address]   [phone]            Aug 31, 2017  7:45 AM CDT   Return Visit with Nate Beck DPM   [facility]  "[place])    [address] [place] [address] [phone]            Sep 05, 2017 12:30 PM CDT   Return Visit with Clifton Ames MD   [facility]    [address] [place] [id number] [phone]              Who to contact     If you have questions or need follow up information about today's clinic visit or your schedule please contact [facility] directly at [phone].  Normal or non-critical lab and imaging results will be communicated to you by [employer], letter or phone within 4 business days after the clinic has received the results. If you do not hear from us within 7 days, please contact the clinic through [employer] or phone. If you have a critical or abnormal lab result, we will notify you by phone as soon as possible.  Submit refill requests through [employer] or call your pharmacy and they will forward the refill request to us. Please allow 3 business days for your refill to be completed.          Additional Information About Your Visit        MyChart Information     [employer] lets you send messages to your doctor, view your test results, renew your prescriptions, schedule appointments and more. To sign up, go to [email or web address] . Click on \"Log in\" on the left side of the screen, which will take you to the Welcome page. Then click on \"Sign up Now\" on the right side of the page.     You will be asked to enter the access code listed below, as well as some personal information. Please follow the directions to create your username and password.     Your access code is: [email or web address]  Expires: 10/29/2017 12:37 PM     Your access code will  in 90 days. If you need help or a new code, please call your [facility] or [phone].        Care EveryWhere ID     This is your Care EveryWhere ID. This could be used by other organizations to access your [place] medical records  [id number]        Your " "Vitals Were     Temperature Height Last Period BMI (Body Mass Index)          96.4  F (35.8  C) (Temporal) 5' 7\" (1.702 m) 07/24/2017 (Exact Date) 32.26 kg/m2         Blood Pressure from Last 3 Encounters:   07/25/17 120/82   07/18/17 (!) 141/94   06/29/17 114/80    Weight from Last 3 Encounters:   08/03/17 206 lb (93.4 kg)   07/25/17 206 lb (93.4 kg)   07/18/17 210 lb 14.4 oz (95.7 kg)              Today, you had the following     No orders found for display       Primary Care Provider Office Phone # Fax #    Young Fletcher [phone]       [facility] [address] [place] [address]        Equal Access to Services     [facility]: Hadii aad ku hadasho Soomaali, waaxda luqadaha, qaybta kaalmada alyssiayapo, monica deleon . So [facility] [phone].    ATENCIÓN: Si habla español, tiene a palacios disposición servicios gratuitos de asistencia lingüística. [facility]an al [phone].    We comply with applicable federal civil rights laws and [place] laws. We do not discriminate on the basis of race, color, national origin, age, disability sex, sexual orientation or gender identity.            Thank you!     Thank you for choosing [facility]  for your care. Our goal is always to provide you with excellent care. Hearing back from our patients is one way we can continue to improve our services. Please take a few minutes to complete the written survey that you may receive in the mail after your visit with us. Thank you!             Your Updated Medication List - Protect others around you: Learn how to safely use, store and throw away your medicines at www.disposemymeds.org.          This list is accurate as of: 8/3/17  1:08 PM.  Always use your most recent med list.                   Brand Name Dispense Instructions for use Diagnosis    ALPRAZolam 0.25 MG tablet    XANAX    10 tablet    Take 1 tablet (0.25 mg) by mouth nightly as needed for anxiety    " Panic attacks       buPROPion 150 MG 24 hr tablet    WELLBUTRIN XL    30 tablet    Take 1 tablet (150 mg) by mouth every morning    Tobacco use disorder       citalopram 40 MG tablet    celeXA    90 tablet    Take 1 tablet (40 mg) by mouth daily Due for office visit    ZAN (generalized anxiety disorder)       cyclobenzaprine 10 MG tablet    FLEXERIL    90 tablet    Take 1 tablet (10 mg) by mouth 3 times daily as needed for muscle spasms    New daily persistent headache, Strain of neck muscle, subsequent encounter, Chronic midline low back pain without sciatica       FLUoxetine 20 MG capsule    PROzac    30 capsule    Take 1 capsule (20 mg) by mouth daily    Moderate episode of recurrent major depressive disorder (H), Anxiety       ibuprofen 200 MG tablet    ADVIL/MOTRIN     Take 3 tablets (600 mg) by mouth every 6 hours as needed for pain        IRON SUPPLEMENT PO      Reported on 5/23/2017        Multi-vitamin Tabs tablet      Take 1 tablet by mouth daily Reported on 5/23/2017        topiramate 100 MG tablet    TOPAMAX    60 tablet    Take 1 tablet (100 mg) by mouth 2 times daily    Postconcussion syndrome, New daily persistent headache       VITAMIN D (CHOLECALCIFEROL) PO      Take by mouth daily

## 2017-08-03 NOTE — PROGRESS NOTES
"Chief Complaint   Patient presents with     Surgical Followup     (7 week, 6 days) WB w/casual boots, walking w/limp, pain 5, no fever; DOS 6/9/2017 - Open Reduction Internal Fixation Right Ankle; LOV 7/6/2017       Weight management plan: Patient was referred to their PCP to discuss a diet and exercise plan.     Subjective: Patient reports for followup of DOS 6/9/2017 - Open Reduction Internal Fixation Right Ankle procedure.  Patient continues pain medication but is tapering off. Patient denies nausea vomiting fever or chills.     EXAM:  No apparent distress, patient is relaxed and comfortable.   Vitals: Temp 96.4  F (35.8  C) (Temporal)  Ht 5' 7\" (1.702 m)  Wt 206 lb (93.4 kg)  LMP 07/24/2017 (Exact Date)  BMI 32.26 kg/m2  Vasc:  DP and PT pulses palpable bilateral, CFT immediate to all digits and surrounding the surgical site.  Neuro:  Light touch sensation intact about the digits. There is minimal to no appreciable numbness around the surgical incision with examination.  Derm: The incision is healed at this time without complications.  Musc: Adequate reduction of deformity identified. No complications.  Near full range of motion is noted however she still has diminished balance on the right when performing unilateral foot balance and unilateral toe raise.    Assessment:      ICD-10-CM    1. Ankle fracture, right, closed, initial encounter S82.891A        Plan:    6/15/2017  The dressing was removed and surgical site inspected.   A bulky sterile dressing was applied with compression.   Patient instructed to reduce or discontinue pain medications as tolerated. Manage pain with reduced activity.   Continue ice and elevation as tolerated.   Continue restrictions of  nonweightbearing.  Follow-up in one week for suture removal.      6/22/2017  Sutures were removed  Refill Atarax and narcotic  Kindig and weightbearing when no longer utilizing pain medication in the fracture boot dispensed today  Continue " "compression dressing  The fracture boot in place even during sleep to be removed only for bathing or showering but no soaking  Follow-up in 2 weeks  Or a Roll-A-Bout as she is quite frustrated by crutches.    7/6/2017  Begin PT and progress WB as tolerated. At home she is to remain in the boot for WB and sleep until 6 weeks then progress out of boot at tolerated.    By week 6 start coming out of boot at home.   Works as a  would like to return to desk work. Letter today to return light duty.  Expect no restrictions or limitations 10-12 weeks.     8/3/2017  She is doing PT with \"Dr. Perez\" in [place] and WB about 2 hours a day.    Continue PT until proprioception, strength and range of motion is equal with the contralateral extremity.  Mostly seated at work   Not using any pain meds now.    Seeing a neurologist and scheduled for brain MRI for why she may be passing out.    Continue all activities as tolerated in sturdy shoes preferably  Avoid alcohol during her recovery as well as diagnosis of other neurologic symptoms. She admits to being hung over or partially intoxicated still after late night of drinking last night. Recommended avoiding this and discussed increased risk associated with passing out and falling again. She admits understanding.  Follow-up in one month as scheduled.    Naet Beck DPM  "

## 2017-08-04 ENCOUNTER — HOSPITAL ENCOUNTER (OUTPATIENT)
Dept: PHYSICAL THERAPY | Facility: OTHER | Age: 27
Setting detail: THERAPIES SERIES
End: 2017-08-04
Attending: PODIATRIST
Payer: COMMERCIAL

## 2017-08-04 PROCEDURE — 40000718 ZZHC STATISTIC PT DEPARTMENT ORTHO VISIT: Performed by: PHYSICAL THERAPIST

## 2017-08-04 PROCEDURE — 97110 THERAPEUTIC EXERCISES: CPT | Mod: GP | Performed by: PHYSICAL THERAPIST

## 2017-08-07 ENCOUNTER — HOSPITAL ENCOUNTER (OUTPATIENT)
Dept: PHYSICAL THERAPY | Facility: OTHER | Age: 27
Setting detail: THERAPIES SERIES
End: 2017-08-07
Attending: PODIATRIST
Payer: COMMERCIAL

## 2017-08-07 PROCEDURE — 97110 THERAPEUTIC EXERCISES: CPT | Mod: GP | Performed by: PHYSICAL THERAPIST

## 2017-08-07 PROCEDURE — 40000718 ZZHC STATISTIC PT DEPARTMENT ORTHO VISIT: Performed by: PHYSICAL THERAPIST

## 2017-08-23 ENCOUNTER — HOSPITAL ENCOUNTER (OUTPATIENT)
Dept: PHYSICAL THERAPY | Facility: OTHER | Age: 27
Setting detail: THERAPIES SERIES
End: 2017-08-23
Attending: PODIATRIST
Payer: COMMERCIAL

## 2017-08-23 PROCEDURE — 97110 THERAPEUTIC EXERCISES: CPT | Mod: GP | Performed by: PHYSICAL THERAPIST

## 2017-08-23 PROCEDURE — 40000718 ZZHC STATISTIC PT DEPARTMENT ORTHO VISIT: Performed by: PHYSICAL THERAPIST

## 2017-08-24 NOTE — PROGRESS NOTES
"Outpatient Physical Therapy Progress Note     Patient: Yesika Grant  : 1990    Beginning/End Dates of Reporting Period:  17 to 2017 (pt seen for 5 PT visits with 3 cancels or no shows)    Referring Provider: Nate Beck DPM    Therapy Diagnosis: s/p R ankle ORIF 17 leading to decreased R ankle ROM, weakness, altered gait pattern and decreased functional level     Client Self Report: Noting gradual improvement when reporting at last visit on 17. Has missed some PT appointments, last PT visit was over 2 weeks ago. Notices pain with stairs and with push off with walking.     Objective Measurements: 17  Objective Measure: average pain level (3/10 at eval for R ankle)  Details: 3/10  Objective Measure: frequency of pain (60% at eval on 17)  Details: 40%  Objective Measure: reported functional level (50% at eval)  Details: 70%  Objective Measure: LEFS (32/80 at eval on 17)  Details: 45/80  Objective Measure: SLS  Details: L 30+, on R 5\" 9\", 12\" best times but pain was limiting factor  Objective Measure: strength (4/5 R ankle DF, inversion, eversion, PF at eval)  Details: 5/5 DF and inversion, 5/5 eversion with pain, not able to do single leg heel raise on R, gets slight heel lift        Goals:  Goal Identifier balance   Goal Description pt will improve SLS to 30\" or greater to help with balance and decrease fall risk in future   Target Date 17   Date Met      Progress:     Goal Identifier function   Goal Description pt will imrpove R ankle ROM and strength and carry over to improved functional level as reported by improved LEFS score from 32/80 to 52/80 or higher   Target Date 17   Date Met      Progress:       Progress Toward Goals:   Progress this reporting period: pt is making progress but hasn't met goals by target dates  Progress limited due to: pain is limiting SLS goal 1. She also has LBP with LE referral which contributes to some sx and also has a " concussion history. She has improved LEFS from 32 to 45 so is making gains and again low back may also contribute to not meeting that goal.     Plan:  Continue therapy per current plan of care. See pt 1X per week or so to progress HEP, re ed as needed.    Discharge:  No

## 2017-08-31 ENCOUNTER — RADIANT APPOINTMENT (OUTPATIENT)
Dept: GENERAL RADIOLOGY | Facility: CLINIC | Age: 27
End: 2017-08-31
Attending: PODIATRIST
Payer: COMMERCIAL

## 2017-08-31 ENCOUNTER — OFFICE VISIT (OUTPATIENT)
Dept: PODIATRY | Facility: CLINIC | Age: 27
End: 2017-08-31
Payer: COMMERCIAL

## 2017-08-31 VITALS — BODY MASS INDEX: 33.59 KG/M2 | HEIGHT: 67 IN | WEIGHT: 214 LBS | TEMPERATURE: 96.8 F

## 2017-08-31 DIAGNOSIS — S82.891A ANKLE FRACTURE, RIGHT, CLOSED, INITIAL ENCOUNTER: Primary | ICD-10-CM

## 2017-08-31 DIAGNOSIS — S82.891A ANKLE FRACTURE, RIGHT, CLOSED, INITIAL ENCOUNTER: ICD-10-CM

## 2017-08-31 PROCEDURE — 73610 X-RAY EXAM OF ANKLE: CPT | Mod: TC

## 2017-08-31 PROCEDURE — 99024 POSTOP FOLLOW-UP VISIT: CPT | Performed by: PODIATRIST

## 2017-08-31 ASSESSMENT — PAIN SCALES - GENERAL: PAINLEVEL: NO PAIN (0)

## 2017-08-31 NOTE — PATIENT INSTRUCTIONS
Reliable shoe stores: To maximize your experience and provide the best possible fit.  Be sure to show them your foot concerns and tell them Dr. Beck sent you.      Stores listed in bold have only athletic shoes, and stores that are not bold are mostly casual or variety of shoes    [employer]  [address] [place] [address]  [phone]    [facility] [employer] [facility] [place]  [address] [place] [address]  [phone]     [employer] [place]  [address] [place] [address]  [phone]    [employer]  [address] [facility] [address]  [phone]    [employer]  [address] [place], [place] [address] [phone]    [employer]  [address] [place] [address]  [phone]                         [employer] Locations:     [address] [place] [address]   [phone]     [address] [place] [address] [address] [place] [address]   [phone]     [address] [place] [address]   [phone]     [address] [facility] [place] [address]   [phone]     [address] [place] [address]   [phone]     [address] [place] [address]   [phone]     [address] [facility] [address]   [place] [address]   [phone]     [address] [facility]   [place] [address]   [phone]             How to find reasonable shoes          The correct width    Correct Fitting    Correct Length      Foot Distortion    Posture Distortion                          Torsional Rigidity      Grasp behind the heel and underneath the foot and twist      Bad    Excessive torsion/twist in midfoot     Less torsion/twist in midfoot is better                   Heel Counter Rigidity      Grasp just above   midsole and squeeze      Bad    Soft heel counter      Good    Rigid Heel Counter      Flexion Rigidity      Grasp shoe and bend from forefoot to rearfoot

## 2017-08-31 NOTE — PROGRESS NOTES
"Chief Complaint   Patient presents with     Surgical Followup     (11 week, 6 days) WB w/athletic shoes, walking w/limp, intermittent swelling; DOS 6/9/2017 - Open Reduction Internal Fixation Right Ankle; XR Right ankle today, 8/31/2017; LOV 8/3/2017       Weight management plan: Patient was referred to their PCP to discuss a diet and exercise plan.     Subjective: Patient reports for followup of DOS 6/9/2017 - Open Reduction Internal Fixation Right Ankle procedure.  Patient continues pain medication but is tapering off. Patient denies nausea vomiting fever or chills.     EXAM:  No apparent distress, patient is relaxed and comfortable.   Vitals: Temp 96.8  F (36  C) (Temporal)  Ht 5' 7\" (1.702 m)  Wt 214 lb (97.1 kg)  BMI 33.52 kg/m2  Vasc:  DP and PT pulses palpable bilateral, CFT immediate to all digits and surrounding the surgical site.  Neuro:  Light touch sensation intact about the digits. There is minimal to no appreciable numbness around the surgical incision with examination.  Derm: The incision is healed at this time without complications.  Musc: Adequate reduction of deformity identified. No complications.  Near full range of motion is noted she still has diminished balance on the right when performing unilateral foot balance and unilateral toe raise however it is trending to normal.    Assessment:      ICD-10-CM    1. Ankle fracture, right, closed, initial encounter S82.891A XR Ankle Right G/E 3 Views       Plan:    6/15/2017  The dressing was removed and surgical site inspected.   A bulky sterile dressing was applied with compression.   Patient instructed to reduce or discontinue pain medications as tolerated. Manage pain with reduced activity.   Continue ice and elevation as tolerated.   Continue restrictions of  nonweightbearing.  Follow-up in one week for suture removal.      6/22/2017  Sutures were removed  Refill Atarax and narcotic  Kindig and weightbearing when no longer utilizing pain " "medication in the fracture boot dispensed today  Continue compression dressing  The fracture boot in place even during sleep to be removed only for bathing or showering but no soaking  Follow-up in 2 weeks  Or a Roll-A-Bout as she is quite frustrated by crutches.    7/6/2017  Begin PT and progress WB as tolerated. At home she is to remain in the boot for WB and sleep until 6 weeks then progress out of boot at tolerated.    By week 6 start coming out of boot at home.   Works as a  would like to return to desk work. Letter today to return light duty.  Expect no restrictions or limitations 10-12 weeks.     8/3/2017  She is doing PT with \"Dr. Perez\" in [place] and WB about 2 hours a day.    Continue PT until proprioception, strength and range of motion is equal with the contralateral extremity.  Mostly seated at work   Not using any pain meds now.    Seeing a neurologist and scheduled for brain MRI for why she may be passing out.    Continue all activities as tolerated in sturdy shoes preferably  Avoid alcohol during her recovery as well as diagnosis of other neurologic symptoms. She admits to being hung over or partially intoxicated still after late night of drinking last night. Recommended avoiding this and discussed increased risk associated with passing out and falling again. She admits understanding.  Follow-up in one month as scheduled.    8/31/2017  Keep working on balance   No restrictions  continue all shoes to tolerance.    She may continue physical therapy as needed in regards to her balance and history of concussions  Follow-up as needed      Nate Beck DPM  "

## 2017-08-31 NOTE — NURSING NOTE
"Chief Complaint   Patient presents with     Surgical Followup     (11 week, 6 days) WB w/athletic shoes, walking w/limp, intermittent swelling; DOS 6/9/2017 - Open Reduction Internal Fixation Right Ankle; XR Right ankle today, 8/31/2017; LOV 8/3/2017       Initial Temp 96.8  F (36  C) (Temporal)  Ht 5' 7\" (1.702 m)  Wt 214 lb (97.1 kg)  BMI 33.52 kg/m2 Estimated body mass index is 33.52 kg/(m^2) as calculated from the following:    Height as of this encounter: 5' 7\" (1.702 m).    Weight as of this encounter: 214 lb (97.1 kg).  BP completed using cuff size: NA (Not Taken)  Medication Reconciliation: complete    Teressa Buitrago CMA, August 31, 2017    "

## 2017-08-31 NOTE — MR AVS SNAPSHOT
After Visit Summary   8/31/2017    Yesika Grant    MRN: [id number]           Patient Information     Date Of Birth          1990        Visit Information        Provider Department      8/31/2017 7:45 AM Nate Beck DPM [facility]        Today's Diagnoses     Ankle fracture, right, closed, initial encounter    -  1      Care Instructions      Reliable shoe stores: To maximize your experience and provide the best possible fit.  Be sure to show them your foot concerns and tell them Dr. Beck sent you.      Stores listed in bold have only athletic shoes, and stores that are not bold are mostly casual or variety of shoes    [employer]  [address] [place] [address]  [phone]    [employer] [facility] [place]  [address] [place] [address]  [phone]    [employer] [facility] [place]  [address] [place] [address]  [phone]    [employer]  [address] [facility] [address]  [phone]    [employer]  [address] [place] [address]  [phone]    [employer]  [address] [place] [address]  [phone]                         [employer] Locations:     [address] [place] [address]   [phone]     [address] [place] [address] [address] [place] [address]   [phone]     [address] [place] [address]   [phone]     [address] [place] [address]   [phone]     [address] [place] [address] [place] [address]   [phone]     [address] [facility] [place] [address]   [phone]     [address] [facility] [address]   [place] [address]   [phone]     [address]   [place] [address]   [phone]             How to find reasonable shoes          The correct width    Correct Fitting    Correct Length      Foot Distortion    Posture Distortion                          Torsional Rigidity      Grasp behind the heel and  underneath the foot and twist      Bad    Excessive torsion/twist in midfoot     Less torsion/twist in midfoot is better                   Heel Counter Rigidity      Grasp just above   midsole and squeeze      Bad    Soft heel counter      Good    Rigid Heel Counter      Flexion Rigidity      Grasp shoe and bend from forefoot to rearfoot                        Follow-ups after your visit        Your next 10 appointments already scheduled     Sep 01, 2017  2:30 PM CDT   [facility]    [address] [facility] [address]   [phone]            Sep 05, 2017 12:30 PM CDT   [facility] with Clifton [facility]   [facility]    [address] [place] [address] [phone]            Sep 08, 2017  3:15 PM CDT   [facility]tho Treatment [facility] Dockery[facility] PT   [facility]    [address] [facility] [address]   [phone]            Sep 14, 2017  3:15 PM CDT   [facility]tho [facility] Dockery[facility] PT   [facility]    [address] [facility] [address] [phone]              Who to contact     If you have questions or need follow up information about today's clinic visit or your schedule please contact [facility] directly at [phone].  Normal or non-critical lab and imaging results will be communicated to you by MyChart, letter or phone within 4 business days after the clinic has received the results. If you do not hear from us within 7 days, please contact the clinic through MyChart or phone. If you have a critical or abnormal lab result, we will notify you by phone as soon as possible.  Submit refill requests through [employer]gent[employer] or call your pharmacy and they will forward the refill request to us. Please allow 3 business days for your  "refill to be completed.          Additional Information About Your Visit        [employer] Information     [employer] lets you send messages to your doctor, view your test results, renew your prescriptions, schedule appointments and more. To sign up, go to [email or web address] . Click on \"Log in\" on the left side of the screen, which will take you to the Welcome page. Then click on \"Sign up Now\" on the right side of the page.     You will be asked to enter the access code listed below, as well as some personal information. Please follow the directions to create your username and password.     Your access code is: [email or web address]  Expires: 10/29/2017 12:37 PM     Your access code will  in 90 days. If you need help or a new code, please call your [facility] clinic or [phone].        Care EveryWhere ID     This is your Care EveryWhere ID. This could be used by other organizations to access your [facility] medical records  [id number]        Your Vitals Were     Temperature Height BMI (Body Mass Index)             96.8  F (36  C) (Temporal) 5' 7\" (1.702 m) 33.52 kg/m2          Blood Pressure from Last 3 Encounters:   17 120/82   17 (!) 141/94   17 114/80    Weight from Last 3 Encounters:   17 214 lb (97.1 kg)   17 206 lb (93.4 kg)   17 206 lb (93.4 kg)               Primary Care Provider Office Phone # Fax #    Young Roly Fletcher [phone] [address] [place] [address]        Equal Access to Services     [facility] : Hadii arina Leiva, malik mckee, monica mitchell. So [facility] [phone].    ATENCIÓN: Si habla español, tiene a palacios disposición servicios gratuitos de asistencia lingüística. Llame al [phone].    We comply with applicable federal civil rights laws and [place] laws. We do not discriminate on the basis of race, color, national origin, age, disability sex, sexual " orientation or gender identity.            Thank you!     Thank you for choosing [facility]  for your care. Our goal is always to provide you with excellent care. Hearing back from our patients is one way we can continue to improve our services. Please take a few minutes to complete the written survey that you may receive in the mail after your visit with us. Thank you!             Your Updated Medication List - Protect others around you: Learn how to safely use, store and throw away your medicines at www.disposemymeds.org.          This list is accurate as of: 8/31/17  8:51 AM.  Always use your most recent med list.                   Brand Name Dispense Instructions for use Diagnosis    ALPRAZolam 0.25 MG tablet    XANAX    10 tablet    Take 1 tablet (0.25 mg) by mouth nightly as needed for anxiety    Panic attacks       buPROPion 150 MG 24 hr tablet    WELLBUTRIN XL    30 tablet    Take 1 tablet (150 mg) by mouth every morning    Tobacco use disorder       citalopram 40 MG tablet    celeXA    90 tablet    Take 1 tablet (40 mg) by mouth daily Due for office visit    ZAN (generalized anxiety disorder)       cyclobenzaprine 10 MG tablet    FLEXERIL    90 tablet    Take 1 tablet (10 mg) by mouth 3 times daily as needed for muscle spasms    New daily persistent headache, Strain of neck muscle, subsequent encounter, Chronic midline low back pain without sciatica       FLUoxetine 20 MG capsule    PROzac    30 capsule    Take 1 capsule (20 mg) by mouth daily    Moderate episode of recurrent major depressive disorder (H), Anxiety       ibuprofen 200 MG tablet    ADVIL/MOTRIN     Take 3 tablets (600 mg) by mouth every 6 hours as needed for pain        IRON SUPPLEMENT PO      Reported on 5/23/2017        Multi-vitamin Tabs tablet      Take 1 tablet by mouth daily Reported on 5/23/2017        topiramate 100 MG tablet    TOPAMAX    60 tablet    Take 1 tablet (100 mg) by mouth 2 times daily    Postconcussion  syndrome, New daily persistent headache       VITAMIN D (CHOLECALCIFEROL) PO      Take by mouth daily

## 2017-09-01 ENCOUNTER — HOSPITAL ENCOUNTER (OUTPATIENT)
Dept: PHYSICAL THERAPY | Facility: OTHER | Age: 27
Setting detail: THERAPIES SERIES
End: 2017-09-01
Attending: PODIATRIST
Payer: COMMERCIAL

## 2017-09-01 DIAGNOSIS — F33.1 MODERATE EPISODE OF RECURRENT MAJOR DEPRESSIVE DISORDER (H): ICD-10-CM

## 2017-09-01 DIAGNOSIS — F41.9 ANXIETY: ICD-10-CM

## 2017-09-01 PROCEDURE — 97110 THERAPEUTIC EXERCISES: CPT | Mod: GP | Performed by: PHYSICAL THERAPIST

## 2017-09-01 PROCEDURE — 40000718 ZZHC STATISTIC PT DEPARTMENT ORTHO VISIT: Performed by: PHYSICAL THERAPIST

## 2017-09-01 NOTE — TELEPHONE ENCOUNTER
prozac     Last Written Prescription Date: 06/29/17  Last Fill Quantity: 30, # refills: 1  [place] [facility] [place]:  06/08/17   Next 5 appointments (look out 90 days)     Sep 05, 2017 12:30 PM CDT   [facility]   [facility]    [address] [place] [address]   [phone]                   Last PHQ-9 score on record=   PHQ-9 SCORE 6/30/2017   Total Score -   Total Score 15

## 2017-09-05 ENCOUNTER — OFFICE VISIT (OUTPATIENT)
Dept: NEUROLOGY | Facility: CLINIC | Age: 27
End: 2017-09-05
Payer: COMMERCIAL

## 2017-09-05 VITALS
WEIGHT: 213.8 LBS | DIASTOLIC BLOOD PRESSURE: 83 MMHG | TEMPERATURE: 97.3 F | OXYGEN SATURATION: 98 % | HEART RATE: 86 BPM | BODY MASS INDEX: 33.49 KG/M2 | SYSTOLIC BLOOD PRESSURE: 120 MMHG

## 2017-09-05 DIAGNOSIS — R40.0 DAYTIME SOMNOLENCE: Primary | ICD-10-CM

## 2017-09-05 DIAGNOSIS — R55 SYNCOPE, UNSPECIFIED SYNCOPE TYPE: ICD-10-CM

## 2017-09-05 PROCEDURE — 99214 OFFICE O/P EST MOD 30 MIN: CPT | Performed by: PSYCHIATRY & NEUROLOGY

## 2017-09-05 NOTE — TELEPHONE ENCOUNTER
FLUoxetine (PROZAC) 20 MG capsule  Routing refill request to provider for review/approval because:  Patient needs to be seen because:  Due for medication follow up   Due for updated PHQ-9    Perla Jimenez RN, BSN

## 2017-09-05 NOTE — MR AVS SNAPSHOT
After Visit Summary   9/5/2017    Yesika Grant    MRN: [id number]           Patient Information     Date Of Birth          1990        Visit Information        Provider Department      9/5/2017 12:30 PM Clifton Ames MD [facility]        Today's Diagnoses     Daytime somnolence    -  1    Syncope, unspecified syncope type           Follow-ups after your visit        Additional Services     CARDIOLOGY EVAL ADULT REFERRAL       Your provider has referred you to:  [facility]    Please be aware that coverage of these services is subject to the terms and limitations of your health insurance plan.  Call member services at your health plan with any benefit or coverage questions.      Type of Referral:  New Cardiology Consult    Timeframe requested:  1 Week    Please bring the following to your appointment:  >>   Any x-rays, CTs or MRIs which have been performed.  Contact the facility where they were done to arrange for  prior to your scheduled appointment.    >>   List of current medications  >>   This referral request   >>   Any documents/labs given to you for this referral            SLEEP EVALUATION & MANAGEMENT REFERRAL - ADULT       Please be aware that coverage of these services is subject to the terms and limitations of your health insurance plan.  Call member services at your health plan with any benefit or coverage questions.      Please bring the following to your appointment:    >>   List of current medications   >>   This referral request   >>   Any documents/labs given to you for this referral    [facility]   [phone] (Age 18 and up)                  Follow-up notes from your care team     Return in about 3 months (around 12/5/2017).      Your next 10 appointments already scheduled     Sep 08, 2017  3:15 PM CDT   [facility]    [address] [place] [address]   [phone]            Sep 14, 2017  3:15 PM CDT   [facility]    [address] [place] [address]   [phone]            Sep 19, 2017  2:30 PM CDT   New Visit with Nelson Ornelas MD   [facility]    [address] [place] [id number] [phone]            Dec 05, 2017  3:00 PM CST   Return Visit with Clifton Ames MD   [facility]    [address] [place] [id number] [phone]              Future tests that were ordered for you today     Open Future Orders        Priority Expected Expires Ordered    SLEEP EVALUATION & MANAGEMENT REFERRAL - ADULT Routine  9/5/2018 9/5/2017            Who to contact     If you have questions or need follow up information about today's clinic visit or your schedule please contact [facility] directly at [phone].  Normal or non-critical lab and imaging results will be communicated to you by MyChart, letter or phone within 4 business days after the clinic has received the results. If you do not hear from us within 7 days, please contact the clinic through [employer] or phone. If you have a critical or abnormal lab result, we will notify you by phone as soon as possible.  Submit refill requests through Did[employer] or call your pharmacy and they will forward the refill request to us. Please allow 3 business days for your refill to be completed.          Additional Information About Your Visit        Diditz Information     Diditz is an electronic gateway that provides easy, online access to your medical records. With Diditz, you can request a clinic appointment, read your test results, renew a prescription or communicate with your care team.     To sign up for Diditz visit the website at  [email or web address]   You will be asked to enter the access code listed below, as well as some personal information. Please follow the directions to create your username and password.     Your access code is: [email or web address]  Expires: 10/29/2017 12:37 PM     Your access code will  in 90 days. If you need help or a new code, please contact your [facility] or call [phone] for assistance.        Care EveryWhere ID     This is your Care EveryWhere ID. This could be used by other organizations to access your [facility] medical records  [id number]        Your Vitals Were     Pulse Temperature Pulse Oximetry BMI (Body Mass Index)          86 97.3  F (36.3  C) (Oral) 98% 33.49 kg/m2         Blood Pressure from Last 3 Encounters:   17 120/83   17 120/82   17 (!) 141/94    Weight from Last 3 Encounters:   17 97 kg (213 lb 12.8 oz)   17 97.1 kg (214 lb)   17 93.4 kg (206 lb)              We Performed the Following     CARDIOLOGY EVAL ADULT REFERRAL        Primary Care Provider Office Phone # Fax #    Young Fletcher [phone] [address] [place] [address]        Equal Access to Services     [facility]: Hadii aad ku hadasho Soomaali, waaxda luqadaha, qaybta kaalmada adeegyada, monica deleon . So [facility] [phone].    ATENCIÓN: Si habla español, tiene a palacios disposición servicios gratuitos de asistencia lingüística. Llame al [phone].    We comply with applicable federal civil rights laws and [place] laws. We do not discriminate on the basis of race, color, national origin, age, disability sex, sexual orientation or gender identity.            Thank you!     Thank you for choosing [facility]  for your care. Our goal is always to provide you with excellent care. Hearing back from our patients is one way we can continue to improve our services. Please take a  few minutes to complete the written survey that you may receive in the mail after your visit with us. Thank you!             Your Updated Medication List - Protect others around you: Learn how to safely use, store and throw away your medicines at www.disposemymeds.org.          This list is accurate as of: 9/5/17  1:21 PM.  Always use your most recent med list.                   Brand Name Dispense Instructions for use Diagnosis    ALPRAZolam 0.25 MG tablet    XANAX    10 tablet    Take 1 tablet (0.25 mg) by mouth nightly as needed for anxiety    Panic attacks       cyclobenzaprine 10 MG tablet    FLEXERIL    90 tablet    Take 1 tablet (10 mg) by mouth 3 times daily as needed for muscle spasms    New daily persistent headache, Strain of neck muscle, subsequent encounter, Chronic midline low back pain without sciatica       FLUoxetine 20 MG capsule    PROzac    30 capsule    Take 1 capsule (20 mg) by mouth daily    Moderate episode of recurrent major depressive disorder (H), Anxiety       ibuprofen 200 MG tablet    ADVIL/MOTRIN     Take 3 tablets (600 mg) by mouth every 6 hours as needed for pain        IRON SUPPLEMENT PO      Reported on 5/23/2017        Multi-vitamin Tabs tablet      Take 1 tablet by mouth daily Reported on 5/23/2017        topiramate 100 MG tablet    TOPAMAX    60 tablet    Take 1 tablet (100 mg) by mouth 2 times daily    Postconcussion syndrome, New daily persistent headache       VITAMIN D (CHOLECALCIFEROL) PO      Take by mouth daily

## 2017-09-05 NOTE — PROGRESS NOTES
HISTORY OF PRESENT ILLNESS:  Yesika Grant is seen in followup with issues of headache and also syncope.  I initially saw her about 6 weeks ago.  At that time she was on a rather complicated regimen of medicine.  She had numerous concerns, including headache, impaired concentration, episodes of shaking and syncope.  I had her discontinue the Wellbutrin.  She has done that.  The headaches are better but her anxiety is worse.  She has not had any more fainting spells.  She had reduced her dose of topiramate from 200 mg a day to 100 mg a day.  She continues to be groggy at work.  This feeling predated the start of topiramate, which was at the beginning of the year.  She over sleeps routinely.  She will set the alarm for 6:00, and often is not awaking until 8:00, having slept through the alarm.  She usually sleeps alone but occasionally is with her boyfriend.  He has not commented to her that she snores.  She has fallen asleep in the past while standing up on her job.  Again, this was before she was taking topiramate.      PHYSICAL EXAMINATION:  Today, her blood pressure is 120/83.        At the time of her last visit, I did obtain an MRI scan of the brain and I have reviewed those images with her.  The study is normal.  An EEG likewise is normal.  She had labs performed.  A vitamin B12 level was 450.      ASSESSMENT:   1.  Episodes of daytime somnolence.   2.  Headache, likely multifactorial.   3.  Recurring episodes of syncope.      DISCUSSION:  The patient is seen with the above problem list.  With regard to the headache, the etiology is not completely determined.  It is better when she is off Wellbutrin, but now her anxiety is worse.  I am not going to restart Wellbutrin.  More concerning are these episodes of daytime somnolence and sometimes even falling asleep inappropriately.  She says there have been occasions when she fell asleep at the wheel.  I advised her that she cannot be driving if she is having  daytime somnolence and falling asleep.  I am going to refer her to a sleep specialist in this regard.  I am also going to have her assessed by Cardiology for the episodes of recurring syncope.  I will see her in followup in 3 months for reexamination.         VICTOR HUGO ENGEL MD             D: 2017 13:02   T: 2017 16:05   MT:       Name:     ENRIQUE BARROS   MRN:      [id number]        Account:      [id number]   :      1990           Visit Date:   2017      Document: [id number]       cc: Young Fletcher MD

## 2017-09-05 NOTE — NURSING NOTE
"Yesika Grant's goals for this visit include: CC  She requests these members of her care team be copied on today's visit information: NO    PCP: Young Fletcher    Referring Provider:  No referring provider defined for this encounter.    Chief Complaint   Patient presents with     RECHECK       Initial There were no vitals taken for this visit. Estimated body mass index is 33.52 kg/(m^2) as calculated from the following:    Height as of 8/31/17: 1.702 m (5' 7\").    Weight as of 8/31/17: 97.1 kg (214 lb).  Medication Reconciliation: complete  "

## 2017-09-08 ENCOUNTER — VIRTUAL VISIT (OUTPATIENT)
Dept: FAMILY MEDICINE | Facility: OTHER | Age: 27
End: 2017-09-08
Payer: COMMERCIAL

## 2017-09-08 ENCOUNTER — HOSPITAL ENCOUNTER (OUTPATIENT)
Dept: PHYSICAL THERAPY | Facility: OTHER | Age: 27
Setting detail: THERAPIES SERIES
End: 2017-09-08
Attending: PODIATRIST
Payer: COMMERCIAL

## 2017-09-08 DIAGNOSIS — F33.1 MODERATE EPISODE OF RECURRENT MAJOR DEPRESSIVE DISORDER (H): ICD-10-CM

## 2017-09-08 DIAGNOSIS — F41.9 ANXIETY: ICD-10-CM

## 2017-09-08 DIAGNOSIS — F41.0 PANIC ATTACKS: ICD-10-CM

## 2017-09-08 PROCEDURE — 97110 THERAPEUTIC EXERCISES: CPT | Mod: GP | Performed by: PHYSICAL THERAPIST

## 2017-09-08 PROCEDURE — 40000718 ZZHC STATISTIC PT DEPARTMENT ORTHO VISIT: Performed by: PHYSICAL THERAPIST

## 2017-09-08 PROCEDURE — 99441 ZZC PHYSICIAN TELEPHONE EVALUATION 5-10 MIN: CPT | Performed by: FAMILY MEDICINE

## 2017-09-08 RX ORDER — FLUOXETINE 40 MG/1
40 CAPSULE ORAL DAILY
Qty: 30 CAPSULE | Refills: 1 | Status: SHIPPED | OUTPATIENT
Start: 2017-09-08 | End: 2017-11-16

## 2017-09-08 RX ORDER — ALPRAZOLAM 0.25 MG
0.25 TABLET ORAL
Qty: 10 TABLET | Refills: 0 | Status: SHIPPED | OUTPATIENT
Start: 2017-09-08 | End: 2018-01-11

## 2017-09-08 ASSESSMENT — ANXIETY QUESTIONNAIRES
GAD7 TOTAL SCORE: 10
IF YOU CHECKED OFF ANY PROBLEMS ON THIS QUESTIONNAIRE, HOW DIFFICULT HAVE THESE PROBLEMS MADE IT FOR YOU TO DO YOUR WORK, TAKE CARE OF THINGS AT HOME, OR GET ALONG WITH OTHER PEOPLE: VERY DIFFICULT
5. BEING SO RESTLESS THAT IT IS HARD TO SIT STILL: MORE THAN HALF THE DAYS
7. FEELING AFRAID AS IF SOMETHING AWFUL MIGHT HAPPEN: SEVERAL DAYS
3. WORRYING TOO MUCH ABOUT DIFFERENT THINGS: SEVERAL DAYS
2. NOT BEING ABLE TO STOP OR CONTROL WORRYING: SEVERAL DAYS
6. BECOMING EASILY ANNOYED OR IRRITABLE: SEVERAL DAYS
1. FEELING NERVOUS, ANXIOUS, OR ON EDGE: SEVERAL DAYS

## 2017-09-08 ASSESSMENT — PATIENT HEALTH QUESTIONNAIRE - PHQ9
5. POOR APPETITE OR OVEREATING: NEARLY EVERY DAY
SUM OF ALL RESPONSES TO PHQ QUESTIONS 1-9: 13

## 2017-09-08 ASSESSMENT — PAIN SCALES - GENERAL: PAINLEVEL: NO PAIN (0)

## 2017-09-08 NOTE — PROGRESS NOTES
"Yesika Grant is a 26 year old female who is being evaluated via a billable telephone visit.      The patient has been notified of following:     \"This telephone visit will be conducted via a call between you and your physician/provider. We have found that certain health care needs can be provided without the need for a physical exam.  This service lets us provide the care you need with a short phone conversation.  If a prescription is necessary we can send it directly to your pharmacy.  If lab work is needed we can place an order for that and you can then stop by our lab to have the test done at a later time.    We will bill your insurance company for this service.  Please check with your medical insurance if this type of visit is covered. You may be responsible for the cost of this type of visit if insurance coverage is denied.  The typical cost is $30 (10min), $59 (11-20min) and $85 (21-30min).  Most often these visits are shorter than 10 minutes.    If during the course of the call the physician/provider feels a telephone visit is not appropriate, you will not be charged for this service.\"       Consent has been obtained for this service by care team member: yes. See the scanned image in the medical record.  SUBJECTIVE:     Yesika Grant complains of    Chief Complaint   Patient presents with     Refill Request       I have reviewed and updated the patient's Past Medical History, Social History, Family History and Medication List.    ALLERGIES  Atarax [hydroxyzine]; Vicodin [hydrocodone-acetaminophen]; and Zithromax [azithromycin dihydrate]    Daniel Qiu CMA   (MA signature)    Additional provider notes:   Depression and Anxiety Follow-Up    Status since last visit: Anxiety is a lot worse    Other associated symptoms:keeps getting anxiety attacks and sleeping too much and it's affecting her work, can't focus at work at all    Complicating factors:     Significant life event: No     Current " substance abuse: None    PHQ-9 SCORE 3/10/2017 6/30/2017 9/8/2017   Total Score - - -   Total Score 13 15 13     ZAN-7 SCORE 3/10/2017 6/30/2017 9/8/2017   Total Score - - -   Total Score 5 13 10     Her anxiety isn't really much better.  No side effects from her medication for this.  Neurology had her stop taking Wellbutrin, which has helped her migraines.  Still taking Topamax.  Fluoxetine doesn't seem to be causing her any problems.  Has been over-sleeping a lot.      Following with neurology, referred her to cardiology for her syncope.  Also referred for sleep study.    PHQ-9  English  PHQ-9   Any Language  GAD7    OBJECTIVE:       ICD-10-CM    1. Moderate episode of recurrent major depressive disorder (H) F33.1 FLUoxetine (PROZAC) 20 MG capsule   2. Anxiety F41.9 FLUoxetine (PROZAC) 20 MG capsule   3. Panic attacks F41.0 ALPRAZolam (XANAX) 0.25 MG tablet     Will increase her fluoxetine to help further with her mood.  Follow up in one month.  Follow up sleep, cardiology as planned.       I have reviewed the note as documented above.  This accurately captures the substance of my conversation with the patient,  Yesika Grant     Total time of call between patient and provider was 6 minutes     Daniel Qiu [facility]

## 2017-09-08 NOTE — MR AVS SNAPSHOT
After Visit Summary   9/8/2017    Yesika Grant    MRN: [id number]           Patient Information     Date Of Birth          1990        Visit Information        Provider Department      9/8/2017 12:15 PM Young Fletcher MD [facility]        Today's Diagnoses     Moderate episode of recurrent major depressive disorder (H)        Anxiety        Panic attacks           Follow-ups after your visit        Your next 10 appointments already scheduled     Sep 08, 2017  3:15 PM CDT   Ortho Treatment with Young Dockery, PT   [facility]    [address]  [facility] [address]   [phone]            Sep 14, 2017  3:15 PM CDT   Ortho Treatment with Young Dockery, PT   [facility]    [address] [facility] [address]   [phone]            Sep 19, 2017  2:30 PM CDT   New Visit with Nelson Ornelas MD   [facility]    [address] [place] [phone]            Oct 02, 2017  7:00 AM CDT   New Sleep Patient with JONELLE Johnson   [facility] ([facility])    [address] [place] [id number] [phone]            Dec 05, 2017  3:00 PM CST   Return Visit with Clifton Ames MD   [facility]    [address] [place] [phone]              Who to contact     If you have questions or need follow up information about today's clinic visit or your schedule please contact [facility] directly at [phone].  Normal or non-critical lab and imaging results will be communicated to you by MyChart, letter or phone within 4 business days after the clinic has received the results. If you do not hear from us within 7 days, please  "contact the clinic through [employer] or phone. If you have a critical or abnormal lab result, we will notify you by phone as soon as possible.  Submit refill requests through [employer] or call your pharmacy and they will forward the refill request to us. Please allow 3 business days for your refill to be completed.          Additional Information About Your Visit        [employer] Information     Konoz lets you send messages to your doctor, view your test results, renew your prescriptions, schedule appointments and more. To sign up, go to [email or web address] . Click on \"Log in\" on the left side of the screen, which will take you to the Welcome page. Then click on \"Sign up Now\" on the right side of the page.     You will be asked to enter the access code listed below, as well as some personal information. Please follow the directions to create your username and password.     Your access code is: [email or web address]  Expires: 10/29/2017 12:37 PM     Your access code will  in 90 days. If you need help or a new code, please call your [facility] clinic or [phone].        Care EveryWhere ID     This is your Care EveryWhere ID. This could be used by other organizations to access your [facility] medical records  [id number]         Blood Pressure from Last 3 Encounters:   17 120/83   17 120/82   17 (!) 141/94    Weight from Last 3 Encounters:   17 213 lb 12.8 oz (97 kg)   17 214 lb (97.1 kg)   17 206 lb (93.4 kg)              Today, you had the following     No orders found for display         Today's Medication Changes          These changes are accurate as of: 17  1:07 PM.  If you have any questions, ask your nurse or doctor.               These medicines have changed or have updated prescriptions.        Dose/Directions    FLUoxetine 40 MG capsule   Commonly known as:  PROzac   This may have changed:    - medication strength  - how much to take   Used for:  Moderate episode of " recurrent major depressive disorder (H), Anxiety   Changed by:  Young Fletcher MD        Dose:  40 mg   Take 1 capsule (40 mg) by mouth daily   Quantity:  30 capsule   Refills:  1            Where to get your medicines      These medications were sent to [facility] [place] - [address]   [address] [place] [address]     Phone:  [phone]     FLUoxetine 40 MG capsule         Some of these will need a paper prescription and others can be bought over the counter.  Ask your nurse if you have questions.     Bring a paper prescription for each of these medications     ALPRAZolam 0.25 MG tablet                Primary Care Provider Office Phone # Fax #    Young Fletcher [phone] [address] [place] [address]        Equal Access to Services     [place] [facility] : Hadii arina cassidy hadasho Soomaali, waaxda luqadaha, qaybta kaalmada adeegyada, monica shultz haymehul deleon . So [facility] [phone].    ATENCIÓN: Si habla español, tiene a palacios disposición servicios gratuitos de asistencia lingüística. Llame al [phone].    We comply with applicable federal civil rights laws and [place] laws. We do not discriminate on the basis of race, color, national origin, age, disability sex, sexual orientation or gender identity.            Thank you!     Thank you for choosing [facility]  for your care. Our goal is always to provide you with excellent care. Hearing back from our patients is one way we can continue to improve our services. Please take a few minutes to complete the written survey that you may receive in the mail after your visit with us. Thank you!             Your Updated Medication List - Protect others around you: Learn how to safely use, store and throw away your medicines at www.disposemymeds.org.          This list is accurate as of: 9/8/17  1:07 PM.  Always use your most recent med list.                    Brand Name Dispense Instructions for use Diagnosis    ALPRAZolam 0.25 MG tablet    XANAX    10 tablet    Take 1 tablet (0.25 mg) by mouth nightly as needed for anxiety    Panic attacks       cyclobenzaprine 10 MG tablet    FLEXERIL    90 tablet    Take 1 tablet (10 mg) by mouth 3 times daily as needed for muscle spasms    New daily persistent headache, Strain of neck muscle, subsequent encounter, Chronic midline low back pain without sciatica       FLUoxetine 40 MG capsule    PROzac    30 capsule    Take 1 capsule (40 mg) by mouth daily    Moderate episode of recurrent major depressive disorder (H), Anxiety       ibuprofen 200 MG tablet    ADVIL/MOTRIN     Take 3 tablets (600 mg) by mouth every 6 hours as needed for pain        IRON SUPPLEMENT PO      Reported on 5/23/2017        Multi-vitamin Tabs tablet      Take 1 tablet by mouth daily Reported on 5/23/2017        topiramate 100 MG tablet    TOPAMAX    60 tablet    Take 1 tablet (100 mg) by mouth 2 times daily    Postconcussion syndrome, New daily persistent headache       VITAMIN D (CHOLECALCIFEROL) PO      Take by mouth daily

## 2017-09-09 ASSESSMENT — ANXIETY QUESTIONNAIRES: GAD7 TOTAL SCORE: 10

## 2017-09-11 ENCOUNTER — TELEPHONE (OUTPATIENT)
Dept: FAMILY MEDICINE | Facility: OTHER | Age: 27
End: 2017-09-11

## 2017-09-11 NOTE — TELEPHONE ENCOUNTER
Summary:    Patient is due/failing the following:   PAP    Action needed:   Patient needs office visit for PAP.    Type of outreach:    Sent letter.    Questions for provider review:    None                                                                                                                                    Riya Dunn     Chart routed to Care Team .        Panel Management Review      Patient has the following on her problem list:     Depression / Dysthymia review  PHQ-9 SCORE 3/10/2017 6/30/2017 9/8/2017   Total Score - - -   Total Score 13 15 13      Patient is due for:  NONE        Composite cancer screening  Chart review shows that this patient is due/due soon for the following Pap Smear

## 2017-09-11 NOTE — LETTER
[facility]  [address] [place] [address]  [place] [address]  Phone: [phone]  September 11, 2017      Yesika Grant  [address] [facility] [address]      Dear [place],    We care about your health and have reviewed your health plan including your medical conditions, medications, and lab results.  Based on this review, it is recommended that you follow up regarding the following health topic(s):  -Cervical Cancer Screening    We recommend you take the following action(s):  -schedule a PAP SMEAR EXAM which is due.  Please disregard this reminder if you have had this exam elsewhere within the last year.  It would be helpful for us to have a copy of your recent pap smear report to update your records.     Please call us at the [facility] - [phone] (or use [employer]) to address the above recommendations.     Thank you for trusting [facility] and we appreciate the opportunity to serve you.  We look forward to supporting your healthcare needs in the future.    Healthy Regards,    Your Health Care Team  [facility]

## 2017-09-14 ENCOUNTER — HOSPITAL ENCOUNTER (OUTPATIENT)
Dept: PHYSICAL THERAPY | Facility: OTHER | Age: 27
Setting detail: THERAPIES SERIES
End: 2017-09-14
Attending: PODIATRIST
Payer: COMMERCIAL

## 2017-09-14 PROCEDURE — 40000718 ZZHC STATISTIC PT DEPARTMENT ORTHO VISIT: Performed by: PHYSICAL THERAPIST

## 2017-09-14 PROCEDURE — 97110 THERAPEUTIC EXERCISES: CPT | Mod: GP | Performed by: PHYSICAL THERAPIST

## 2017-09-14 NOTE — PROCEDURES
VIDEO EEG #:  [id number]      DATE OF STUDY:  07/31/2017      TYPE OF STUDY:  Outpatient video EEG monitoring.        DURATION OF RECORDING:  3 hours 4 minutes 15 seconds      HISTORY:  A 26 year old being evaluated for episodes of collapse and amnesia.  The referring physician is concerned that these spells may be epileptic seizures.  She is being treated with topiramate 200 mg per day.    TECHNICAL SUMMARY: This continuous video- EEG monitoring procedure was performed with 23 scalp electrodes in 10-20 electrode system placements, and additional scalp, precordial and other surface electrodes used for electrical referencing and artifact detection.  Video monitoring was utilized and periodically reviewed by EEG technologists and the physician for electroclinical correlations.     FINDINGS:  During quiet wakefulness, 10 Hz posterior dominant rhythm, symmetrical, reactive.  During drowsiness, slow eye movements.  Occasional positive occipital sharp transients of sleep.  The patient proceeds to N2 sleep with vertex waves and sleep spindles.  A period of REM sleep is seen about 20 minutes after sleep onset, which lasts for about 5 minutes.  Rapid eye movements are noted during this period of time.  There is also some central sawtooth.      Photic stimulation is performed and does not induce any abnormalities.  Hyperventilation is not performed.      INTERICTAL ABNORMALITIES:  No epileptiform discharges.      ICTAL ABNORMALITIES:  No electrographic seizures.      IMPRESSION:  Background activity is within normal limits, and there is no epileptiform activity.        A period of REM sleep was seen about 1/2 hour after sleep onset.  This finding could be due to sleep deprivation, withdrawal of REM suppressant medication (including Wellbutrin), delayed sleep phase onset syndrome, depression or perhaps narcolepsy.  Clinical correlation is required.  Review of chart indicates that the patient is set to undergo a sleep disorder  evaluation in approximately 3 weeks.         AARTI CLAUDIO MD             D: 2017 18:14   T: 2017 08:21   MT: [email or web address]      Name:     ENRIQUE BARROS   MRN:      [id number]        Account:        [id number]   :      1990           Procedure Date: 2017      Document: [id number]

## 2017-09-15 ENCOUNTER — PRE VISIT (OUTPATIENT)
Dept: CARDIOLOGY | Facility: CLINIC | Age: 27
End: 2017-09-15

## 2017-09-15 NOTE — TELEPHONE ENCOUNTER
Returned pt's call to complete pre-visit. Pt did not answer. Left message to call back.  Sloane Cordero CMA

## 2017-09-15 NOTE — TELEPHONE ENCOUNTER
[facility] Call Center    Phone Message    Name of Caller: Virginia    Phone Number: Home number on file [phone] (home)    Best time to return call: Any    May a detailed message be left on voicemail: yes    Relation to patient: Self    Reason for Call: Other: Patient is returning clinics previsit. Patient would prefer a call on Monday morning. Please advise.      Action Taken: Message routed to:  Adult Clinics: Cardiology p [id number]

## 2017-09-15 NOTE — TELEPHONE ENCOUNTER
Called pt to initiate pre-visit for upcoming Dr. Ornelas appointment. Pt did not answer. Left message for pt to return call to clinic.  Sloane Cordero, CMA

## 2017-09-21 ENCOUNTER — HOSPITAL ENCOUNTER (OUTPATIENT)
Dept: PHYSICAL THERAPY | Facility: OTHER | Age: 27
Setting detail: THERAPIES SERIES
End: 2017-09-21
Attending: PODIATRIST
Payer: COMMERCIAL

## 2017-09-21 PROCEDURE — 97110 THERAPEUTIC EXERCISES: CPT | Mod: GP | Performed by: PHYSICAL THERAPIST

## 2017-09-21 PROCEDURE — 40000718 ZZHC STATISTIC PT DEPARTMENT ORTHO VISIT: Performed by: PHYSICAL THERAPIST

## 2017-09-21 PROCEDURE — 97140 MANUAL THERAPY 1/> REGIONS: CPT | Mod: GP | Performed by: PHYSICAL THERAPIST

## 2017-09-28 ENCOUNTER — HOSPITAL ENCOUNTER (OUTPATIENT)
Dept: PHYSICAL THERAPY | Facility: OTHER | Age: 27
Setting detail: THERAPIES SERIES
End: 2017-09-28
Attending: PODIATRIST
Payer: COMMERCIAL

## 2017-09-28 PROCEDURE — 97110 THERAPEUTIC EXERCISES: CPT | Mod: GP | Performed by: PHYSICAL THERAPIST

## 2017-09-28 PROCEDURE — 40000718 ZZHC STATISTIC PT DEPARTMENT ORTHO VISIT: Performed by: PHYSICAL THERAPIST

## 2017-10-03 ENCOUNTER — PRE VISIT (OUTPATIENT)
Dept: CARDIOLOGY | Facility: CLINIC | Age: 27
End: 2017-10-03

## 2017-10-03 NOTE — TELEPHONE ENCOUNTER
PREVISIT INFORMATION                                                    Yesika Grant scheduled for future visit at [facility] specialty clinics.    Patient is scheduled to see [facility] on 10/5/17  Reason for visit: Syncope  Referring provider Dr Ames  Has patient seen previous specialist? No  Medical Records:  Available in chart.  Patient was previously seen at a [facility] or [facility] facility.    REVIEW                                                      New patient packet mailed to patient: N/A  Medication reconciliation complete: Yes      Current Outpatient Prescriptions   Medication Sig Dispense Refill     FLUoxetine (PROZAC) 40 MG capsule Take 1 capsule (40 mg) by mouth daily 30 capsule 1     ALPRAZolam (XANAX) 0.25 MG tablet Take 1 tablet (0.25 mg) by mouth nightly as needed for anxiety 10 tablet 0     ibuprofen (ADVIL/MOTRIN) 200 MG tablet Take 3 tablets (600 mg) by mouth every 6 hours as needed for pain       topiramate (TOPAMAX) 100 MG tablet Take 1 tablet (100 mg) by mouth 2 times daily 60 tablet 3     cyclobenzaprine (FLEXERIL) 10 MG tablet Take 1 tablet (10 mg) by mouth 3 times daily as needed for muscle spasms 90 tablet 1     VITAMIN D, CHOLECALCIFEROL, PO Take by mouth daily       multivitamin, therapeutic with minerals (MULTI-VITAMIN) TABS Take 1 tablet by mouth daily Reported on 5/23/2017       Ferrous Sulfate (IRON SUPPLEMENT PO) Reported on 5/23/2017         Allergies: Atarax [hydroxyzine]; Vicodin [hydrocodone-acetaminophen]; and Zithromax [azithromycin dihydrate]        PLAN/FOLLOW-UP NEEDED                                                      Previsit review complete.  Patient will see provider at future scheduled appointment.     Patient Reminders Given:  Clinic location reviewed.   If you need to cancel or reschedule,please call [phone].    Asia Abernathy

## 2017-10-05 ENCOUNTER — HOSPITAL ENCOUNTER (OUTPATIENT)
Dept: PHYSICAL THERAPY | Facility: OTHER | Age: 27
Setting detail: THERAPIES SERIES
End: 2017-10-05
Attending: PODIATRIST
Payer: COMMERCIAL

## 2017-10-05 ENCOUNTER — OFFICE VISIT (OUTPATIENT)
Dept: CARDIOLOGY | Facility: CLINIC | Age: 27
End: 2017-10-05
Attending: PSYCHIATRY & NEUROLOGY
Payer: COMMERCIAL

## 2017-10-05 VITALS
WEIGHT: 223.7 LBS | DIASTOLIC BLOOD PRESSURE: 81 MMHG | HEART RATE: 67 BPM | SYSTOLIC BLOOD PRESSURE: 121 MMHG | BODY MASS INDEX: 35.04 KG/M2 | OXYGEN SATURATION: 98 %

## 2017-10-05 DIAGNOSIS — R55 SYNCOPE, UNSPECIFIED SYNCOPE TYPE: Primary | ICD-10-CM

## 2017-10-05 PROCEDURE — 40000718 ZZHC STATISTIC PT DEPARTMENT ORTHO VISIT: Performed by: PHYSICAL THERAPIST

## 2017-10-05 PROCEDURE — 99214 OFFICE O/P EST MOD 30 MIN: CPT | Performed by: INTERNAL MEDICINE

## 2017-10-05 PROCEDURE — 97110 THERAPEUTIC EXERCISES: CPT | Mod: GP | Performed by: PHYSICAL THERAPIST

## 2017-10-05 PROCEDURE — 93000 ELECTROCARDIOGRAM COMPLETE: CPT | Performed by: INTERNAL MEDICINE

## 2017-10-05 ASSESSMENT — PAIN SCALES - GENERAL: PAINLEVEL: MILD PAIN (3)

## 2017-10-05 NOTE — PROGRESS NOTES
2017            Young Fletcher MD    [facility]   [address] [place]  [address]       RE:  Yesika Grant   MRN:  [id number]   :  1990      Dear Dr. Fletcher:      It was a pleasure participating in the care of your patient, Ms. Yesika Grant.  As you know, she is a 26-year-old lady who I see today for syncope.      Her past medical history is significant for the followin.  Depression/anxiety.   2.  Tobacco abuse.   3.  Concussion in the past.   4.  Vitamin D deficiency.      She denies having a significant history of cardiac disease.      In terms of her present symptom complex, she has had 2 episodes of syncope in the past.  The first occurred on 12/10/2016, at which time she was attending a dance.  She had had a few glasses of wine, which is more than she usually has, and while slow dancing with her boyfriend, she passed out.  The second episode occurred on 2017, at which time she was taking a hot shower and performed Valsalva maneuver while popping her ears and she fell and broke her ankle.      Notably, she states that 50% of the time when she stands up quickly she will get lightheaded.  She has not had any further episodes of syncope, but she has had some near-syncopal episodes, particularly related to quick changes in position.  She does state that she gets some random rapid palpitations, but she has panic attacks fairly often and it is unclear if it is always related.  She otherwise is able to walk through the store.  She is able to climb stairs with some mild dyspnea likely secondary to deconditioning.  She otherwise denies delon chest pain, PND, orthopnea or edema.      In terms of her cardiac risk factors, no history of diabetes or hypertension.  She does smoke less than a pack a day for 7 years.  Denies alcohol use or drug use.  Her grandfather had heart trouble in his 50s.  She does not know her cholesterol.  She is a .       SOCIAL HISTORY:  She enjoys reading and spending time with her friends.  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  She has just been recovering from her fractured ankle and was unable to do much during the summertime and had to use her vacation to cover her injury.      PRESENT MEDICATIONS:  She is taking Prozac, alprazolam, vitamins, iron.      REVIEW OF SYSTEMS:  Positive for severe hypersomnolence, [place] Sleepiness Scale score of 20.      PHYSICAL EXAMINATION:     VITAL SIGNS:  Her blood pressure lying supine is 109/74 with a pulse of 64, sitting 112/75 with a pulse of 67, standing 111/70 with a pulse of 76.   NECK:  Reveals normal jugular venous pressure.  No carotid bruits.   LUNGS:  Clear to auscultation.  Respiratory effort is normal.   CARDIAC:  Reveals a regular rate and rhythm, no obvious murmur or gallop appreciated.   ABDOMEN:  Belly soft, nontender.   EXTREMITIES:  Without gross edema.      EKG today reveals sinus bradycardia at a rate of 54 beats per minute, otherwise unremarkable.  On 12/12/2016, potassium 3.8, GFR normal.  Hemoglobin normal as of 06/08/2017.        IMPRESSION:      Virginia is a 26-year-old lady without a prior documented history of cardiac disease who presents with 2 episodes of syncope of unclear etiology.  Her syncopal episodes by history are likely secondary to hypovolemia and/or an orthostatic hypotension with contributions from alcohol, vasodilatation from a hot shower and Valsalva maneuver in combination with a low baseline blood pressure.      However, since she does complain of occasional palpitations that may be in fact randomly occurring, and we will perform further evaluation.      Additionally, she clearly has a sleep disorder of some sort, and with [place] Sleepiness Scale score of 20 with hypersomnolence.  Sleep disorders should also be evaluated.        PLAN:     1.  Echocardiogram to rule out  significant structural pathology.     2.  ZioPatch monitor for 14 days to rule out significant arrhythmia.     3.  She had a sleep consultation previously scheduled, but she slept through it.  We will reschedule this and have her evaluated for some form of sleep disorder, sleep apnea, narcolepsy, etc.     4.  Further updates to follow pending above tests and consultations.      Once again, it was a pleasure participating in the care of your patient, Ms. Enrique Barros.  Please feel free to contact me at any time if there are any questions regarding her care in the future.         Sincerely,      LISA MALDONADO MD             D: 10/05/2017 09:35   T: 10/05/2017 12:22   MT: TS      Name:     ENRIQUE BARROS   MRN:      [id number]        Account:      [id number]   :      1990      Document: [id number]       cc: Young Fletcher MD

## 2017-10-05 NOTE — MR AVS SNAPSHOT
After Visit Summary   10/5/2017    Yesika Grant    MRN: [id number]           Patient Information     Date Of Birth          1990        Visit Information        Provider Department      10/5/2017 8:30 AM Nelson Ornelas MD [facility]        Today's Diagnoses     Syncope, unspecified syncope type    -  1      Care Instructions      The following is a summary of your office visit:    Medications started today:none    Medications stopped today:none    Medication dose change: none    Nurse contact information: Asia Abernathy RN  Cardiology Care Coordinator  [phone] Phone  [phone] Fax    Appointments made today: Echocardiogram, Ziopatch heart monitor, and a sleep consult.    Patient instructions: Follow up will be determined by results      If you have had any blood work, imaging or other testing completed we will be in touch within 1-2 weeks regarding the results. If you have any questions, concerns or need to schedule a follow up, please contact us at [phone]. If you are needing refills please contact your pharmacy. For urgent after hour care please call the [facility] at [phone] or the [facility] at [phone] and ask to speak to the cardiologist on call.    It was a pleasure meeting with you today. Please let us know if there is anything else we can do for you so that we can be sure you are leaving completely satisfied with your care experience.     Your Cardiology Team at [facility]  RN Care Coordinator: Asia Anton                    Follow-ups after your visit        Your next 10 appointments already scheduled     Oct 05, 2017  3:15 PM CDT   Ortho Treatment with Young Dockery PT   [facility]    [address] [address] [phone]            Oct 09, 2017  1:30 PM CDT   Ech Complete with MGECHR1, MG ECHO TECH   M  [facility] ([facility])    [address] [place] [address] [phone]           1. Please bring or wear a comfortable two-piece outfit. 2. You may eat, drink and take your normal medicines. 3. For any questions that cannot be answered, please contact the ordering physician            Oct 09, 2017  2:45 PM CDT   ZIOPATCH MONITOR with MG DEVICE RM, MG CV TECH   [facility] ([facility]    [address] [place] [address] [phone]            Oct 11, 2017  1:00 PM CDT   New Sleep Patient with Mendez Erazo PA-C   [facility] ([facility])    [address] [place] [id number] [phone]            Dec 05, 2017  3:00 PM CST   Return Visit with Clifton Ames MD   [facility] ([facility])    [address] [place] [address] [phone]              Future tests that were ordered for you today     Open Future Orders        Priority Expected Expires Ordered    Zio Patch Holter Routine  12/4/2017 10/5/2017    Echocardiogram Routine  10/5/2018 10/5/2017            Who to contact     If you have questions or need follow up information about today's clinic visit or your schedule please contact [facility] directly at [phone].  Normal or non-critical lab and imaging results will be communicated to you by MyChart, letter or phone within 4 business days after the clinic has received the results. If you do not hear from us within 7 days, please contact the clinic through MyChart or phone. If you have a critical or abnormal lab result, we will notify you by phone as soon as possible.  Submit refill requests through [employer] or call your pharmacy and they will forward the refill request to us. Please allow 3 business days for your refill to be completed.          Additional Information  About Your Visit        [employer] Information     [employer] is an electronic gateway that provides easy, online access to your medical records. With [employer], you can request a clinic appointment, read your test results, renew a prescription or communicate with your care team.     To sign up for [employer] visit the website at [email or web address]   You will be asked to enter the access code listed below, as well as some personal information. Please follow the directions to create your username and password.     Your access code is: [email or web address]  Expires: 10/29/2017 12:37 PM     Your access code will  in 90 days. If you need help or a new code, please contact your [facility] or call [phone] for assistance.        Care EveryWhere ID     This is your Care EveryWhere ID. This could be used by other organizations to access your [place] medical records  [id number]        Your Vitals Were     Pulse Pulse Oximetry BMI (Body Mass Index)             67 98% 35.04 kg/m2          Blood Pressure from Last 3 Encounters:   10/05/17 121/81   17 120/83   17 120/82    Weight from Last 3 Encounters:   10/05/17 101.5 kg (223 lb 11.2 oz)   17 97 kg (213 lb 12.8 oz)   17 97.1 kg (214 lb)               Primary Care Provider Office Phone # Fax #    Young Roly Fletcher [phone] [address] [place] [address]        Equal Access to Services     [facility]: Hadii aad ku hadasho Soomaali, waaxda luqadaha, qaybta kaalmada adeegyada, waxay idiin hayaan alyssia fisher la'mehul . So [facility] [phone].    ATENCIÓN: Si habla español, tiene a palacios disposición servicios gratuitos de asistencia lingüística. Llame al [phone].    We comply with applicable federal civil rights laws and [place] laws. We do not discriminate on the basis of race, color, national origin, age, disability, sex, sexual orientation, or gender identity.            Thank you!      Thank you for choosing [facility]  for your care. Our goal is always to provide you with excellent care. Hearing back from our patients is one way we can continue to improve our services. Please take a few minutes to complete the written survey that you may receive in the mail after your visit with us. Thank you!             Your Updated Medication List - Protect others around you: Learn how to safely use, store and throw away your medicines at www.disposemymeds.org.          This list is accurate as of: 10/5/17  9:39 AM.  Always use your most recent med list.                   Brand Name Dispense Instructions for use Diagnosis    ALPRAZolam 0.25 MG tablet    XANAX    10 tablet    Take 1 tablet (0.25 mg) by mouth nightly as needed for anxiety    Panic attacks       cyclobenzaprine 10 MG tablet    FLEXERIL    90 tablet    Take 1 tablet (10 mg) by mouth 3 times daily as needed for muscle spasms    New daily persistent headache, Strain of neck muscle, subsequent encounter, Chronic midline low back pain without sciatica       FLUoxetine 40 MG capsule    PROzac    30 capsule    Take 1 capsule (40 mg) by mouth daily    Moderate episode of recurrent major depressive disorder (H), Anxiety       ibuprofen 200 MG tablet    ADVIL/MOTRIN     Take 3 tablets (600 mg) by mouth every 6 hours as needed for pain        IRON SUPPLEMENT PO      Reported on 5/23/2017        Multi-vitamin Tabs tablet      Take 1 tablet by mouth daily Reported on 5/23/2017        VITAMIN D (CHOLECALCIFEROL) PO      Take by mouth daily

## 2017-10-05 NOTE — PATIENT INSTRUCTIONS
The following is a summary of your office visit:    Medications started today:none    Medications stopped today:none    Medication dose change: none    Nurse contact information: Asia Abernathy RN  Cardiology Care Coordinator  [phone] Phone  [phone] Fax    Appointments made today: Echocardiogram, Ziopatch heart monitor, and a sleep consult.    Patient instructions: Follow up will be determined by results      If you have had any blood work, imaging or other testing completed we will be in touch within 1-2 weeks regarding the results. If you have any questions, concerns or need to schedule a follow up, please contact us at [phone]. If you are needing refills please contact your pharmacy. For urgent after hour care please call the [facility] at [phone] or the [facility] at [phone] and ask to speak to the cardiologist on call.    It was a pleasure meeting with you today. Please let us know if there is anything else we can do for you so that we can be sure you are leaving completely satisfied with your care experience.     Your Cardiology Team at [facility]  RN Care Coordinator: Asia  CMA: Sloane

## 2017-10-05 NOTE — NURSING NOTE
"Yesika Grant's goals for this visit include:   Chief Complaint   Patient presents with     Consult     Cardiovascular screening       She requests these members of her care team be copied on today's visit information: PCP    PCP: Young Fletcher    Referring Provider:  Clifton Ames MD  [address] [place] [address]    Chief Complaint   Patient presents with     Consult     Cardiovascular screening       Initial /81 (BP Location: Left arm, Patient Position: Chair, Cuff Size: Adult Large)  Pulse 67  Wt 101.5 kg (223 lb 11.2 oz)  SpO2 98%  BMI 35.04 kg/m2 Estimated body mass index is 35.04 kg/(m^2) as calculated from the following:    Height as of 8/31/17: 1.702 m (5' 7\").    Weight as of this encounter: 101.5 kg (223 lb 11.2 oz).  Medication Reconciliation: complete       Medication Refills: none      Sloane Cordero CMA        "

## 2017-10-13 ENCOUNTER — HOSPITAL ENCOUNTER (OUTPATIENT)
Dept: PHYSICAL THERAPY | Facility: OTHER | Age: 27
Setting detail: THERAPIES SERIES
End: 2017-10-13
Attending: PODIATRIST
Payer: COMMERCIAL

## 2017-10-13 PROCEDURE — 40000718 ZZHC STATISTIC PT DEPARTMENT ORTHO VISIT: Performed by: PHYSICAL THERAPIST

## 2017-10-13 PROCEDURE — 97110 THERAPEUTIC EXERCISES: CPT | Mod: GP | Performed by: PHYSICAL THERAPIST

## 2017-10-17 ENCOUNTER — RADIANT APPOINTMENT (OUTPATIENT)
Dept: CARDIOLOGY | Facility: CLINIC | Age: 27
End: 2017-10-17
Attending: INTERNAL MEDICINE
Payer: COMMERCIAL

## 2017-10-17 DIAGNOSIS — R55 SYNCOPE, UNSPECIFIED SYNCOPE TYPE: ICD-10-CM

## 2017-10-17 PROCEDURE — 99207 ZZC NO CHARGE LOS: CPT

## 2017-10-17 PROCEDURE — 93306 TTE W/DOPPLER COMPLETE: CPT

## 2017-10-17 NOTE — PROGRESS NOTES
Patient has been prescribed a ZioPatch holter for 14 days.  Patient was instructed regarding the indication, function, care and prompt return of the ZioPatch holter monitor. The monitor, with S/N [id number],  was placed on the patient with instructions regarding care of the skin, electrodes, and monitor, as well as documentation in the patient diary. Patient demonstrated understanding of this information and agreed to call [employer] with further questions or concerns.

## 2017-10-17 NOTE — MR AVS SNAPSHOT
MRN:[id number]                      After Visit Summary   10/17/2017    Yesika Grant    MRN: [id number]           Visit Information        Provider Department      10/17/2017 3:30 PM MG CV TECH; MG DEVICE RM [facility]        Your next 10 appointments already scheduled     Oct 23, 2017  2:30 PM CDT   [facility]    [address] [id number] [phone]            Oct 26, 2017  3:00 PM CDT   New Sleep Patient with Mendez Erazo PA-C   [facility] ([facility])    [address] [id number] [phone]            Dec 05, 2017  3:00 PM CST   Return Visit with Clifton Ames MD   [facility]    [address] [place] [id number] [phone]              MyChart Information     [employer] is an electronic gateway that provides easy, online access to your medical records. With [employer], you can request a clinic appointment, read your test results, renew a prescription or communicate with your care team.     To sign up for [employer] visit the website at [email or web address]   You will be asked to enter the access code listed below, as well as some personal information. Please follow the directions to create your username and password.     Your access code is: [email or web address]  Expires: 10/29/2017 12:37 PM     Your access code will  in 90 days. If you need help or a new code, please contact your [facility] or call [phone] for assistance.        Care EveryWhere ID     This is your Care EveryWhere ID. This could be used by other organizations to access your [place] medical records  [id number]        Equal Access to Services     SHI ALONSO : malik Perez qaybta  monica campbell ah. [facility] [phone].    ATENCIÓN: Si habla español, tiene a palacios disposición servicios gratuitos de asistencia lingüística. Llame al [phone].    We comply with applicable federal civil rights laws and [place] laws. We do not discriminate on the basis of race, color, national origin, age, disability, sex, sexual orientation, or gender identity.

## 2017-10-20 ENCOUNTER — HEALTH MAINTENANCE LETTER (OUTPATIENT)
Age: 27
End: 2017-10-20

## 2017-10-23 ENCOUNTER — HOSPITAL ENCOUNTER (OUTPATIENT)
Dept: PHYSICAL THERAPY | Facility: OTHER | Age: 27
Setting detail: THERAPIES SERIES
End: 2017-10-23
Attending: PODIATRIST
Payer: COMMERCIAL

## 2017-10-23 PROCEDURE — 40000718 ZZHC STATISTIC PT DEPARTMENT ORTHO VISIT: Performed by: PHYSICAL THERAPIST

## 2017-10-23 PROCEDURE — 97110 THERAPEUTIC EXERCISES: CPT | Mod: GP | Performed by: PHYSICAL THERAPIST

## 2017-10-24 ENCOUNTER — OFFICE VISIT (OUTPATIENT)
Dept: SLEEP MEDICINE | Facility: CLINIC | Age: 27
End: 2017-10-24
Attending: PSYCHIATRY & NEUROLOGY
Payer: COMMERCIAL

## 2017-10-24 ENCOUNTER — TELEPHONE (OUTPATIENT)
Dept: SLEEP MEDICINE | Facility: CLINIC | Age: 27
End: 2017-10-24

## 2017-10-24 VITALS
DIASTOLIC BLOOD PRESSURE: 76 MMHG | WEIGHT: 220 LBS | HEART RATE: 73 BPM | OXYGEN SATURATION: 99 % | BODY MASS INDEX: 34.53 KG/M2 | HEIGHT: 67 IN | SYSTOLIC BLOOD PRESSURE: 118 MMHG

## 2017-10-24 DIAGNOSIS — R40.0 DAYTIME SOMNOLENCE: ICD-10-CM

## 2017-10-24 DIAGNOSIS — R06.83 SNORING: Primary | ICD-10-CM

## 2017-10-24 DIAGNOSIS — R44.2 HYPNAGOGIC HALLUCINATIONS: ICD-10-CM

## 2017-10-24 DIAGNOSIS — G47.00 INSOMNIA, UNSPECIFIED TYPE: ICD-10-CM

## 2017-10-24 PROCEDURE — 99203 OFFICE O/P NEW LOW 30 MIN: CPT | Performed by: OTOLARYNGOLOGY

## 2017-10-24 RX ORDER — ZOLPIDEM TARTRATE 5 MG/1
TABLET ORAL
Qty: 1 TABLET | Refills: 0 | Status: SHIPPED | OUTPATIENT
Start: 2017-10-24 | End: 2017-11-28

## 2017-10-24 NOTE — TELEPHONE ENCOUNTER
Patient needs to be scheduled for Actigraphy and PSG w/MSLT will be contacting the techs to schedule a time for these tests.Anika Sal

## 2017-10-24 NOTE — PATIENT INSTRUCTIONS

## 2017-10-24 NOTE — NURSING NOTE
"Chief Complaint   Patient presents with     Sleep Problem     fatigued all the time       Initial /76  Pulse 73  Ht 1.702 m (5' 7\")  Wt 99.8 kg (220 lb)  SpO2 99%  BMI 34.46 kg/m2 Estimated body mass index is 34.46 kg/(m^2) as calculated from the following:    Height as of this encounter: 1.702 m (5' 7\").    Weight as of this encounter: 99.8 kg (220 lb).  Medication Reconciliation: complete   Neck circumference: 15.5 inches.      "

## 2017-10-24 NOTE — MR AVS SNAPSHOT
After Visit Summary   10/24/2017    Yesika Grant    MRN: [id number]           Patient Information     Date Of Birth          1990        Visit Information        Provider Department      10/24/2017 3:00 PM Rambo Hawkins MD [place] [facility]        Today's Diagnoses     Snoring    -  1    Daytime somnolence        Hypnagogic hallucinations        Insomnia, unspecified type          Care Instructions      Your BMI is Body mass index is 34.46 kg/(m^2).  Weight management is a personal decision.  If you are interested in exploring weight loss strategies, the following discussion covers the approaches that may be successful. Body mass index (BMI) is one way to tell whether you are at a healthy weight, overweight, or obese. It measures your weight in relation to your height.  A BMI of 18.5 to 24.9 is in the healthy range. A person with a BMI of 25 to 29.9 is considered overweight, and someone with a BMI of 30 or greater is considered obese. More than two-thirds of American adults are considered overweight or obese.  Being overweight or obese increases the risk for further weight gain. Excess weight may lead to heart disease and diabetes.  Creating and following plans for healthy eating and physical activity may help you improve your health.  Weight control is part of healthy lifestyle and includes exercise, emotional health, and healthy eating habits. Careful eating habits lifelong are the mainstay of weight control. Though there are significant health benefits from weight loss, long-term weight loss with diet alone may be very difficult to achieve- studies show long-term success with dietary management in less than 10% of people. Attaining a healthy weight may be especially difficult to achieve in those with severe obesity. In some cases, medications, devices and surgical management might be considered.  What can you do?  If you are overweight or obese and are interested in  methods for weight loss, you should discuss this with your provider.     Consider reducing daily calorie intake by 500 calories.     Keep a food journal.     Avoiding skipping meals, consider cutting portions instead.    Diet combined with exercise helps maintain muscle while optimizing fat loss. Strength training is particularly important for building and maintaining muscle mass. Exercise helps reduce stress, increase energy, and improves fitness. Increasing exercise without diet control, however, may not burn enough calories to loose weight.       Start walking three days a week 10-20 minutes at a time    Work towards walking thirty minutes five days a week     Eventually, increase the speed of your walking for 1-2 minutes at time    In addition, we recommend that you review healthy lifestyles and methods for weight loss available through the National Institutes of Health patient information sites:  http://win.niddk.nih.gov/publications/index.htm    And look into health and wellness programs that may be available through your health insurance provider, employer, local community center, or [employer].    Weight management plan: Patient was referred to their PCP to discuss a diet and exercise plan.              Follow-ups after your visit        Your next 10 appointments already scheduled     Dec 05, 2017  3:00 PM CST   Return Visit with Clifton Ames MD   [facility] [place] ([facility])    [address] [place] [id number] [phone]              Future tests that were ordered for you today     Open Future Orders        Priority Expected Expires Ordered    Comprehensive Sleep Study Routine  4/22/2018 10/24/2017            Who to contact     If you have questions or need follow up information about today's clinic visit or your schedule please contact [place] [facility] directly at [phone].  Normal or non-critical lab and imaging results will be  "communicated to you by MyChart, letter or phone within 4 business days after the clinic has received the results. If you do not hear from us within 7 days, please contact the clinic through [employer] or phone. If you have a critical or abnormal lab result, we will notify you by phone as soon as possible.  Submit refill requests through [employer] or call your pharmacy and they will forward the refill request to us. Please allow 3 business days for your refill to be completed.          Additional Information About Your Visit        [employer] Information     [employer] lets you send messages to your doctor, view your test results, renew your prescriptions, schedule appointments and more. To sign up, go to [email or web address] . Click on \"Log in\" on the left side of the screen, which will take you to the Welcome page. Then click on \"Sign up Now\" on the right side of the page.     You will be asked to enter the access code listed below, as well as some personal information. Please follow the directions to create your username and password.     Your access code is: [email or web address]  Expires: 10/29/2017 12:37 PM     Your access code will  in 90 days. If you need help or a new code, please call your [facility] clinic or [phone].        Care EveryWhere ID     This is your Care EveryWhere ID. This could be used by other organizations to access your [facility] medical records  [id number]        Your Vitals Were     Pulse Height Pulse Oximetry BMI (Body Mass Index)          73 1.702 m (5' 7\") 99% 34.46 kg/m2         Blood Pressure from Last 3 Encounters:   10/24/17 118/76   10/05/17 121/81   17 120/83    Weight from Last 3 Encounters:   10/24/17 99.8 kg (220 lb)   10/05/17 101.5 kg (223 lb 11.2 oz)   17 97 kg (213 lb 12.8 oz)              We Performed the Following     SLEEP EVALUATION & MANAGEMENT REFERRAL - ADULT          Today's Medication Changes          These changes are accurate as of: 10/24/17  4:16 PM.  If " you have any questions, ask your nurse or doctor.               Start taking these medicines.        Dose/Directions    zolpidem 5 MG tablet   Commonly known as:  AMBIEN   Used for:  Insomnia, unspecified type   Started by:  Rambo Hawkins MD        Take tablet by mouth 15 minutes prior to sleep, for Sleep Study   Quantity:  1 tablet   Refills:  0            Where to get your medicines      Some of these will need a paper prescription and others can be bought over the counter.  Ask your nurse if you have questions.     Bring a paper prescription for each of these medications     zolpidem 5 MG tablet                Primary Care Provider Office Phone # Fax #    Young Roly Fletcher [phone] [address] [place] [address]        Equal Access to Services     [facility]: Hadii arina frasero Sojayne, waaxda lurachealadaha, qaybta kaalmada adedarylyapo, monica beckham. So [facility] [phone].    ATENCIÓN: Si habla español, tiene a palacios disposición servicios gratuitos de asistencia lingüística. Llame al [phone].    We comply with applicable federal civil rights laws and [place] laws. We do not discriminate on the basis of race, color, national origin, age, disability, sex, sexual orientation, or gender identity.            Thank you!     Thank you for choosing [facility] [facility]  for your care. Our goal is always to provide you with excellent care. Hearing back from our patients is one way we can continue to improve our services. Please take a few minutes to complete the written survey that you may receive in the mail after your visit with us. Thank you!             Your Updated Medication List - Protect others around you: Learn how to safely use, store and throw away your medicines at www.disposemymeds.org.          This list is accurate as of: 10/24/17  4:16 PM.  Always use your most recent med list.                   Brand Name Dispense  Instructions for use Diagnosis    ALPRAZolam 0.25 MG tablet    XANAX    10 tablet    Take 1 tablet (0.25 mg) by mouth nightly as needed for anxiety    Panic attacks       cyclobenzaprine 10 MG tablet    FLEXERIL    90 tablet    Take 1 tablet (10 mg) by mouth 3 times daily as needed for muscle spasms    New daily persistent headache, Strain of neck muscle, subsequent encounter, Chronic midline low back pain without sciatica       FLUoxetine 40 MG capsule    PROzac    30 capsule    Take 1 capsule (40 mg) by mouth daily    Moderate episode of recurrent major depressive disorder (H), Anxiety       ibuprofen 200 MG tablet    ADVIL/MOTRIN     Take 3 tablets (600 mg) by mouth every 6 hours as needed for pain        IRON SUPPLEMENT PO      Reported on 5/23/2017        Multi-vitamin Tabs tablet      Take 1 tablet by mouth daily Reported on 5/23/2017        VITAMIN D (CHOLECALCIFEROL) PO      Take by mouth daily        zolpidem 5 MG tablet    AMBIEN    1 tablet    Take tablet by mouth 15 minutes prior to sleep, for Sleep Study    Insomnia, unspecified type

## 2017-10-24 NOTE — PROGRESS NOTES
Sleep Consultation:    Date on this visit: 10/24/2017    Yesika Grant is sent by Clifton Ames for a sleep consultation regarding excessive daytime sleepiness.    Primary Physician: Young Fletcher     Yesika Grant reports nightly mild snoring for years. She admits to some RLS symptoms at night.      [place]ia goes to sleep at 10:00 PM during the week. She wakes up at 8:00 AM with an alarm. She falls asleep in 1 to  2 hours .  Virginia has difficulty falling asleep.  She wakes up 1-2 times a night for 5 minutes before falling back to sleep.  Virginia wakes up to uncertain reasons.  On weekends, Virginia goes to sleep at 11:00 PM.  She wakes up at 11:00 AM with an alarm. She falls asleep in 1 to 2 hours.  Patient gets an average of 7 to 8 hours of sleep per night.     Patient does watch TV in bed and read in bed.     Virginia does not do shift work.  She works day shifts.      Virginia does snore some nights. Patient does not have a regular bed partner. There is report of snoring and kicking.  She does not have witnessed apneas. Patient sleeps on her back, side and stomach. She has occasional restless legs,. Virginia denies any bruxism, sleep walking, sleep talking, dream enactment, sleep paralysis and cataplexy.  She does get hypnagogic dreams at night and even during the day.     She denies sleep walking, sleep talking, enuresis and sleep terrors as a child.  Virginia denies difficulty breathing through her nose, claustrophobia, reflux at night and heartburn.    The patient has depression and takes meds.  Virginia has gained 5-10 pounds in the last year.   Patient's [place] Sleepiness score 19/24 consistent with severe daytime sleepiness.      Virginia naps 1-2 times per day for 20-30 minutes, feels refreshed after naps. She takes no inadvertant naps.  She admits dozing while driving. Patient was counseled on the importance of driving while alert, to pull over if drowsy, or nap before  "getting into the vehicle if sleepy.  She uses 2 to 3 energy drinks/day.     Allergies:    Allergies   Allergen Reactions     Atarax [Hydroxyzine] Other (See Comments)     rage     Vicodin [Hydrocodone-Acetaminophen] Nausea     \"violently angry\".  Patient states she does tolerate regular Tylenol/Acetaminophen     Zithromax [Azithromycin Dihydrate] Hives and Swelling       Medications:    Current Outpatient Prescriptions   Medication Sig Dispense Refill     FLUoxetine (PROZAC) 40 MG capsule Take 1 capsule (40 mg) by mouth daily 30 capsule 1     ALPRAZolam (XANAX) 0.25 MG tablet Take 1 tablet (0.25 mg) by mouth nightly as needed for anxiety 10 tablet 0     ibuprofen (ADVIL/MOTRIN) 200 MG tablet Take 3 tablets (600 mg) by mouth every 6 hours as needed for pain       cyclobenzaprine (FLEXERIL) 10 MG tablet Take 1 tablet (10 mg) by mouth 3 times daily as needed for muscle spasms 90 tablet 1     VITAMIN D, CHOLECALCIFEROL, PO Take by mouth daily       multivitamin, therapeutic with minerals (MULTI-VITAMIN) TABS Take 1 tablet by mouth daily Reported on 5/23/2017       Ferrous Sulfate (IRON SUPPLEMENT PO) Reported on 5/23/2017         Problem List:  Patient Active Problem List    Diagnosis Date Noted     Vitamin D deficiency 04/20/2016     Priority: Medium     Contraception 09/16/2015     Priority: Medium     Concussion injury of body structure 01/10/2014     Priority: Medium     Major depression in complete remission (H) 12/05/2012     Priority: Medium     Anxiety 05/03/2012     Priority: Medium     Tobacco use disorder 02/23/2012     Priority: Medium     CARDIOVASCULAR SCREENING; LDL GOAL LESS THAN 160 04/20/2011     Priority: Medium        Past Medical/Surgical History:  Past Medical History:   Diagnosis Date     Moderate major depression (H) 5/3/2012     Motion sickness      Ovarian cyst 11/05    small 2 cm simple left ovarian cyst vs dominant follicle on US     Tonsillitis      Urticaria     ?due to oat straw allergy "     Past Surgical History:   Procedure Laterality Date     HC TOOTH EXTRACTION W/FORCEP Bilateral      OPEN REDUCTION INTERNAL FIXATION ANKLE Right 6/9/2017    Procedure: OPEN REDUCTION INTERNAL FIXATION ANKLE;  Open Reduction Internal Fixation Right Ankle;  Surgeon: Nate Beck DPM;  Location: [facility] OR     TONSILLECTOMY, ADENOIDECTOMY ADULT, COMBINED  12/1/2011    Procedure:COMBINED TONSILLECTOMY, ADENOIDECTOMY ADULT; Adult Tonsillectomy & Adenoidectomy, Cauterization Of       TURBINOPLASTY  12/1/2011    Procedure:TURBINOPLASTY; Surgeon:GISELE SWENSON; Location:[facility] OR       Social History:  Social History     Social History     Marital status: Single     Spouse name: N/A     Number of children: 0     Years of education: N/A     Occupational History      Unemployed     Social History Main Topics     Smoking status: Current Every Day Smoker     Packs/day: 1.00     Types: Cigarettes     Last attempt to quit: 5/4/2015     Smokeless tobacco: Never Used     Alcohol use Yes      Comment: 1-2 drinks a month     Drug use: No     Sexual activity: Yes     Partners: Male     Birth control/ protection: Condom, IUD      Comment: mirena 5/13     Other Topics Concern     Parent/Sibling W/ Cabg, Mi Or Angioplasty Before 65f 55m? No     Social History Narrative    Home schooling, planning to obtain GED.       Family History:  Family History   Problem Relation Age of Onset     Alzheimer Disease Maternal Grandmother      Hypertension Maternal Grandmother      Thyroid Disease Maternal Grandmother      Arthritis Maternal Grandfather      CANCER Paternal Grandmother      Breast Cancer     Genitourinary Problems Paternal Grandmother      HEART DISEASE Paternal Grandfather      Hypertension Mother      Genitourinary Problems Sister      endometriosis     Respiratory Father 49     Pulmonary Embolism     DIABETES Paternal Uncle        Review of Systems:  A complete review of systems reviewed by me is negative with the exeption of  what has been mentioned in the history of present illness.  CONSTITUTIONAL: NEGATIVE for weight gain/loss, fever, chills, sweats or night sweats, drug allergies.  EYES: NEGATIVE for changes in vision, blind spots, double vision.  ENT: NEGATIVE for ear pain, sore throat, sinus pain, post-nasal drip, runny nose, bloody nose  CARDIAC: NEGATIVE for fast heartbeats or fluttering in chest, chest pain or pressure, breathlessness when lying flat, swollen legs or swollen feet.  NEUROLOGIC: NEGATIVE headaches, weakness or numbness in the arms or legs.  DERMATOLOGIC: NEGATIVE for rashes, new moles or change in mole(s)  PULMONARY: NEGATIVE SOB at rest, SOB with activity, dry cough, productive cough, coughing up blood, wheezing or whistling when breathing.    GASTROINTESTINAL: NEGATIVE for nausea or vomitting, loose or watery stools, fat or grease in stools, constipation, abdominal pain, bowel movements black in color or blood noted.  GENITOURINARY: NEGATIVE for pain during urination, blood in urine, urinating more frequently than usual, irregular menstrual periods.  MUSCULOSKELETAL: NEGATIVE for muscle pain, bone or joint pain, swollen joints.  ENDOCRINE: NEGATIVE for increased thirst or urination, diabetes.  LYMPHATIC: NEGATIVE for swollen lymph nodes, lumps or bumps in the breasts or nipple discharge.    Physical Examination:  Vitals: There were no vitals taken for this visit.  BMI= There is no height or weight on file to calculate BMI.         No flowsheet data found.    GENERAL APPEARANCE: healthy, alert, no distress and cooperative  EYES: Eyes grossly normal to inspection, PERRL and conjunctivae and sclerae normal  HENT: ear canals and TM's normal and nose and mouth without ulcers or lesions  NECK: no adenopathy, no asymmetry, masses, or scars and thyroid normal to palpation  NEURO: Normal strength and tone, mentation intact and speech normal    Mallampati Class: I.  Tonsillar Stage: 1  hidden by pillars.  Class 1  occlusion  Impression/Plan:    The patient with excessive daytime sleepiness, some hypnagogic dreams, posible sleep paralysis, black outs w/u by cardiology and cleared, neurology with negative EEG.  Literature provided regarding sleep hygiene.    We are suspicious of other causes of hypersomnia possible Narcolepsy. Will obtain sleep log, actigraphy followed by PSG and possible MSLT.  She will follow up with me in approximately two weeks after her sleep study has been competed to review the results and discuss plan of care.       Polysomnography reviewed.  Obstructive sleep apnea reviewed.  Complications of untreated sleep apnea were reviewed.  Stopban    Rambo Hawkins MD    Your BMI is Body mass index is 34.46 kg/(m^2).    What is BMI?  Body mass index (BMI) is one way to tell whether you are at a healthy weight, overweight, or obese. It measures your weight in relation to your height.  A BMI of 18.5 to 24.9 is in the healthy range. A person with a BMI of 25 to 29.9 is considered overweight, and someone with a BMI of 30 or greater is considered obese.  Another way to find out if you are at risk for health problems caused by overweight and obesity is to measure your waist. If you are a woman and your waist is more than 35 inches, or if you are a man and your waist is more than 40 inches, your risk of disease may be higher.  More than two-thirds of American adults are considered overweight or obese. Being overweight or obese increases the risk for further weight gain.  Excess weight may lead to heart disease and diabetes. Creating and following plans for healthy eating and physical activity may help you improve your health.    Methods for maintaining or losing weight.  Weight control is part of healthy lifestyle and includes exercise, emotional health, and healthy eating habits.  Careful eating habits lifelong is the mainstay of weight control.  Though there are significant health benefits from weight loss,  long-term weight loss with diet alone may be very difficult to achieve- studies show long-term success with dietary management in less than 10% of people. Attaining a healthy weight may be especially difficult to achieve in those with severe obesity. In some cases, medications, devices and surgical management might be considered.    What can you do?  If you are overweight or obese and are interested in methods for weight loss, you should discuss this with your provider. In addition, we recommend that you review healthy life styles and methods for weight loss available through the National Institutes of Health patient information sites:   http://win.niddk.nih.gov/publications/index.htm        CC: Clifton Ames

## 2017-11-02 NOTE — ADDENDUM NOTE
Encounter addended by: Young Dockery, PT on: 11/2/2017  8:23 AM<BR>     Actions taken: Sign clinical note

## 2017-11-02 NOTE — PROGRESS NOTES
"Outpatient Physical Therapy Progress Note and Discharge Note     Patient: Yesika Grant  : 1990    Beginning/End Dates of Reporting Period:  17 to 2017 (pt seen for 8 visits since last progress report, 13 overall 17 to 10/23/17)    Referring Provider: Nate Beck DPM    Therapy Diagnosis: s/p R ankle ORIF 17 leading to decreased R ankle ROM, weakness, altered gait pattern and decreased functional level     Client Self Report: If pt has pain it is more localized over R lateral ankle. Overall better when reporting at last visit on 10/23/17. On feet a lot over the weekend doing painting. Pt notes she may need screws taken out in the future.    Objective Measurements:10/23/17  Objective Measure: average pain level (3/10 at eval for R ankle)  Details: 1/10  Objective Measure: frequency (60% at eval on 17)  Details: 60%  Objective Measure: reported functional level (50% at eval)  Details: 80%  Objective Measure: LEFS (32/80 at eval on 17)(45/80 17) (56/80 on 17)  Details: 63/80  Objective Measure: SLS  Details: 30\"+ eyes open, R 8\" closed eyes, L 20\", in previous session best time on R was 11\", L 25\" with eyes closed.   Objective Measure: strength (4/5 R ankle DF, inversion, eversion, PF at eval)  Details: 5/5 DF, iversion, eversion, single leg heel raise X 20      Goals:  Goal Identifier balance   Goal Description pt will improve SLS to 30\" or greater to help with balance and decrease fall risk in future   Target Date 17   Date Met  17   Progress:     Goal Identifier function   Goal Description pt will imrpove R ankle ROM and strength and carry over to improved functional level as reported by improved LEFS score from 32/80 to 52/80 or higher   Target Date 17   Date Met  17   Progress:     Goal Identifier function   Goal Description pt will further improve balance and carry over to improved functional level as reported by LEFS of 60 or higher "   Target Date 10/19/17   Date Met  10/23/17   Progress:       Progress Toward Goals:   Progress this reporting period: pt continues to improve as noted by LEFS score improvement, better strength and balance. Pt continues to have some swelling which contributes to pain. She knows it may take many months before the swelling goes away.     Plan:  Discharge from therapy.  But, Plan to keep patient's chart open another month and if no further PT appointments in that time then this note will become the discharge summary.    Discharge:    Reason for Discharge: Patient has met all goals.    Equipment Issued: rema muniz    Discharge Plan: Patient to continue home program.

## 2017-11-06 DIAGNOSIS — Q79.60 EDS (EHLERS-DANLOS SYNDROME): Primary | ICD-10-CM

## 2017-11-06 DIAGNOSIS — R06.83 SNORING: ICD-10-CM

## 2017-11-08 ENCOUNTER — DOCUMENTATION ONLY (OUTPATIENT)
Dept: SLEEP MEDICINE | Facility: CLINIC | Age: 27
End: 2017-11-08

## 2017-11-13 ENCOUNTER — OFFICE VISIT (OUTPATIENT)
Dept: SLEEP MEDICINE | Facility: CLINIC | Age: 27
End: 2017-11-13
Payer: COMMERCIAL

## 2017-11-13 DIAGNOSIS — Q79.60 EDS (EHLERS-DANLOS SYNDROME): Primary | ICD-10-CM

## 2017-11-13 PROCEDURE — G0399 HOME SLEEP TEST/TYPE 3 PORTA: HCPCS | Performed by: OTOLARYNGOLOGY

## 2017-11-13 NOTE — MR AVS SNAPSHOT
After Visit Summary   11/13/2017    Yesika Grant    MRN: [id number]           Patient Information     Date Of Birth          1990        Visit Information        Provider Department      11/13/2017 4:30 PM PH BED 3 [facility]        Today's Diagnoses     EDS (Iwona-Danlos syndrome)    -  1      Care Instructions    Patient is completing a home sleep test for concerns primarily related to snoring and suspected MAJOR. She was instructed on how to put on the Noxturnal T3 device and associated equipment before going to bed and given the opportunity to practice putting it on before leaving the sleep center. She was reminded to bring the home sleep test kit back to the center tomorrow for download and reporting..Anika Sal          Follow-ups after your visit        Your next 10 appointments already scheduled     Nov 16, 2017  2:30 PM CST   Ortho Treatment with Young Dockery PT   [facility] ([facility])    [address] [address] [phone]            Dec 05, 2017  3:00 PM CST   Return Visit with Clifton Ames MD   [facility] ([facility]    [id number] [place] [address] [phone]              Who to contact     If you have questions or need follow up information about today's clinic visit or your schedule please contact [facility] directly at [phone].  Normal or non-critical lab and imaging results will be communicated to you by MyChart, letter or phone within 4 business days after the clinic has received the results. If you do not hear from us within 7 days, please contact the clinic through MyChart or phone. If you have a critical or abnormal lab result, we will notify you by phone as soon as possible.  Submit refill requests through [employer] or call your pharmacy and they will forward the refill request to us. Please  "allow 3 business days for your refill to be completed.          Additional Information About Your Visit        MyChart Information     [employer] lets you send messages to your doctor, view your test results, renew your prescriptions, schedule appointments and more. To sign up, go to [email or web address] . Click on \"Log in\" on the left side of the screen, which will take you to the Welcome page. Then click on \"Sign up Now\" on the right side of the page.     You will be asked to enter the access code listed below, as well as some personal information. Please follow the directions to create your username and password.     Your access code is: [email or web address]  Expires: 2018  5:01 PM     Your access code will  in 90 days. If you need help or a new code, please call your [place] clinic or [phone].        Care EveryWhere ID     This is your Care EveryWhere ID. This could be used by other organizations to access your [place] medical records  [id number]         Blood Pressure from Last 3 Encounters:   10/24/17 118/76   10/05/17 121/81   17 120/83    Weight from Last 3 Encounters:   10/24/17 99.8 kg (220 lb)   10/05/17 101.5 kg (223 lb 11.2 oz)   17 97 kg (213 lb 12.8 oz)              Today, you had the following     No orders found for display       Primary Care Provider Office Phone # Fax #    Young Roly Fletcher [phone] [address] [place] [address]        Equal Access to Services     [facility]: Hadii aad ku hadasho Soomaali, waaxda luqadaha, qaybta kaalmada keyonna, monica deleon . So [facility] [phone].    ATENCIÓN: Si habla español, tiene a palacios disposición servicios gratuitos de asistencia lingüística. Llame al [phone].    We comply with applicable federal civil rights laws and [place] laws. We do not discriminate on the basis of race, color, national origin, age, disability, sex, sexual orientation, or gender " identity.            Thank you!     Thank you for choosing [facility]  for your care. Our goal is always to provide you with excellent care. Hearing back from our patients is one way we can continue to improve our services. Please take a few minutes to complete the written survey that you may receive in the mail after your visit with us. Thank you!             Your Updated Medication List - Protect others around you: Learn how to safely use, store and throw away your medicines at www.disposemymeds.org.          This list is accurate as of: 11/13/17  5:01 PM.  Always use your most recent med list.                   Brand Name Dispense Instructions for use Diagnosis    ALPRAZolam 0.25 MG tablet    XANAX    10 tablet    Take 1 tablet (0.25 mg) by mouth nightly as needed for anxiety    Panic attacks       cyclobenzaprine 10 MG tablet    FLEXERIL    90 tablet    Take 1 tablet (10 mg) by mouth 3 times daily as needed for muscle spasms    New daily persistent headache, Strain of neck muscle, subsequent encounter, Chronic midline low back pain without sciatica       FLUoxetine 40 MG capsule    PROzac    30 capsule    Take 1 capsule (40 mg) by mouth daily    Moderate episode of recurrent major depressive disorder (H), Anxiety       ibuprofen 200 MG tablet    ADVIL/MOTRIN     Take 3 tablets (600 mg) by mouth every 6 hours as needed for pain        IRON SUPPLEMENT PO      Reported on 5/23/2017        Multi-vitamin Tabs tablet      Take 1 tablet by mouth daily Reported on 5/23/2017        VITAMIN D (CHOLECALCIFEROL) PO      Take by mouth daily        zolpidem 5 MG tablet    AMBIEN    1 tablet    Take tablet by mouth 15 minutes prior to sleep, for Sleep Study    Insomnia, unspecified type

## 2017-11-13 NOTE — PATIENT INSTRUCTIONS
Patient is completing a home sleep test for concerns primarily related to snoring and suspected MAJOR. She was instructed on how to put on the Noxturnal T3 device and associated equipment before going to bed and given the opportunity to practice putting it on before leaving the sleep center. She was reminded to bring the home sleep test kit back to the center tomorrow for download and reporting..Anika Sal

## 2017-11-16 ENCOUNTER — HOSPITAL ENCOUNTER (OUTPATIENT)
Dept: PHYSICAL THERAPY | Facility: OTHER | Age: 27
Setting detail: THERAPIES SERIES
End: 2017-11-16
Attending: PODIATRIST
Payer: COMMERCIAL

## 2017-11-16 DIAGNOSIS — F41.9 ANXIETY: ICD-10-CM

## 2017-11-16 DIAGNOSIS — F33.1 MODERATE EPISODE OF RECURRENT MAJOR DEPRESSIVE DISORDER (H): ICD-10-CM

## 2017-11-16 PROCEDURE — 40000718 ZZHC STATISTIC PT DEPARTMENT ORTHO VISIT: Performed by: PHYSICAL THERAPIST

## 2017-11-16 PROCEDURE — 97110 THERAPEUTIC EXERCISES: CPT | Mod: GP | Performed by: PHYSICAL THERAPIST

## 2017-11-16 NOTE — PROGRESS NOTES
"HOME SLEEP STUDY INTERPRETATION    Patient: Yesika Grant  MRN: [id number]  YOB: 1990  Study Date: 11/13/2017  Referring Provider: Young Fletcher MD  Ordering Provider: Rambo Hawkins MD     Indications for Home Study: Yesika Grant is a 26 year old female with a history of excessive daytime sleepiness and snoring who presents with symptoms suggestive of obstructive sleep apnea.    Estimated body mass index is 34.46 kg/(m^2) as calculated from the following:    Height as of 10/24/17: 1.702 m (5' 7\").    Weight as of 10/24/17: 99.8 kg (220 lb).  Total score - [place]: 19 (10/24/2017  3:00 PM)  StopBang Total Score: 2 (10/24/2017  3:00 PM)    Data: A full night home sleep study was performed recording the standard physiologic parameters including body position, movement, sound, nasal pressure, thermal oral airflow, chest and abdominal movements with respiratory inductance plethysmography, and oxygen saturation by pulse oximetry. Pulse rate was estimated by oximetry recording. This study was considered adequate based on > 4 hours of quality oximetry and respiratory recording. As specified by the AASM Manual for the Scoring of Sleep and Associated events, version 2.3, Rule VIII.D 1B, 4% oxygen desaturation scoring for hypopneas is used as a standard of care on all home sleep apnea testing.    Analysis Time:  502 minutes    Respiration:   Sleep Associated Hypoxemia: sustained hypoxemia was present. Baseline oxygen saturation was 91.5%.  Time with saturation less than or equal to 88% was 23 minutes. The lowest oxygen saturation was 86%.   Snoring: Snoring was present.  Respiratory events: The home study revealed a presence of 11 obstructive apneas and 4 mixed and central apneas. There were 20 hypopneas resulting in a combined apnea/hypopnea index [AHI] of 4 events per hour.  AHI was 4.5 per hour supine, 4.3 per hour prone, 3.2 per hour on left side, and 2.4 per hour on right side. "   Pattern: Excluding events noted above, respiratory rate and pattern was Normal.    Position: Percent of time spent: supine - 74%, prone - 3%, on left - 4%, on right - 19%.    Heart Rate: By pulse oximetry tachycardia was noted.     Assessment:   NO obstructive sleep apnea.  Sleep associated hypoxemia was present.    Recommendations:  Consider oral appliance therapy or other treatment options for socially disruptive snoring.  Suggest optimizing sleep hygiene and avoiding sleep deprivation.  Weight management.  Further evaluation of hypersomnia.    Diagnosis Code(s): Hypoxemia G47.36, Snoring R06.83, hypersomnia G47.0    Rambo Hawkins MD,  November 16, 2017   Diplomate, American Board of Otolaryngology, Sleep Medicine

## 2017-11-17 RX ORDER — FLUOXETINE 40 MG/1
CAPSULE ORAL
Qty: 20 CAPSULE | Refills: 0 | Status: SHIPPED | OUTPATIENT
Start: 2017-11-17 | End: 2017-12-04

## 2017-11-17 NOTE — TELEPHONE ENCOUNTER
Prozac:  Routing refill request to provider for review/approval because:  Patient needs to be seen because:  Overdue for mood follow up    Marielos Teresa RN, BSN

## 2017-11-17 NOTE — TELEPHONE ENCOUNTER
Patient is scheduled with MELISSA for 12/04/17.  Are you willing to fill to get to her appt?  Daniel Qiu, CMA

## 2017-11-22 ENCOUNTER — TELEPHONE (OUTPATIENT)
Dept: CARDIOLOGY | Facility: CLINIC | Age: 27
End: 2017-11-22

## 2017-11-28 ENCOUNTER — OFFICE VISIT (OUTPATIENT)
Dept: SLEEP MEDICINE | Facility: CLINIC | Age: 27
End: 2017-11-28
Payer: COMMERCIAL

## 2017-11-28 VITALS
SYSTOLIC BLOOD PRESSURE: 105 MMHG | RESPIRATION RATE: 16 BRPM | DIASTOLIC BLOOD PRESSURE: 77 MMHG | WEIGHT: 220 LBS | HEIGHT: 67 IN | BODY MASS INDEX: 34.53 KG/M2 | HEART RATE: 88 BPM | OXYGEN SATURATION: 97 %

## 2017-11-28 DIAGNOSIS — G47.10 HYPERSOMNIA: Primary | ICD-10-CM

## 2017-11-28 PROCEDURE — 99213 OFFICE O/P EST LOW 20 MIN: CPT | Performed by: OTOLARYNGOLOGY

## 2017-11-28 NOTE — PROGRESS NOTES
Sleep Study Follow-Up Visit:    Date on this visit: 11/28/2017    Yesika Grant comes in today for follow-up of her home sleep study done on 11/13/17 at the [facility] for excessive daytime sleepiness and possible sleep apnea.    The study demonstrated no MAJOR with AHI of 4. THere was some hypoxemia noted but the patient does not appear to have any pulmonary disease. Her main concern is hypersomnia.  These findings were reviewed with patient.     Past medical/surgical history, family history, social history, medications and allergies were reviewed.      Problem List:  Patient Active Problem List    Diagnosis Date Noted     Vitamin D deficiency 04/20/2016     Priority: Medium     Contraception 09/16/2015     Priority: Medium     Concussion injury of body structure 01/10/2014     Priority: Medium     Major depression in complete remission (H) 12/05/2012     Priority: Medium     Anxiety 05/03/2012     Priority: Medium     Tobacco use disorder 02/23/2012     Priority: Medium     CARDIOVASCULAR SCREENING; LDL GOAL LESS THAN 160 04/20/2011     Priority: Medium        Impression/Plan:    At this point the patient would require further evaluation of her hypersomnia and possibility of Narcolepsy.  Will order actigraphy followed by PSG and possible MSLT.   Also, hypoxemia without any further explanation other than obesity(but not morbid obesity) may require comprehensive study.   She will follow up with me in about 1 month(s).     Fifteen minutes spent with patient, all of which were spent face-to-face counseling, consulting, coordinating plan of care.      Rambo Hawkins MD    CC: Young Fletcher

## 2017-11-28 NOTE — MR AVS SNAPSHOT
After Visit Summary   11/28/2017    Yesika Grant    MRN: [id number]           Patient Information     Date Of Birth          1990        Visit Information        Provider Department      11/28/2017 3:45 PM Rambo Hawkins MD [facility]        Today's Diagnoses     Hypersomnia    -  1      Care Instructions      Your BMI is Body mass index is 34.46 kg/(m^2).  Weight management is a personal decision.  If you are interested in exploring weight loss strategies, the following discussion covers the approaches that may be successful. Body mass index (BMI) is one way to tell whether you are at a healthy weight, overweight, or obese. It measures your weight in relation to your height.  A BMI of 18.5 to 24.9 is in the healthy range. A person with a BMI of 25 to 29.9 is considered overweight, and someone with a BMI of 30 or greater is considered obese. More than two-thirds of American adults are considered overweight or obese.  Being overweight or obese increases the risk for further weight gain. Excess weight may lead to heart disease and diabetes.  Creating and following plans for healthy eating and physical activity may help you improve your health.  Weight control is part of healthy lifestyle and includes exercise, emotional health, and healthy eating habits. Careful eating habits lifelong are the mainstay of weight control. Though there are significant health benefits from weight loss, long-term weight loss with diet alone may be very difficult to achieve- studies show long-term success with dietary management in less than 10% of people. Attaining a healthy weight may be especially difficult to achieve in those with severe obesity. In some cases, medications, devices and surgical management might be considered.  What can you do?  If you are overweight or obese and are interested in methods for weight loss, you should discuss this with your provider.     Consider reducing  daily calorie intake by 500 calories.     Keep a food journal.     Avoiding skipping meals, consider cutting portions instead.    Diet combined with exercise helps maintain muscle while optimizing fat loss. Strength training is particularly important for building and maintaining muscle mass. Exercise helps reduce stress, increase energy, and improves fitness. Increasing exercise without diet control, however, may not burn enough calories to loose weight.       Start walking three days a week 10-20 minutes at a time    Work towards walking thirty minutes five days a week     Eventually, increase the speed of your walking for 1-2 minutes at time    In addition, we recommend that you review healthy lifestyles and methods for weight loss available through the National Institutes of Health patient information sites:  http://win.niddk.nih.gov/publications/index.htm    And look into health and wellness programs that may be available through your health insurance provider, employer, local community center, or rodney club.    Weight management plan: Patient was referred to their PCP to discuss a diet and exercise plan.              Follow-ups after your visit        Your next 10 appointments already scheduled     Dec 04, 2017  1:45 PM CST   [facility] Visit with Young Fletcher MD   [facility]    [address] [place] [address] [phone]           Bring a current list of meds and any records pertaining to this visit. For Physicals, please bring immunization records and any forms needing to be filled out. Please arrive 10 minutes early to complete paperwork.            Dec 05, 2017  3:00 PM CST   Return Visit with Clifton Ames MD   [facility]    [address] [place] [address] [phone]              Future tests that were ordered for you today     Open Future Orders        Priority  "Expected Expires Ordered    Comprehensive Sleep Study Routine  2017            Who to contact     If you have questions or need follow up information about today's clinic visit or your schedule please contact [facility] directly at [phone].  Normal or non-critical lab and imaging results will be communicated to you by MyChart, letter or phone within 4 business days after the clinic has received the results. If you do not hear from us within 7 days, please contact the clinic through [employer] or phone. If you have a critical or abnormal lab result, we will notify you by phone as soon as possible.  Submit refill requests through [employer] or call your pharmacy and they will forward the refill request to us. Please allow 3 business days for your refill to be completed.          Additional Information About Your Visit        [employer] Information     [employer] lets you send messages to your doctor, view your test results, renew your prescriptions, schedule appointments and more. To sign up, go to [email or web address] . Click on \"Log in\" on the left side of the screen, which will take you to the Welcome page. Then click on \"Sign up Now\" on the right side of the page.     You will be asked to enter the access code listed below, as well as some personal information. Please follow the directions to create your username and password.     Your access code is: [email or web address]  Expires: 2018  5:01 PM     Your access code will  in 90 days. If you need help or a new code, please call your [place] clinic or [phone].        Care EveryWhere ID     This is your Care EveryWhere ID. This could be used by other organizations to access your [place] medical records  [id number]        Your Vitals Were     Pulse Respirations Height Pulse Oximetry BMI (Body Mass Index)       88 16 1.702 m (5' 7\") 97% 34.46 kg/m2        Blood Pressure from Last 3 Encounters:   17 105/77   10/24/17 " 118/76   10/05/17 121/81    Weight from Last 3 Encounters:   11/28/17 99.8 kg (220 lb)   10/24/17 99.8 kg (220 lb)   10/05/17 101.5 kg (223 lb 11.2 oz)                 Today's Medication Changes          These changes are accurate as of: 11/28/17  4:51 PM.  If you have any questions, ask your nurse or doctor.               Stop taking these medicines if you haven't already. Please contact your care team if you have questions.     zolpidem 5 MG tablet   Commonly known as:  AMBIEN   Stopped by:  Rambo Hawkins MD                    Primary Care Provider Office Phone # Fax #    Young Rloy Fletcher [phone] [address] [place] [address]        Equal Access to Services     [facility]: Reggie dominguez Sojayne, waaxda luqadaha, qaybta kaalmada adeegyapo, monica deleon . So [facility] [phone].    ATENCIÓN: Si habla español, tiene a palacios disposición servicios gratuitos de asistencia lingüística. Llame al [phone].    We comply with applicable federal civil rights laws and [place] laws. We do not discriminate on the basis of race, color, national origin, age, disability, sex, sexual orientation, or gender identity.            Thank you!     Thank you for choosing [place] [facility] ([place] [place])  for your care. Our goal is always to provide you with excellent care. Hearing back from our patients is one way we can continue to improve our services. Please take a few minutes to complete the written survey that you may receive in the mail after your visit with us. Thank you!             Your Updated Medication List - Protect others around you: Learn how to safely use, store and throw away your medicines at www.disposemymeds.org.          This list is accurate as of: 11/28/17  4:51 PM.  Always use your most recent med list.                   Brand Name Dispense Instructions for use Diagnosis    ALPRAZolam 0.25 MG tablet    XANAX    10 tablet    Take 1  tablet (0.25 mg) by mouth nightly as needed for anxiety    Panic attacks       cyclobenzaprine 10 MG tablet    FLEXERIL    90 tablet    Take 1 tablet (10 mg) by mouth 3 times daily as needed for muscle spasms    New daily persistent headache, Strain of neck muscle, subsequent encounter, Chronic midline low back pain without sciatica       FLUoxetine 40 MG capsule    PROzac    20 capsule    TAKE ONE CAPSULE BY MOUTH EVERY DAY    Moderate episode of recurrent major depressive disorder (H), Anxiety       ibuprofen 200 MG tablet    ADVIL/MOTRIN     Take 3 tablets (600 mg) by mouth every 6 hours as needed for pain        IRON SUPPLEMENT PO      Reported on 5/23/2017        Multi-vitamin Tabs tablet      Take 1 tablet by mouth daily Reported on 5/23/2017        VITAMIN D (CHOLECALCIFEROL) PO      Take by mouth daily

## 2017-11-28 NOTE — NURSING NOTE
"Chief Complaint   Patient presents with     Sleep Problem     hst results       Initial /77  Pulse 88  Resp 16  Ht 1.702 m (5' 7\")  Wt 99.8 kg (220 lb)  SpO2 97%  BMI 34.46 kg/m2 Estimated body mass index is 34.46 kg/(m^2) as calculated from the following:    Height as of this encounter: 1.702 m (5' 7\").    Weight as of this encounter: 99.8 kg (220 lb).  Medication Reconciliation: complete    "

## 2017-11-29 ENCOUNTER — TELEPHONE (OUTPATIENT)
Dept: CARDIOLOGY | Facility: CLINIC | Age: 27
End: 2017-11-29

## 2017-11-29 ENCOUNTER — OFFICE VISIT (OUTPATIENT)
Dept: SLEEP MEDICINE | Facility: CLINIC | Age: 27
End: 2017-11-29
Payer: COMMERCIAL

## 2017-11-29 DIAGNOSIS — R06.83 SNORING: Primary | ICD-10-CM

## 2017-11-29 PROCEDURE — 95803 ACTIGRAPHY TESTING: CPT | Performed by: OTOLARYNGOLOGY

## 2017-11-29 NOTE — MR AVS SNAPSHOT
After Visit Summary   11/29/2017    Yesika Grant    MRN: [id number]           Patient Information     Date Of Birth          1990        Visit Information        Provider Department      11/29/2017 3:30 PM BK BED 5 [facility]        Today's Diagnoses     Snoring    -  1       Follow-ups after your visit        Your next 10 appointments already scheduled     Dec 04, 2017  1:45 PM CST   Office Visit with Young Fletcher MD   [facility] ([facility]    [address] [facility] [address] [phone]           Bring a current list of meds and any records pertaining to this visit. For Physicals, please bring immunization records and any forms needing to be filled out. Please arrive 10 minutes early to complete paperwork.            Dec 05, 2017  3:00 PM CST   Return Visit with Clifton Ames MD   [facility]    [address] [place] [address]   [phone]            Dec 10, 2017  8:00 PM CST   Actigraphy Drop Off with BK BED 5   [facility] ([facility])    [address] [address] [id number] [phone]            Dec 10, 2017  8:00 PM CST   PSG Diagnostic/MSLT with BK BED 3   [facility] ([facility])    [address] [id number] [phone]              Future tests that were ordered for you today     Open Future Orders        Priority Expected Expires Ordered    Comprehensive Sleep Study Routine  5/27/2018 11/28/2017            Who to contact     If you have questions or need follow up information about today's clinic visit or your schedule please contact [facility] directly at [phone].  Normal or non-critical lab and imaging results will be communicated to you by MyChart, letter or phone  "within 4 business days after the clinic has received the results. If you do not hear from us within 7 days, please contact the clinic through [employer] or phone. If you have a critical or abnormal lab result, we will notify you by phone as soon as possible.  Submit refill requests through [employer] or call your pharmacy and they will forward the refill request to us. Please allow 3 business days for your refill to be completed.          Additional Information About Your Visit        [employer] Information     [employer] lets you send messages to your doctor, view your test results, renew your prescriptions, schedule appointments and more. To sign up, go to [email or web address] . Click on \"Log in\" on the left side of the screen, which will take you to the Welcome page. Then click on \"Sign up Now\" on the right side of the page.     You will be asked to enter the access code listed below, as well as some personal information. Please follow the directions to create your username and password.     Your access code is: [email or web address]  Expires: 2018  5:01 PM     Your access code will  in 90 days. If you need help or a new code, please call your [place] [facility] clinic or [phone].        Care EveryWhere ID     This is your Care EveryWhere ID. This could be used by other organizations to access your [facility] medical records  [id number]         Blood Pressure from Last 3 Encounters:   17 105/77   10/24/17 118/76   10/05/17 121/81    Weight from Last 3 Encounters:   17 99.8 kg (220 lb)   10/24/17 99.8 kg (220 lb)   10/05/17 101.5 kg (223 lb 11.2 oz)              Today, you had the following     No orders found for display       Primary Care Provider Office Phone # Fax #    Young Fletcher [phone] [address] [place] [address]        Equal Access to Services     SHI ALONSO AH: Hadii arina frasero Soomaali, waaxda luqadaha, qaybta kaalmapo newby, monica cade " natalia deleon ah. So [facility] [phone].    ATENCIÓN: Si kael gagnon, tiene a palacios disposición servicios gratuitos de asistencia lingüística. [address] [phone].    We comply with applicable federal civil rights laws and [place] laws. We do not discriminate on the basis of race, color, national origin, age, disability, sex, sexual orientation, or gender identity.            Thank you!     Thank you for choosing [facility]  for your care. Our goal is always to provide you with excellent care. Hearing back from our patients is one way we can continue to improve our services. Please take a few minutes to complete the written survey that you may receive in the mail after your visit with us. Thank you!             Your Updated Medication List - Protect others around you: Learn how to safely use, store and throw away your medicines at www.disposemymeds.org.          This list is accurate as of: 11/29/17  3:43 PM.  Always use your most recent med list.                   Brand Name Dispense Instructions for use Diagnosis    ALPRAZolam 0.25 MG tablet    XANAX    10 tablet    Take 1 tablet (0.25 mg) by mouth nightly as needed for anxiety    Panic attacks       cyclobenzaprine 10 MG tablet    FLEXERIL    90 tablet    Take 1 tablet (10 mg) by mouth 3 times daily as needed for muscle spasms    New daily persistent headache, Strain of neck muscle, subsequent encounter, Chronic midline low back pain without sciatica       FLUoxetine 40 MG capsule    PROzac    20 capsule    TAKE ONE CAPSULE BY MOUTH EVERY DAY    Moderate episode of recurrent major depressive disorder (H), Anxiety       ibuprofen 200 MG tablet    ADVIL/MOTRIN     Take 3 tablets (600 mg) by mouth every 6 hours as needed for pain        IRON SUPPLEMENT PO      Reported on 5/23/2017        Multi-vitamin Tabs tablet      Take 1 tablet by mouth daily Reported on 5/23/2017        VITAMIN D (CHOLECALCIFEROL) PO      Take by mouth daily

## 2017-12-01 ENCOUNTER — TELEPHONE (OUTPATIENT)
Dept: CARDIOLOGY | Facility: CLINIC | Age: 27
End: 2017-12-01

## 2017-12-01 NOTE — TELEPHONE ENCOUNTER
This writer received a call from [employer] stating patient has not yet returned heart monitor that is 45 days past due. This writer left a message for Ms. Grant reminding her to please send her heart monitor device back in.

## 2017-12-04 ENCOUNTER — OFFICE VISIT (OUTPATIENT)
Dept: FAMILY MEDICINE | Facility: OTHER | Age: 27
End: 2017-12-04
Payer: COMMERCIAL

## 2017-12-04 ENCOUNTER — TELEPHONE (OUTPATIENT)
Dept: CARDIOLOGY | Facility: CLINIC | Age: 27
End: 2017-12-04

## 2017-12-04 ENCOUNTER — RESULT FOLLOW UP (OUTPATIENT)
Dept: FAMILY MEDICINE | Facility: OTHER | Age: 27
End: 2017-12-04

## 2017-12-04 VITALS
SYSTOLIC BLOOD PRESSURE: 110 MMHG | HEART RATE: 84 BPM | TEMPERATURE: 98.2 F | RESPIRATION RATE: 16 BRPM | HEIGHT: 67 IN | WEIGHT: 220.1 LBS | DIASTOLIC BLOOD PRESSURE: 70 MMHG | BODY MASS INDEX: 34.55 KG/M2

## 2017-12-04 DIAGNOSIS — F33.1 MODERATE EPISODE OF RECURRENT MAJOR DEPRESSIVE DISORDER (H): Primary | ICD-10-CM

## 2017-12-04 DIAGNOSIS — Z12.4 SCREENING FOR MALIGNANT NEOPLASM OF CERVIX: ICD-10-CM

## 2017-12-04 DIAGNOSIS — F41.9 ANXIETY: ICD-10-CM

## 2017-12-04 DIAGNOSIS — R87.610 ASCUS WITH POSITIVE HIGH RISK HPV CERVICAL: ICD-10-CM

## 2017-12-04 DIAGNOSIS — R87.810 ASCUS WITH POSITIVE HIGH RISK HPV CERVICAL: ICD-10-CM

## 2017-12-04 DIAGNOSIS — R39.15 URGENCY OF URINATION: ICD-10-CM

## 2017-12-04 DIAGNOSIS — R35.0 URINARY FREQUENCY: ICD-10-CM

## 2017-12-04 PROCEDURE — G0145 SCR C/V CYTO,THINLAYER,RESCR: HCPCS | Performed by: FAMILY MEDICINE

## 2017-12-04 PROCEDURE — 99214 OFFICE O/P EST MOD 30 MIN: CPT | Performed by: FAMILY MEDICINE

## 2017-12-04 PROCEDURE — G0124 SCREEN C/V THIN LAYER BY MD: HCPCS | Performed by: FAMILY MEDICINE

## 2017-12-04 PROCEDURE — G0476 HPV COMBO ASSAY CA SCREEN: HCPCS | Performed by: FAMILY MEDICINE

## 2017-12-04 RX ORDER — ARIPIPRAZOLE 5 MG/1
5 TABLET ORAL AT BEDTIME
Qty: 30 TABLET | Refills: 1 | Status: SHIPPED | OUTPATIENT
Start: 2017-12-04 | End: 2018-01-11 | Stop reason: SINTOL

## 2017-12-04 RX ORDER — FLUOXETINE 40 MG/1
40 CAPSULE ORAL DAILY
Qty: 90 CAPSULE | Refills: 1 | Status: SHIPPED | OUTPATIENT
Start: 2017-12-04 | End: 2018-04-12

## 2017-12-04 ASSESSMENT — PAIN SCALES - GENERAL: PAINLEVEL: NO PAIN (0)

## 2017-12-04 NOTE — PATIENT INSTRUCTIONS
"Thank you for visiting [facility]    Let's see if addition of Abilify helps further with mood.  If desired, can take in AM or PM, depending on sedating effects or not.    Continue fluoxetine at current dose for now.    Contact us or return if questions or concerns.     We could also consider adding SNRI like Effexor XR to current regimen if Abilify doesn't help at all.    Please see me in 1 month for follow up. Options are       (a) office visit       (b) scheduled phone visit (check with your insurance about coverage)       (c) E-visit (need to \"request an E-visit\" through [employer])        If you had imaging scheduled please refer to your radiology prep sheet.    Appointment    Date_______________     Time_____________    Day:   M TU W TH F    With____________________________    Location_________________________    If you need medication refills, please contact your pharmacy 3 days before your prescriptions runs out. If you are out of refills, your pharmacy will contact contact the clinic.    Contact us or return if questions or concerns.     -Your Care Team:  MD Justine Kunz PA-C Joel De Haan, PA-C Elizabeth McLean, APRN CNP    General information about your clinic      Clinic hours:     Lab hours:  Phone [phone]  Monday 7:30 am-7 pm    Monday 8:30 am-6:30 pm  Tuesday-Friday 7:30 am-5 pm   Tuesday-Friday 8:30 am-4:30 pm    Pharmacy hours:  Phone [phone]  Monday 8:30 am-7pm  Tuesday-Friday 8:30am-6 pm                                       [employer] assistance [phone]        We would like to hear from you, how was your visit today?    Solange Castillo  Patient Information Supervisor   Patient Care Supervisor  [place] [place], and [facility], and [facility]  [phone] "

## 2017-12-04 NOTE — PROGRESS NOTES
SUBJECTIVE:                                                    Yesika Grant is a 26 year old female who presents to clinic today for the following health issues:      HPI    Depression and Anxiety Follow-Up    Status since last visit: little bit worse    Other associated symptoms:None    Complicating factors:     Significant life event: No     Current substance abuse: None    PHQ-9 Score and MyChart F/U Questions 6/30/2017 9/8/2017 12/5/2017   Total Score 15 13 12   Q9: Suicide Ideation Not at all Not at all Not at all     ZAN-7 SCORE 6/30/2017 9/8/2017 12/5/2017   Total Score - - -   Total Score 13 10 10     ZAN-7 SCORE 6/30/2017 9/8/2017 12/5/2017   Total Score - - -   Total Score 13 10 10         PHQ-9  English  PHQ-9   Any Language  GAD7  Suicide Assessment Five-step Evaluation and Treatment (SAFE-T)    Pt feels like mood has been worse over the past month.  Did have slight response to the fluoxetine initially, but worse of late.    Was on Wellbutrin in the past, but had some HA with this.  Improved after cessation.      Had been on citalopram for years.      Sleep is good, but perhaps excessive of late.  Hard to awaken in the morning.    Is following with sleep medicine, being evaluated for possible narcolepsy.  Didn't have sleep apnea.        Problem list and histories reviewed & adjusted, as indicated.  Additional history: as documented      Current Outpatient Prescriptions   Medication Sig Dispense Refill     FLUoxetine (PROZAC) 40 MG capsule Take 1 capsule (40 mg) by mouth daily 90 capsule 1     ARIPiprazole (ABILIFY) 5 MG tablet Take 1 tablet (5 mg) by mouth At Bedtime 30 tablet 1     ALPRAZolam (XANAX) 0.25 MG tablet Take 1 tablet (0.25 mg) by mouth nightly as needed for anxiety 10 tablet 0     ibuprofen (ADVIL/MOTRIN) 200 MG tablet Take 3 tablets (600 mg) by mouth every 6 hours as needed for pain       cyclobenzaprine (FLEXERIL) 10 MG tablet Take 1 tablet (10 mg) by mouth 3 times daily as needed  "for muscle spasms 90 tablet 1     VITAMIN D, CHOLECALCIFEROL, PO Take by mouth daily       multivitamin, therapeutic with minerals (MULTI-VITAMIN) TABS Take 1 tablet by mouth daily Reported on 5/23/2017       Ferrous Sulfate (IRON SUPPLEMENT PO) Reported on 5/23/2017       Recent Labs   Lab Test  06/08/17   1525  01/13/17   1700  12/12/16   1444  08/19/16   1408  08/18/16   1521   12/06/14   1410   A1C  5.2   --    --    --    --    --    --    ALT   --    --    --    --   21   --   14   CR   --    --   0.68   --   0.70   --   0.69   GFRESTIMATED   --    --   >90  Non  GFR Calc     --   >90  Non  GFR Calc     --   >90  Non  GFR Calc     GFRESTBLACK   --    --   >90   GFR Calc     --   >90   GFR Calc     --   >90   GFR Calc     POTASSIUM   --    --   3.8   --   4.0   --   4.0   TSH   --   1.72   --   4.88*   --    < >   --     < > = values in this interval not displayed.      BP Readings from Last 3 Encounters:   12/04/17 110/70   11/28/17 105/77   10/24/17 118/76    Wt Readings from Last 3 Encounters:   12/04/17 220 lb 1.6 oz (99.8 kg)   11/28/17 220 lb (99.8 kg)   10/24/17 220 lb (99.8 kg)                   ROS:  Constitutional, HEENT, cardiovascular, pulmonary, gi and gu systems are negative, except as otherwise noted.  Chills, frequent urination, urgency.        OBJECTIVE:   /70 (BP Location: Right arm, Patient Position: Chair, Cuff Size: Adult Large)  Pulse 84  Temp 98.2  F (36.8  C) (Temporal)  Resp 16  Ht 5' 7\" (1.702 m)  Wt 220 lb 1.6 oz (99.8 kg)  LMP 11/25/2017  BMI 34.47 kg/m2  Body mass index is 34.47 kg/(m^2).  GENERAL: healthy, alert and no distress  NECK: no adenopathy, no asymmetry, masses, or scars and thyroid normal to palpation  RESP: lungs clear to auscultation - no rales, rhonchi or wheezes  CV: regular rate and rhythm, normal S1 S2, no S3 or S4, no murmur, click or rub, no peripheral " "edema and peripheral pulses strong  ABDOMEN: soft, nontender, no hepatosplenomegaly, no masses and bowel sounds normal  MS: no gross musculoskeletal defects noted, no edema  BREAST: no masses, no tenderness, no nipple discharge, no palpable axillary masses or adenopathy  - female: cervix- normal, adnexae- normal; uterus- normal, no masses, no discharge     Diagnostic Test Results:  Results for orders placed or performed in visit on 10/17/17   Zio Patch Holter    [address] [id number] [phone]  10/17/2017      Patient:  Yesika Grant  Chart: [id number]  :  1990  Age:  26 year old  Sex:  female       Procedure:  ZioPatch Monitor.        Technician performing hook-up:  Robyn Moura CCT, CMA         ASSESSMENT/PLAN:     Tobacco Cessation:   reports that she has been smoking Cigarettes.  She has been smoking about 1.00 pack per day. She has never used smokeless tobacco.  Tobacco Cessation Action Plan: Information offered: Patient not interested at this time    BMI:   Estimated body mass index is 34.47 kg/(m^2) as calculated from the following:    Height as of this encounter: 5' 7\" (1.702 m).    Weight as of this encounter: 220 lb 1.6 oz (99.8 kg).   Weight management plan: Discussed healthy diet and exercise guidelines and patient will follow up in 6 months in clinic to re-evaluate.            ICD-10-CM    1. Moderate episode of recurrent major depressive disorder (H) F33.1 FLUoxetine (PROZAC) 40 MG capsule     ARIPiprazole (ABILIFY) 5 MG tablet   2. Anxiety F41.9 FLUoxetine (PROZAC) 40 MG capsule     ARIPiprazole (ABILIFY) 5 MG tablet   3. Urgency of urination R39.15 UA reflex to Microscopic and Culture     CANCELED: *UA reflex to Microscopic and Culture ([facility] (except [place] and [place])   4. Urinary frequency R35.0 UA reflex to Microscopic and Culture     CANCELED: *UA reflex to Microscopic and Culture " "([place] [facility] (except [place] and [place])   5. Screening for malignant neoplasm of cervix Z12.4 Pap imaged thin layer screen reflex to HPV if ASCUS - recommend age 25 - 29     1.  This is not currently controlled.  Discussed various options for trying to get this under better control.  After brief discussion of the pros and cons of each, she opted to try the addition of Abilify to her current regimen.  Can dose this at the time of day that is more helpful to her depending upon the degree of sedation she receives from it.  Follow-up in 1 month.  2.  Somewhat better.  This should also continue to improve with the addition of the Abilify.  3,4.  Unclear cause.  Will obtain a urinalysis to start on this.  Unfortunately, there was miscommunication and patient did not do this prior to leaving.  She was contacted by phone and will come in to do this at a later time.  5.  Pap obtained.  No anomalies noted.    Portions of this note were completed using Dragon dictation software.  Although reviewed, there may be typographical and other inadvertent errors that remain.               Patient Instructions   Thank you for visiting [facility]    Let's see if addition of Abilify helps further with mood.  If desired, can take in AM or PM, depending on sedating effects or not.    Continue fluoxetine at current dose for now.    Contact us or return if questions or concerns.     We could also consider adding SNRI like Effexor XR to current regimen if Abilify doesn't help at all.    Please see me in 1 month for follow up. Options are       (a) office visit       (b) scheduled phone visit (check with your insurance about coverage)       (c) E-visit (need to \"request an E-visit\" through [employer])        If you had imaging scheduled please refer to your radiology prep sheet.    Appointment    Date_______________     Time_____________    Day:   M TU W TH " F    With____________________________    Location_________________________    If you need medication refills, please contact your pharmacy 3 days before your prescriptions runs out. If you are out of refills, your pharmacy will contact contact the clinic.    Contact us or return if questions or concerns.     -Your Care Team:  MD Justine Kunz PA-C Joel De Haan, PA-C Elizabeth McLean, APRN CNP    General information about your clinic      Clinic hours:     Lab hours:  Phone [phone]  Monday 7:30 am-7 pm    Monday 8:30 am-6:30 pm  Tuesday-Friday 7:30 am-5 pm   Tuesday-Friday 8:30 am-4:30 pm    Pharmacy hours:  Phone [phone]  Monday 8:30 am-7pm  Tuesday-Friday 8:30am-6 pm                                       [employer] assistance [phone]        We would like to hear from you, how was your visit today?    Solange Castillo  Patient Information Supervisor   Patient Care Supervisor  [facility] [place], and [facility], and [facility]  [phone]         Young Fletcher MD, MD  [facility]

## 2017-12-04 NOTE — NURSING NOTE
"Chief Complaint   Patient presents with     Recheck Medication     Panel Management       Initial /70 (BP Location: Right arm, Patient Position: Chair, Cuff Size: Adult Large)  Pulse 84  Temp 98.2  F (36.8  C) (Temporal)  Resp 16  Ht 5' 7\" (1.702 m)  Wt 220 lb 1.6 oz (99.8 kg)  LMP 11/25/2017  BMI 34.47 kg/m2 Estimated body mass index is 34.47 kg/(m^2) as calculated from the following:    Height as of this encounter: 5' 7\" (1.702 m).    Weight as of this encounter: 220 lb 1.6 oz (99.8 kg).  Medication Reconciliation: complete  Daniel Qiu, GARY    "

## 2017-12-04 NOTE — LETTER
December 27, 2018      Yesika Grant  [address] [facility] [address]    Dear MsEdison,      At [facility], your health and wellness is our primary concern. That is why we are following up on a colposcopy from 1/4/18, which was reported as having ectocervical mucosa with reactive changes. Your provider had recommended that you have a Colposcopy  completed by 1/4/19. Our records do not show that this has been scheduled.    It is important to complete the follow up that your provider has suggested for you to ensure that there are no worsening changes which may, over time, develop into cancer.      Please contact our office at  [phone] to schedule an appointment for a Pap smear and HPV test at your earliest convenience. If you have questions or concerns, please call the clinic and we will be happy to assist you.    If you have completed the tests outside of [facility], please have the results forwarded to our office. We will update the chart for your primary Physician to review before your next annual physical.     Thank you for choosing [facility]!    Sincerely,      Young Fletcher MD/cain

## 2017-12-04 NOTE — MR AVS SNAPSHOT
"              After Visit Summary   12/4/2017    Yesika Grant    MRN: [id number]           Patient Information     Date Of Birth          1990        Visit Information        Provider Department      12/4/2017 1:45 PM Young Fletcher MD [facility]        Today's Diagnoses     Moderate episode of recurrent major depressive disorder (H)    -  1    Anxiety        Urgency of urination        Urinary frequency        Screening for malignant neoplasm of cervix          Care Instructions    Thank you for visiting [facility]    Let's see if addition of Abilify helps further with mood.  If desired, can take in AM or PM, depending on sedating effects or not.    Continue fluoxetine at current dose for now.    Contact us or return if questions or concerns.     We could also consider adding SNRI like Effexor XR to current regimen if Abilify doesn't help at all.    Please see me in 1 month for follow up. Options are       (a) office visit       (b) scheduled phone visit (check with your insurance about coverage)       (c) E-visit (need to \"request an E-visit\" through [employer])        If you had imaging scheduled please refer to your radiology prep sheet.    Appointment    Date_______________     Time_____________    Day:   M TU W TH F    With____________________________    Location_________________________    If you need medication refills, please contact your pharmacy 3 days before your prescriptions runs out. If you are out of refills, your pharmacy will contact contact the clinic.    Contact us or return if questions or concerns.     -Your Care Team:  MD Justine Kunz PA-C Joel De Haan, PA-C Elizabeth McLean, LASHAE LIZARRAGA    General information about your clinic      Clinic hours:     Lab hours:  Phone [phone]  Monday 7:30 am-7 pm    Monday 8:30 am-6:30 pm  Tuesday-Friday 7:30 am-5 pm   Tuesday-Friday 8:30 am-4:30 pm    Pharmacy hours:  Phone " [phone]  Monday 8:30 am-7pm  Tuesday-Friday 8:30am-6 pm                                       [place] assistance [phone]        We would like to hear from you, how was your visit today?    Solange Castillo  Patient Information Supervisor   Patient Care Supervisor  [facility], and [facility] [place], and [facility]  [phone]             Follow-ups after your visit        Your next 10 appointments already scheduled     Dec 05, 2017  3:00 PM CST   [facility] with Clifton Ames MD   [facility]    [id number] [place] [address] [phone]            Dec 10, 2017  8:00 PM CST   Actigraphy Drop Off with BK BED 5   [facility] ([facility])    [address]  [place] [address] [phone]            Dec 10, 2017  8:00 PM CST   PSG Diagnostic/MSLT with BK BED 3   [facility] ([facility])    [address]  [place] [address] [phone]              Who to contact     If you have questions or need follow up information about today's clinic visit or your schedule please contact [facility] directly at [phone].  Normal or non-critical lab and imaging results will be communicated to you by indidebthart, letter or phone within 4 business days after the clinic has received the results. If you do not hear from us within 7 days, please contact the clinic through eSightt or phone. If you have a critical or abnormal lab result, we will notify you by phone as soon as possible.  Submit refill requests through SeptRx or call your pharmacy and they will forward the refill request to us. Please allow 3 business days for your refill to be completed.          Additional Information About Your Visit        [facility]  "Information     NitroPCR lets you send messages to your doctor, view your test results, renew your prescriptions, schedule appointments and more. To sign up, go to [email or web address] . Click on \"Log in\" on the left side of the screen, which will take you to the Welcome page. Then click on \"Sign up Now\" on the right side of the page.     You will be asked to enter the access code listed below, as well as some personal information. Please follow the directions to create your username and password.     Your access code is: [email or web address]  Expires: 2018  5:01 PM     Your access code will  in 90 days. If you need help or a new code, please call your [place] clinic or [phone].        Care EveryWhere ID     This is your [facility] EveryWhere ID. This could be used by other organizations to access your [place] medical records  [id number]        Your Vitals Were     Pulse Temperature Respirations Height Last Period BMI (Body Mass Index)    84 98.2  F (36.8  C) (Temporal) 16 5' 7\" (1.702 m) 2017 34.47 kg/m2       Blood Pressure from Last 3 Encounters:   17 110/70   17 105/77   10/24/17 118/76    Weight from Last 3 Encounters:   17 220 lb 1.6 oz (99.8 kg)   17 220 lb (99.8 kg)   10/24/17 220 lb (99.8 kg)              We Performed the Following     *UA reflex to Microscopic and Culture ([place] and [place] [facility] (except [place] and [place])     DEPRESSION ACTION PLAN (DAP)     Pap imaged thin layer screen reflex to HPV if ASCUS - recommend age 25 - 29          Today's Medication Changes          These changes are accurate as of: 17  2:33 PM.  If you have any questions, ask your nurse or doctor.               Start taking these medicines.        Dose/Directions    ARIPiprazole 5 MG tablet   Commonly known as:  ABILIFY   Used for:  Anxiety, Moderate episode of recurrent major depressive disorder (H)   Started by:  Young Fletcher MD        Dose:  5 mg   Take 1 tablet " (5 mg) by mouth At Bedtime   Quantity:  30 tablet   Refills:  1         These medicines have changed or have updated prescriptions.        Dose/Directions    FLUoxetine 40 MG capsule   Commonly known as:  PROzac   This may have changed:  See the new instructions.   Used for:  Moderate episode of recurrent major depressive disorder (H), Anxiety   Changed by:  Young Fletcher MD        Dose:  40 mg   Take 1 capsule (40 mg) by mouth daily   Quantity:  90 capsule   Refills:  1            Where to get your medicines      These medications were sent to [facility] - [address]   [address] [place] [address]     Phone:  [phone]     ARIPiprazole 5 MG tablet    FLUoxetine 40 MG capsule                Primary Care Provider Office Phone # Fax #    Young Fletcher [phone] [address] [place] [address]        Equal Access to Services     [facility]: Hadii aad ku hadasho Soomaali, waaxda luqadaha, qaybta kaalmada adeegyada, waxay idiin hayaan alyssia huddlestonaracameron deleon . So [facility] [phone].    ATENCIÓN: Si habla español, tiene a palacios disposición servicios gratuitos de asistencia lingüística. Llame al [phone].    We comply with applicable federal civil rights laws and [place] laws. We do not discriminate on the basis of race, color, national origin, age, disability, sex, sexual orientation, or gender identity.            Thank you!     Thank you for choosing [facility]  for your care. Our goal is always to provide you with excellent care. Hearing back from our patients is one way we can continue to improve our services. Please take a few minutes to complete the written survey that you may receive in the mail after your visit with us. Thank you!             Your Updated Medication List - Protect others around you: Learn how to safely use, store and throw away your medicines at www.disposemymeds.org.           This list is accurate as of: 12/4/17  2:33 PM.  Always use your most recent med list.                   Brand Name Dispense Instructions for use Diagnosis    ALPRAZolam 0.25 MG tablet    XANAX    10 tablet    Take 1 tablet (0.25 mg) by mouth nightly as needed for anxiety    Panic attacks       ARIPiprazole 5 MG tablet    ABILIFY    30 tablet    Take 1 tablet (5 mg) by mouth At Bedtime    Anxiety, Moderate episode of recurrent major depressive disorder (H)       cyclobenzaprine 10 MG tablet    FLEXERIL    90 tablet    Take 1 tablet (10 mg) by mouth 3 times daily as needed for muscle spasms    New daily persistent headache, Strain of neck muscle, subsequent encounter, Chronic midline low back pain without sciatica       FLUoxetine 40 MG capsule    PROzac    90 capsule    Take 1 capsule (40 mg) by mouth daily    Moderate episode of recurrent major depressive disorder (H), Anxiety       ibuprofen 200 MG tablet    ADVIL/MOTRIN     Take 3 tablets (600 mg) by mouth every 6 hours as needed for pain        IRON SUPPLEMENT PO      Reported on 5/23/2017        Multi-vitamin Tabs tablet      Take 1 tablet by mouth daily Reported on 5/23/2017        VITAMIN D (CHOLECALCIFEROL) PO      Take by mouth daily

## 2017-12-04 NOTE — TELEPHONE ENCOUNTER
Called and spoke to patient. Was notified by robyn in the EKG department that the patient may have not turned her monitor back in. She states that she took it off around Halloween and put it in her home mailbox in the box that was provided.   Notified Robyn in EKG of phone call.

## 2017-12-04 NOTE — LETTER
My Depression Action Plan  Name: Yesika Grant   Date of Birth 1990  Date: 12/4/2017    My doctor: Young Fletcher   My clinic: [facility]  [address] [address] [phone]          GREEN    ZONE   Good Control    What it looks like:     Things are going generally well. You have normal up s and down s. You may even feel depressed from time to time, but bad moods usually last less than a day.   What you need to do:  1. Continue to care for yourself (see self care plan)  2. Check your depression survival kit and update it as needed  3. Follow your physician s recommendations including any medication.  4. Do not stop taking medication unless you consult with your physician first.           YELLOW         ZONE Getting Worse    What it looks like:     Depression is starting to interfere with your life.     It may be hard to get out of bed; you may be starting to isolate yourself from others.    Symptoms of depression are starting to last most all day and this has happened for several days.     You may have suicidal thoughts but they are not constant.   What you need to do:     1. Call your care team, your response to treatment will improve if you keep your care team informed of your progress. Yellow periods are signs an adjustment may need to be made.     2. Continue your self-care, even if you have to fake it!    3. Talk to someone in your support network    4. Open up your depression survival kit           RED    ZONE Medical Alert - Get Help    What it looks like:     Depression is seriously interfering with your life.     You may experience these or other symptoms: You can t get out of bed most days, can t work or engage in other necessary activities, you have trouble taking care of basic hygiene, or basic responsibilities, thoughts of suicide or death that will not go away, self-injurious behavior.     What you need to do:  1. Call your care team  and request a same-day appointment. If they are not available (weekends or after hours) call your local crisis line, emergency room or 911.      Electronically signed by: Daniel Qiu, December 4, 2017    Depression Self Care Plan / Survival Kit    Self-Care for Depression  Here s the deal. Your body and mind are really not as separate as most people think.  What you do and think affects how you feel and how you feel influences what you do and think. This means if you do things that people who feel good do, it will help you feel better.  Sometimes this is all it takes.  There is also a place for medication and therapy depending on how severe your depression is, so be sure to consult with your medical provider and/ or Behavioral Health Consultant if your symptoms are worsening or not improving.     In order to better manage my stress, I will:    Exercise  Get some form of exercise, every day. This will help reduce pain and release endorphins, the  feel good  chemicals in your brain. This is almost as good as taking antidepressants!  This is not the same as joining a gym and then never going! (they count on that by the way ) It can be as simple as just going for a walk or doing some gardening, anything that will get you moving.      Hygiene   Maintain good hygiene (Get out of bed in the morning, Make your bed, Brush your teeth, Take a shower, and Get dressed like you were going to work, even if you are unemployed).  If your clothes don't fit try to get ones that do.    Diet  I will strive to eat foods that are good for me, drink plenty of water, and avoid excessive sugar, caffeine, alcohol, and other mood-altering substances.  Some foods that are helpful in depression are: complex carbohydrates, B vitamins, flaxseed, fish or fish oil, fresh fruits and vegetables.    Psychotherapy  I agree to participate in Individual Therapy (if recommended).    Medication  If prescribed medications, I agree to take them.   Missing doses can result in serious side effects.  I understand that drinking alcohol, or other illicit drug use, may cause potential side effects.  I will not stop my medication abruptly without first discussing it with my provider.    Staying Connected With Others  I will stay in touch with my friends, family members, and my primary care provider/team.    Use your imagination  Be creative.  We all have a creative side; it doesn t matter if it s oil painting, sand castles, or mud pies! This will also kick up the endorphins.    Witness Beauty  (AKA stop and smell the roses) Take a look outside, even in mid-winter. Notice colors, textures. Watch the squirrels and birds.     Service to others  Be of service to others.  There is always someone else in need.  By helping others we can  get out of ourselves  and remember the really important things.  This also provides opportunities for practicing all the other parts of the program.    Humor  Laugh and be silly!  Adjust your TV habits for less news and crime-drama and more comedy.    Control your stress  Try breathing deep, massage therapy, biofeedback, and meditation. Find time to relax each day.     My support system    Clinic Contact:  Phone number:    Contact 1:  Phone number:    Contact 2:  Phone number:    [facility]/:  Phone number:    Therapist:  Phone number:    Local crisis center:    Phone number:    Other community support:  Phone number:

## 2017-12-05 PROBLEM — F33.1 MODERATE EPISODE OF RECURRENT MAJOR DEPRESSIVE DISORDER (H): Status: ACTIVE | Noted: 2017-12-05

## 2017-12-05 ASSESSMENT — ANXIETY QUESTIONNAIRES
6. BECOMING EASILY ANNOYED OR IRRITABLE: MORE THAN HALF THE DAYS
IF YOU CHECKED OFF ANY PROBLEMS ON THIS QUESTIONNAIRE, HOW DIFFICULT HAVE THESE PROBLEMS MADE IT FOR YOU TO DO YOUR WORK, TAKE CARE OF THINGS AT HOME, OR GET ALONG WITH OTHER PEOPLE: SOMEWHAT DIFFICULT
5. BEING SO RESTLESS THAT IT IS HARD TO SIT STILL: MORE THAN HALF THE DAYS
3. WORRYING TOO MUCH ABOUT DIFFERENT THINGS: SEVERAL DAYS
7. FEELING AFRAID AS IF SOMETHING AWFUL MIGHT HAPPEN: SEVERAL DAYS
2. NOT BEING ABLE TO STOP OR CONTROL WORRYING: SEVERAL DAYS
GAD7 TOTAL SCORE: 10
1. FEELING NERVOUS, ANXIOUS, OR ON EDGE: SEVERAL DAYS

## 2017-12-05 ASSESSMENT — PATIENT HEALTH QUESTIONNAIRE - PHQ9
5. POOR APPETITE OR OVEREATING: MORE THAN HALF THE DAYS
SUM OF ALL RESPONSES TO PHQ QUESTIONS 1-9: 12

## 2017-12-06 ASSESSMENT — ANXIETY QUESTIONNAIRES: GAD7 TOTAL SCORE: 10

## 2017-12-08 LAB
COPATH REPORT: ABNORMAL
PAP: ABNORMAL

## 2017-12-10 ENCOUNTER — THERAPY VISIT (OUTPATIENT)
Dept: SLEEP MEDICINE | Facility: CLINIC | Age: 27
End: 2017-12-10
Payer: COMMERCIAL

## 2017-12-10 DIAGNOSIS — R39.15 URGENCY OF URINATION: ICD-10-CM

## 2017-12-10 DIAGNOSIS — R35.0 URINARY FREQUENCY: ICD-10-CM

## 2017-12-10 DIAGNOSIS — G47.10 HYPERSOMNIA: ICD-10-CM

## 2017-12-10 PROCEDURE — 95810 POLYSOM 6/> YRS 4/> PARAM: CPT | Performed by: INTERNAL MEDICINE

## 2017-12-10 NOTE — MR AVS SNAPSHOT
"              After Visit Summary   12/10/2017    Yesika Grant    MRN: [id number]           Patient Information     Date Of Birth          1990        Visit Information        Provider Department      12/10/2017 8:00 PM BK BED 3 [facility]         Follow-ups after your visit        Who to contact     If you have questions or need follow up information about today's clinic visit or your schedule please contact [facility] directly at [phone].  Normal or non-critical lab and imaging results will be communicated to you by HRBosshart, letter or phone within 4 business days after the clinic has received the results. If you do not hear from us within 7 days, please contact the clinic through [employer] or phone. If you have a critical or abnormal lab result, we will notify you by phone as soon as possible.  Submit refill requests through esolidar or call your pharmacy and they will forward the refill request to us. Please allow 3 business days for your refill to be completed.          Additional Information About Your Visit        [facility] Information     esolidar lets you send messages to your doctor, view your test results, renew your prescriptions, schedule appointments and more. To sign up, go to [email or web address] . Click on \"Log in\" on the left side of the screen, which will take you to the Welcome page. Then click on \"Sign up Now\" on the right side of the page.     You will be asked to enter the access code listed below, as well as some personal information. Please follow the directions to create your username and password.     Your access code is: [email or web address]  Expires: 2018  5:01 PM     Your access code will  in 90 days. If you need help or a new code, please call your [place] clinic or [phone].        Care EveryWhere ID     This is your Care EveryWhere ID. This could be used by other organizations to access your [place] medical records  [id number]   "      Your Vitals Were     Last Period                   11/25/2017            Blood Pressure from Last 3 Encounters:   12/04/17 110/70   11/28/17 105/77   10/24/17 118/76    Weight from Last 3 Encounters:   12/04/17 99.8 kg (220 lb 1.6 oz)   11/28/17 99.8 kg (220 lb)   10/24/17 99.8 kg (220 lb)              Today, you had the following     No orders found for display       Primary Care Provider Office Phone # Fax #    Young Fletcher [phone] [address] [place] [address]        Equal Access to Services     [facility]: Hadii aad khanh hadasho Soomaali, waaxda luqadaha, qaybta kaalmada adedarylyada, monica deleon . So [facility] [phone].    ATENCIÓN: Si habla español, tiene a palacios disposición servicios gratuitos de asistencia lingüística. LlThe [facility] [phone].    We comply with applicable federal civil rights laws and [place] laws. We do not discriminate on the basis of race, color, national origin, age, disability, sex, sexual orientation, or gender identity.            Thank you!     Thank you for choosing [facility]  for your care. Our goal is always to provide you with excellent care. Hearing back from our patients is one way we can continue to improve our services. Please take a few minutes to complete the written survey that you may receive in the mail after your visit with us. Thank you!             Your Updated Medication List - Protect others around you: Learn how to safely use, store and throw away your medicines at www.disposemymeds.org.          This list is accurate as of: 12/10/17 11:59 PM.  Always use your most recent med list.                   Brand Name Dispense Instructions for use Diagnosis    ALPRAZolam 0.25 MG tablet    XANAX    10 tablet    Take 1 tablet (0.25 mg) by mouth nightly as needed for anxiety    Panic attacks       ARIPiprazole 5 MG tablet    ABILIFY    30 tablet    Take 1 tablet (5 mg) by mouth At Bedtime     Anxiety, Moderate episode of recurrent major depressive disorder (H)       cyclobenzaprine 10 MG tablet    FLEXERIL    90 tablet    Take 1 tablet (10 mg) by mouth 3 times daily as needed for muscle spasms    New daily persistent headache, Strain of neck muscle, subsequent encounter, Chronic midline low back pain without sciatica       FLUoxetine 40 MG capsule    PROzac    90 capsule    Take 1 capsule (40 mg) by mouth daily    Moderate episode of recurrent major depressive disorder (H), Anxiety       ibuprofen 200 MG tablet    ADVIL/MOTRIN     Take 3 tablets (600 mg) by mouth every 6 hours as needed for pain        IRON SUPPLEMENT PO      Reported on 5/23/2017        Multi-vitamin Tabs tablet      Take 1 tablet by mouth daily Reported on 5/23/2017        VITAMIN D (CHOLECALCIFEROL) PO      Take by mouth daily

## 2017-12-11 ENCOUNTER — THERAPY VISIT (OUTPATIENT)
Dept: SLEEP MEDICINE | Facility: CLINIC | Age: 27
End: 2017-12-11
Payer: COMMERCIAL

## 2017-12-11 DIAGNOSIS — R39.15 URGENCY OF URINATION: ICD-10-CM

## 2017-12-11 DIAGNOSIS — R06.83 SNORING: ICD-10-CM

## 2017-12-11 DIAGNOSIS — Q79.60 EDS (EHLERS-DANLOS SYNDROME): ICD-10-CM

## 2017-12-11 DIAGNOSIS — R35.0 URINARY FREQUENCY: ICD-10-CM

## 2017-12-11 DIAGNOSIS — R39.15 URGENCY OF URINATION: Primary | ICD-10-CM

## 2017-12-11 DIAGNOSIS — R40.0 DAYTIME SOMNOLENCE: ICD-10-CM

## 2017-12-11 DIAGNOSIS — R44.2 HYPNAGOGIC HALLUCINATIONS: ICD-10-CM

## 2017-12-11 LAB
ALBUMIN UR-MCNC: NEGATIVE MG/DL
APPEARANCE UR: ABNORMAL
BACTERIA #/AREA URNS HPF: ABNORMAL /HPF
BILIRUB UR QL STRIP: NEGATIVE
COLOR UR AUTO: YELLOW
FINAL DIAGNOSIS: ABNORMAL
GLUCOSE UR STRIP-MCNC: NEGATIVE MG/DL
HGB UR QL STRIP: ABNORMAL
HPV HR 12 DNA CVX QL NAA+PROBE: POSITIVE
HPV16 DNA SPEC QL NAA+PROBE: NEGATIVE
HPV18 DNA SPEC QL NAA+PROBE: NEGATIVE
KETONES UR STRIP-MCNC: NEGATIVE MG/DL
LEUKOCYTE ESTERASE UR QL STRIP: NEGATIVE
MUCOUS THREADS #/AREA URNS LPF: PRESENT /LPF
NITRATE UR QL: NEGATIVE
NON-SQ EPI CELLS #/AREA URNS LPF: ABNORMAL /LPF
PH UR STRIP: 6.5 PH (ref 5–7)
RBC #/AREA URNS AUTO: ABNORMAL /HPF
SOURCE: ABNORMAL
SP GR UR STRIP: 1.02 (ref 1–1.03)
SPECIMEN DESCRIPTION: ABNORMAL
UROBILINOGEN UR STRIP-ACNC: 0.2 EU/DL (ref 0.2–1)
WBC #/AREA URNS AUTO: ABNORMAL /HPF

## 2017-12-11 PROCEDURE — 95805 MULTIPLE SLEEP LATENCY TEST: CPT | Performed by: INTERNAL MEDICINE

## 2017-12-11 PROCEDURE — 81001 URINALYSIS AUTO W/SCOPE: CPT | Performed by: FAMILY MEDICINE

## 2017-12-11 NOTE — MR AVS SNAPSHOT
"              After Visit Summary   2017    Yesika Grant    MRN: [id number]           Patient Information     Date Of Birth          1990        Visit Information        Provider Department      2017 8:00 AM BK BED 5 [facility]        Today's Diagnoses     Daytime somnolence        Snoring        Hypnagogic hallucinations        EDS (Iwona-Danlos syndrome)           Follow-ups after your visit        Who to contact     If you have questions or need follow up information about today's clinic visit or your schedule please contact [facility] directly at [phone].  Normal or non-critical lab and imaging results will be communicated to you by [employer], letter or phone within 4 business days after the clinic has received the results. If you do not hear from us within 7 days, please contact the clinic through [employer] or phone. If you have a critical or abnormal lab result, we will notify you by phone as soon as possible.  Submit refill requests through [employer] or call your pharmacy and they will forward the refill request to us. Please allow 3 business days for your refill to be completed.          Additional Information About Your Visit        MyChart Information     [employer] lets you send messages to your doctor, view your test results, renew your prescriptions, schedule appointments and more. To sign up, go to [email or web address] . Click on \"Log in\" on the left side of the screen, which will take you to the Welcome page. Then click on \"Sign up Now\" on the right side of the page.     You will be asked to enter the access code listed below, as well as some personal information. Please follow the directions to create your username and password.     Your access code is: [email or web address]  Expires: 2018  5:01 PM     Your access code will  in 90 days. If you need help or a new code, please call your [place] clinic or [phone].        Care EveryWhere ID  "    This is your Care EveryWhere ID. This could be used by other organizations to access your [facility] medical records  [id number]        Your Vitals Were     Last Period                   11/25/2017            Blood Pressure from Last 3 Encounters:   12/04/17 110/70   11/28/17 105/77   10/24/17 118/76    Weight from Last 3 Encounters:   12/04/17 99.8 kg (220 lb 1.6 oz)   11/28/17 99.8 kg (220 lb)   10/24/17 99.8 kg (220 lb)              We Performed the Following     Comprehensive Sleep Study     HST-Home Sleep Apnea Test        Primary Care Provider Office Phone # Fax #    Young Fletcher [phone] [address] [place] [address]        Equal Access to Services     [facility] : Hadii aad ku hadasho Sojayne, waaxda luqadaha, qaybta kaalmada adeegyada, monica shultz haymehul deleon . So [facility] [phone].    ATENCIÓN: Si habla español, tiene a palacios disposición servicios gratuitos de asistencia lingüística. [facility] [phone].    We comply with applicable federal civil rights laws and [place] laws. We do not discriminate on the basis of race, color, national origin, age, disability, sex, sexual orientation, or gender identity.            Thank you!     Thank you for choosing [facility]  for your care. Our goal is always to provide you with excellent care. Hearing back from our patients is one way we can continue to improve our services. Please take a few minutes to complete the written survey that you may receive in the mail after your visit with us. Thank you!             Your Updated Medication List - Protect others around you: Learn how to safely use, store and throw away your medicines at www.disposemymeds.org.          This list is accurate as of: 12/11/17 11:59 PM.  Always use your most recent med list.                   Brand Name Dispense Instructions for use Diagnosis    ALPRAZolam 0.25 MG tablet    XANAX    10 tablet    Take 1 tablet  (0.25 mg) by mouth nightly as needed for anxiety    Panic attacks       ARIPiprazole 5 MG tablet    ABILIFY    30 tablet    Take 1 tablet (5 mg) by mouth At Bedtime    Anxiety, Moderate episode of recurrent major depressive disorder (H)       cyclobenzaprine 10 MG tablet    FLEXERIL    90 tablet    Take 1 tablet (10 mg) by mouth 3 times daily as needed for muscle spasms    New daily persistent headache, Strain of neck muscle, subsequent encounter, Chronic midline low back pain without sciatica       FLUoxetine 40 MG capsule    PROzac    90 capsule    Take 1 capsule (40 mg) by mouth daily    Moderate episode of recurrent major depressive disorder (H), Anxiety       ibuprofen 200 MG tablet    ADVIL/MOTRIN     Take 3 tablets (600 mg) by mouth every 6 hours as needed for pain        IRON SUPPLEMENT PO      Reported on 5/23/2017        Multi-vitamin Tabs tablet      Take 1 tablet by mouth daily Reported on 5/23/2017        VITAMIN D (CHOLECALCIFEROL) PO      Take by mouth daily

## 2017-12-12 NOTE — PROGRESS NOTES
12/4/17 ASCUS pap, + HR HPV (not 16/18). Plan colp due by 3/4/18  12/13/17 left msg  12/18/17 Pt. Notified.  1/4/18 [place]: ectocervical mucosa with reactive changes. ECC: negative. Plan: cotest in 1 yr  1/22/18 Patient notified.   12/27/18 Cotest reminder letter sent (rlm)  1/24/19 Reminder call to pt, pt will call to schedule (rlm)  2/7/19 Patient is lost to follow-up. Routed to provider as FYI. (rlm)

## 2017-12-14 DIAGNOSIS — G47.10 HYPERSOMNIA: ICD-10-CM

## 2017-12-14 LAB
AMPHETAMINES UR QL: NOT DETECTED NG/ML
BARBITURATES UR QL SCN: NOT DETECTED NG/ML
BENZODIAZ UR QL SCN: NOT DETECTED NG/ML
BUPRENORPHINE UR QL: NOT DETECTED NG/ML
CANNABINOIDS UR QL: ABNORMAL NG/ML
COCAINE UR QL SCN: NOT DETECTED NG/ML
D-METHAMPHET UR QL: NOT DETECTED NG/ML
METHADONE UR QL SCN: NOT DETECTED NG/ML
OPIATES UR QL SCN: NOT DETECTED NG/ML
OXYCODONE UR QL SCN: NOT DETECTED NG/ML
PCP UR QL SCN: NOT DETECTED NG/ML
PROPOXYPH UR QL: NOT DETECTED NG/ML
TRICYCLICS UR QL SCN: NOT DETECTED NG/ML

## 2017-12-14 PROCEDURE — 80307 DRUG TEST PRSMV CHEM ANLYZR: CPT | Mod: 90 | Performed by: INTERNAL MEDICINE

## 2017-12-14 PROCEDURE — 99000 SPECIMEN HANDLING OFFICE-LAB: CPT | Performed by: INTERNAL MEDICINE

## 2017-12-19 NOTE — PROCEDURES
" SLEEP STUDY INTERPRETATION  POLYSOMNOGRAPHY REPORT      Patient: Yesika Grant  YOB: 1990  Study Date: 12/10/2017  MRN: [id number]  Referring Provider: Clifton Ames  Ordering Provider: Rambo Hawkins MD    Indications for Polysomnography: The patient is a 27 y old Female who is 5' 7\" and weighs 220.0 lbs.  Her BMI is 34.5, [place] sleepiness scale 19.0 and neck size is 40.0.  Relevant medical history includes tobacco use, obesity, depression, and anxiety. A diagnostic polysomnogram was performed to evaluate for MAJOR and hypersomnia.    Polysomnogram Data:  A full night polysomnogram recorded the standard physiologic parameters including EEG, EOG, EMG, ECG, nasal and oral airflow.  Respiratory parameters of chest and abdominal movements were recorded with respiratory inductance plethysmography.  Oxygen saturation was recorded by pulse oximetry.      Sleep Architecture: Normal sleep latency without sleep aid, normal arousal index, and normal sleep efficiency.  Decreased N3 and markedly increased REM sleep.  The total recording time of the polysomnogram was 710.0 minutes.  The total sleep time was 674.5 minutes.  Sleep latency was normal at 9.0 minutes without the use of a sleep aid.  REM latency was 116.0 minutes.  Arousal index was normal at 13.3 arousals per hour.  Sleep efficiency was normal at 95.0%.  Wake after sleep onset was 25.5 minutes.  The patient spent 9.1% of total sleep time in Stage N1, 44.6% in Stage N2, 9.9% in Stages N3, and 36.4% in REM.  Time in REM supine was 32.0 minutes.    Respiration: Snoring without significant sleep-disordered breathing.    Events - The polysomnogram revealed a presence of 9 obstructive, - central, and 1 mixed apneas resulting in an apnea index of 0.9 events per hour.  There were 23 hypopneas resulting in a hypopnea index of 2.0 events per hour.  The combined apnea/hypopnea index was 2.9 events per hour.  The REM AHI was 5.1 events per hour.  The " supine AHI was 2.4 events per hour.  The RERA index was 2.8 events per hour.   The RDI was 5.7 events per hour.    Snoring - was reported as loud.    Respiratory rate and pattern - was normal.    Sustained Sleep Associated Hypoventilation - Transcutaneous carbon dioxide monitoring was not used, however significant hypoventilation was not suggested by oximetry.    Sleep Associated Hypoxemia - (Greater than 5 minutes O2 sat below 89%) was not present.  Baseline oxygen saturation was 92.8%. Lowest oxygen saturation was 85.2%.  Time spent less than or equal to 88% was 0.7 minutes.  Time spent less than or equal to 89% was 1.5 minutes.    Movement Activity: Few PLMs of unlikely significance.    Periodic Limb Activity - There were 55 PLMs during the entire study. The PLM index was 4.9 movements per hour.  The PLM Arousal Index was - per hour.    REM EMG Activity - Excessive muscle activity was not present.    Nocturnal Behavior - Abnormal sleep related behaviors were not noted.    Bruxism - None apparent.    Cardiac Summary: No significant arrhythmias noted.  The average pulse rate was 59.3 bpm.  The minimum pulse rate was 47.0 bpm while the maximum pulse rate was 100.0 bpm. The rhythm is normal sinus. Arrhythmias were not noted.    Pre PSG Evaluation: Overall average time in bed and estimated sleep time, however, with significant decrease in week prior to sleep study, and significant sleep deprivation on night prior to sleep study.    Actigraphy -   o Dates of recording - From 11/29/2017 to 12/12/2017.  o Quality - Excellent (minimal off wrist time, and decent use of marker button)  o Bed Time - average 11:06 PM (range of 8:15 PM to 3:46 AM).  o Get Up Time - average 6:59 AM (range of 5:02 AM to 9:41 AM).  o Time in Bed - average 7:53 hours (range of 3:07 to 13:03 hours).  o Estimated Total Sleep Time - average 6:04 hours (range of 2:00 to 11:08 hours).  o Sleep Onset Delay - delays in sleep onset noted second  week.  o Sleep Periods - are interrupted by movements  o Light exposure periods - are inappropriately timed to sleep schedule 1 day.  o Napping - is not noted.  - Week 1    8.75 hours time in bed per night    6.52 hours of total estimated sleep time per night  - Week 2    6.90 hours time in bed per night    5.56 hours of total estimated sleep time per night  - Night prior to PSG    3.11 hours time in bed    1.99 hours of total estimated sleep time  Actigraphy Interpretation: Sleep wake pattern is consistent with irregular sleep pattern with acute sleep deprivation.      Assessment:     Normal sleep latency without sleep aid, normal arousal index, and normal sleep efficiency.  Decreased N3 and markedly increased REM sleep.    Snoring without significant sleep-disordered breathing.     Few PLMs of unlikely significance.     No significant arrhythmias noted.    Overall average time in bed and estimated sleep time, however, with significant decrease in week prior to sleep study, and significant sleep deprivation on night prior to sleep study.     Urine drug screen positive for THC metabolites (tested 2 days after sleep study).    Recommendations:    Recommend addressing sleep deprivation and sedating substances which may contribute to symptom burden.    Advise regarding the risks of drowsy driving.    Suggest optimizing sleep schedule and avoiding sleep deprivation.    Weight management (if BMI > 30).    Pharmacologic therapy should be used for management of restless legs syndrome only if present and clinically indicated and not based on the presence of periodic limb movements alone.    Cardiac Comments: Normal Sinus Rhythm  Diagnostic Codes:     Hypersomnia, Unspecified F51.11    Unspecified Sleep Disturbance G47.9     _____________________________________   Electronically Signed By: Ashwin Anguiano MD 12/19/2017       ------------------------------------------------------------------------------------        MSLT/MWT  "REPORT      Patient Name: Yesika Grant Study Date: 12/11/2017   YOB: 1990 Study Type: MSLT   Age:  27 y MRN: [id number]   Sex: Female Interp Physician: Ashwin Anguiano MD   BMI:  34.5 Ordering Physician: Rambo Hawkins MD   Height: 5' 7\" Referring Physician: Clifton Ames   Weight: 220.0 lbs Recording Tech: C. Shalom, RPSGT, RST   [place]: 19.0 Neck Size (cm): 40.0 Scoring Tech: C. Olorettaugi, RPSGT, RST   Nap Summary       Nap 1 Nap 2 Nap 3 Nap 4 Nap 5 Average   Lights Off 11:04:13 AM 01:04:13 PM 03:04:43 PM 04:59:13 PM 07:00:44 PM -   Lights On 11:21:13 AM 01:24:44 PM 03:26:13 PM 05:18:13 PM 07:23:14 PM -   Time In Bed 17.0 20.5 21.5 19.0 22.5 20.1   Sleep Time 15.5 14.5 13.5 15.5 13.0 14.4   Sleep Efficiency 91.2% 70.8% 62.9% 81.6% 57.9% 71.7%   Sleep Onset 11:05:42 AM 01:09:12 PM 03:10:42 PM 05:02:42 PM 07:07:42 PM -   Sleep Latency 1.5 5.0 6.0 3.5 7.0 4.6   REM Onset - - - - - -   REM Sleep Onset Latency - - - - - -         Recording / Sleep Tech Comments    10 Channel MSLT completed. Patient was monitored between naps for wakefulness. Sleep diary and actigraphy was collected. A UTOX was collected. She did consumed breakfast after PSG. She eats lunch after the second nap. When not napping, Pt mainly was using her cell phone and computer.     Nap 1) Tired, SO:  1.5 min, REM: none, felt that she slept. Pt felt tried after nap was ended.   Nap 2) Tried, SO: 5.0 min, REM: none, felt that she slept.  Pt felt tired after nap was ended.   Nap 3) Tired, SO: 6.0 min, REM: none, felt that she slept. Pt felt tired after nap was ended.   Nap 4) A littler Tired, SO:  3.50 min, REM: none, felt that she slept. Pt felt tried after nap was ended.   Nap 5) A littler tired, SO:  7.0 min, REM: none, felt that she not slept. Pt felt tired after nap was ended.     Physician Interpretation  Abnormal MSLT findings with short latencies (mean of 4.6 minutes) and 0/6 SOREMs (including PSG).  Actigraphy showed " significant sleep deprivation prior to PSG.  Increased REM % on PSG with high sleep efficiency, which may be found in sleep deprivation, and possibly pathological hypersomnolence (although increased REM % is not typical).  Urine drug screen positive for THC metabolites.    Interpreted in isolation, this MSLT would be indicative of pathological hypersomnolence (but not Narcolepsy). However, in context of aforementioned abnormalities, the test should be considered invalid.  Of course, clinical correlation is recommended.    Electronically Signed By: Ashwin Anguiano MD 12/19/2017

## 2017-12-21 LAB — PAIN DRUG SCR UR W RPTD MEDS: NORMAL

## 2018-01-04 ENCOUNTER — OFFICE VISIT (OUTPATIENT)
Dept: OBGYN | Facility: CLINIC | Age: 28
End: 2018-01-04
Payer: COMMERCIAL

## 2018-01-04 VITALS
BODY MASS INDEX: 34.39 KG/M2 | HEART RATE: 73 BPM | WEIGHT: 219.6 LBS | DIASTOLIC BLOOD PRESSURE: 70 MMHG | SYSTOLIC BLOOD PRESSURE: 106 MMHG | OXYGEN SATURATION: 97 %

## 2018-01-04 DIAGNOSIS — R87.810 ASCUS WITH POSITIVE HIGH RISK HPV CERVICAL: Primary | ICD-10-CM

## 2018-01-04 DIAGNOSIS — R87.610 ASCUS WITH POSITIVE HIGH RISK HPV CERVICAL: Primary | ICD-10-CM

## 2018-01-04 LAB — BETA HCG QUAL IFA URINE: NEGATIVE

## 2018-01-04 PROCEDURE — 99202 OFFICE O/P NEW SF 15 MIN: CPT | Mod: 25 | Performed by: OBSTETRICS & GYNECOLOGY

## 2018-01-04 PROCEDURE — 57454 BX/CURETT OF CERVIX W/SCOPE: CPT | Performed by: OBSTETRICS & GYNECOLOGY

## 2018-01-04 PROCEDURE — 88305 TISSUE EXAM BY PATHOLOGIST: CPT | Performed by: OBSTETRICS & GYNECOLOGY

## 2018-01-04 PROCEDURE — 84703 CHORIONIC GONADOTROPIN ASSAY: CPT | Performed by: OBSTETRICS & GYNECOLOGY

## 2018-01-04 NOTE — PROGRESS NOTES
Patient Name: Yesika Grant              Date: 1/4/2018   YOB: 1990                         Age: 27 year old   Phone: [phone] (home)   ________________________________________________________________________  I have been asked to see [place] in consultation by Young Fletcher MD,  to discuss the pap smear, findings and possible further evaluation.  The patient's pap smear on 12/04/2017, showed ASCUS and high risk HPV.   I attempted to ensure that the patient was educated regarding the nature of her findings and implications to date.  We reviewed the role of HPV, incidence in the population and the natural history of the infection, and its transmission.  We also reviewed ways to minimize her future risk, the effect of HPV on the cervix and treatment options available, should they be indicated.    The pathophysiology of the cervix, including a discussion of the squamous and columnar cells, metaplasia and dysplasia have been reviewed, drawings, sketches and the pamphlets were reviewed with her.       No LMP recorded.  Current Birth Control Method: none   Age at first sexual intercourse: 19 years old  Number of sexual partners (lifetime): 8, 3 new partners in the past year.   History of veneral diseases: : no  History of genital warts:  No  Visible warts now?:  No  Family History of  Cervical, Uterine or Vaginal Cancer?: No    Past Medical History:   Diagnosis Date     ASCUS with positive high risk HPV cervical 12/04/2017    (not 16/18)     Moderate major depression (H) 5/3/2012     Motion sickness      Ovarian cyst 11/05    small 2 cm simple left ovarian cyst vs dominant follicle on US     Tonsillitis      Urticaria     ?due to oat straw allergy       Past Surgical History:   Procedure Laterality Date     HC TOOTH EXTRACTION W/FORCEP Bilateral      OPEN REDUCTION INTERNAL FIXATION ANKLE Right 6/9/2017    Procedure: OPEN REDUCTION INTERNAL FIXATION ANKLE;  Open Reduction Internal Fixation Right  Ankle;  Surgeon: Nate Beck DPM;  Location: [facility] OR     TONSILLECTOMY, ADENOIDECTOMY ADULT, COMBINED  12/1/2011    Procedure:COMBINED TONSILLECTOMY, ADENOIDECTOMY ADULT; Adult Tonsillectomy & Adenoidectomy, Cauterization Of       TURBINOPLASTY  12/1/2011    Procedure:TURBINOPLASTY; Surgeon:GISELE SWENSON; Location:[facility] OR        Outpatient Encounter Prescriptions as of 1/4/2018   Medication Sig Dispense Refill     FLUoxetine (PROZAC) 40 MG capsule Take 1 capsule (40 mg) by mouth daily 90 capsule 1     ARIPiprazole (ABILIFY) 5 MG tablet Take 1 tablet (5 mg) by mouth At Bedtime 30 tablet 1     ALPRAZolam (XANAX) 0.25 MG tablet Take 1 tablet (0.25 mg) by mouth nightly as needed for anxiety 10 tablet 0     ibuprofen (ADVIL/MOTRIN) 200 MG tablet Take 3 tablets (600 mg) by mouth every 6 hours as needed for pain       cyclobenzaprine (FLEXERIL) 10 MG tablet Take 1 tablet (10 mg) by mouth 3 times daily as needed for muscle spasms 90 tablet 1     VITAMIN D, CHOLECALCIFEROL, PO Take by mouth daily       multivitamin, therapeutic with minerals (MULTI-VITAMIN) TABS Take 1 tablet by mouth daily Reported on 5/23/2017       Ferrous Sulfate (IRON SUPPLEMENT PO) Reported on 5/23/2017       No facility-administered encounter medications on file as of 1/4/2018.         Allergies as of 01/04/2018 - Malik as Reviewed 12/04/2017   Allergen Reaction Noted     Atarax [hydroxyzine] Other (See Comments) 09/14/2015     Vicodin [hydrocodone-acetaminophen] Nausea 05/28/2017     Zithromax [azithromycin dihydrate] Hives and Swelling 05/03/2012       Social History     Social History     Marital status: Single     Spouse name: N/A     Number of children: 0     Years of education: N/A     Occupational History      Unemployed     Social History Main Topics     Smoking status: Current Every Day Smoker     Packs/day: 1.00     Types: Cigarettes     Last attempt to quit: 5/4/2015     Smokeless tobacco: Never Used     Alcohol use Yes       Comment: 1-2 drinks a month     Drug use: No     Sexual activity: Yes     Partners: Male     Birth control/ protection: Condom, IUD      Comment: mirena 5/13     Other Topics Concern     Parent/Sibling W/ Cabg, Mi Or Angioplasty Before 65f 55m? No     Social History Narrative    Home schooling, planning to obtain GED.        Family History   Problem Relation Age of Onset     Alzheimer Disease Maternal Grandmother      Hypertension Maternal Grandmother      Thyroid Disease Maternal Grandmother      Arthritis Maternal Grandfather      CANCER Paternal Grandmother      Breast Cancer     Genitourinary Problems Paternal Grandmother      HEART DISEASE Paternal Grandfather      Hypertension Mother      Genitourinary Problems Sister      endometriosis     Respiratory Father 49     Pulmonary Embolism     DIABETES Paternal Uncle          Review of Systems:  10 point ROS of systems including Constitutional, Eyes, Respiratory, Cardiovascular, Gastroenterology, Genitourinary, Integumentary, Muscularskeletal, Psychiatric were all negative except for pertinent positives noted in my HPI and in the PMH.      Exam:   /70 (BP Location: Left arm, Cuff Size: Adult Regular)  Pulse 73  Wt 99.6 kg (219 lb 9.6 oz)  LMP 01/01/2018  SpO2 97%  BMI 34.39 kg/m2  GENERAL:  WNWD female NAD  HEENT: NC/AT, EOMI  Lungs:  Good respiratory effort   SKIN: normal skin turgor  GAIT: Normal  NECK: Symmetrical, no masses noted   VULVA: Normal Genitalia  BUS: Normal  URETHRA:  No hypermobility noted  URETHRAL MEATUS:  No masses noted  VAGINA: Normal mucosa, no discharge  CERVIX: Closed, mobile, no discharge  PERIANAL:  No masses or lesions seen  EXTREMITIES: no clubbing, cyanosis, or edema    Assessment:  ASCUS pap smear and high risk HPV, not 16 or 18.     Plan:  Recommend to Proceed with Colpo  The details of the colposcopic procedure were reviewed, the risks of missed diagnoses, pain, infection, and bleeding.    Saravanan Alejo,  MD        Procedure:  Procedure for colposcopy and biopsy has been explained to the patient and consent obtained.    Before the procedure, it was ensured that the patient was educated regarding the nature of her findings and implications to date.  We reviewed the role of HPV and the natural history of the infection.  We also reviewed ways to minimize her future risk, the effect of HPV on the cervix and treatment options available, should they be indicated.    The pathophysiology of the cervix, including a discussion of the squamous and columnar cells, metaplasia and dysplasia have been reviewed, drawings, sketches and the pamphlets were reviewed with her.  The details of the colposcopic procedure were reviewed, the risks of missed diagnoses, pain, infection, and bleeding.  Questions seemed to be answered before proceeding and the patient then consented to the procedure.     Speculum placed in vagina and excellent visualization of cervix achieved, cervix swabbed  with acetic acid solution.    biopsies taken (including ECC): 2  Hemostasis noted     Findings:  Cervix: minimal AWE seen at the 12-1 o'clock position  Vaginal inspection: normal without visible lesions.  Procedure Summary: Patient tolerated procedure well and colposcopy adequate.      Assessment:   ASCUS pap smear and high risk HPV    Plan:  Specimens labelled and sent to pathology.  Will base further treatment on pathology findings.  Post biopsy instructions given to patient and call to discuss Pathology results.    Saravanan Alejo MD

## 2018-01-04 NOTE — NURSING NOTE
"Chief Complaint   Patient presents with     Consult     abnormal papsmear per Dr. Fletcher     Colposcopy     ASC-US HPV+ other       Initial /70 (BP Location: Left arm, Cuff Size: Adult Regular)  Pulse 73  Wt 99.6 kg (219 lb 9.6 oz)  LMP 01/01/2018  SpO2 97%  BMI 34.39 kg/m2 Estimated body mass index is 34.39 kg/(m^2) as calculated from the following:    Height as of 12/4/17: 1.702 m (5' 7\").    Weight as of this encounter: 99.6 kg (219 lb 9.6 oz).  Medication Reconciliation: complete   CHRISTOPHER Patterson 1/4/2018         "

## 2018-01-04 NOTE — MR AVS SNAPSHOT
"              After Visit Summary   2018    Yesika Grant    MRN: [id number]           Patient Information     Date Of Birth          1990        Visit Information        Provider Department      2018 2:30 PM Saravanan Alejo MD; PROC RM 1 [facility]        Today's Diagnoses     ASCUS with positive high risk HPV cervical    -  1       Follow-ups after your visit        Who to contact     If you have questions or need follow up information about today's clinic visit or your schedule please contact [facility] directly at [phone].  Normal or non-critical lab and imaging results will be communicated to you by Mobykohart, letter or phone within 4 business days after the clinic has received the results. If you do not hear from us within 7 days, please contact the clinic through Mobykohart or phone. If you have a critical or abnormal lab result, we will notify you by phone as soon as possible.  Submit refill requests through Talaentia or call your pharmacy and they will forward the refill request to us. Please allow 3 business days for your refill to be completed.          Additional Information About Your Visit        MyChart Information     Talaentia lets you send messages to your doctor, view your test results, renew your prescriptions, schedule appointments and more. To sign up, go to [email or web address] . Click on \"Log in\" on the left side of the screen, which will take you to the Welcome page. Then click on \"Sign up Now\" on the right side of the page.     You will be asked to enter the access code listed below, as well as some personal information. Please follow the directions to create your username and password.     Your access code is: [email or web address]  Expires: 2018  5:01 PM     Your access code will  in 90 days. If you need help or a new code, please call your [facility] or [phone].        Care EveryWhere ID     This is your Care " EveryWhere ID. This could be used by other organizations to access your [facility] medical records  [id number]        Your Vitals Were     Pulse Last Period Pulse Oximetry BMI (Body Mass Index)          73 01/01/2018 97% 34.39 kg/m2         Blood Pressure from Last 3 Encounters:   01/04/18 106/70   12/04/17 110/70   11/28/17 105/77    Weight from Last 3 Encounters:   01/04/18 99.6 kg (219 lb 9.6 oz)   12/04/17 99.8 kg (220 lb 1.6 oz)   11/28/17 99.8 kg (220 lb)              We Performed the Following     Beta HCG qual IFA urine     COLP CERVIX/UPPER VAGINA     Surgical pathology exam        Primary Care Provider Office Phone # Fax #    Young Fletcher [phone] [address] [place] [address]        Equal Access to Services     [facility]: Hadii aad khanh hadasho Sojayne, waaxda luqadaha, qaybta kaalmada adeegyada, monica shultz haymehul deleon . So [facility] [phone].    ATENCIÓN: Si habla español, tiene a palacios disposición servicios gratuitos de asistencia lingüística. Llame al [phone].    We comply with applicable federal civil rights laws and [place] laws. We do not discriminate on the basis of race, color, national origin, age, disability, sex, sexual orientation, or gender identity.            Thank you!     Thank you for choosing [facility]  for your care. Our goal is always to provide you with excellent care. Hearing back from our patients is one way we can continue to improve our services. Please take a few minutes to complete the written survey that you may receive in the mail after your visit with us. Thank you!             Your Updated Medication List - Protect others around you: Learn how to safely use, store and throw away your medicines at www.disposemymeds.org.          This list is accurate as of: 1/4/18  5:04 PM.  Always use your most recent med list.                   Brand Name Dispense Instructions for use Diagnosis     ALPRAZolam 0.25 MG tablet    XANAX    10 tablet    Take 1 tablet (0.25 mg) by mouth nightly as needed for anxiety    Panic attacks       ARIPiprazole 5 MG tablet    ABILIFY    30 tablet    Take 1 tablet (5 mg) by mouth At Bedtime    Anxiety, Moderate episode of recurrent major depressive disorder (H)       cyclobenzaprine 10 MG tablet    FLEXERIL    90 tablet    Take 1 tablet (10 mg) by mouth 3 times daily as needed for muscle spasms    New daily persistent headache, Strain of neck muscle, subsequent encounter, Chronic midline low back pain without sciatica       FLUoxetine 40 MG capsule    PROzac    90 capsule    Take 1 capsule (40 mg) by mouth daily    Moderate episode of recurrent major depressive disorder (H), Anxiety       ibuprofen 200 MG tablet    ADVIL/MOTRIN     Take 3 tablets (600 mg) by mouth every 6 hours as needed for pain        IRON SUPPLEMENT PO      Reported on 5/23/2017        Multi-vitamin Tabs tablet      Take 1 tablet by mouth daily Reported on 5/23/2017        VITAMIN D (CHOLECALCIFEROL) PO      Take by mouth daily

## 2018-01-10 LAB — COPATH REPORT: NORMAL

## 2018-01-11 ENCOUNTER — VIRTUAL VISIT (OUTPATIENT)
Dept: FAMILY MEDICINE | Facility: OTHER | Age: 28
End: 2018-01-11
Payer: COMMERCIAL

## 2018-01-11 DIAGNOSIS — F41.9 ANXIETY: ICD-10-CM

## 2018-01-11 DIAGNOSIS — F41.0 PANIC ATTACKS: ICD-10-CM

## 2018-01-11 DIAGNOSIS — F33.1 MODERATE EPISODE OF RECURRENT MAJOR DEPRESSIVE DISORDER (H): Primary | ICD-10-CM

## 2018-01-11 DIAGNOSIS — Z30.011 BCP (BIRTH CONTROL PILLS) INITIATION: ICD-10-CM

## 2018-01-11 PROBLEM — R87.610 ASCUS WITH POSITIVE HIGH RISK HPV CERVICAL: Status: ACTIVE | Noted: 2017-12-04

## 2018-01-11 PROBLEM — R87.810 ASCUS WITH POSITIVE HIGH RISK HPV CERVICAL: Status: ACTIVE | Noted: 2017-12-04

## 2018-01-11 PROCEDURE — 99441 ZZC PHYSICIAN TELEPHONE EVALUATION 5-10 MIN: CPT | Performed by: FAMILY MEDICINE

## 2018-01-11 RX ORDER — NORGESTIMATE AND ETHINYL ESTRADIOL 0.25-0.035
1 KIT ORAL DAILY
Qty: 84 TABLET | Refills: 3 | Status: SHIPPED | OUTPATIENT
Start: 2018-01-11 | End: 2018-12-28

## 2018-01-11 RX ORDER — ALPRAZOLAM 0.25 MG
0.25 TABLET ORAL
Qty: 10 TABLET | Refills: 0 | Status: SHIPPED | OUTPATIENT
Start: 2018-01-11 | End: 2018-04-12

## 2018-01-11 RX ORDER — VENLAFAXINE HYDROCHLORIDE 37.5 MG/1
CAPSULE, EXTENDED RELEASE ORAL
Qty: 46 CAPSULE | Refills: 1 | Status: SHIPPED | OUTPATIENT
Start: 2018-01-11 | End: 2018-03-20

## 2018-01-11 ASSESSMENT — PAIN SCALES - GENERAL: PAINLEVEL: MILD PAIN (3)

## 2018-01-11 ASSESSMENT — PATIENT HEALTH QUESTIONNAIRE - PHQ9
SUM OF ALL RESPONSES TO PHQ QUESTIONS 1-9: 18
5. POOR APPETITE OR OVEREATING: NEARLY EVERY DAY

## 2018-01-11 ASSESSMENT — ANXIETY QUESTIONNAIRES
GAD7 TOTAL SCORE: 13
6. BECOMING EASILY ANNOYED OR IRRITABLE: NEARLY EVERY DAY
3. WORRYING TOO MUCH ABOUT DIFFERENT THINGS: NEARLY EVERY DAY
7. FEELING AFRAID AS IF SOMETHING AWFUL MIGHT HAPPEN: SEVERAL DAYS
IF YOU CHECKED OFF ANY PROBLEMS ON THIS QUESTIONNAIRE, HOW DIFFICULT HAVE THESE PROBLEMS MADE IT FOR YOU TO DO YOUR WORK, TAKE CARE OF THINGS AT HOME, OR GET ALONG WITH OTHER PEOPLE: EXTREMELY DIFFICULT
1. FEELING NERVOUS, ANXIOUS, OR ON EDGE: SEVERAL DAYS
2. NOT BEING ABLE TO STOP OR CONTROL WORRYING: SEVERAL DAYS
5. BEING SO RESTLESS THAT IT IS HARD TO SIT STILL: SEVERAL DAYS

## 2018-01-11 NOTE — PROGRESS NOTES
"Yesika Grant is a 27 year old female who is being evaluated via a billable telephone visit.      The patient has been notified of following:     \"This telephone visit will be conducted via a call between you and your physician/provider. We have found that certain health care needs can be provided without the need for a physical exam.  This service lets us provide the care you need with a short phone conversation.  If a prescription is necessary we can send it directly to your pharmacy.  If lab work is needed we can place an order for that and you can then stop by our lab to have the test done at a later time.    We will bill your insurance company for this service.  Please check with your medical insurance if this type of visit is covered. You may be responsible for the cost of this type of visit if insurance coverage is denied.  The typical cost is $30 (10min), $59 (11-20min) and $85 (21-30min).  Most often these visits are shorter than 10 minutes.    If during the course of the call the physician/provider feels a telephone visit is not appropriate, you will not be charged for this service.\"       Consent has been obtained for this service by care team member: yes. See the scanned image in the medical record.  SUBJECTIVE:     Yesika Grant complains of    Chief Complaint   Patient presents with     Depression     meds       I have reviewed and updated the patient's Past Medical History, Social History, Family History and Medication List.    ALLERGIES  Atarax [hydroxyzine]; Vicodin [hydrocodone-acetaminophen]; and Zithromax [azithromycin dihydrate]    Daniel Qiu CMA   (MA signature)    Additional provider notes:   Pt has had trouble since we changed medications.  Has been down \"all the time\".  This has been going on since starting the Abilify.  It makes it hard for her to sleep.  Has not helped with her mood at all.  Previously, was feeling tired a lot.      We had previously discussed trying " Effexor XR.      Depression and Anxiety Follow-Up    Status since last visit: Worsened severely    Other associated symptoms:severe depression, weakness    Complicating factors:     Significant life event: No     Current substance abuse: None    PHQ-9 Score and MyChart F/U Questions 9/8/2017 12/5/2017 1/11/2018   Total Score 13 12 18   Q9: Suicide Ideation Not at all Not at all Not at all     ZAN-7 SCORE 9/8/2017 12/5/2017 1/11/2018   Total Score - - -   Total Score 10 10 13     In the past two weeks have you had thoughts of suicide or self-harm?  No.    Do you have concerns about your personal safety or the safety of others?   No    PHQ-9  English  PHQ-9   Any Language  GAD7  Suicide Assessment Five-step Evaluation and Treatment (SAFE-T)    Has been having panic attacks a couple of times/week.  Would also like a refill on her Xanax.      Finally requests Rx for birth control.  Previously on previfem.  Did well with these.      OBJECTIVE:       ICD-10-CM    1. Moderate episode of recurrent major depressive disorder (H) F33.1 venlafaxine (EFFEXOR-XR) 37.5 MG 24 hr capsule   2. Panic attacks F41.0 ALPRAZolam (XANAX) 0.25 MG tablet   3. Anxiety F41.9 venlafaxine (EFFEXOR-XR) 37.5 MG 24 hr capsule   4. BCP (birth control pills) initiation Z30.011 norgestimate-ethinyl estradiol (ORTHO-CYCLEN, SPRINTEC) 0.25-35 MG-MCG per tablet       1,3.  Will stop Abilify and start Effexor XR to help with mood (and possibly pain).  If getting fatigued again, could consider resuming Abilify at a lower dose.  Follow up on mood in one month, sooner if problems.    2.  Will refill Xanax for occasional use.  Avoid too frequent use of this medication.  4.  Recent colposcopy is reassuring.  Will give one year supply.    I have reviewed the note as documented above.  This accurately captures the substance of my conversation with the patient,  Yesika Grant     Total time of call between patient and provider was 8 minutes     Daniel ROWLEY  Uyen, CMA

## 2018-01-11 NOTE — MR AVS SNAPSHOT
"              After Visit Summary   2018    Yesika Grant    MRN: [id number]           Patient Information     Date Of Birth          1990        Visit Information        Provider Department      2018 12:15 PM Young Fletcher MD [facility]        Today's Diagnoses     Moderate episode of recurrent major depressive disorder (H)    -  1    Panic attacks        Anxiety        BCP (birth control pills) initiation           Follow-ups after your visit        Who to contact     If you have questions or need follow up information about today's clinic visit or your schedule please contact [facility] directly at [phone].  Normal or non-critical lab and imaging results will be communicated to you by LineMetricshart, letter or phone within 4 business days after the clinic has received the results. If you do not hear from us within 7 days, please contact the clinic through LineMetricshart or phone. If you have a critical or abnormal lab result, we will notify you by phone as soon as possible.  Submit refill requests through HALSCION or call your pharmacy and they will forward the refill request to us. Please allow 3 business days for your refill to be completed.          Additional Information About Your Visit        MyChart Information     HALSCION lets you send messages to your doctor, view your test results, renew your prescriptions, schedule appointments and more. To sign up, go to [email or web address] . Click on \"Log in\" on the left side of the screen, which will take you to the Welcome page. Then click on \"Sign up Now\" on the right side of the page.     You will be asked to enter the access code listed below, as well as some personal information. Please follow the directions to create your username and password.     Your access code is: [email or web address]  Expires: 2018  5:01 PM     Your access code will  in 90 days. If you need help or a new code, please call your " [facility] or [phone].        Care EveryWhere ID     This is your Care EveryWhere ID. This could be used by other organizations to access your [place] medical records  [id number]        Your Vitals Were     Last Period                   01/01/2018            Blood Pressure from Last 3 Encounters:   01/04/18 106/70   12/04/17 110/70   11/28/17 105/77    Weight from Last 3 Encounters:   01/04/18 219 lb 9.6 oz (99.6 kg)   12/04/17 220 lb 1.6 oz (99.8 kg)   11/28/17 220 lb (99.8 kg)              Today, you had the following     No orders found for display         Today's Medication Changes          These changes are accurate as of: 1/11/18  2:22 PM.  If you have any questions, ask your nurse or doctor.               Start taking these medicines.        Dose/Directions    norgestimate-ethinyl estradiol 0.25-35 MG-MCG per tablet   Commonly known as:  ORTHO-CYCLEN, SPRINTEC   Used for:  BCP (birth control pills) initiation   Started by:  Young Fletcher MD        Dose:  1 tablet   Take 1 tablet by mouth daily   Quantity:  84 tablet   Refills:  3       venlafaxine 37.5 MG 24 hr capsule   Commonly known as:  EFFEXOR-XR   Used for:  Anxiety, Moderate episode of recurrent major depressive disorder (H)   Started by:  Young Fletcher MD        Take 1 capsule daily for 14 days, then take 2 capsules daily.   Quantity:  46 capsule   Refills:  1         Stop taking these medicines if you haven't already. Please contact your care team if you have questions.     ARIPiprazole 5 MG tablet   Commonly known as:  ABILIFY   Stopped by:  Young Fletcher MD                Where to get your medicines      These medications were sent to [facility] - [address]   [address] [place] [address]     Phone:  [phone]     norgestimate-ethinyl estradiol 0.25-35 MG-MCG per tablet    venlafaxine 37.5 MG 24 hr capsule         Some of these will need a paper  prescription and others can be bought over the counter.  Ask your nurse if you have questions.     Bring a paper prescription for each of these medications     ALPRAZolam 0.25 MG tablet                Primary Care Provider Office Phone # Fax #    Young Fletcher [phone] [address] [place] [address]        Equal Access to Services     : Hadii aad ku hadasho Soomaali, waaxda luqadaha, qaybta kaalmada adeegyada, waxay idiin hayaan adeeg kharash lacharmainen . So [facility] [phone].    ATENCIÓN: Si habla español, tiene a palacios disposición servicios gratuitos de asistencia lingüística. Ll[facility] [phone].    We comply with applicable federal civil rights laws and [place] laws. We do not discriminate on the basis of race, color, national origin, age, disability, sex, sexual orientation, or gender identity.            Thank you!     Thank you for choosing [facility]  for your care. Our goal is always to provide you with excellent care. Hearing back from our patients is one way we can continue to improve our services. Please take a few minutes to complete the written survey that you may receive in the mail after your visit with us. Thank you!             Your Updated Medication List - Protect others around you: Learn how to safely use, store and throw away your medicines at www.disposemymeds.org.          This list is accurate as of: 1/11/18  2:22 PM.  Always use your most recent med list.                   Brand Name Dispense Instructions for use Diagnosis    ALPRAZolam 0.25 MG tablet    XANAX    10 tablet    Take 1 tablet (0.25 mg) by mouth nightly as needed for anxiety    Panic attacks       cyclobenzaprine 10 MG tablet    FLEXERIL    90 tablet    Take 1 tablet (10 mg) by mouth 3 times daily as needed for muscle spasms    New daily persistent headache, Strain of neck muscle, subsequent encounter, Chronic midline low back pain without sciatica        FLUoxetine 40 MG capsule    PROzac    90 capsule    Take 1 capsule (40 mg) by mouth daily    Moderate episode of recurrent major depressive disorder (H), Anxiety       ibuprofen 200 MG tablet    ADVIL/MOTRIN     Take 3 tablets (600 mg) by mouth every 6 hours as needed for pain        IRON SUPPLEMENT PO      Reported on 5/23/2017        Multi-vitamin Tabs tablet      Take 1 tablet by mouth daily Reported on 5/23/2017        norgestimate-ethinyl estradiol 0.25-35 MG-MCG per tablet    ORTHO-CYCLEN, SPRINTEC    84 tablet    Take 1 tablet by mouth daily    BCP (birth control pills) initiation       venlafaxine 37.5 MG 24 hr capsule    EFFEXOR-XR    46 capsule    Take 1 capsule daily for 14 days, then take 2 capsules daily.    Anxiety, Moderate episode of recurrent major depressive disorder (H)       VITAMIN D (CHOLECALCIFEROL) PO      Take by mouth daily

## 2018-01-12 DIAGNOSIS — F41.0 PANIC ATTACKS: ICD-10-CM

## 2018-01-12 RX ORDER — ALPRAZOLAM 0.25 MG
0.25 TABLET ORAL
Qty: 10 TABLET | Refills: 0 | Status: CANCELLED | OUTPATIENT
Start: 2018-01-12

## 2018-01-12 ASSESSMENT — ANXIETY QUESTIONNAIRES: GAD7 TOTAL SCORE: 13

## 2018-01-12 NOTE — TELEPHONE ENCOUNTER
Rx has been walked to [facility] pharmacy  Closing encounter  Iman Dias RT (R)

## 2018-01-23 NOTE — PROGRESS NOTES
"Outpatient Physical Therapy Discharge Note     Patient: Yesika Grant  : 1990    Beginning/End Dates of Reporting Period:  17 to 2018 (pt only seen for 1 PT visit since last progress report, 14 overall from  to 17)    Referring Provider: Nate Beck DPM    Therapy Diagnosis: s/p R ankle ORIF 17 leading to decreased R ankle ROM, weakness, altered gait pattern and decreased functional level     Client Self Report: Has noticed some R lateral ankle pain over the last week with walking when pt reported at last visit on 17. Has been moving so up and down stairs a lot. Moving to [place]. Pain also over incision site and \"feels like something catching over the screws\". 3/10 today at rest.     Objective Measurements:17  Objective Measure: average pain level (3/10 at eval for R ankle)  Details: was 1/10 but last week 3/10  Objective Measure: frequency (60% at eval on 17)  Details: lately 100% was intermittent prior  Objective Measure: reported functional level (50% at eval)   Details: 80% on 17 visit  Objective Measure: LEFS (32/80 at eval on 17)(45/80 17) (56/80 on 17)   Details: 63/80 on 17 visit  Objective Measure: SLS  Details: 30\"+ eyes open, R 8\" closed eyes, L 20\", in previous session best time on R was 11\", L 25\" with eyes closed last session  Objective Measure: strength (4/5 R ankle DF, inversion, eversion, PF at eval)  Details: 5/5 DF, iversion, eversion, single leg heel raise X 20, last visit      Goals:  Goal Identifier balance   Goal Description pt will improve SLS to 30\" or greater to help with balance and decrease fall risk in future   Target Date 17   Date Met  17   Progress:     Goal Identifier function   Goal Description pt will imrpove R ankle ROM and strength and carry over to improved functional level as reported by improved LEFS score from 32/80 to 52/80 or higher   Target Date 17   Date Met  17 "   Progress:     Goal Identifier function   Goal Description pt will further improve balance and carry over to improved functional level as reported by LEFS of 60 or higher   Target Date 10/19/17   Date Met  10/23/17   Progress:       Progress Toward Goals:   Original goals met, no new goal set for 11/16/17 visit. Pt made overall progress but slow due to swelling, also has underlying mechanical low back pain with LE referral and neck pain with headaches, previous concussion issues, multiple medical issues.    Plan:  Discharge from therapy.    Discharge:    Reason for Discharge: Patient has failed to schedule further appointments. Pt also noted she was moving from the [facility] to [place].    Equipment Issued: none recently.    Discharge Plan: Patient to continue home program.

## 2018-01-23 NOTE — ADDENDUM NOTE
Encounter addended by: Young Dockery, PT on: 1/23/2018  4:08 PM<BR>     Actions taken: Sign clinical note, Episode resolved

## 2018-03-20 DIAGNOSIS — F41.9 ANXIETY: ICD-10-CM

## 2018-03-20 DIAGNOSIS — F33.1 MODERATE EPISODE OF RECURRENT MAJOR DEPRESSIVE DISORDER (H): ICD-10-CM

## 2018-03-21 NOTE — TELEPHONE ENCOUNTER
"Requested Prescriptions   Pending Prescriptions Disp Refills     venlafaxine (EFFEXOR-XR) 37.5 MG 24 hr capsule [Pharmacy Med Name: VENLAFAXINE HCL ER 37.5MG CP24] 46 capsule 1     Sig: TAKE ONE CAPSULE BY MOUTH EVERY DAY FOR 14 DAYS THEN TAKE TWO CAPSULES BY MOUTH EVERY DAY    Serotonin-Norepinephrine Reuptake Inhibitors  Failed    3/20/2018 10:50 AM       Failed - PHQ-9 score of less than 5 in past 6 months    Please review last PHQ-9 score.          Failed - Normal serum creatinine on file in past 12 months    Recent Labs   Lab Test  12/12/16   1444   CR  0.68            Passed - Blood pressure under 140/90 in past 12 months    BP Readings from Last 3 Encounters:   01/04/18 106/70   12/04/17 110/70   11/28/17 105/77                Passed - Patient is age 18 or older       Passed - No active pregnancy on record       Passed - No positive pregnancy test in past 12 months       Passed - Recent (6 mo) or future (30 days) visit within the authorizing provider's specialty    Patient had office visit in the last 6 months or has a visit in the next 30 days with authorizing provider or within the authorizing provider's specialty.  See \"Patient Info\" tab in inbasket, or \"Choose Columns\" in Meds & Orders section of the refill encounter.            Routing refill request to provider for review/approval because:  Labs not current:  Creatinine.  PHQ-9 was >5 on 1/11/18  Provider to change sig if appropriate.  It looks like an increasing dose.    Lyndsey Saini RN          "

## 2018-03-22 RX ORDER — VENLAFAXINE HYDROCHLORIDE 37.5 MG/1
CAPSULE, EXTENDED RELEASE ORAL
Qty: 46 CAPSULE | Refills: 1 | Status: SHIPPED | OUTPATIENT
Start: 2018-03-22 | End: 2018-04-12

## 2018-03-22 NOTE — TELEPHONE ENCOUNTER
Pt is overdue for follow up.  Please schedule follow up visit.  Once scheduled, can refill enough medication to get patient to the visit.

## 2018-03-22 NOTE — TELEPHONE ENCOUNTER
Spoke with patient appointment scheduled   4/12/2018 3:15    Patient is out of medication, she uses [facility] pharmacy  Thank you  Iman Dias RT (R)

## 2018-04-09 DIAGNOSIS — G44.52 NEW DAILY PERSISTENT HEADACHE: ICD-10-CM

## 2018-04-09 DIAGNOSIS — S16.1XXD STRAIN OF NECK MUSCLE, SUBSEQUENT ENCOUNTER: ICD-10-CM

## 2018-04-09 DIAGNOSIS — M54.50 CHRONIC MIDLINE LOW BACK PAIN WITHOUT SCIATICA: ICD-10-CM

## 2018-04-09 DIAGNOSIS — G89.29 CHRONIC MIDLINE LOW BACK PAIN WITHOUT SCIATICA: ICD-10-CM

## 2018-04-09 NOTE — PROGRESS NOTES
SUBJECTIVE:   Yesika Grant is a 27 year old female who presents to clinic today for the following health issues:      History of Present Illness     Depression & Anxiety Follow-up:     Depression/Anxiety:  Depression & Anxiety    Status since last visit::  Improved    Other associated symptoms of depression and anxiety::  YES (Stomach aches and constantly burp)    Significant life event::  No    Current substance use::  None       Today's PHQ-9         PHQ-9 Total Score:     (P) 8   PHQ-9 Q9 Suicidal ideation:   (P) Not at all   Thoughts of suicide or self harm:      Self-harm Plan:        Self-harm Action:          Safety concerns for self or others:       ZAN-7 Total Score: (P) 7    Diet:  Regular (no restrictions)  Frequency of exercise:  2-3 days/week  Duration of exercise:  15-30 minutes  Taking medications regularly:  Yes  Medication side effects:  Not applicable  Additional concerns today:  No    Pt feels like the Effexor XR has been helping more than the Abilify was.      PHQ-9 SCORE 12/5/2017 1/11/2018 4/12/2018   Total Score - - -   Total Score MyChart - - 8 (Mild depression)   Total Score 12 18 8     ZAN-7 SCORE 12/5/2017 1/11/2018 4/12/2018   Total Score - - -   Total Score - - 7 (mild anxiety)   Total Score 10 13 7     Getting stomach aches and burping, associates with her anxiety.  Taking xanax much less frequently, but takes one every couple of weeks when more severe symptoms.      HA have been much better.  Still requires flexeril from time to time for the muscles of her neck.    She currently has a bit of a head cold, started today.      Still smoking.  Has tried patches in the past.  Tried Wellbutrin without benefit.  Can't tolerate gum.  Hasn't tried Chantix.      Problem list and histories reviewed & adjusted, as indicated.  Additional history: as documented      Current Outpatient Prescriptions   Medication Sig Dispense Refill     cyclobenzaprine (FLEXERIL) 10 MG tablet TAKE ONE TABLET  BY MOUTH THREE TIMES A DAY AS NEEDED FOR MUSCLE SPASMS 30 tablet 0     venlafaxine (EFFEXOR-XR) 37.5 MG 24 hr capsule TAKE ONE CAPSULE BY MOUTH EVERY DAY FOR 14 DAYS THEN TAKE TWO CAPSULES BY MOUTH EVERY DAY 46 capsule 1     ALPRAZolam (XANAX) 0.25 MG tablet Take 1 tablet (0.25 mg) by mouth nightly as needed for anxiety 10 tablet 0     norgestimate-ethinyl estradiol (ORTHO-CYCLEN, SPRINTEC) 0.25-35 MG-MCG per tablet Take 1 tablet by mouth daily 84 tablet 3     ibuprofen (ADVIL/MOTRIN) 200 MG tablet Take 3 tablets (600 mg) by mouth every 6 hours as needed for pain       VITAMIN D, CHOLECALCIFEROL, PO Take by mouth daily       multivitamin, therapeutic with minerals (MULTI-VITAMIN) TABS Take 1 tablet by mouth daily Reported on 5/23/2017       Ferrous Sulfate (IRON SUPPLEMENT PO) Reported on 5/23/2017       Recent Labs   Lab Test  06/08/17   1525  01/13/17   1700  12/12/16   1444  08/19/16   1408  08/18/16   1521   12/06/14   1410   A1C  5.2   --    --    --    --    --    --    ALT   --    --    --    --   21   --   14   CR   --    --   0.68   --   0.70   --   0.69   GFRESTIMATED   --    --   >90  Non  GFR Calc     --   >90  Non  GFR Calc     --   >90  Non  GFR Calc     GFRESTBLACK   --    --   >90   GFR Calc     --   >90   GFR Calc     --   >90   GFR Calc     POTASSIUM   --    --   3.8   --   4.0   --   4.0   TSH   --   1.72   --   4.88*   --    < >   --     < > = values in this interval not displayed.      BP Readings from Last 3 Encounters:   04/12/18 110/76   01/04/18 106/70   12/04/17 110/70    Wt Readings from Last 3 Encounters:   04/12/18 233 lb 14.4 oz (106.1 kg)   01/04/18 219 lb 9.6 oz (99.6 kg)   12/04/17 220 lb 1.6 oz (99.8 kg)                    ROS:  Constitutional, HEENT, cardiovascular, pulmonary, gi and gu systems are negative, except as otherwise noted.    OBJECTIVE:     /76 (BP Location: Left arm,  "Patient Position: Chair, Cuff Size: Adult Large)  Pulse 80  Temp 97.8  F (36.6  C) (Temporal)  Resp 16  Ht 5' 7\" (1.702 m)  Wt 233 lb 14.4 oz (106.1 kg)  BMI 36.63 kg/m2  Body mass index is 36.63 kg/(m^2).   GENERAL: healthy, alert and no distress  EYES: Eyes grossly normal to inspection, PERRL and conjunctivae and sclerae normal  HENT: normal cephalic/atraumatic, ear canals and TM's normal, nose and mouth without ulcers or lesions, rhinorrhea clear, oropharynx clear and oral mucous membranes moist  NECK: no adenopathy, no asymmetry, masses, or scars and thyroid normal to palpation  RESP: lungs clear to auscultation - no rales, rhonchi or wheezes  CV: regular rate and rhythm, normal S1 S2, no S3 or S4, no murmur, click or rub, no peripheral edema and peripheral pulses strong  ABDOMEN: soft, nontender, no hepatosplenomegaly, no masses and bowel sounds normal  MS: no gross musculoskeletal defects noted, no edema    Diagnostic Test Results:  Results for orders placed or performed in visit on 01/04/18   Beta HCG qual IFA urine   Result Value Ref Range    Beta HCG Qual IFA Urine Negative NEG^Negative      Surgical pathology exam   Result Value Ref Range    Copath Report       Patient Name: ENRIQUE BARROS  MR#: [id number]  Specimen #:   Collected: 1/4/2018  Received: 1/5/2018  Reported: 1/10/2018 10:09  Ordering Phy(s): PARADISE DURAND    For improved result formatting, select 'View Enhanced Report Format' under   Linked Documents section.    SPECIMEN(S):  A: Cervical biopsy, 12 o'clock  B: Endocervical curettings    FINAL DIAGNOSIS:  A. CERVIX, 12 O'CLOCK, BIOPSY:  - Fragment of ectocervical mucosa with reactive changes  - Negative for squamous intraepithelial lesion and malignancy    B. ENDOCERVIX, CURETTINGS:  - Fragments of unremarkable endocervical mucosa  - Negative for squamous intraepithelial lesion and malignancy    I have personally reviewed all specimens and/or slides, including the " "  listed special stains, and used them  with my medical judgement to determine or confirm the final diagnosis.    Electronically signed out by:    Yaima Hernandez M.D., PhD, [facility]    CLINICAL HISTORY:  27 year old female with ASCUS.  GROSS:  A :  The specimen is received in formalin with proper patient   identification, labeled \"cervical biopsy\".  The  specimen consists of a 0.4 cm in greatest dimension red soft tissue   fragment.  The specimen is wrapped and  entirely submitted in cassette A1.    B:  The specimen is received in formalin with proper patient   identification, labeled \"ECC\".  The specimen  consists of a 0.6 x 0.2 x 0.1 cm aggregate of pink-tan soft tissue admixed   with mucus with a plastic medical  brush.  The specimen is filtered, wrapped and entirely submitted in   cassette B1. (Dictated by: Tracey Odell  [facility] 1/5/2018 09:47 AM)    MICROSCOPIC:  Microscopic examination was performed.    CPT Codes:  A: 44030-SR6  B: 98448-UF4    TESTING LAB LOCATION:  [facility], [address]   [address]  [phone]    COLLECTION SITE:  Client: [facility]  Location: [facility] (B)    Resident  [id number]         ASSESSMENT:     PLAN:       ICD-10-CM    1. Moderate episode of recurrent major depressive disorder (H) F33.1 venlafaxine (EFFEXOR-XR) 150 MG 24 hr capsule   2. Anxiety F41.9 venlafaxine (EFFEXOR-XR) 150 MG 24 hr capsule   3. Panic attacks F41.0 ALPRAZolam (XANAX) 0.25 MG tablet   4. New daily persistent headache G44.52 cyclobenzaprine (FLEXERIL) 10 MG tablet   5. Strain of neck muscle, subsequent encounter S16.1XXD cyclobenzaprine (FLEXERIL) 10 MG tablet   6. Chronic midline low back pain without sciatica M54.5 cyclobenzaprine (FLEXERIL) 10 MG tablet    G89.29    7. Tobacco use disorder F17.200 TOBACCO CESSATION - FOR HEALTH MAINTENANCE     varenicline (CHANTIX) 1 MG tablet     varenicline " "(CHANTIX STARTING MONTH PRACHI) 0.5 MG X 11 & 1 MG X 42 tablet     1, 2.  Mood is much better.  Still not quite at goal.  Discussed further increase in her Effexor.  She was interested in pursuing this, so will increase dose to 150 mg daily.  She has stopped her SSRI, but it is not clear when that happened.  This was removed from her medication list.  3.  Much improved.  Still does occasionally require alprazolam.  Medications refilled today for occasional use.  Hopefully these will continue to get less frequent as her anxiety gets under better control.  4, 5, 6.  Currently controlled.  Continue current regimen.  7.  Discussed multiple options for smoking cessation, risks benefits and alternatives to each.  Patient was interested in a trial of Chantix at some point.  Prescription was given today.  She is encouraged to set a quit date and start the medication approximately 1 week prior to her quit date.    Portions of this note were completed using Dragon dictation software.  Although reviewed, there may be typographical and other inadvertent errors that remain.             Tobacco Cessation:   reports that she has been smoking Cigarettes.  She has been smoking about 1.00 pack per day. She has never used smokeless tobacco.  Tobacco Cessation Action Plan: Pharmacotherapies : Chantix    BMI:   Estimated body mass index is 36.63 kg/(m^2) as calculated from the following:    Height as of this encounter: 5' 7\" (1.702 m).    Weight as of this encounter: 233 lb 14.4 oz (106.1 kg).   Weight management plan: Discussed healthy diet and exercise guidelines and patient will follow up in 6 months in clinic to re-evaluate.      Patient Instructions   Thank you for visiting [facility]    Increase your Effexor dose to 150 mg/day.  Let me know if problems.    When you're ready to quit smoking, start the Chantix about a week before your quit date.  Be sure to use for at least 2-3 months if tolerating well.  Let me know if " problems.    OK to use Flonase or similar for your nasal symptoms.    Contact us or return if questions or concerns.     If you had imaging scheduled please refer to your radiology prep sheet.    Appointment    Date_______________     Time_____________    Day:   M TU W TH F    With____________________________    Location_________________________    If you need medication refills, please contact your pharmacy 3 days before your prescriptions runs out. If you are out of refills, your pharmacy will contact contact the clinic.    Contact us or return if questions or concerns.     -Your Care Team:  MD Justine Kunz PA-C Joel De Haan, PA-C Elizabeth McLean, APRN CNP    General information about your clinic      Clinic hours:     Lab hours:  Phone [phone]  Monday 7:30 am-7 pm    Monday 8:30 am-6:30 pm  Tuesday-Friday 7:30 am-5 pm   Tuesday-Friday 8:30 am-4:30 pm    Pharmacy hours:  Phone [phone]  Monday 8:30 am-7pm  Tuesday-Friday 8:30am-6 pm                                       Mychart assistance [phone]        We would like to hear from you, how was your visit today?    Solange Castillo  Patient Information Supervisor   Patient Care Supervisor  [facility], and \[facility] [place] [facility]\"", and [facility]  [phone]         Young Fletcher MD, MD  [facility]

## 2018-04-10 RX ORDER — CYCLOBENZAPRINE HCL 10 MG
TABLET ORAL
Qty: 30 TABLET | Refills: 0 | Status: SHIPPED | OUTPATIENT
Start: 2018-04-10 | End: 2018-04-12

## 2018-04-10 NOTE — TELEPHONE ENCOUNTER
cyclobenzaprine (FLEXERIL) 10 MG tablet      Last Written Prescription Date:  03/08/2017  Last Fill Quantity: 90,   # refills: 1  Last Office Visit: 12/04/2017  Future Office visit:    Next 5 appointments (look out 90 days)     Apr 12, 2018  3:15 PM CDT   [facility]    [address], [place], [facility] [address] [phone]                 Routing refill request to provider for review/approval because:  Drug not on the [facility], [facility] or  Health refill protocol or controlled substance  Perla Jimenez, RN, BSN

## 2018-04-12 ENCOUNTER — OFFICE VISIT (OUTPATIENT)
Dept: FAMILY MEDICINE | Facility: OTHER | Age: 28
End: 2018-04-12
Payer: COMMERCIAL

## 2018-04-12 VITALS
RESPIRATION RATE: 16 BRPM | BODY MASS INDEX: 36.71 KG/M2 | SYSTOLIC BLOOD PRESSURE: 110 MMHG | TEMPERATURE: 97.8 F | DIASTOLIC BLOOD PRESSURE: 76 MMHG | HEIGHT: 67 IN | WEIGHT: 233.9 LBS | HEART RATE: 80 BPM

## 2018-04-12 DIAGNOSIS — G89.29 CHRONIC MIDLINE LOW BACK PAIN WITHOUT SCIATICA: ICD-10-CM

## 2018-04-12 DIAGNOSIS — F17.200 TOBACCO USE DISORDER: ICD-10-CM

## 2018-04-12 DIAGNOSIS — M54.50 CHRONIC MIDLINE LOW BACK PAIN WITHOUT SCIATICA: ICD-10-CM

## 2018-04-12 DIAGNOSIS — F33.1 MODERATE EPISODE OF RECURRENT MAJOR DEPRESSIVE DISORDER (H): Primary | ICD-10-CM

## 2018-04-12 DIAGNOSIS — S16.1XXD STRAIN OF NECK MUSCLE, SUBSEQUENT ENCOUNTER: ICD-10-CM

## 2018-04-12 DIAGNOSIS — F41.9 ANXIETY: ICD-10-CM

## 2018-04-12 DIAGNOSIS — G44.52 NEW DAILY PERSISTENT HEADACHE: ICD-10-CM

## 2018-04-12 DIAGNOSIS — F41.0 PANIC ATTACKS: ICD-10-CM

## 2018-04-12 PROCEDURE — 99214 OFFICE O/P EST MOD 30 MIN: CPT | Performed by: FAMILY MEDICINE

## 2018-04-12 RX ORDER — CYCLOBENZAPRINE HCL 10 MG
TABLET ORAL
Qty: 30 TABLET | Refills: 3 | Status: SHIPPED | OUTPATIENT
Start: 2018-04-12 | End: 2018-10-24 | Stop reason: SINTOL

## 2018-04-12 RX ORDER — VARENICLINE TARTRATE 1 MG/1
1 TABLET, FILM COATED ORAL 2 TIMES DAILY
Qty: 56 TABLET | Refills: 2 | Status: SHIPPED | OUTPATIENT
Start: 2018-04-12 | End: 2020-01-06

## 2018-04-12 RX ORDER — VENLAFAXINE HYDROCHLORIDE 150 MG/1
150 CAPSULE, EXTENDED RELEASE ORAL DAILY
Qty: 90 CAPSULE | Refills: 1 | Status: SHIPPED | OUTPATIENT
Start: 2018-04-12 | End: 2018-10-29

## 2018-04-12 RX ORDER — ALPRAZOLAM 0.25 MG
0.25 TABLET ORAL
Qty: 10 TABLET | Refills: 1 | Status: SHIPPED | OUTPATIENT
Start: 2018-04-12 | End: 2018-10-18

## 2018-04-12 ASSESSMENT — ANXIETY QUESTIONNAIRES
1. FEELING NERVOUS, ANXIOUS, OR ON EDGE: SEVERAL DAYS
7. FEELING AFRAID AS IF SOMETHING AWFUL MIGHT HAPPEN: NOT AT ALL
7. FEELING AFRAID AS IF SOMETHING AWFUL MIGHT HAPPEN: NOT AT ALL
5. BEING SO RESTLESS THAT IT IS HARD TO SIT STILL: SEVERAL DAYS
3. WORRYING TOO MUCH ABOUT DIFFERENT THINGS: SEVERAL DAYS
4. TROUBLE RELAXING: MORE THAN HALF THE DAYS
GAD7 TOTAL SCORE: 7
6. BECOMING EASILY ANNOYED OR IRRITABLE: SEVERAL DAYS
GAD7 TOTAL SCORE: 7
GAD7 TOTAL SCORE: 7
2. NOT BEING ABLE TO STOP OR CONTROL WORRYING: SEVERAL DAYS

## 2018-04-12 ASSESSMENT — PAIN SCALES - GENERAL: PAINLEVEL: MILD PAIN (3)

## 2018-04-12 ASSESSMENT — PATIENT HEALTH QUESTIONNAIRE - PHQ9
SUM OF ALL RESPONSES TO PHQ QUESTIONS 1-9: 8
10. IF YOU CHECKED OFF ANY PROBLEMS, HOW DIFFICULT HAVE THESE PROBLEMS MADE IT FOR YOU TO DO YOUR WORK, TAKE CARE OF THINGS AT HOME, OR GET ALONG WITH OTHER PEOPLE: SOMEWHAT DIFFICULT
SUM OF ALL RESPONSES TO PHQ QUESTIONS 1-9: 8

## 2018-04-12 NOTE — PATIENT INSTRUCTIONS
Thank you for visiting [facility]    Increase your Effexor dose to 150 mg/day.  Let me know if problems.    When you're ready to quit smoking, start the Chantix about a week before your quit date.  Be sure to use for at least 2-3 months if tolerating well.  Let me know if problems.    OK to use Flonase or similar for your nasal symptoms.    Contact us or return if questions or concerns.     If you had imaging scheduled please refer to your radiology prep sheet.    Appointment    Date_______________     Time_____________    Day:   M TU W TH F    With____________________________    Location_________________________    If you need medication refills, please contact your pharmacy 3 days before your prescriptions runs out. If you are out of refills, your pharmacy will contact contact the clinic.    Contact us or return if questions or concerns.     -Your Care Team:  MD Justine Kunz PA-C Joel De Haan, PA-C Elizabeth McLean, APRN CNP    General information about your clinic      Clinic hours:     Lab hours:  Phone [phone]  Monday 7:30 am-7 pm    Monday 8:30 am-6:30 pm  Tuesday-Friday 7:30 am-5 pm   Tuesday-Friday 8:30 am-4:30 pm    Pharmacy hours:  Phone [phone]  Monday 8:30 am-7pm  Tuesday-Friday 8:30am-6 pm                                       Mychart assistance [phone]        We would like to hear from you, how was your visit today?    Solange Castillo  Patient Information Supervisor   Patient Care Supervisor  [facility], and [facility], and [facility]  [phone]

## 2018-04-12 NOTE — NURSING NOTE
"Chief Complaint   Patient presents with     Depression     Anxiety     Panel Management     tobacco cessation       Initial /76 (BP Location: Left arm, Patient Position: Chair, Cuff Size: Adult Large)  Pulse 80  Temp 97.8  F (36.6  C) (Temporal)  Resp 16  Ht 5' 7\" (1.702 m)  Wt 233 lb 14.4 oz (106.1 kg)  BMI 36.63 kg/m2 Estimated body mass index is 36.63 kg/(m^2) as calculated from the following:    Height as of this encounter: 5' 7\" (1.702 m).    Weight as of this encounter: 233 lb 14.4 oz (106.1 kg).  Medication Reconciliation: complete  Daniel Qiu, GARY    "

## 2018-04-12 NOTE — MR AVS SNAPSHOT
After Visit Summary   4/12/2018    Yesika Grant    MRN: [id number]           Patient Information     Date Of Birth          1990        Visit Information        Provider Department      4/12/2018 3:15 PM Young Fletcher MD [facility]        Today's Diagnoses     Tobacco use disorder    -  1    New daily persistent headache        Strain of neck muscle, subsequent encounter        Chronic midline low back pain without sciatica        Panic attacks        Anxiety        Moderate episode of recurrent major depressive disorder (H)          Care Instructions    Thank you for visiting [facility]    Increase your Effexor dose to 150 mg/day.  Let me know if problems.    When you're ready to quit smoking, start the Chantix about a week before your quit date.  Be sure to use for at least 2-3 months if tolerating well.  Let me know if problems.    OK to use Flonase or similar for your nasal symptoms.    Contact us or return if questions or concerns.     If you had imaging scheduled please refer to your radiology prep sheet.    Appointment    Date_______________     Time_____________    Day:   M TU W TH F    With____________________________    Location_________________________    If you need medication refills, please contact your pharmacy 3 days before your prescriptions runs out. If you are out of refills, your pharmacy will contact contact the clinic.    Contact us or return if questions or concerns.     -Your Care Team:  MD Justine Kunz PA-C Joel De Haan, PA-C Elizabeth McLean, APRN CNP    General information about your clinic      Clinic hours:     Lab hours:  Phone [phone]  Monday 7:30 am-7 pm    Monday 8:30 am-6:30 pm  Tuesday-Friday 7:30 am-5 pm   Tuesday-Friday 8:30 am-4:30 pm    Pharmacy hours:  Phone [phone]  Monday 8:30 am-7pm  Tuesday-Friday 8:30am-6 pm                                       Mychart assistance  "[phone]        We would like to hear from you, how was your visit today?    Solange Castillo  Patient Information Supervisor   Patient Care Supervisor  [facility] [place], and [facility] [place], and [facility]  [phone]             Follow-ups after your visit        Follow-up notes from your care team     Return in about 3 months (around 2018) for Recheck.      Who to contact     If you have questions or need follow up information about today's clinic visit or your schedule please contact [facility] directly at [phone].  Normal or non-critical lab and imaging results will be communicated to you by [employer], letter or phone within 4 business days after the clinic has received the results. If you do not hear from us within 7 days, please contact the clinic through [employer] or phone. If you have a critical or abnormal lab result, we will notify you by phone as soon as possible.  Submit refill requests through [employer] or call your pharmacy and they will forward the refill request to us. Please allow 3 business days for your refill to be completed.          Additional Information About Your Visit        MyChart Information     [employer] lets you send messages to your doctor, view your test results, renew your prescriptions, schedule appointments and more. To sign up, go to [email or web address] . Click on \"Log in\" on the left side of the screen, which will take you to the Welcome page. Then click on \"Sign up Now\" on the right side of the page.     You will be asked to enter the access code listed below, as well as some personal information. Please follow the directions to create your username and password.     Your access code is: [email or web address]  Expires: 2018  4:07 PM     Your access code will  in 90 days. If you need help or a new code, please call your [facility] or [phone].        Care EveryWhere ID     " "This is your Care EveryWhere ID. This could be used by other organizations to access your [facility] medical records  [id number]        Your Vitals Were     Pulse Temperature Respirations Height BMI (Body Mass Index)       80 97.8  F (36.6  C) (Temporal) 16 5' 7\" (1.702 m) 36.63 kg/m2        Blood Pressure from Last 3 Encounters:   04/12/18 110/76   01/04/18 106/70   12/04/17 110/70    Weight from Last 3 Encounters:   04/12/18 233 lb 14.4 oz (106.1 kg)   01/04/18 219 lb 9.6 oz (99.6 kg)   12/04/17 220 lb 1.6 oz (99.8 kg)              We Performed the Following     TOBACCO CESSATION - FOR HEALTH MAINTENANCE          Today's Medication Changes          These changes are accurate as of 4/12/18  4:07 PM.  If you have any questions, ask your nurse or doctor.               Start taking these medicines.        Dose/Directions    * varenicline 1 MG tablet   Commonly known as:  CHANTIX   Used for:  Tobacco use disorder   Started by:  Young Fletcher MD        Dose:  1 mg   Take 1 tablet (1 mg) by mouth 2 times daily   Quantity:  56 tablet   Refills:  2       * varenicline 0.5 MG X 11 & 1 MG X 42 tablet   Commonly known as:  CHANTIX STARTING MONTH PRACHI   Used for:  Tobacco use disorder   Started by:  Young Fletcher MD        Take 0.5 mg tab daily for 3 days, then 0.5 mg tab twice daily for 4 days, then 1 mg twice daily.   Quantity:  53 tablet   Refills:  0       * Notice:  This list has 2 medication(s) that are the same as other medications prescribed for you. Read the directions carefully, and ask your doctor or other care provider to review them with you.      These medicines have changed or have updated prescriptions.        Dose/Directions    cyclobenzaprine 10 MG tablet   Commonly known as:  FLEXERIL   This may have changed:  See the new instructions.   Used for:  New daily persistent headache, Strain of neck muscle, subsequent encounter, Chronic midline low back pain without sciatica   Changed by:  Chance, " Young Dunham MD        TAKE ONE TABLET BY MOUTH THREE TIMES A DAY AS NEEDED FOR MUSCLE SPASMS   Quantity:  30 tablet   Refills:  3       venlafaxine 150 MG 24 hr capsule   Commonly known as:  EFFEXOR-XR   This may have changed:  See the new instructions.   Used for:  Anxiety, Moderate episode of recurrent major depressive disorder (H)   Changed by:  Young Fletcher MD        Dose:  150 mg   Take 1 capsule (150 mg) by mouth daily   Quantity:  90 capsule   Refills:  1            Where to get your medicines      These medications were sent to [facility] - [place] - [address]   [address] [place] [address]     Phone:  [phone]     cyclobenzaprine 10 MG tablet    varenicline 0.5 MG X 11 & 1 MG X 42 tablet    varenicline 1 MG tablet    venlafaxine 150 MG 24 hr capsule         Some of these will need a paper prescription and others can be bought over the counter.  Ask your nurse if you have questions.     Bring a paper prescription for each of these medications     ALPRAZolam 0.25 MG tablet                Primary Care Provider Office Phone # Fax #    Young Fletcher [phone] [address] [place] [address]        Equal Access to Services     MAR ALONSO AH: Hadii arina cassidy hadasho Soomaali, waaxda luqadaha, qaybta kaalmada adeegyada, monica beckham. So [facility] [phone].    ATENCIÓN: Si habla español, tiene a palacios disposición servicios gratyomios de asistencia lingüística. Llame al [phone].    We comply with applicable federal civil rights laws and [place] laws. We do not discriminate on the basis of race, color, national origin, age, disability, sex, sexual orientation, or gender identity.            Thank you!     Thank you for choosing [facility]  for your care. Our goal is always to provide you with excellent care. Hearing back from our patients is one way we can continue to improve  our services. Please take a few minutes to complete the written survey that you may receive in the mail after your visit with us. Thank you!             Your Updated Medication List - Protect others around you: Learn how to safely use, store and throw away your medicines at www.disposemymeds.org.          This list is accurate as of 4/12/18  4:07 PM.  Always use your most recent med list.                   Brand Name Dispense Instructions for use Diagnosis    ALPRAZolam 0.25 MG tablet    XANAX    10 tablet    Take 1 tablet (0.25 mg) by mouth nightly as needed for anxiety    Panic attacks       cyclobenzaprine 10 MG tablet    FLEXERIL    30 tablet    TAKE ONE TABLET BY MOUTH THREE TIMES A DAY AS NEEDED FOR MUSCLE SPASMS    New daily persistent headache, Strain of neck muscle, subsequent encounter, Chronic midline low back pain without sciatica       ibuprofen 200 MG tablet    ADVIL/MOTRIN     Take 3 tablets (600 mg) by mouth every 6 hours as needed for pain        IRON SUPPLEMENT PO      Reported on 5/23/2017        Multi-vitamin Tabs tablet      Take 1 tablet by mouth daily Reported on 5/23/2017        norgestimate-ethinyl estradiol 0.25-35 MG-MCG per tablet    ORTHO-CYCLEN, SPRINTEC    84 tablet    Take 1 tablet by mouth daily    BCP (birth control pills) initiation       * varenicline 1 MG tablet    CHANTIX    56 tablet    Take 1 tablet (1 mg) by mouth 2 times daily    Tobacco use disorder       * varenicline 0.5 MG X 11 & 1 MG X 42 tablet    CHANTIX STARTING MONTH PRACHI    53 tablet    Take 0.5 mg tab daily for 3 days, then 0.5 mg tab twice daily for 4 days, then 1 mg twice daily.    Tobacco use disorder       venlafaxine 150 MG 24 hr capsule    EFFEXOR-XR    90 capsule    Take 1 capsule (150 mg) by mouth daily    Anxiety, Moderate episode of recurrent major depressive disorder (H)       VITAMIN D (CHOLECALCIFEROL) PO      Take by mouth daily        * Notice:  This list has 2 medication(s) that are the same as  other medications prescribed for you. Read the directions carefully, and ask your doctor or other care provider to review them with you.

## 2018-04-13 ASSESSMENT — ANXIETY QUESTIONNAIRES: GAD7 TOTAL SCORE: 7

## 2018-04-13 ASSESSMENT — PATIENT HEALTH QUESTIONNAIRE - PHQ9: SUM OF ALL RESPONSES TO PHQ QUESTIONS 1-9: 8

## 2018-07-10 ENCOUNTER — TELEPHONE (OUTPATIENT)
Dept: FAMILY MEDICINE | Facility: OTHER | Age: 28
End: 2018-07-10

## 2018-07-10 NOTE — LETTER
[facility]  [id number] [place] [address]  Phone: [phone]  July 10, 2018      Yesika Grant  [address] [facility] [address]      Dear [place],    We care about your health and have reviewed your health plan including your medical conditions, medications, and lab results.  Based on this review, it is recommended that you follow up regarding the following health topic(s):  -Depression    We recommend you take the following action(s):  -Complete and return the attached PHQ-9 Form.  If your total score is greater than 9, please schedule a followup appointment.  If you answer Yes to question 9, call your clinic between the hours of 8 to 5.  You may also call the Suicide Hotline at 5-001-527-WEEO (6484) any time.     Please call us at the [facility] - [phone] (or use [employer]) to address the above recommendations.     Thank you for trusting [facility] and we appreciate the opportunity to serve you.  We look forward to supporting your healthcare needs in the future.    Healthy Regards,    Your Health Care Team  [facility]

## 2018-08-14 ENCOUNTER — OFFICE VISIT (OUTPATIENT)
Dept: PODIATRY | Facility: OTHER | Age: 28
End: 2018-08-14
Payer: COMMERCIAL

## 2018-08-14 ENCOUNTER — RADIANT APPOINTMENT (OUTPATIENT)
Dept: GENERAL RADIOLOGY | Facility: OTHER | Age: 28
End: 2018-08-14
Attending: PODIATRIST
Payer: COMMERCIAL

## 2018-08-14 VITALS
SYSTOLIC BLOOD PRESSURE: 120 MMHG | DIASTOLIC BLOOD PRESSURE: 68 MMHG | WEIGHT: 234 LBS | BODY MASS INDEX: 36.73 KG/M2 | HEIGHT: 67 IN

## 2018-08-14 DIAGNOSIS — Z96.9 RETAINED ORTHOPEDIC HARDWARE: ICD-10-CM

## 2018-08-14 DIAGNOSIS — S82.891S CLOSED FRACTURE OF RIGHT ANKLE, SEQUELA: ICD-10-CM

## 2018-08-14 DIAGNOSIS — S82.891S CLOSED FRACTURE OF RIGHT ANKLE, SEQUELA: Primary | ICD-10-CM

## 2018-08-14 PROCEDURE — 73610 X-RAY EXAM OF ANKLE: CPT | Mod: RT

## 2018-08-14 PROCEDURE — 99213 OFFICE O/P EST LOW 20 MIN: CPT | Performed by: PODIATRIST

## 2018-08-14 ASSESSMENT — PAIN SCALES - GENERAL: PAINLEVEL: MODERATE PAIN (4)

## 2018-08-14 NOTE — MR AVS SNAPSHOT
"              After Visit Summary   8/14/2018    Yesika Grant    MRN: [id number]           Patient Information     Date Of Birth          1990        Visit Information        Provider Department      8/14/2018 2:15 PM Nate Beck DPM [facility]        Today's Diagnoses     Closed fracture of right ankle, sequela    -  1    Retained orthopedic hardware          Care Instructions    Follow-up for surgical intervention.          Follow-ups after your visit        Who to contact     If you have questions or need follow up information about today's clinic visit or your schedule please contact [facility] directly at [phone].  Normal or non-critical lab and imaging results will be communicated to you by MyChart, letter or phone within 4 business days after the clinic has received the results. If you do not hear from us within 7 days, please contact the clinic through MyChart or phone. If you have a critical or abnormal lab result, we will notify you by phone as soon as possible.  Submit refill requests through [employer] or call your pharmacy and they will forward the refill request to us. Please allow 3 business days for your refill to be completed.          Additional Information About Your Visit        Care EveryWhere ID     This is your Care EveryWhere ID. This could be used by other organizations to access your [place] medical records  [id number]        Your Vitals Were     Height BMI (Body Mass Index)                5' 7\" (1.702 m) 36.65 kg/m2           Blood Pressure from Last 3 Encounters:   08/14/18 120/68   04/12/18 110/76   01/04/18 106/70    Weight from Last 3 Encounters:   08/14/18 234 lb (106.1 kg)   04/12/18 233 lb 14.4 oz (106.1 kg)   01/04/18 219 lb 9.6 oz (99.6 kg)              We Performed the Following     Florencia-Operative Worksheet        Primary Care Provider Office Phone # Fax #    Young Fletcher [phone] " [address] [place] [address]        Equal Access to Services     [facility] : Hadii arina cassidy briarenita Calebali, wadashawnda luqivannaha, qamita juan luistalimonica augustin. So [facility] [phone].    ATENCIÓN: Si habla español, tiene a palacios disposición servicios gratuitos de asistencia lingüística. [facility] [phone].    We comply with applicable federal civil rights laws and [place] laws. We do not discriminate on the basis of race, color, national origin, age, disability, sex, sexual orientation, or gender identity.            Thank you!     Thank you for choosing [facility]  for your care. Our goal is always to provide you with excellent care. Hearing back from our patients is one way we can continue to improve our services. Please take a few minutes to complete the written survey that you may receive in the mail after your visit with us. Thank you!             Your Updated Medication List - Protect others around you: Learn how to safely use, store and throw away your medicines at www.disposemymeds.org.          This list is accurate as of 8/14/18  2:37 PM.  Always use your most recent med list.                   Brand Name Dispense Instructions for use Diagnosis    ALPRAZolam 0.25 MG tablet    XANAX    10 tablet    Take 1 tablet (0.25 mg) by mouth nightly as needed for anxiety    Panic attacks       cyclobenzaprine 10 MG tablet    FLEXERIL    30 tablet    TAKE ONE TABLET BY MOUTH THREE TIMES A DAY AS NEEDED FOR MUSCLE SPASMS    New daily persistent headache, Strain of neck muscle, subsequent encounter, Chronic midline low back pain without sciatica       ibuprofen 200 MG tablet    ADVIL/MOTRIN     Take 3 tablets (600 mg) by mouth every 6 hours as needed for pain        IRON SUPPLEMENT PO      Reported on 5/23/2017        Multi-vitamin Tabs tablet      Take 1 tablet by mouth daily Reported on 5/23/2017        norgestimate-ethinyl estradiol 0.25-35 MG-MCG per tablet     ORTHO-CYCLEN, SPRINTEC    84 tablet    Take 1 tablet by mouth daily    BCP (birth control pills) initiation       * varenicline 1 MG tablet    CHANTIX    56 tablet    Take 1 tablet (1 mg) by mouth 2 times daily    Tobacco use disorder       * varenicline 0.5 MG X 11 & 1 MG X 42 tablet    CHANTIX STARTING MONTH PRACHI    53 tablet    Take 0.5 mg tab daily for 3 days, then 0.5 mg tab twice daily for 4 days, then 1 mg twice daily.    Tobacco use disorder       venlafaxine 150 MG 24 hr capsule    EFFEXOR-XR    90 capsule    Take 1 capsule (150 mg) by mouth daily    Anxiety, Moderate episode of recurrent major depressive disorder (H)       VITAMIN D (CHOLECALCIFEROL) PO      Take by mouth daily        * Notice:  This list has 2 medication(s) that are the same as other medications prescribed for you. Read the directions carefully, and ask your doctor or other care provider to review them with you.

## 2018-08-14 NOTE — LETTER
8/14/2018         RE: Yesika Grant  [address] [facility] [address]        Dear Colleague,    Thank you for referring your patient, Yesika Grant, to the [facility]. Please see a copy of my visit note below.    Chief Complaint   Patient presents with     RECHECK     burning, stabbing, pain 4-7, questioning hardware positioning; DOS 6/9/2017 - Open Reduction Internal Fixation Right Ankle; XR Right ankle today, 8/14/18; LOV 8/31/2017       Weight management plan: Patient was referred to their PCP to discuss a diet and exercise plan.     Patient to follow up with Primary Care provider regarding elevated blood pressure.    HPI:  6/9/2017 - Open Reduction Internal Fixation Right Ankle procedure.  Had been returned to all activities including hiking high impact long and aggressive activities all through the spring until about 2 months ago she started having more pain and prominence about the lateral right ankle.  Any shoes that rub against the fibula cause discomfort.  She does not feel as though she can rely on her ankle.  She notes that it does not feel weak but it hurts with any pressure against the outside.  She feels like the hardware is prominent.  She has had some bleeding from irritation along the lateral incision even in the last couple of months.    Works desk as     ROS:  10 point ROS neg other than the symptoms noted above in the HPI.    Patient Active Problem List   Diagnosis     CARDIOVASCULAR SCREENING; LDL GOAL LESS THAN 160     Tobacco use disorder     Anxiety     Major depression in complete remission (H)     Contraception     Vitamin D deficiency     Concussion injury of body structure     Moderate episode of recurrent major depressive disorder (H)     ASCUS with positive high risk HPV cervical       PAST MEDICAL HISTORY:   Past Medical History:   Diagnosis Date     ASCUS with positive high risk HPV cervical 12/04/2017    (not 16/18)     Moderate  major depression (H) 5/3/2012     Motion sickness      Ovarian cyst 11/05    small 2 cm simple left ovarian cyst vs dominant follicle on US     Tonsillitis      Urticaria     ?due to oat straw allergy        PAST SURGICAL HISTORY:   Past Surgical History:   Procedure Laterality Date     HC TOOTH EXTRACTION W/FORCEP Bilateral      OPEN REDUCTION INTERNAL FIXATION ANKLE Right 6/9/2017    Procedure: OPEN REDUCTION INTERNAL FIXATION ANKLE;  Open Reduction Internal Fixation Right Ankle;  Surgeon: Nate Beck DPM;  Location:  OR     TONSILLECTOMY, ADENOIDECTOMY ADULT, COMBINED  12/1/2011    Procedure:COMBINED TONSILLECTOMY, ADENOIDECTOMY ADULT; Adult Tonsillectomy & Adenoidectomy, Cauterization Of       TURBINOPLASTY  12/1/2011    Procedure:TURBINOPLASTY; Surgeon:GISELE SWENSON; Location: OR        MEDICATIONS:   Current Outpatient Prescriptions:      Ferrous Sulfate (IRON SUPPLEMENT PO), Reported on 5/23/2017, Disp: , Rfl:      ibuprofen (ADVIL/MOTRIN) 200 MG tablet, Take 3 tablets (600 mg) by mouth every 6 hours as needed for pain, Disp: , Rfl:      multivitamin, therapeutic with minerals (MULTI-VITAMIN) TABS, Take 1 tablet by mouth daily Reported on 5/23/2017, Disp: , Rfl:      norgestimate-ethinyl estradiol (ORTHO-CYCLEN, SPRINTEC) 0.25-35 MG-MCG per tablet, Take 1 tablet by mouth daily, Disp: 84 tablet, Rfl: 3     venlafaxine (EFFEXOR-XR) 150 MG 24 hr capsule, Take 1 capsule (150 mg) by mouth daily, Disp: 90 capsule, Rfl: 1     VITAMIN D, CHOLECALCIFEROL, PO, Take by mouth daily, Disp: , Rfl:      ALPRAZolam (XANAX) 0.25 MG tablet, Take 1 tablet (0.25 mg) by mouth nightly as needed for anxiety, Disp: 10 tablet, Rfl: 1     cyclobenzaprine (FLEXERIL) 10 MG tablet, TAKE ONE TABLET BY MOUTH THREE TIMES A DAY AS NEEDED FOR MUSCLE SPASMS, Disp: 30 tablet, Rfl: 3     varenicline (CHANTIX STARTING MONTH PAK) 0.5 MG X 11 & 1 MG X 42 tablet, Take 0.5 mg tab daily for 3 days, then 0.5 mg tab twice daily for 4  "days, then 1 mg twice daily., Disp: 53 tablet, Rfl: 0     varenicline (CHANTIX) 1 MG tablet, Take 1 tablet (1 mg) by mouth 2 times daily, Disp: 56 tablet, Rfl: 2     ALLERGIES:    Allergies   Allergen Reactions     Atarax [Hydroxyzine] Other (See Comments)     rage     Vicodin [Hydrocodone-Acetaminophen] Nausea     \"violently angry\".  Patient states she does tolerate regular Tylenol/Acetaminophen     Zithromax [Azithromycin Dihydrate] Hives and Swelling        SOCIAL HISTORY:   Social History     Social History     Marital status: Single     Spouse name: N/A     Number of children: 0     Years of education: N/A     Occupational History      Unemployed     Social History Main Topics     Smoking status: Current Every Day Smoker     Packs/day: 1.00     Types: Cigarettes     Last attempt to quit: 5/4/2015     Smokeless tobacco: Never Used     Alcohol use Yes      Comment: 1-2 drinks a month     Drug use: No     Sexual activity: Yes     Partners: Male     Birth control/ protection: Condom      Comment: mirena 5/13     Other Topics Concern     Parent/Sibling W/ Cabg, Mi Or Angioplasty Before 65f 55m? No     Social History Narrative    Home schooling, planning to obtain GED.        FAMILY HISTORY:   Family History   Problem Relation Age of Onset     Alzheimer Disease Maternal Grandmother      Hypertension Maternal Grandmother      Thyroid Disease Maternal Grandmother      Arthritis Maternal Grandfather      Cancer Paternal Grandmother      Breast Cancer     Genitourinary Problems Paternal Grandmother      HEART DISEASE Paternal Grandfather      Hypertension Mother      Genitourinary Problems Sister      endometriosis     Respiratory Father 49     Pulmonary Embolism     Diabetes Paternal Uncle         EXAM:Vitals: /68 (BP Location: Right arm, Cuff Size: Adult Regular)  Ht 5' 7\" (1.702 m)  Wt 234 lb (106.1 kg)  BMI 36.65 kg/m2  BMI= Body mass index is 36.65 kg/(m^2).    General appearance: Patient is alert and fully " cooperative with history & exam.  No sign of distress is noted during the visit.     Psychiatric: Affect is pleasant & appropriate.  Patient appears motivated to improve health.     Respiratory: Breathing is regular & unlabored while sitting.     HEENT: Hearing is intact to spoken word.  Speech is clear.  No gross evidence of visual impairment that would impact ambulation.     Vascular: DP & PT pulses are intact & regular bilaterally.  No significant edema or varicosities noted.  CFT and skin temperature is normal to both lower extremities.     Neurologic: Lower extremity sensation is intact to light touch.  No evidence of weakness or contracture in the lower extremities.  No evidence of neuropathy.    Dermatologic: Skin is intact to both lower extremities with adequate texture, turgor and tone about the integument.  No paronychia or evidence of soft tissue infection is noted.     Musculoskeletal: Patient is ambulatory without assistive device or brace.  No obvious deformity is noted.  Full range of motion about the right and left ankle.  Negative anterior drawer and no symptoms with this maneuver.  No peroneal subluxation.  Manual muscle strength +5/5 to all 4 quadrants.  Most discomfort is noted with direct palpation to the lateral incision directly over the hardware which is fairly prominent.  Minimal discomfort noted with firm palpation along the lateral gutter or the anterior ankle joint margin of the right ankle.    Radiographs: 3 views 8/14/18 demonstrate adequate position of hardware without change in position of hardware.  Adequate joint space and alignment noted.  Complete union noted.  No broken or fractured hardware loose hardware.     ASSESSMENT:       ICD-10-CM    1. Closed fracture of right ankle, sequela S82.891S XR Ankle Right G/E 3 Views   2. Retained orthopedic hardware Z96.9         PLAN:  Reviewed patient's chart in [facility].      8/14/2018   Obtained interpreted radiographs of the right  ankle.  Discussed treatment options.  We discussed etiology of potential for traumatic arthritis osteochondral defect instability or peroneal tendinopathy.  MRI may make imaging of the peroneal tendons challenging.  She would like to have the hardware removed and this seems reasonable.  We also discussed injections oral anti-inflammatories but she feels this is limiting her physical activity significantly would like to have the hardware removed.  We will move forward with scheduling excision of hardware right ankle      Nate Beck DPM    Please schedule for surgery, pre op H&P, and post ops.    Surgery:  Patient Name:  Yesika Grant ([id number])  Procedure:   Excision hardware right ankle  Diagnosis:    Painful retained hardware   Assistant: first assist  Surgeon:  Nate Beck DPM  Anesthesia:  MAC  PT type:  Same Day Surgery  Time needed: 60 minutes  Patient position:  lying supine  Mini fluoro:  Yes  C-arm:  no  Equipment: Hardware removal set, screwdriver for Arthrex ankle set  Anticoagulation:  No  Vendor:  no  Surgeon Notes:     Post op appts:    5-7 days po  12-15 days po    Expected work restrictions:  full weight bearing in post op shoe    [facility] Discussed:  Not Applicable        Again, thank you for allowing me to participate in the care of your patient.        Sincerely,        Nate Beck DPM

## 2018-08-14 NOTE — PROGRESS NOTES
Chief Complaint   Patient presents with     RECHECK     burning, stabbing, pain 4-7, questioning hardware positioning; DOS 6/9/2017 - Open Reduction Internal Fixation Right Ankle; XR Right ankle today, 8/14/18; LOV 8/31/2017       Weight management plan: Patient was referred to their PCP to discuss a diet and exercise plan.     Patient to follow up with Primary Care provider regarding elevated blood pressure.    HPI:  6/9/2017 - Open Reduction Internal Fixation Right Ankle procedure.  Had been returned to all activities including hiking high impact long and aggressive activities all through the spring until about 2 months ago she started having more pain and prominence about the lateral right ankle.  Any shoes that rub against the fibula cause discomfort.  She does not feel as though she can rely on her ankle.  She notes that it does not feel weak but it hurts with any pressure against the outside.  She feels like the hardware is prominent.  She has had some bleeding from irritation along the lateral incision even in the last couple of months.    Works desk as     ROS:  10 point ROS neg other than the symptoms noted above in the HPI.    Patient Active Problem List   Diagnosis     CARDIOVASCULAR SCREENING; LDL GOAL LESS THAN 160     Tobacco use disorder     Anxiety     Major depression in complete remission (H)     Contraception     Vitamin D deficiency     Concussion injury of body structure     Moderate episode of recurrent major depressive disorder (H)     ASCUS with positive high risk HPV cervical       PAST MEDICAL HISTORY:   Past Medical History:   Diagnosis Date     ASCUS with positive high risk HPV cervical 12/04/2017    (not 16/18)     Moderate major depression (H) 5/3/2012     Motion sickness      Ovarian cyst 11/05    small 2 cm simple left ovarian cyst vs dominant follicle on US     Tonsillitis      Urticaria     ?due to oat straw allergy        PAST SURGICAL HISTORY:   Past Surgical  History:   Procedure Laterality Date     HC TOOTH EXTRACTION W/FORCEP Bilateral      OPEN REDUCTION INTERNAL FIXATION ANKLE Right 6/9/2017    Procedure: OPEN REDUCTION INTERNAL FIXATION ANKLE;  Open Reduction Internal Fixation Right Ankle;  Surgeon: Nate Beck DPM;  Location:  OR     TONSILLECTOMY, ADENOIDECTOMY ADULT, COMBINED  12/1/2011    Procedure:COMBINED TONSILLECTOMY, ADENOIDECTOMY ADULT; Adult Tonsillectomy & Adenoidectomy, Cauterization Of       TURBINOPLASTY  12/1/2011    Procedure:TURBINOPLASTY; Surgeon:GISELE SWENSON; Location: OR        MEDICATIONS:   Current Outpatient Prescriptions:      Ferrous Sulfate (IRON SUPPLEMENT PO), Reported on 5/23/2017, Disp: , Rfl:      ibuprofen (ADVIL/MOTRIN) 200 MG tablet, Take 3 tablets (600 mg) by mouth every 6 hours as needed for pain, Disp: , Rfl:      multivitamin, therapeutic with minerals (MULTI-VITAMIN) TABS, Take 1 tablet by mouth daily Reported on 5/23/2017, Disp: , Rfl:      norgestimate-ethinyl estradiol (ORTHO-CYCLEN, SPRINTEC) 0.25-35 MG-MCG per tablet, Take 1 tablet by mouth daily, Disp: 84 tablet, Rfl: 3     venlafaxine (EFFEXOR-XR) 150 MG 24 hr capsule, Take 1 capsule (150 mg) by mouth daily, Disp: 90 capsule, Rfl: 1     VITAMIN D, CHOLECALCIFEROL, PO, Take by mouth daily, Disp: , Rfl:      ALPRAZolam (XANAX) 0.25 MG tablet, Take 1 tablet (0.25 mg) by mouth nightly as needed for anxiety, Disp: 10 tablet, Rfl: 1     cyclobenzaprine (FLEXERIL) 10 MG tablet, TAKE ONE TABLET BY MOUTH THREE TIMES A DAY AS NEEDED FOR MUSCLE SPASMS, Disp: 30 tablet, Rfl: 3     varenicline (CHANTIX STARTING MONTH PAK) 0.5 MG X 11 & 1 MG X 42 tablet, Take 0.5 mg tab daily for 3 days, then 0.5 mg tab twice daily for 4 days, then 1 mg twice daily., Disp: 53 tablet, Rfl: 0     varenicline (CHANTIX) 1 MG tablet, Take 1 tablet (1 mg) by mouth 2 times daily, Disp: 56 tablet, Rfl: 2     ALLERGIES:    Allergies   Allergen Reactions     Atarax [Hydroxyzine] Other (See  "Comments)     rage     Vicodin [Hydrocodone-Acetaminophen] Nausea     \"violently angry\".  Patient states she does tolerate regular Tylenol/Acetaminophen     Zithromax [Azithromycin Dihydrate] Hives and Swelling        SOCIAL HISTORY:   Social History     Social History     Marital status: Single     Spouse name: N/A     Number of children: 0     Years of education: N/A     Occupational History      Unemployed     Social History Main Topics     Smoking status: Current Every Day Smoker     Packs/day: 1.00     Types: Cigarettes     Last attempt to quit: 5/4/2015     Smokeless tobacco: Never Used     Alcohol use Yes      Comment: 1-2 drinks a month     Drug use: No     Sexual activity: Yes     Partners: Male     Birth control/ protection: Condom      Comment: mirena 5/13     Other Topics Concern     Parent/Sibling W/ Cabg, Mi Or Angioplasty Before 65f 55m? No     Social History Narrative    Home schooling, planning to obtain GED.        FAMILY HISTORY:   Family History   Problem Relation Age of Onset     Alzheimer Disease Maternal Grandmother      Hypertension Maternal Grandmother      Thyroid Disease Maternal Grandmother      Arthritis Maternal Grandfather      Cancer Paternal Grandmother      Breast Cancer     Genitourinary Problems Paternal Grandmother      HEART DISEASE Paternal Grandfather      Hypertension Mother      Genitourinary Problems Sister      endometriosis     Respiratory Father 49     Pulmonary Embolism     Diabetes Paternal Uncle         EXAM:Vitals: /68 (BP Location: Right arm, Cuff Size: Adult Regular)  Ht 5' 7\" (1.702 m)  Wt 234 lb (106.1 kg)  BMI 36.65 kg/m2  BMI= Body mass index is 36.65 kg/(m^2).    General appearance: Patient is alert and fully cooperative with history & exam.  No sign of distress is noted during the visit.     Psychiatric: Affect is pleasant & appropriate.  Patient appears motivated to improve health.     Respiratory: Breathing is regular & unlabored while sitting.   "   HEENT: Hearing is intact to spoken word.  Speech is clear.  No gross evidence of visual impairment that would impact ambulation.     Vascular: DP & PT pulses are intact & regular bilaterally.  No significant edema or varicosities noted.  CFT and skin temperature is normal to both lower extremities.     Neurologic: Lower extremity sensation is intact to light touch.  No evidence of weakness or contracture in the lower extremities.  No evidence of neuropathy.    Dermatologic: Skin is intact to both lower extremities with adequate texture, turgor and tone about the integument.  No paronychia or evidence of soft tissue infection is noted.     Musculoskeletal: Patient is ambulatory without assistive device or brace.  No obvious deformity is noted.  Full range of motion about the right and left ankle.  Negative anterior drawer and no symptoms with this maneuver.  No peroneal subluxation.  Manual muscle strength +5/5 to all 4 quadrants.  Most discomfort is noted with direct palpation to the lateral incision directly over the hardware which is fairly prominent.  Minimal discomfort noted with firm palpation along the lateral gutter or the anterior ankle joint margin of the right ankle.    Radiographs: 3 views 8/14/18 demonstrate adequate position of hardware without change in position of hardware.  Adequate joint space and alignment noted.  Complete union noted.  No broken or fractured hardware loose hardware.     ASSESSMENT:       ICD-10-CM    1. Closed fracture of right ankle, sequela S82.891S XR Ankle Right G/E 3 Views   2. Retained orthopedic hardware Z96.9         PLAN:  Reviewed patient[facility] chart in [facility].      8/14/2018   Obtained interpreted radiographs of the right ankle.  Discussed treatment options.  We discussed etiology of potential for traumatic arthritis osteochondral defect instability or peroneal tendinopathy.  MRI may make imaging of the peroneal tendons challenging.  She would like to have the hardware  removed and this seems reasonable.  We also discussed injections oral anti-inflammatories but she feels this is limiting her physical activity significantly would like to have the hardware removed.  We will move forward with scheduling excision of hardware right ankle      Nate Beck DPM    Please schedule for surgery, pre op H&P, and post ops.    Surgery:  Patient Name:  Yesika Grant ([id number])  Procedure:   Excision hardware right ankle  Diagnosis:    Painful retained hardware   Assistant: first assist  Surgeon:  Nate Beck DPM  Anesthesia:  MAC  PT type:  Same Day Surgery  Time needed: 60 minutes  Patient position:  lying supine  Mini fluoro:  Yes  C-arm:  no  Equipment: Hardware removal set, screwdriver for Arthrex ankle set  Anticoagulation:  No  Vendor:  no  Surgeon Notes:     Post op appts:    5-7 days po  12-15 days po    Expected work restrictions:  full weight bearing in post op shoe    [facility] Discussed:  Not Applicable

## 2018-08-15 NOTE — PROGRESS NOTES
[facility]  [address] [facility] [address] [phone]  Dept: [phone]    PRE-OP EVALUATION:  Today's date: 2018    Yesika Grant (: 1990) presents for pre-operative evaluation assessment as requested by Dr. Beck.  She requires evaluation and anesthesia risk assessment prior to undergoing surgery/procedure for treatment of ankle hardware rubbing and cutting tendon  .    Fax number for surgical facility: [facility].   Primary Physician: Young Fletcher  Type of Anesthesia Anticipated: to be determined    Patient has a Health Care Directive or Living Will:  NO    Preop Questions 2018   Who is doing your surgery? Kiran   What are you having done? Hardwear removal   Date of Surgery/Procedure: Aug. 31st   Facility or Hospital where procedure/surgery will be performed: [place]   1.  Do you have a history of Heart attack, stroke, stent, coronary bypass surgery, or other heart surgery? No   2.  Do you ever have any pain or discomfort in your chest? No   3.  Do you have a history of  Heart Failure? No   4.   Are you troubled by shortness of breath when:  walking on a level surface, or up a slight hill, or at night? YES - not new    5.  Do you currently have a cold, bronchitis or other respiratory infection? No   6.  Do you have a cough, shortness of breath, or wheezing? YES - always has a cough    7.  Do you sometimes get pains in the calves of your legs when you walk? YES - occasionally, not new    8. Do you or anyone in your family have previous history of blood clots? YES - long family history, parent history as well    9.  Do you or does anyone in your family have a serious bleeding problem such as prolonged bleeding following surgeries or cuts? No   10. Have you ever had problems with anemia or been told to take iron pills? YES - currently on iron    11. Have you had any abnormal blood loss such as black, tarry or bloody stools, or abnormal  vaginal bleeding? No   12. Have you ever had a blood transfusion? UNKNOWN    13. Have you or any of your relatives ever had problems with anesthesia? YES -  Pt gets very nauseous with severe anxiety    14. Do you have sleep apnea, excessive snoring or daytime drowsiness? YES - daytime drowsiness (negative for sleep apnea)   15. Do you have any prosthetic heart valves? No   16. Do you have prosthetic joints? No   17. Is there any chance that you may be pregnant? No         HPI:     HPI related to upcoming procedure: history of right ankle fracture subsequent surgical procedure with hardware that is not causing pain. She state he pain is quite bad after working 8 hours 10/10.   States she is taking ibuprofen 600-800 mg every 6-8 hours without decrease in pain.     DEPRESSION - Patient has a long history of Depression of moderate severity requiring medication for control with recent symptoms being stable..Current symptoms of depression include none.                                                                                                                                                                                    .    MEDICAL HISTORY:     Patient Active Problem List    Diagnosis Date Noted     Moderate episode of recurrent major depressive disorder (H) 12/05/2017     Priority: Medium     ASCUS with positive high risk HPV cervical 12/04/2017     Priority: Medium     12/4/17 ASCUS pap, + HR HPV (not 16/18). Plan colp due by 3/4/18  1/4/18 Stanley: ectocervical mucosa with reactive changes. ECC: negative. Plan: cotest in 1 yr       Vitamin D deficiency 04/20/2016     Priority: Medium     Contraception 09/16/2015     Priority: Medium     Concussion injury of body structure 01/10/2014     Priority: Medium     Major depression in complete remission (H) 12/05/2012     Priority: Medium     Anxiety 05/03/2012     Priority: Medium     Tobacco use disorder 02/23/2012     Priority: Medium     CARDIOVASCULAR SCREENING; LDL  GOAL LESS THAN 160 04/20/2011     Priority: Medium      Past Medical History:   Diagnosis Date     ASCUS with positive high risk HPV cervical 12/04/2017    (not 16/18)     Moderate major depression (H) 5/3/2012     Motion sickness      Ovarian cyst 11/05    small 2 cm simple left ovarian cyst vs dominant follicle on US     Tonsillitis      Urticaria     ?due to oat straw allergy     Past Surgical History:   Procedure Laterality Date     HC TOOTH EXTRACTION W/FORCEP Bilateral      OPEN REDUCTION INTERNAL FIXATION ANKLE Right 6/9/2017    Procedure: OPEN REDUCTION INTERNAL FIXATION ANKLE;  Open Reduction Internal Fixation Right Ankle;  Surgeon: Nate Beck DPM;  Location:  OR     TONSILLECTOMY, ADENOIDECTOMY ADULT, COMBINED  12/1/2011    Procedure:COMBINED TONSILLECTOMY, ADENOIDECTOMY ADULT; Adult Tonsillectomy & Adenoidectomy, Cauterization Of       TURBINOPLASTY  12/1/2011    Procedure:TURBINOPLASTY; Surgeon:GISELE SWENSON; Location:[facility]     Current Outpatient Prescriptions   Medication Sig Dispense Refill     ALPRAZolam (XANAX) 0.25 MG tablet Take 1 tablet (0.25 mg) by mouth nightly as needed for anxiety 10 tablet 1     cyclobenzaprine (FLEXERIL) 10 MG tablet TAKE ONE TABLET BY MOUTH THREE TIMES A DAY AS NEEDED FOR MUSCLE SPASMS 30 tablet 3     Ferrous Sulfate (IRON SUPPLEMENT PO) Reported on 5/23/2017       HYDROcodone-acetaminophen (NORCO) 5-325 MG per tablet Take 1 tablet by mouth every 6 hours as needed for moderate to severe pain 20 tablet 0     ibuprofen (ADVIL/MOTRIN) 200 MG tablet Take 3 tablets (600 mg) by mouth every 6 hours as needed for pain       multivitamin, therapeutic with minerals (MULTI-VITAMIN) TABS Take 1 tablet by mouth daily Reported on 5/23/2017       norgestimate-ethinyl estradiol (ORTHO-CYCLEN, SPRINTEC) 0.25-35 MG-MCG per tablet Take 1 tablet by mouth daily 84 tablet 3     varenicline (CHANTIX STARTING MONTH PAK) 0.5 MG X 11 & 1 MG X 42 tablet Take 0.5 mg tab daily for 3  "days, then 0.5 mg tab twice daily for 4 days, then 1 mg twice daily. 53 tablet 0     varenicline (CHANTIX) 1 MG tablet Take 1 tablet (1 mg) by mouth 2 times daily 56 tablet 2     venlafaxine (EFFEXOR-XR) 150 MG 24 hr capsule Take 1 capsule (150 mg) by mouth daily 90 capsule 1     VITAMIN D, CHOLECALCIFEROL, PO Take by mouth daily       OTC products: NSAIDS    Allergies   Allergen Reactions     Atarax [Hydroxyzine] Other (See Comments)     rage     Vicodin [Hydrocodone-Acetaminophen] Nausea     \"violently angry\".  Patient states she does tolerate regular Tylenol/Acetaminophen     Zithromax [Azithromycin Dihydrate] Hives and Swelling      Latex Allergy: NO    Social History   Substance Use Topics     Smoking status: Current Every Day Smoker     Packs/day: 1.00     Types: Cigarettes     Last attempt to quit: 5/4/2015     Smokeless tobacco: Never Used     Alcohol use Yes      Comment: 1-2 drinks a month     History   Drug Use No       REVIEW OF SYSTEMS:   Constitutional, neuro, ENT, endocrine, pulmonary, cardiac, gastrointestinal, genitourinary, musculoskeletal, integument and psychiatric systems are negative, except as otherwise noted.    EXAM:   /78  Pulse 88  Temp 98.2  F (36.8  C) (Oral)  Resp 16  Wt 238 lb (108 kg)  BMI 37.28 kg/m2    GENERAL APPEARANCE: healthy, alert, moderate distress and over weight     EYES: EOMI, PERRL     HENT: ear canals and TM's normal and nose and mouth without ulcers or lesions     NECK: no adenopathy, no asymmetry, masses, or scars and thyroid normal to palpation     RESP: lungs clear to auscultation - no rales, rhonchi or wheezes     CV: regular rates and rhythm, normal S1 S2, no S3 or S4 and no murmur, click or rub     ABDOMEN:  soft, nontender, no HSM or masses and bowel sounds normal     MS: right lower extremity is covered with compression stocking. There is inflammation present. She does not tolerate trying to remove stocking. CMS is intact     SKIN: no suspicious " lesions or rashes     NEURO: Normal strength and tone, sensory exam grossly normal, mentation intact and speech normal     PSYCH: mentation appears normal. and affect normal/bright     LYMPHATICS: No cervical adenopathy    DIAGNOSTICS:   No labs or EKG required for low risk surgery (cataract, skin procedure, breast biopsy, etc)  Urine HCG DOS     Recent Labs   Lab Test  06/08/17   1525  12/12/16   1444  08/18/16   1521   HGB  12.1  13.0  14.0   PLT   --   339  325   NA   --   142  138   POTASSIUM   --   3.8  4.0   CR   --   0.68  0.70   A1C  5.2   --    --         IMPRESSION:   Reason for surgery/procedure: removal and assessment of right ankle    The proposed surgical procedure is considered LOW risk.    REVISED CARDIAC RISK INDEX  The patient has the following serious cardiovascular risks for perioperative complications such as (MI, PE, VFib and 3  AV Block):  No serious cardiac risks  INTERPRETATION: 0 risks: Class I (very low risk - 0.4% complication rate)    The patient has the following additional risks for perioperative complications:  No identified additional risks      ICD-10-CM    1. Preop general physical exam Z01.818    2. Retained orthopedic hardware Z96.9 HYDROcodone-acetaminophen (NORCO) 5-325 MG per tablet   3. Closed fracture of right ankle, sequela S82.891S HYDROcodone-acetaminophen (NORCO) 5-325 MG per tablet       RECOMMENDATIONS:     --Patient is to take all scheduled medications on the day of surgery EXCEPT for modifications listed below.  Patient was given short course of percocet for severe pain.     APPROVAL GIVEN to proceed with proposed procedure, without further diagnostic evaluation       Signed Electronically by: LASHAE Foster CNP    Copy of this evaluation report is provided to requesting physician.    [facility] Preop Guidelines    Revised Cardiac Risk Index  Answers for HPI/ROS submitted by the patient on 8/21/2018   If you checked off any problems, how difficult have these  problems made it for you to do your work, take care of things at home, or get along with other people?: Somewhat difficult  PHQ9 TOTAL SCORE: 8  ZAN 7 TOTAL SCORE: 5

## 2018-08-15 NOTE — PATIENT INSTRUCTIONS
Before Your Surgery      Call your surgeon if there is any change in your health. This includes signs of a cold or flu (such as a sore throat, runny nose, cough, rash or fever).    Do not smoke, drink alcohol or take over the counter medicine (unless your surgeon or primary care doctor tells you to) for the 24 hours before and after surgery.    If you take prescribed drugs: Follow your doctor s orders about which medicines to take and which to stop until after surgery.    Eating and drinking prior to surgery: follow the instructions from your surgeon    Take a shower or bath the night before surgery. Use the soap your surgeon gave you to gently clean your skin. If you do not have soap from your surgeon, use your regular soap. Do not shave or scrub the surgery site.  Wear clean pajamas and have clean sheets on your bed.     Please hold Nsaid (Aleve, advil, motrin, ibuprofen) or aspirin 7-10 days prior to surgery.   Follow surgery center guidelines for eating and drinking.   Will need urine pregnancy test DOS    Thank you

## 2018-08-16 ENCOUNTER — TELEPHONE (OUTPATIENT)
Dept: PODIATRY | Facility: CLINIC | Age: 28
End: 2018-08-16

## 2018-08-16 NOTE — TELEPHONE ENCOUNTER
Type of surgery: excision hardware right ankle  Location of surgery: [facility]   Date of surgery: 8/31/18  Surgeon: Dr. Beck  Pre-Op Appt Date: 8/21/18  Post-Op Appt Date: 9/6, 9/18   Packet sent out: Surgery packet mailed to patient's home address.   Pre-cert/Authorization completed: NA  Date: 8/16/2018    Swati Jones  Surgery Scheduler

## 2018-08-21 ENCOUNTER — OFFICE VISIT (OUTPATIENT)
Dept: FAMILY MEDICINE | Facility: OTHER | Age: 28
End: 2018-08-21
Payer: COMMERCIAL

## 2018-08-21 VITALS
WEIGHT: 238 LBS | TEMPERATURE: 98.2 F | HEART RATE: 88 BPM | SYSTOLIC BLOOD PRESSURE: 120 MMHG | BODY MASS INDEX: 37.28 KG/M2 | DIASTOLIC BLOOD PRESSURE: 78 MMHG | RESPIRATION RATE: 16 BRPM

## 2018-08-21 DIAGNOSIS — Z96.9 RETAINED ORTHOPEDIC HARDWARE: ICD-10-CM

## 2018-08-21 DIAGNOSIS — S82.891S CLOSED FRACTURE OF RIGHT ANKLE, SEQUELA: ICD-10-CM

## 2018-08-21 DIAGNOSIS — Z01.818 PREOP GENERAL PHYSICAL EXAM: Primary | ICD-10-CM

## 2018-08-21 PROCEDURE — 99214 OFFICE O/P EST MOD 30 MIN: CPT | Performed by: NURSE PRACTITIONER

## 2018-08-21 RX ORDER — HYDROCODONE BITARTRATE AND ACETAMINOPHEN 5; 325 MG/1; MG/1
1 TABLET ORAL EVERY 6 HOURS PRN
Qty: 20 TABLET | Refills: 0 | Status: SHIPPED | OUTPATIENT
Start: 2018-08-21 | End: 2018-08-21 | Stop reason: SINTOL

## 2018-08-21 RX ORDER — OXYCODONE AND ACETAMINOPHEN 5; 325 MG/1; MG/1
1 TABLET ORAL EVERY 6 HOURS PRN
Qty: 12 TABLET | Refills: 0 | Status: SHIPPED | OUTPATIENT
Start: 2018-08-21 | End: 2018-10-06

## 2018-08-21 ASSESSMENT — ANXIETY QUESTIONNAIRES
GAD7 TOTAL SCORE: 5
GAD7 TOTAL SCORE: 5
1. FEELING NERVOUS, ANXIOUS, OR ON EDGE: SEVERAL DAYS
6. BECOMING EASILY ANNOYED OR IRRITABLE: SEVERAL DAYS
5. BEING SO RESTLESS THAT IT IS HARD TO SIT STILL: SEVERAL DAYS
3. WORRYING TOO MUCH ABOUT DIFFERENT THINGS: NOT AT ALL
7. FEELING AFRAID AS IF SOMETHING AWFUL MIGHT HAPPEN: NOT AT ALL
2. NOT BEING ABLE TO STOP OR CONTROL WORRYING: SEVERAL DAYS
4. TROUBLE RELAXING: SEVERAL DAYS
7. FEELING AFRAID AS IF SOMETHING AWFUL MIGHT HAPPEN: NOT AT ALL
GAD7 TOTAL SCORE: 5

## 2018-08-21 ASSESSMENT — PATIENT HEALTH QUESTIONNAIRE - PHQ9
SUM OF ALL RESPONSES TO PHQ QUESTIONS 1-9: 8
SUM OF ALL RESPONSES TO PHQ QUESTIONS 1-9: 8
10. IF YOU CHECKED OFF ANY PROBLEMS, HOW DIFFICULT HAVE THESE PROBLEMS MADE IT FOR YOU TO DO YOUR WORK, TAKE CARE OF THINGS AT HOME, OR GET ALONG WITH OTHER PEOPLE: SOMEWHAT DIFFICULT

## 2018-08-21 NOTE — MR AVS SNAPSHOT
After Visit Summary   8/21/2018    Yesika Grant    MRN: [id number]           Patient Information     Date Of Birth          1990        Visit Information        Provider Department      8/21/2018 4:00 PM Clarissa Melendrez APRN [facility]        Today's Diagnoses     Preop general physical exam    -  1    Retained orthopedic hardware        Closed fracture of right ankle, sequela          Care Instructions      Before Your Surgery      Call your surgeon if there is any change in your health. This includes signs of a cold or flu (such as a sore throat, runny nose, cough, rash or fever).    Do not smoke, drink alcohol or take over the counter medicine (unless your surgeon or primary care doctor tells you to) for the 24 hours before and after surgery.    If you take prescribed drugs: Follow your doctor s orders about which medicines to take and which to stop until after surgery.    Eating and drinking prior to surgery: follow the instructions from your surgeon    Take a shower or bath the night before surgery. Use the soap your surgeon gave you to gently clean your skin. If you do not have soap from your surgeon, use your regular soap. Do not shave or scrub the surgery site.  Wear clean pajamas and have clean sheets on your bed.     Please hold Nsaid (Aleve, advil, motrin, ibuprofen) or aspirin 7-10 days prior to surgery.   Follow surgery center guidelines for eating and drinking.   Will need urine pregnancy test DOS    Thank you            Follow-ups after your visit        Your next 10 appointments already scheduled     Aug 31, 2018   Procedure with Nate Beck DPM   [facility] ([facility])    [address] [place] [address] [address] [place] [address]   [phone]           From [address]: Exit at [email or web address] on [address] side of [place]. Turn right on [email or web address]. Turn left at stoplight on [address]. [facility]  will be in view two blocks ahead            Aug 31, 2018  9:30 AM CDT   XR SURGERY MILA LESS THAN 5 MIN FLUORO W STILLS with PHMINI   [facility] ([facility])    [address] [place] [address] [phone]           Please bring a list of your current medicines to your exam. (Include vitamins, minerals and over-thecounter medicines.) Leave your valuables at home.  Tell your doctor if there is a chance you may be pregnant.  You do not need to do anything special for this exam.            Sep 06, 2018  9:00 AM CDT   Nurse Only with [facility] SPECIALTY RN   [facility]    [address] [place] [address] [phone]            Sep 18, 2018 10:15 AM CDT   Return Visit with Nate Beck DPM   [facility]    [address]  [facility] [address] [phone]              Who to contact     If you have questions or need follow up information about today's clinic visit or your schedule please contact [facility] directly at [phone].  Normal or non-critical lab and imaging results will be communicated to you by MyChart, letter or phone within 4 business days after the clinic has received the results. If you do not hear from us within 7 days, please contact the clinic through MyChart or phone. If you have a critical or abnormal lab result, we will notify you by phone as soon as possible.  Submit refill requests through [employer] or call your pharmacy and they will forward the refill request to us. Please allow 3 business days for your refill to be completed.          Additional Information About Your Visit        Care EveryWhere ID     This is your Care EveryWhere ID. This could be used by other organizations to access your [facility] medical records  [id number]        Your Vitals Were     Pulse Temperature Respirations BMI (Body Mass Index)          88  98.2  F (36.8  C) (Oral) 16 37.28 kg/m2         Blood Pressure from Last 3 Encounters:   08/21/18 120/78   08/14/18 120/68   04/12/18 110/76    Weight from Last 3 Encounters:   08/21/18 238 lb (108 kg)   08/14/18 234 lb (106.1 kg)   04/12/18 233 lb 14.4 oz (106.1 kg)              Today, you had the following     No orders found for display         Today's Medication Changes          These changes are accurate as of 8/21/18  4:26 PM.  If you have any questions, ask your nurse or doctor.               Start taking these medicines.        Dose/Directions    HYDROcodone-acetaminophen 5-325 MG per tablet   Commonly known as:  NORCO   Used for:  Retained orthopedic hardware, Closed fracture of right ankle, sequela   Started by:  Clarissa Melendrez APRN CNP        Dose:  1 tablet   Take 1 tablet by mouth every 6 hours as needed for moderate to severe pain   Quantity:  20 tablet   Refills:  0            Where to get your medicines      Some of these will need a paper prescription and others can be bought over the counter.  Ask your nurse if you have questions.     Bring a paper prescription for each of these medications     HYDROcodone-acetaminophen 5-325 MG per tablet               Information about OPIOIDS     PRESCRIPTION OPIOIDS: WHAT YOU NEED TO KNOW   We gave you an opioid (narcotic) pain medicine. It is important to manage your pain, but opioids are not always the best choice. You should first try all the other options your care team gave you. Take this medicine for as short a time (and as few doses) as possible.    Some activities can increase your pain, such as bandage changes or therapy sessions. It may help to take your pain medicine 30 to 60 minutes before these activities. Reduce your stress by getting enough sleep, working on hobbies you enjoy and practicing relaxation or meditation. Talk to your care team about ways to manage your pain beyond prescription opioids.    These medicines have risks:    DO  NOT drive when on new or higher doses of pain medicine. These medicines can affect your alertness and reaction times, and you could be arrested for driving under the influence (DUI). If you need to use opioids long-term, talk to your care team about driving.    DO NOT operate heavy machinery    DO NOT do any other dangerous activities while taking these medicines.    DO NOT drink any alcohol while taking these medicines.     If the opioid prescribed includes acetaminophen, DO NOT take with any other medicines that contain acetaminophen. Read all labels carefully. Look for the word  acetaminophen  or  Tylenol.  Ask your pharmacist if you have questions or are unsure.    You can get addicted to pain medicines, especially if you have a history of addiction (chemical, alcohol or substance dependence). Talk to your care team about ways to reduce this risk.    All opioids tend to cause constipation. Drink plenty of water and eat foods that have a lot of fiber, such as fruits, vegetables, prune juice, apple juice and high-fiber cereal. Take a laxative (Miralax, milk of magnesia, Colace, Senna) if you don t move your bowels at least every other day. Other side effects include upset stomach, sleepiness, dizziness, throwing up, tolerance (needing more of the medicine to have the same effect), physical dependence and slowed breathing.    Store your pills in a secure place, locked if possible. We will not replace any lost or stolen medicine. If you don t finish your medicine, please throw away (dispose) as directed by your pharmacist. The [place] Pollution [employer] has more information about safe disposal: [email or web address]://[email or web address].[email or web address]/[email or web address]-[email or web address]-medications         Primary Care Provider Office Phone # Fax #    Young Fletcher [phone] [address] [place] [address]        Equal Access to Services     SHI ALONSO : malik Perez  fer arizmendi kharimonica harrington ah. So [facility] [phone].    ATENCIÓN: Si kael gagnon, tiene a palacios disposición servicios gratuitos de asistencia lingüística. Suzan al [phone].    We comply with applicable federal civil rights laws and [place] laws. We do not discriminate on the basis of race, color, national origin, age, disability, sex, sexual orientation, or gender identity.            Thank you!     Thank you for choosing [facility]  for your care. Our goal is always to provide you with excellent care. Hearing back from our patients is one way we can continue to improve our services. Please take a few minutes to complete the written survey that you may receive in the mail after your visit with us. Thank you!             Your Updated Medication List - Protect others around you: Learn how to safely use, store and throw away your medicines at www.disposemymeds.org.          This list is accurate as of 8/21/18  4:26 PM.  Always use your most recent med list.                   Brand Name Dispense Instructions for use Diagnosis    ALPRAZolam 0.25 MG tablet    XANAX    10 tablet    Take 1 tablet (0.25 mg) by mouth nightly as needed for anxiety    Panic attacks       cyclobenzaprine 10 MG tablet    FLEXERIL    30 tablet    TAKE ONE TABLET BY MOUTH THREE TIMES A DAY AS NEEDED FOR MUSCLE SPASMS    New daily persistent headache, Strain of neck muscle, subsequent encounter, Chronic midline low back pain without sciatica       HYDROcodone-acetaminophen 5-325 MG per tablet    NORCO    20 tablet    Take 1 tablet by mouth every 6 hours as needed for moderate to severe pain    Retained orthopedic hardware, Closed fracture of right ankle, sequela       ibuprofen 200 MG tablet    ADVIL/MOTRIN     Take 3 tablets (600 mg) by mouth every 6 hours as needed for pain        IRON SUPPLEMENT PO      Reported on 5/23/2017        Multi-vitamin Tabs tablet      Take 1 tablet by mouth  daily Reported on 5/23/2017        norgestimate-ethinyl estradiol 0.25-35 MG-MCG per tablet    ORTHO-CYCLEN, SPRINTEC    84 tablet    Take 1 tablet by mouth daily    BCP (birth control pills) initiation       * varenicline 1 MG tablet    CHANTIX    56 tablet    Take 1 tablet (1 mg) by mouth 2 times daily    Tobacco use disorder       * varenicline 0.5 MG X 11 & 1 MG X 42 tablet    CHANTIX STARTING MONTH PRACHI    53 tablet    Take 0.5 mg tab daily for 3 days, then 0.5 mg tab twice daily for 4 days, then 1 mg twice daily.    Tobacco use disorder       venlafaxine 150 MG 24 hr capsule    EFFEXOR-XR    90 capsule    Take 1 capsule (150 mg) by mouth daily    Anxiety, Moderate episode of recurrent major depressive disorder (H)       VITAMIN D (CHOLECALCIFEROL) PO      Take by mouth daily        * Notice:  This list has 2 medication(s) that are the same as other medications prescribed for you. Read the directions carefully, and ask your doctor or other care provider to review them with you.

## 2018-08-22 ASSESSMENT — ANXIETY QUESTIONNAIRES: GAD7 TOTAL SCORE: 5

## 2018-08-22 ASSESSMENT — PATIENT HEALTH QUESTIONNAIRE - PHQ9: SUM OF ALL RESPONSES TO PHQ QUESTIONS 1-9: 8

## 2018-08-30 ENCOUNTER — ANESTHESIA EVENT (OUTPATIENT)
Dept: SURGERY | Facility: CLINIC | Age: 28
End: 2018-08-30
Payer: COMMERCIAL

## 2018-08-30 ASSESSMENT — LIFESTYLE VARIABLES: TOBACCO_USE: 1

## 2018-08-30 NOTE — H&P (VIEW-ONLY)
[facility]  [address] [facility] [id number] [phone]  Dept: [phone]    PRE-OP EVALUATION:  Today's date: 2018    Yesika Grant (: 1990) presents for pre-operative evaluation assessment as requested by Dr. Beck.  She requires evaluation and anesthesia risk assessment prior to undergoing surgery/procedure for treatment of ankle hardware rubbing and cutting tendon  .    Fax number for surgical facility: [facility].   Primary Physician: Young Fletcher  Type of Anesthesia Anticipated: to be determined    Patient has a Health Care Directive or Living Will:  NO    Preop Questions 2018   Who is doing your surgery? Kiran   What are you having done? Hardwear removal   Date of Surgery/Procedure: Aug. 31st   Facility or Hospital where procedure/surgery will be performed: [facility]   1.  Do you have a history of Heart attack, stroke, stent, coronary bypass surgery, or other heart surgery? No   2.  Do you ever have any pain or discomfort in your chest? No   3.  Do you have a history of  Heart Failure? No   4.   Are you troubled by shortness of breath when:  walking on a level surface, or up a slight hill, or at night? YES - not new    5.  Do you currently have a cold, bronchitis or other respiratory infection? No   6.  Do you have a cough, shortness of breath, or wheezing? YES - always has a cough    7.  Do you sometimes get pains in the calves of your legs when you walk? YES - occasionally, not new    8. Do you or anyone in your family have previous history of blood clots? YES - long family history, parent history as well    9.  Do you or does anyone in your family have a serious bleeding problem such as prolonged bleeding following surgeries or cuts? No   10. Have you ever had problems with anemia or been told to take iron pills? YES - currently on iron    11. Have you had any abnormal blood loss such as black, tarry or bloody stools, or abnormal  vaginal bleeding? No   12. Have you ever had a blood transfusion? UNKNOWN    13. Have you or any of your relatives ever had problems with anesthesia? YES -  Pt gets very nauseous with severe anxiety    14. Do you have sleep apnea, excessive snoring or daytime drowsiness? YES - daytime drowsiness (negative for sleep apnea)   15. Do you have any prosthetic heart valves? No   16. Do you have prosthetic joints? No   17. Is there any chance that you may be pregnant? No         HPI:     HPI related to upcoming procedure: history of right ankle fracture subsequent surgical procedure with hardware that is not causing pain. She state he pain is quite bad after working 8 hours 10/10.   States she is taking ibuprofen 600-800 mg every 6-8 hours without decrease in pain.     DEPRESSION - Patient has a long history of Depression of moderate severity requiring medication for control with recent symptoms being stable..Current symptoms of depression include none.                                                                                                                                                                                    .    MEDICAL HISTORY:     Patient Active Problem List    Diagnosis Date Noted     Moderate episode of recurrent major depressive disorder (H) 12/05/2017     Priority: Medium     ASCUS with positive high risk HPV cervical 12/04/2017     Priority: Medium     12/4/17 ASCUS pap, + HR HPV (not 16/18). Plan colp due by 3/4/18  1/4/18 [place]: ectocervical mucosa with reactive changes. ECC: negative. Plan: cotest in 1 yr       Vitamin D deficiency 04/20/2016     Priority: Medium     Contraception 09/16/2015     Priority: Medium     Concussion injury of body structure 01/10/2014     Priority: Medium     Major depression in complete remission (H) 12/05/2012     Priority: Medium     Anxiety 05/03/2012     Priority: Medium     Tobacco use disorder 02/23/2012     Priority: Medium     CARDIOVASCULAR SCREENING; LDL  GOAL LESS THAN 160 04/20/2011     Priority: Medium      Past Medical History:   Diagnosis Date     ASCUS with positive high risk HPV cervical 12/04/2017    (not 16/18)     Moderate major depression (H) 5/3/2012     Motion sickness      Ovarian cyst 11/05    small 2 cm simple left ovarian cyst vs dominant follicle on US     Tonsillitis      Urticaria     ?due to oat straw allergy     Past Surgical History:   Procedure Laterality Date     HC TOOTH EXTRACTION W/FORCEP Bilateral      OPEN REDUCTION INTERNAL FIXATION ANKLE Right 6/9/2017    Procedure: OPEN REDUCTION INTERNAL FIXATION ANKLE;  Open Reduction Internal Fixation Right Ankle;  Surgeon: Nate Beck DPM;  Location:  OR     TONSILLECTOMY, ADENOIDECTOMY ADULT, COMBINED  12/1/2011    Procedure:COMBINED TONSILLECTOMY, ADENOIDECTOMY ADULT; Adult Tonsillectomy & Adenoidectomy, Cauterization Of       TURBINOPLASTY  12/1/2011    Procedure:TURBINOPLASTY; Surgeon:GISELE SWENSON; Location:[facility] OR     Current Outpatient Prescriptions   Medication Sig Dispense Refill     ALPRAZolam (XANAX) 0.25 MG tablet Take 1 tablet (0.25 mg) by mouth nightly as needed for anxiety 10 tablet 1     cyclobenzaprine (FLEXERIL) 10 MG tablet TAKE ONE TABLET BY MOUTH THREE TIMES A DAY AS NEEDED FOR MUSCLE SPASMS 30 tablet 3     Ferrous Sulfate (IRON SUPPLEMENT PO) Reported on 5/23/2017       HYDROcodone-acetaminophen (NORCO) 5-325 MG per tablet Take 1 tablet by mouth every 6 hours as needed for moderate to severe pain 20 tablet 0     ibuprofen (ADVIL/MOTRIN) 200 MG tablet Take 3 tablets (600 mg) by mouth every 6 hours as needed for pain       multivitamin, therapeutic with minerals (MULTI-VITAMIN) TABS Take 1 tablet by mouth daily Reported on 5/23/2017       norgestimate-ethinyl estradiol (ORTHO-CYCLEN, SPRINTEC) 0.25-35 MG-MCG per tablet Take 1 tablet by mouth daily 84 tablet 3     varenicline (CHANTIX STARTING MONTH PAK) 0.5 MG X 11 & 1 MG X 42 tablet Take 0.5 mg tab daily for 3  "days, then 0.5 mg tab twice daily for 4 days, then 1 mg twice daily. 53 tablet 0     varenicline (CHANTIX) 1 MG tablet Take 1 tablet (1 mg) by mouth 2 times daily 56 tablet 2     venlafaxine (EFFEXOR-XR) 150 MG 24 hr capsule Take 1 capsule (150 mg) by mouth daily 90 capsule 1     VITAMIN D, CHOLECALCIFEROL, PO Take by mouth daily       OTC products: NSAIDS    Allergies   Allergen Reactions     Atarax [Hydroxyzine] Other (See Comments)     rage     Vicodin [Hydrocodone-Acetaminophen] Nausea     \"violently angry\".  Patient states she does tolerate regular Tylenol/Acetaminophen     Zithromax [Azithromycin Dihydrate] Hives and Swelling      Latex Allergy: NO    Social History   Substance Use Topics     Smoking status: Current Every Day Smoker     Packs/day: 1.00     Types: Cigarettes     Last attempt to quit: 5/4/2015     Smokeless tobacco: Never Used     Alcohol use Yes      Comment: 1-2 drinks a month     History   Drug Use No       REVIEW OF SYSTEMS:   Constitutional, neuro, ENT, endocrine, pulmonary, cardiac, gastrointestinal, genitourinary, musculoskeletal, integument and psychiatric systems are negative, except as otherwise noted.    EXAM:   /78  Pulse 88  Temp 98.2  F (36.8  C) (Oral)  Resp 16  Wt 238 lb (108 kg)  BMI 37.28 kg/m2    GENERAL APPEARANCE: healthy, alert, moderate distress and over weight     EYES: EOMI, PERRL     HENT: ear canals and TM's normal and nose and mouth without ulcers or lesions     NECK: no adenopathy, no asymmetry, masses, or scars and thyroid normal to palpation     RESP: lungs clear to auscultation - no rales, rhonchi or wheezes     CV: regular rates and rhythm, normal S1 S2, no S3 or S4 and no murmur, click or rub     ABDOMEN:  soft, nontender, no HSM or masses and bowel sounds normal     MS: right lower extremity is covered with compression stocking. There is inflammation present. She does not tolerate trying to remove stocking. CMS is intact     SKIN: no suspicious " lesions or rashes     NEURO: Normal strength and tone, sensory exam grossly normal, mentation intact and speech normal     PSYCH: mentation appears normal. and affect normal/bright     LYMPHATICS: No cervical adenopathy    DIAGNOSTICS:   No labs or EKG required for low risk surgery (cataract, skin procedure, breast biopsy, etc)  Urine HCG DOS     Recent Labs   Lab Test  06/08/17   1525  12/12/16   1444  08/18/16   1521   HGB  12.1  13.0  14.0   PLT   --   339  325   NA   --   142  138   POTASSIUM   --   3.8  4.0   CR   --   0.68  0.70   A1C  5.2   --    --         IMPRESSION:   Reason for surgery/procedure: removal and assessment of right ankle    The proposed surgical procedure is considered LOW risk.    REVISED CARDIAC RISK INDEX  The patient has the following serious cardiovascular risks for perioperative complications such as (MI, PE, VFib and 3  AV Block):  No serious cardiac risks  INTERPRETATION: 0 risks: Class I (very low risk - 0.4% complication rate)    The patient has the following additional risks for perioperative complications:  No identified additional risks      ICD-10-CM    1. Preop general physical exam Z01.818    2. Retained orthopedic hardware Z96.9 HYDROcodone-acetaminophen (NORCO) 5-325 MG per tablet   3. Closed fracture of right ankle, sequela S82.891S HYDROcodone-acetaminophen (NORCO) 5-325 MG per tablet       RECOMMENDATIONS:     --Patient is to take all scheduled medications on the day of surgery EXCEPT for modifications listed below.  Patient was given short course of percocet for severe pain.     APPROVAL GIVEN to proceed with proposed procedure, without further diagnostic evaluation       Signed Electronically by: LASHAE Foster CNP    Copy of this evaluation report is provided to requesting physician.    [facility] Preop Guidelines    Revised Cardiac Risk Index  Answers for HPI/ROS submitted by the patient on 8/21/2018   If you checked off any problems, how difficult have these  problems made it for you to do your work, take care of things at home, or get along with other people?: Somewhat difficult  PHQ9 TOTAL SCORE: 8  ZAN 7 TOTAL SCORE: 5

## 2018-08-30 NOTE — ANESTHESIA PREPROCEDURE EVALUATION
Anesthesia Evaluation     . Pt has had prior anesthetic.     History of anesthetic complications   - PONV        ROS/MED HX    ENT/Pulmonary:     (+)MAJOR risk factors snores loudly, daytime somnolence, tobacco use, Current use , . .    Neurologic:  - neg neurologic ROS     Cardiovascular:     (+) Dyslipidemia, ----. : . . . :. . No previous cardiac testing       METS/Exercise Tolerance:     Hematologic:     (+) Anemia, -      Musculoskeletal:   (+) , fracture lower extremity: Ankle, -       GI/Hepatic:  - neg GI/hepatic ROS       Renal/Genitourinary:  - ROS Renal section negative       Endo:  - neg endo ROS       Psychiatric:     (+) psychiatric history anxiety and depression      Infectious Disease:  - neg infectious disease ROS       Malignancy:      - no malignancy   Other:                     Physical Exam  Normal systems: cardiovascular, pulmonary and dental    Airway   Mallampati: II  TM distance: >3 FB  Neck ROM: full    Dental     Cardiovascular   Rhythm and rate: regular and normal      Pulmonary    breath sounds clear to auscultation                    Anesthesia Plan      History & Physical Review  History and physical reviewed and following examination; no interval change.    ASA Status:  2 .    NPO Status:  > 2 hours    Plan for MAC with Propofol induction. Maintenance will be Balanced.  Reason for MAC:  Deep or markedly invasive procedure (G8)  PONV prophylaxis:  Ondansetron (or other 5HT-3) and Dexamethasone or Solumedrol       Postoperative Care      Consents  Anesthetic plan, risks, benefits and alternatives discussed with:  Patient.  Use of blood products discussed: No .   .                          .

## 2018-08-31 ENCOUNTER — HOSPITAL ENCOUNTER (OUTPATIENT)
Facility: CLINIC | Age: 28
Discharge: HOME | End: 2018-08-31
Attending: PODIATRIST | Admitting: PODIATRIST
Payer: COMMERCIAL

## 2018-08-31 ENCOUNTER — ANESTHESIA (OUTPATIENT)
Dept: SURGERY | Facility: CLINIC | Age: 28
End: 2018-08-31
Payer: COMMERCIAL

## 2018-08-31 ENCOUNTER — SURGERY (OUTPATIENT)
Age: 28
End: 2018-08-31

## 2018-08-31 VITALS
OXYGEN SATURATION: 96 % | BODY MASS INDEX: 37.28 KG/M2 | WEIGHT: 238 LBS | HEART RATE: 71 BPM | SYSTOLIC BLOOD PRESSURE: 118 MMHG | RESPIRATION RATE: 16 BRPM | TEMPERATURE: 98.1 F | DIASTOLIC BLOOD PRESSURE: 78 MMHG

## 2018-08-31 DIAGNOSIS — T84.84XA PAINFUL ORTHOPAEDIC HARDWARE (H): Primary | ICD-10-CM

## 2018-08-31 LAB — HCG UR QL: NEGATIVE

## 2018-08-31 PROCEDURE — 25000128 H RX IP 250 OP 636: Performed by: NURSE ANESTHETIST, CERTIFIED REGISTERED

## 2018-08-31 PROCEDURE — 36000054 ZZH SURGERY LEVEL 2 W FLUORO 1ST 30 MIN: Performed by: PODIATRIST

## 2018-08-31 PROCEDURE — 27210794 ZZH OR GENERAL SUPPLY STERILE: Performed by: PODIATRIST

## 2018-08-31 PROCEDURE — 25000125 ZZHC RX 250: Performed by: NURSE ANESTHETIST, CERTIFIED REGISTERED

## 2018-08-31 PROCEDURE — 25000125 ZZHC RX 250: Performed by: ANESTHESIOLOGY

## 2018-08-31 PROCEDURE — 25000128 H RX IP 250 OP 636: Performed by: ANESTHESIOLOGY

## 2018-08-31 PROCEDURE — 25000132 ZZH RX MED GY IP 250 OP 250 PS 637: Performed by: PODIATRIST

## 2018-08-31 PROCEDURE — 40000306 ZZH STATISTIC PRE PROC ASSESS II: Performed by: PODIATRIST

## 2018-08-31 PROCEDURE — 20680 REMOVAL OF IMPLANT DEEP: CPT | Mod: RT | Performed by: PODIATRIST

## 2018-08-31 PROCEDURE — 81025 URINE PREGNANCY TEST: CPT | Performed by: NURSE ANESTHETIST, CERTIFIED REGISTERED

## 2018-08-31 PROCEDURE — 36000052 ZZH SURGERY LEVEL 2 EA 15 ADDTL MIN: Performed by: PODIATRIST

## 2018-08-31 PROCEDURE — 25000128 H RX IP 250 OP 636: Performed by: PODIATRIST

## 2018-08-31 PROCEDURE — 37000009 ZZH ANESTHESIA TECHNICAL FEE, EACH ADDTL 15 MIN: Performed by: PODIATRIST

## 2018-08-31 PROCEDURE — 37000008 ZZH ANESTHESIA TECHNICAL FEE, 1ST 30 MIN: Performed by: PODIATRIST

## 2018-08-31 PROCEDURE — 71000027 ZZH RECOVERY PHASE 2 EACH 15 MINS: Performed by: PODIATRIST

## 2018-08-31 PROCEDURE — 25000125 ZZHC RX 250: Performed by: PODIATRIST

## 2018-08-31 RX ORDER — PROPOFOL 10 MG/ML
INJECTION, EMULSION INTRAVENOUS PRN
Status: DISCONTINUED | OUTPATIENT
Start: 2018-08-31 | End: 2018-08-31

## 2018-08-31 RX ORDER — SODIUM CHLORIDE, SODIUM LACTATE, POTASSIUM CHLORIDE, CALCIUM CHLORIDE 600; 310; 30; 20 MG/100ML; MG/100ML; MG/100ML; MG/100ML
INJECTION, SOLUTION INTRAVENOUS CONTINUOUS
Status: DISCONTINUED | OUTPATIENT
Start: 2018-08-31 | End: 2018-08-31 | Stop reason: HOSPADM

## 2018-08-31 RX ORDER — LIDOCAINE 40 MG/G
CREAM TOPICAL
Status: DISCONTINUED | OUTPATIENT
Start: 2018-08-31 | End: 2018-08-31 | Stop reason: HOSPADM

## 2018-08-31 RX ORDER — CEFAZOLIN SODIUM 2 G/100ML
2 INJECTION, SOLUTION INTRAVENOUS
Status: COMPLETED | OUTPATIENT
Start: 2018-08-31 | End: 2018-08-31

## 2018-08-31 RX ORDER — FENTANYL CITRATE 50 UG/ML
25-50 INJECTION, SOLUTION INTRAMUSCULAR; INTRAVENOUS
Status: DISCONTINUED | OUTPATIENT
Start: 2018-08-31 | End: 2018-08-31 | Stop reason: HOSPADM

## 2018-08-31 RX ORDER — HYDROXYZINE HYDROCHLORIDE 25 MG/1
25 TABLET, FILM COATED ORAL EVERY 6 HOURS PRN
Qty: 30 TABLET | Refills: 3 | Status: SHIPPED | OUTPATIENT
Start: 2018-08-31 | End: 2018-10-06

## 2018-08-31 RX ORDER — NALOXONE HYDROCHLORIDE 0.4 MG/ML
.1-.4 INJECTION, SOLUTION INTRAMUSCULAR; INTRAVENOUS; SUBCUTANEOUS
Status: DISCONTINUED | OUTPATIENT
Start: 2018-08-31 | End: 2018-08-31 | Stop reason: HOSPADM

## 2018-08-31 RX ORDER — OXYCODONE HYDROCHLORIDE 5 MG/1
5-10 TABLET ORAL
Qty: 20 TABLET | Refills: 0 | Status: SHIPPED | OUTPATIENT
Start: 2018-08-31 | End: 2018-10-06

## 2018-08-31 RX ORDER — ONDANSETRON 2 MG/ML
4 INJECTION INTRAMUSCULAR; INTRAVENOUS EVERY 30 MIN PRN
Status: DISCONTINUED | OUTPATIENT
Start: 2018-08-31 | End: 2018-08-31 | Stop reason: HOSPADM

## 2018-08-31 RX ORDER — OXYCODONE HYDROCHLORIDE 5 MG/1
10 TABLET ORAL
Status: COMPLETED | OUTPATIENT
Start: 2018-08-31 | End: 2018-08-31

## 2018-08-31 RX ORDER — KETOROLAC TROMETHAMINE 30 MG/ML
INJECTION, SOLUTION INTRAMUSCULAR; INTRAVENOUS PRN
Status: DISCONTINUED | OUTPATIENT
Start: 2018-08-31 | End: 2018-08-31

## 2018-08-31 RX ORDER — CEFAZOLIN SODIUM 1 G/3ML
1 INJECTION, POWDER, FOR SOLUTION INTRAMUSCULAR; INTRAVENOUS SEE ADMIN INSTRUCTIONS
Status: DISCONTINUED | OUTPATIENT
Start: 2018-08-31 | End: 2018-08-31 | Stop reason: HOSPADM

## 2018-08-31 RX ORDER — AMOXICILLIN 250 MG
1-2 CAPSULE ORAL 2 TIMES DAILY
Qty: 30 TABLET | Refills: 3 | Status: SHIPPED | OUTPATIENT
Start: 2018-08-31 | End: 2018-10-06

## 2018-08-31 RX ORDER — PROPOFOL 10 MG/ML
INJECTION, EMULSION INTRAVENOUS CONTINUOUS PRN
Status: DISCONTINUED | OUTPATIENT
Start: 2018-08-31 | End: 2018-08-31

## 2018-08-31 RX ORDER — MEPERIDINE HYDROCHLORIDE 25 MG/ML
12.5 INJECTION INTRAMUSCULAR; INTRAVENOUS; SUBCUTANEOUS
Status: DISCONTINUED | OUTPATIENT
Start: 2018-08-31 | End: 2018-08-31 | Stop reason: HOSPADM

## 2018-08-31 RX ORDER — HYDROMORPHONE HYDROCHLORIDE 1 MG/ML
.3-.5 INJECTION, SOLUTION INTRAMUSCULAR; INTRAVENOUS; SUBCUTANEOUS EVERY 10 MIN PRN
Status: DISCONTINUED | OUTPATIENT
Start: 2018-08-31 | End: 2018-08-31 | Stop reason: HOSPADM

## 2018-08-31 RX ORDER — LIDOCAINE HYDROCHLORIDE 20 MG/ML
INJECTION, SOLUTION INFILTRATION; PERINEURAL PRN
Status: DISCONTINUED | OUTPATIENT
Start: 2018-08-31 | End: 2018-08-31

## 2018-08-31 RX ORDER — FENTANYL CITRATE 50 UG/ML
INJECTION, SOLUTION INTRAMUSCULAR; INTRAVENOUS PRN
Status: DISCONTINUED | OUTPATIENT
Start: 2018-08-31 | End: 2018-08-31

## 2018-08-31 RX ORDER — BUPIVACAINE HYDROCHLORIDE 5 MG/ML
INJECTION, SOLUTION PERINEURAL PRN
Status: DISCONTINUED | OUTPATIENT
Start: 2018-08-31 | End: 2018-08-31 | Stop reason: HOSPADM

## 2018-08-31 RX ORDER — ONDANSETRON 4 MG/1
4 TABLET, ORALLY DISINTEGRATING ORAL EVERY 30 MIN PRN
Status: DISCONTINUED | OUTPATIENT
Start: 2018-08-31 | End: 2018-08-31 | Stop reason: HOSPADM

## 2018-08-31 RX ADMIN — OXYCODONE HYDROCHLORIDE 10 MG: 5 TABLET ORAL at 13:50

## 2018-08-31 RX ADMIN — FENTANYL CITRATE 25 MCG: 50 INJECTION, SOLUTION INTRAMUSCULAR; INTRAVENOUS at 13:10

## 2018-08-31 RX ADMIN — LIDOCAINE HYDROCHLORIDE 40 MG: 20 INJECTION, SOLUTION INFILTRATION; PERINEURAL at 12:55

## 2018-08-31 RX ADMIN — PROPOFOL 50 MG: 10 INJECTION, EMULSION INTRAVENOUS at 12:55

## 2018-08-31 RX ADMIN — PROPOFOL 100 MCG/KG/MIN: 10 INJECTION, EMULSION INTRAVENOUS at 12:55

## 2018-08-31 RX ADMIN — CEFAZOLIN SODIUM 2 G: 2 INJECTION, SOLUTION INTRAVENOUS at 12:50

## 2018-08-31 RX ADMIN — LIDOCAINE HYDROCHLORIDE 20 MG: 20 INJECTION, SOLUTION INFILTRATION; PERINEURAL at 12:56

## 2018-08-31 RX ADMIN — BUPIVACAINE HYDROCHLORIDE 10 ML: 5 INJECTION, SOLUTION PERINEURAL at 13:00

## 2018-08-31 RX ADMIN — FENTANYL CITRATE 50 MCG: 50 INJECTION, SOLUTION INTRAMUSCULAR; INTRAVENOUS at 12:50

## 2018-08-31 RX ADMIN — BUPIVACAINE HYDROCHLORIDE 10 ML: 5 INJECTION, SOLUTION PERINEURAL at 13:30

## 2018-08-31 RX ADMIN — MIDAZOLAM 2 MG: 1 INJECTION INTRAMUSCULAR; INTRAVENOUS at 12:50

## 2018-08-31 RX ADMIN — KETOROLAC TROMETHAMINE 30 MG: 30 INJECTION, SOLUTION INTRAMUSCULAR at 13:39

## 2018-08-31 RX ADMIN — PROPOFOL 50 MG: 10 INJECTION, EMULSION INTRAVENOUS at 12:56

## 2018-08-31 RX ADMIN — SODIUM CHLORIDE, POTASSIUM CHLORIDE, SODIUM LACTATE AND CALCIUM CHLORIDE: 600; 310; 30; 20 INJECTION, SOLUTION INTRAVENOUS at 08:05

## 2018-08-31 RX ADMIN — LIDOCAINE HYDROCHLORIDE 1 ML: 10 INJECTION, SOLUTION EPIDURAL; INFILTRATION; INTRACAUDAL; PERINEURAL at 08:05

## 2018-08-31 RX ADMIN — FENTANYL CITRATE 25 MCG: 50 INJECTION, SOLUTION INTRAMUSCULAR; INTRAVENOUS at 12:56

## 2018-08-31 NOTE — DISCHARGE INSTRUCTIONS
Same-Day Surgery   Adult Discharge Orders & Instructions     For 24 hours after surgery    1. Get plenty of rest.  A responsible adult must stay with you for at least 24 hours after you leave the hospital.   2. Do not drive or use heavy equipment.  If you have weakness or tingling, don't drive or use heavy equipment until this feeling goes away.  3. Do not drink alcohol.  4. Avoid strenuous or risky activities.  Ask for help when climbing stairs.   5. You may feel lightheaded.  If so, sit for a few minutes before standing.  Have someone help you get up.   6. You may have a slight fever. Call the doctor if your fever is over 100 F (37.7 C) (taken under the tongue) or lasts longer than 24 hours.  7. You may have a dry mouth, a sore throat, muscle aches or trouble sleeping.  These should go away after 24 hours.  8. Do not make important or legal decisions.  Based on the surgery/procedure that you had today, we do not expect that you will have any problems.  However, we want you to know what to do if you have pain, nausea, bleeding,or infection:  To control pain:  Take medicines your physician has prescribed or or over-the counter medicine he or she advises.  Ice packs and periods of rest are often helpful.  For surgery on an arm or leg, raise it on a pillow to ease swelling.  If your pain is not managed with the above methods, contact your physician.  To control nausea:  Take anti-nausea medicine approved by your physician.  Drink clear liquids such as apple juice, ginger ale, broth or 7-Up. Be sure to drink enough fluids.  Move to a regular diet as you feel able.  Rest may also help.  Bleeding:  You may see a little blood on your dressing, about the size of a quarter in the first 24 hours.  If you see this, there is no reason to be alarmed.  However, if this continues to increase in size, apply pressure if able, and notify your physician.  Infection: Please contact your physician if you have any of the following  signs:  redness, swelling, heat, increasing pain or foul-smelling drainage at your surgery site, fever or chills.    Call your doctor for any of the followin.  It has been over 8 to 10 hours since surgery and you are still not able to urinate (pass water).      Nurse advice line: [phone]

## 2018-08-31 NOTE — IP AVS SNAPSHOT
MRN:[id number]                      After Visit Summary   8/31/2018    Yesika Grant    MRN: [id number]           Thank you!     Thank you for choosing [facility] for your care. Our goal is always to provide you with excellent care. Hearing back from our patients is one way we can continue to improve our services. Please take a few minutes to complete the written survey that you may receive in the mail after you visit with us. Thank you!        Patient Information     Date Of Birth          1990        About your hospital stay     You were admitted on:  August 31, 2018 You last received care in the:  [facility] Phase II    You were discharged on:  August 31, 2018       Who to Call     For medical emergencies, please call 911.  For non-urgent questions about your medical care, please call your primary care provider or clinic, [phone]  For questions related to your surgery, please call your surgery clinic        Attending Provider     Provider Nate Hampton DPM Podiatry       Primary Care Provider Office Phone # Fax #    [facility] [phone]      After Care Instructions     Discharge Instructions       Review discharge instructions as directed by Provider.            Elevate affected extremity           Ice to affected area       Ice pack to affected area PRN (as needed).            No Alcohol       for 24 hours post-procedure            No driving or operating machinery       until the day after procedure            No weight bearing                 Your next 10 appointments already scheduled     Sep 06, 2018  9:00 AM CDT   Nurse Only with PH SPECIALTY RN   [facility] ([facility]    [address] [place] [id number]   [phone]            Sep 18, 2018 10:15 AM CDT   Return Visit with Nate Beck DPM   [facility]    [address] [place] [address]  [place]  [facility] [address]   [phone]              Further instructions from your care team         Same-Day Surgery   Adult Discharge Orders & Instructions     For 24 hours after surgery    1. Get plenty of rest.  A responsible adult must stay with you for at least 24 hours after you leave the hospital.   2. Do not drive or use heavy equipment.  If you have weakness or tingling, don't drive or use heavy equipment until this feeling goes away.  3. Do not drink alcohol.  4. Avoid strenuous or risky activities.  Ask for help when climbing stairs.   5. You may feel lightheaded.  If so, sit for a few minutes before standing.  Have someone help you get up.   6. You may have a slight fever. Call the doctor if your fever is over 100 F (37.7 C) (taken under the tongue) or lasts longer than 24 hours.  7. You may have a dry mouth, a sore throat, muscle aches or trouble sleeping.  These should go away after 24 hours.  8. Do not make important or legal decisions.  Based on the surgery/procedure that you had today, we do not expect that you will have any problems.  However, we want you to know what to do if you have pain, nausea, bleeding,or infection:  To control pain:  Take medicines your physician has prescribed or or over-the counter medicine he or she advises.  Ice packs and periods of rest are often helpful.  For surgery on an arm or leg, raise it on a pillow to ease swelling.  If your pain is not managed with the above methods, contact your physician.  To control nausea:  Take anti-nausea medicine approved by your physician.  Drink clear liquids such as apple juice, ginger ale, broth or 7-Up. Be sure to drink enough fluids.  Move to a regular diet as you feel able.  Rest may also help.  Bleeding:  You may see a little blood on your dressing, about the size of a quarter in the first 24 hours.  If you see this, there is no reason to be alarmed.  However, if this continues to increase in size, apply pressure if able,  and notify your physician.  Infection: Please contact your physician if you have any of the following signs:  redness, swelling, heat, increasing pain or foul-smelling drainage at your surgery site, fever or chills.    Call your doctor for any of the followin.  It has been over 8 to 10 hours since surgery and you are still not able to urinate (pass water).      Nurse advice line: [phone]      Pending Results     No orders found from 2018 to 2018.            Admission Information     Date & Time Provider Department Dept. Phone    2018 Nate Beck DPM [facility] Phase [phone]      Your Vitals Were     Blood Pressure Pulse Temperature Respirations Weight Last Period    118/ (Cuff Size: Adult Large) 71 98.1  F (36.7  C) (Oral) 16 108 kg (238 lb) 2018    Pulse Oximetry BMI (Body Mass Index)                96% 37.28 kg/m2          Care EveryWhere ID     This is your Care EveryWhere ID. This could be used by other organizations to access your [facility] medical records  [id number]        Equal Access to Services     SHI ALONSO AH: Hadii arina dominguez Sojayne, waaxda lujair, qaybta kaalmapo newby, monica beckham. So [facility] [phone].    ATENCIÓN: Si habla español, tiene a palacios disposición servicios gratuitos de asistencia lingüística. Suzan al [phone].    We comply with applicable federal civil rights laws and [place] laws. We do not discriminate on the basis of race, color, national origin, age, disability, sex, sexual orientation, or gender identity.               Review of your medicines      START taking        Dose / Directions    hydrOXYzine 25 MG tablet   Commonly known as:  ATARAX   Used for:  Painful orthopaedic hardware (H)        Dose:  25 mg   Take 1 tablet (25 mg) by mouth every 6 hours as needed for itching (and nausea)   Quantity:  30 tablet   Refills:  3       oxyCODONE IR 5 MG tablet   Commonly known as:   ROXICODONE   Used for:  Painful orthopaedic hardware (H)        Dose:  5-10 mg   Take 1-2 tablets (5-10 mg) by mouth every 3 hours as needed for pain or other (Moderate to Severe)   Quantity:  20 tablet   Refills:  0       senna-docusate 8.6-50 MG per tablet   Commonly known as:  SENOKOT-S;PERICOLACE   Used for:  Painful orthopaedic hardware (H)        Dose:  1-2 tablet   Take 1-2 tablets by mouth 2 times daily Take while on oral narcotics to prevent or treat constipation.   Quantity:  30 tablet   Refills:  3         CONTINUE these medicines which have NOT CHANGED        Dose / Directions    ALPRAZolam 0.25 MG tablet   Commonly known as:  XANAX   Used for:  Panic attacks        Dose:  0.25 mg   Take 1 tablet (0.25 mg) by mouth nightly as needed for anxiety   Quantity:  10 tablet   Refills:  1       cyclobenzaprine 10 MG tablet   Commonly known as:  FLEXERIL   Used for:  New daily persistent headache, Strain of neck muscle, subsequent encounter, Chronic midline low back pain without sciatica        TAKE ONE TABLET BY MOUTH THREE TIMES A DAY AS NEEDED FOR MUSCLE SPASMS   Quantity:  30 tablet   Refills:  3       ibuprofen 200 MG tablet   Commonly known as:  ADVIL/MOTRIN        Dose:  600 mg   Take 3 tablets (600 mg) by mouth every 6 hours as needed for pain   Refills:  0       IRON SUPPLEMENT PO        Reported on 5/23/2017   Refills:  0       Multi-vitamin Tabs tablet        Dose:  1 tablet   Take 1 tablet by mouth daily Reported on 5/23/2017   Refills:  0       norgestimate-ethinyl estradiol 0.25-35 MG-MCG per tablet   Commonly known as:  ORTHO-CYCLEN, SPRINTEC   Used for:  BCP (birth control pills) initiation        Dose:  1 tablet   Take 1 tablet by mouth daily   Quantity:  84 tablet   Refills:  3       oxyCODONE-acetaminophen 5-325 MG per tablet   Commonly known as:  PERCOCET   Used for:  Closed fracture of right ankle, sequela, Retained orthopedic hardware        Dose:  1 tablet   Take 1 tablet by mouth every 6  hours as needed for pain   Quantity:  12 tablet   Refills:  0       * varenicline 1 MG tablet   Commonly known as:  CHANTIX   Used for:  Tobacco use disorder        Dose:  1 mg   Take 1 tablet (1 mg) by mouth 2 times daily   Quantity:  56 tablet   Refills:  2       * varenicline 0.5 MG X 11 & 1 MG X 42 tablet   Commonly known as:  CHANTIX STARTING MONTH PAK   Used for:  Tobacco use disorder        Take 0.5 mg tab daily for 3 days, then 0.5 mg tab twice daily for 4 days, then 1 mg twice daily.   Quantity:  53 tablet   Refills:  0       venlafaxine 150 MG 24 hr capsule   Commonly known as:  EFFEXOR-XR   Used for:  Anxiety, Moderate episode of recurrent major depressive disorder (H)        Dose:  150 mg   Take 1 capsule (150 mg) by mouth daily   Quantity:  90 capsule   Refills:  1       VITAMIN D (CHOLECALCIFEROL) PO        Take by mouth daily   Refills:  0       * Notice:  This list has 2 medication(s) that are the same as other medications prescribed for you. Read the directions carefully, and ask your doctor or other care provider to review them with you.         Where to get your medicines      These medications were sent to [facility] [place], [place] - [address]   [address] , [facility] [address]     Phone:  [phone]     hydrOXYzine 25 MG tablet    senna-docusate 8.6-50 MG per tablet         Some of these will need a paper prescription and others can be bought over the counter. Ask your nurse if you have questions.     Bring a paper prescription for each of these medications     oxyCODONE IR 5 MG tablet                Protect others around you: Learn how to safely use, store and throw away your medicines at www.disposemymeds.org.        Information about OPIOIDS     PRESCRIPTION OPIOIDS: WHAT YOU NEED TO KNOW   We gave you an opioid (narcotic) pain medicine. It is important to manage your pain, but opioids are not always the best choice. You should first try all the other options  your care team gave you. Take this medicine for as short a time (and as few doses) as possible.    Some activities can increase your pain, such as bandage changes or therapy sessions. It may help to take your pain medicine 30 to 60 minutes before these activities. Reduce your stress by getting enough sleep, working on hobbies you enjoy and practicing relaxation or meditation. Talk to your care team about ways to manage your pain beyond prescription opioids.    These medicines have risks:    DO NOT drive when on new or higher doses of pain medicine. These medicines can affect your alertness and reaction times, and you could be arrested for driving under the influence (DUI). If you need to use opioids long-term, talk to your care team about driving.    DO NOT operate heavy machinery    DO NOT do any other dangerous activities while taking these medicines.    DO NOT drink any alcohol while taking these medicines.     If the opioid prescribed includes acetaminophen, DO NOT take with any other medicines that contain acetaminophen. Read all labels carefully. Look for the word  acetaminophen  or  Tylenol.  Ask your pharmacist if you have questions or are unsure.    You can get addicted to pain medicines, especially if you have a history of addiction (chemical, alcohol or substance dependence). Talk to your care team about ways to reduce this risk.    All opioids tend to cause constipation. Drink plenty of water and eat foods that have a lot of fiber, such as fruits, vegetables, prune juice, apple juice and high-fiber cereal. Take a laxative (Miralax, milk of magnesia, Colace, Senna) if you don t move your bowels at least every other day. Other side effects include upset stomach, sleepiness, dizziness, throwing up, tolerance (needing more of the medicine to have the same effect), physical dependence and slowed breathing.    Store your pills in a secure place, locked if possible. We will not replace any lost or stolen  medicine. If you don t finish your medicine, please throw away (dispose) as directed by your pharmacist. The [place] Pollution [employer] has more information about safe disposal: https://www.pca.[facility].[place].us/living-green/managing-unwanted-medications             Medication List: This is a list of all your medications and when to take them. Check marks below indicate your daily home schedule. Keep this list as a reference.      Medications           Morning Afternoon Evening Bedtime As Needed    ALPRAZolam 0.25 MG tablet   Commonly known as:  XANAX   Take 1 tablet (0.25 mg) by mouth nightly as needed for anxiety                                cyclobenzaprine 10 MG tablet   Commonly known as:  FLEXERIL   TAKE ONE TABLET BY MOUTH THREE TIMES A DAY AS NEEDED FOR MUSCLE SPASMS                                hydrOXYzine 25 MG tablet   Commonly known as:  ATARAX   Take 1 tablet (25 mg) by mouth every 6 hours as needed for itching (and nausea)                                ibuprofen 200 MG tablet   Commonly known as:  ADVIL/MOTRIN   Take 3 tablets (600 mg) by mouth every 6 hours as needed for pain                                IRON SUPPLEMENT PO   Reported on 5/23/2017                                Multi-vitamin Tabs tablet   Take 1 tablet by mouth daily Reported on 5/23/2017                                norgestimate-ethinyl estradiol 0.25-35 MG-MCG per tablet   Commonly known as:  ORTHO-CYCLEN, SPRINTEC   Take 1 tablet by mouth daily                                oxyCODONE IR 5 MG tablet   Commonly known as:  ROXICODONE   Take 1-2 tablets (5-10 mg) by mouth every 3 hours as needed for pain or other (Moderate to Severe)   Last time this was given:  10 mg on 8/31/2018  1:50 PM                                oxyCODONE-acetaminophen 5-325 MG per tablet   Commonly known as:  PERCOCET   Take 1 tablet by mouth every 6 hours as needed for pain                                senna-docusate 8.6-50 MG per tablet    Commonly known as:  SENOKOT-S;PERICOLACE   Take 1-2 tablets by mouth 2 times daily Take while on oral narcotics to prevent or treat constipation.                                * varenicline 1 MG tablet   Commonly known as:  CHANTIX   Take 1 tablet (1 mg) by mouth 2 times daily                                * varenicline 0.5 MG X 11 & 1 MG X 42 tablet   Commonly known as:  CHANTIX STARTING MONTH PRACHI   Take 0.5 mg tab daily for 3 days, then 0.5 mg tab twice daily for 4 days, then 1 mg twice daily.                                venlafaxine 150 MG 24 hr capsule   Commonly known as:  EFFEXOR-XR   Take 1 capsule (150 mg) by mouth daily                                VITAMIN D (CHOLECALCIFEROL) PO   Take by mouth daily                                * Notice:  This list has 2 medication(s) that are the same as other medications prescribed for you. Read the directions carefully, and ask your doctor or other care provider to review them with you.

## 2018-08-31 NOTE — ANESTHESIA CARE TRANSFER NOTE
Patient: Yesika Grant    Procedure(s):  Excision Hardware Right Ankle - Wound Class: I-Clean    Diagnosis: painful retained hardware  Diagnosis Additional Information: No value filed.    Anesthesia Type:   MAC     Note:  Airway :Face Mask  Patient transferred to:Phase II  Handoff Report: Identifed the Patient, Identified the Reponsible Provider, Reviewed the pertinent medical history, Discussed the surgical course, Reviewed Intra-OP anesthesia mangement and issues during anesthesia, Set expectations for post-procedure period and Allowed opportunity for questions and acknowledgement of understanding      Vitals: (Last set prior to Anesthesia Care Transfer)    CRNA VITALS  8/31/2018 1310 - 8/31/2018 1344      8/31/2018             Pulse: 75    SpO2: 97 %                Electronically Signed By: LASHAE Dorman CRNA  August 31, 2018  1:44 PM

## 2018-08-31 NOTE — ANESTHESIA POSTPROCEDURE EVALUATION
Patient: Yesika Grant    Procedure(s):  Excision Hardware Right Ankle - Wound Class: I-Clean    Diagnosis:painful retained hardware  Diagnosis Additional Information: No value filed.    Anesthesia Type:  MAC    Note:  Anesthesia Post Evaluation    Patient location during evaluation: Phase 2  Patient participation: Able to fully participate in evaluation  Level of consciousness: awake  Pain management: adequate  Airway patency: patent  Cardiovascular status: acceptable and hemodynamically stable  Respiratory status: acceptable, room air and nonlabored ventilation  Hydration status: stable  PONV: none     Anesthetic complications: None    Comments: Patient was happy with the anesthesia care received and no anesthesia related complications were noted.  I will follow up with the patient again if it is needed.        Last vitals:  Vitals:    08/31/18 0806   BP: 118/78   Pulse: 71   Resp: 16   Temp: 98.1  F (36.7  C)   SpO2: 96%         Electronically Signed By: LASHAE Reyes CRNA  August 31, 2018  3:00 PM

## 2018-08-31 NOTE — PROGRESS NOTES
No chief complaint on file.      Weight management plan: Patient was referred to their PCP to discuss a diet and exercise plan.     Patient to follow up with Primary Care provider regarding elevated blood pressure.    HPI:  6/9/2017 - Open Reduction Internal Fixation Right Ankle procedure.  Had been returned to all activities including hiking high impact long and aggressive activities all through the spring until about 2 months ago she started having more pain and prominence about the lateral right ankle.  Any shoes that rub against the fibula cause discomfort.  She does not feel as though she can rely on her ankle.  She notes that it does not feel weak but it hurts with any pressure against the outside.  She feels like the hardware is prominent.  She has had some bleeding from irritation along the lateral incision even in the last couple of months.    Works desk as     ROS:  10 point ROS neg other than the symptoms noted above in the HPI.    Patient Active Problem List   Diagnosis     CARDIOVASCULAR SCREENING; LDL GOAL LESS THAN 160     Tobacco use disorder     Anxiety     Major depression in complete remission (H)     Contraception     Vitamin D deficiency     Concussion injury of body structure     Moderate episode of recurrent major depressive disorder (H)     ASCUS with positive high risk HPV cervical       PAST MEDICAL HISTORY:   Past Medical History:   Diagnosis Date     ASCUS with positive high risk HPV cervical 12/04/2017    (not 16/18)     Moderate major depression (H) 5/3/2012     Motion sickness      Ovarian cyst 11/05    small 2 cm simple left ovarian cyst vs dominant follicle on US     PONV (postoperative nausea and vomiting)      Tonsillitis      Urticaria     ?due to oat straw allergy        PAST SURGICAL HISTORY:   Past Surgical History:   Procedure Laterality Date     HC TOOTH EXTRACTION W/FORCEP Bilateral      OPEN REDUCTION INTERNAL FIXATION ANKLE Right 6/9/2017    Procedure: OPEN  "REDUCTION INTERNAL FIXATION ANKLE;  Open Reduction Internal Fixation Right Ankle;  Surgeon: Nate Beck DPM;  Location: [facility]     TONSILLECTOMY, ADENOIDECTOMY ADULT, COMBINED  12/1/2011    Procedure:COMBINED TONSILLECTOMY, ADENOIDECTOMY ADULT; Adult Tonsillectomy & Adenoidectomy, Cauterization Of       TURBINOPLASTY  12/1/2011    Procedure:TURBINOPLASTY; Surgeon:GISELE SWENSON; Location:[facility]        MEDICATIONS:   Current Facility-Administered Medications:      ceFAZolin (ANCEF) 1 g vial to attach to  ml bag for ADULT or 50 ml bag for PEDS, 1 g, Intravenous, See Admin Instructions, Nate Beck DPM     ceFAZolin (ANCEF) intermittent infusion 2 g in 100 mL dextrose PRE-MIX, 2 g, Intravenous, Pre-Op/Pre-procedure x 1 dose, Nate Beck DPM     lactated ringers infusion, , Intravenous, Continuous, Brendon Medina APRN CRNA, Last Rate: 25 mL/hr at 08/31/18 0805     lidocaine (LMX4) kit, , Topical, Q1H PRN, Brendon Medina APRN CRNA     lidocaine 1 % 1 mL, 1 mL, Other, Q1H PRN, Brendon Medina APRN CRNA, 1 mL at 08/31/18 0805     sodium chloride (PF) 0.9% PF flush 3 mL, 3 mL, Intracatheter, Q1H PRN, Brendon Medina APRN CRNA     sodium chloride (PF) 0.9% PF flush 3 mL, 3 mL, Intracatheter, Q8H, Brendon Medina APRN CRNA     ALLERGIES:    Allergies   Allergen Reactions     Atarax [Hydroxyzine] Other (See Comments)     rage     Vicodin [Hydrocodone-Acetaminophen] Nausea     \"violently angry\".  Patient states she does tolerate regular Tylenol/Acetaminophen     Zithromax [Azithromycin Dihydrate] Hives and Swelling        SOCIAL HISTORY:   Social History     Social History     Marital status: Single     Spouse name: N/A     Number of children: 0     Years of education: N/A     Occupational History      Unemployed     Social History Main Topics     Smoking status: Current Every Day Smoker     Packs/day: 0.50     Types: Cigarettes     Smokeless tobacco: Never Used     Alcohol use No      Comment: " 1-2 drinks a month     Drug use: No     Sexual activity: Yes     Partners: Male     Birth control/ protection: Condom      Comment: mirena 5/13     Other Topics Concern     Parent/Sibling W/ Cabg, Mi Or Angioplasty Before 65f 55m? No     Social History Narrative    Home schooling, planning to obtain GED.        FAMILY HISTORY:   Family History   Problem Relation Age of Onset     Alzheimer Disease Maternal Grandmother      Hypertension Maternal Grandmother      Thyroid Disease Maternal Grandmother      Arthritis Maternal Grandfather      Cancer Paternal Grandmother      Breast Cancer     Genitourinary Problems Paternal Grandmother      HEART DISEASE Paternal Grandfather      Hypertension Mother      Genitourinary Problems Sister      endometriosis     Respiratory Father 49     Pulmonary Embolism     Diabetes Paternal Uncle         EXAM:Vitals: /78 (Cuff Size: Adult Large)  Pulse 71  Temp 98.1  F (36.7  C) (Oral)  Resp 16  Wt 238 lb (108 kg)  LMP 08/21/2018  SpO2 96%  BMI 37.28 kg/m2  BMI= Body mass index is 37.28 kg/(m^2).    General appearance: Patient is alert and fully cooperative with history & exam.  No sign of distress is noted during the visit.     Psychiatric: Affect is pleasant & appropriate.  Patient appears motivated to improve health.     Respiratory: Breathing is regular & unlabored while sitting.     HEENT: Hearing is intact to spoken word.  Speech is clear.  No gross evidence of visual impairment that would impact ambulation.     Vascular: DP & PT pulses are intact & regular bilaterally.  No significant edema or varicosities noted.  CFT and skin temperature is normal to both lower extremities.     Neurologic: Lower extremity sensation is intact to light touch.  No evidence of weakness or contracture in the lower extremities.  No evidence of neuropathy.    Dermatologic: Skin is intact to both lower extremities with adequate texture, turgor and tone about the integument.  No paronychia or  evidence of soft tissue infection is noted.     Musculoskeletal: Patient is ambulatory without assistive device or brace.  No obvious deformity is noted.  Full range of motion about the right and left ankle.  Negative anterior drawer and no symptoms with this maneuver.  No peroneal subluxation.  Manual muscle strength +5/5 to all 4 quadrants.  Most discomfort is noted with direct palpation to the lateral incision directly over the hardware which is fairly prominent.  Minimal discomfort noted with firm palpation along the lateral gutter or the anterior ankle joint margin of the right ankle.    Radiographs: 3 views 8/14/18 demonstrate adequate position of hardware without change in position of hardware.  Adequate joint space and alignment noted.  Complete union noted.  No broken or fractured hardware loose hardware.     ASSESSMENT:     Painful retained hardware right ankle     PLAN:  Reviewed [facility] in [facility].      8/14/2018   Obtained interpreted radiographs of the right ankle.  Discussed treatment options.  We discussed etiology of potential for traumatic arthritis osteochondral defect instability or peroneal tendinopathy.  MRI may make imaging of the peroneal tendons challenging.  She would like to have the hardware removed and this seems reasonable.  We also discussed injections oral anti-inflammatories but she feels this is limiting her physical activity significantly would like to have the hardware removed.  We will move forward with scheduling excision of hardware right ankle    8/31/2018  I obtained informed consent to treat painful retained hardware with treatment of Procedure(s):  REMOVE HARDWARE RIGHT ANKLE.  I discussed with the patient potential risks and complications as well as alternative treatment options and post op care requirements.   The patients clinical exam and indications are unchanged.  I answered all questions for the patient.  No guarantees were given or implied.  They wish to  proceed with surgical treatment.  They have been NPO for 8 hours or more.  No contraindications to surgical treatment at this time.          Nate Beck DPM    Please schedule for surgery, pre op H&P, and post ops.    Surgery:  Patient Name:  Yesika Grant ([id number])  Procedure:   Excision hardware right ankle  Diagnosis:    Painful retained hardware   Assistant: first assist  Surgeon:  Nate Beck DPM  Anesthesia:  MAC  PT type:  Same Day Surgery  Time needed: 60 minutes  Patient position:  lying supine  Mini fluoro:  Yes  C-arm:  no  Equipment: Hardware removal set, screwdriver for Arthrex ankle set  Anticoagulation:  No  Vendor:  no  Surgeon Notes:     Post op appts:    5-7 days po  12-15 days po    Expected work restrictions:  full weight bearing in post op shoe    [facility] Discussed:  Not Applicable

## 2018-08-31 NOTE — IP AVS SNAPSHOT
[facility]    [address]     [place] [address]    Phone:  [phone]                                       After Visit Summary   8/31/2018    Yesika Grant    MRN: [id number]           After Visit Summary Signature Page     I have received my discharge instructions, and my questions have been answered. I have discussed any challenges I see with this plan with the nurse or doctor.    ..........................................................................................................................................  Patient/Patient Representative Signature      ..........................................................................................................................................  Patient Representative Print Name and Relationship to Patient    ..................................................               ................................................  Date                                            Time    ..........................................................................................................................................  Reviewed by Signature/Title    ...................................................              ..............................................  Date                                                            Time          22EPIC Rev 08/18

## 2018-09-01 NOTE — OP NOTE
Procedure Date: 2018      SURGEON:  Nate Blancas DPM      ASSISTANT:  None.      PREOPERATIVE DIAGNOSIS:  Painful retained hardware, right ankle.      POSTOPERATIVE DIAGNOSIS:  Painful retained hardware, right ankle.      PLANNED PROCEDURE:  Excision of painful retained hardware, right ankle.      DESCRIPTION OF PROCEDURE:  In the preoperative holding area, informed consent was obtained.  We discussed potential risks and complications and alternative treatment options.  We discussed postoperative course and expectations.  All questions were answered.  She wishes to proceed with the surgery.  No contraindications were noted.  She was brought in the operating room, placed on the operating table in a supine position.  She was given monitored anesthetic care by the Anesthesia Department.  No tourniquet was used throughout the procedure.  Ten mL of 0.5% Marcaine plain were injected about the distal fibula to achieve local anesthesia.  The foot was prepped and draped in the usual sterile fashion.  A timeout was performed after prepping and draping and 15-blade was utilized to make a 6 cm incision over the distal fibula.  This was made directly from the skin to the plate, 8 screws were removed quite easily without any complication.  The plate was removed as well.  Surgical site was flushed with copious amounts of sterile saline.  Periosteum was closed with 3-0 Vicryl, superficial was closed with 4-0 Vicryl and 4-0 Prolene.  Another 10 mL of 0.5% Marcaine plain were injected about the surgical site to achieve local anesthesia.  Adaptic and a bulky sterile compression dressing was applied.  She was brought back to the recovery room, vital signs stable, vascular status intact to all 5 digits in the right lower extremity.         NATE BLANCAS DPM             D: 2018   T: 2018   MT: CARRI      Name:     ENRIQUE BARROS   MRN:      [id number]        Account:        [id number]   :      1990            Procedure Date: 08/31/2018      Document: [id number]

## 2018-09-05 NOTE — OR NURSING
[facility] Same Day Surgery  Discharge Call Back  Yesika Grant  1990  MRN: [id number]  Home: [phone] (home)   PCP: Young Fletcher    We are calling to see how you're doing since your surgery/procedure with us?   Comments: I've been having a lot of pain. Encouraged to ice and elevate, may add Tylenol to pain regimen.  Clinical Questions  1. Have you had time to look at your discharge instructions? Do you have any questions in regards to the instructions?   Comment: no  2. Do you feel your pain is being controlled with the regimen the surgeon sent you home on? (ie: prescription medications, over the counter pain medications, ice packs)   Comments: sort of- waiting for my follow up appointment tomorrow, 9/6.  3. Have you noticed any drainage on your dressing? Do you know what to do if you have bleeding as a result of your procedure?   Comments: no  4. Have you had any nausea/vomiting? Do you know how to treat this?   Comment: I threw up once right after  5. Have you had any signs/symptoms of infection? (ie: fever, swelling, heat, drainage or redness) Do you know what to do if you have?   Comment: no  6. Do you have a follow up appointment made with your surgeon? Do you have a number to contact them at if you need it?   Comment: yes- tomorrow  Improvement  Our goal is to be the best. Do you have any suggestions for things that we could improve on?   Comments: no  You may be randomly selected to fill out a [facility] survey. We would appreciate you taking the time to fill this out. It is important to us if you would answer all of the questions on the survey.

## 2018-09-06 ENCOUNTER — ALLIED HEALTH/NURSE VISIT (OUTPATIENT)
Dept: FAMILY MEDICINE | Facility: CLINIC | Age: 28
End: 2018-09-06
Payer: COMMERCIAL

## 2018-09-06 VITALS
HEART RATE: 84 BPM | SYSTOLIC BLOOD PRESSURE: 106 MMHG | DIASTOLIC BLOOD PRESSURE: 66 MMHG | RESPIRATION RATE: 16 BRPM | TEMPERATURE: 98 F

## 2018-09-06 DIAGNOSIS — T84.84XA PAINFUL ORTHOPAEDIC HARDWARE (H): Primary | ICD-10-CM

## 2018-09-06 PROCEDURE — 99207 ZZC NO CHARGE NURSE ONLY: CPT

## 2018-09-06 NOTE — LETTER
[address] [address]  [phone]        September 6, 2018    Re:Yesika Grant       [address] [facility] [address]          To Whom It May Concern,    [place] in under my care. She had surgery on her ankle August 31, 2018. She has been working from home due to her inability to weight bear and drive.  She will be seen again by myself on September 18 to assess for workability.    Sincerely,        Dr. BREEZY Beck, JUSTINOM

## 2018-09-06 NOTE — MR AVS SNAPSHOT
After Visit Summary   9/6/2018    Yesika Grant    MRN: [id number]           Patient Information     Date Of Birth          1990        Visit Information        Provider Department      9/6/2018 9:00 AM [facility] SPECIAL[facility] RN [facility]        Today's Diagnoses     Painful orthopaedic hardware (H)    -  1       Follow-ups after your visit        Your next 10 appointments already scheduled     Sep 18, 2018 10:15 AM CDT   Return Visit with Nate Beck DPM   [facility] ([facility]    [address] [facility] [address] [phone]              Who to contact     If you have questions or need follow up information about today's clinic visit or your schedule please contact [facility] directly at [phone].  Normal or non-critical lab and imaging results will be communicated to you by MyChart, letter or phone within 4 business days after the clinic has received the results. If you do not hear from us within 7 days, please contact the clinic through MyChart or phone. If you have a critical or abnormal lab result, we will notify you by phone as soon as possible.  Submit refill requests through [facility] or call your pharmacy and they will forward the refill request to us. Please allow 3 business days for your refill to be completed.          Additional Information About Your Visit        Care EveryWhere ID     This is your Care EveryWhere ID. This could be used by other organizations to access your [place] [facility] medical records  [id number]        Your Vitals Were     Pulse Temperature Respirations Last Period          84 98  F (36.7  C) (Temporal) 16 08/21/2018         Blood Pressure from Last 3 Encounters:   09/06/18 106/66   08/31/18 118/78   08/21/18 120/78    Weight from Last 3 Encounters:   08/31/18 108 kg (238 lb)   08/21/18 108 kg (238 lb)   08/14/18 106.1 kg (234 lb)              Today, you had the following     No orders  found for display       Primary Care Provider Office Phone # Fax #    Young Roly Fletcher [phone] [address] [place] [address]        Equal Access to Services     SHI ALONSO : Hadii arina ku briao Soebrylali, waaxda luqadaha, qaybta kaalmada adepriyankda, monica dardenspike chapincito. So [facility] [phone].    ATENCIÓN: Si habla español, tiene a palacios disposición servicios gratuitos de asistencia lingüística. Llame al [phone].    We comply with applicable federal civil rights laws and [place] laws. We do not discriminate on the basis of race, color, national origin, age, disability, sex, sexual orientation, or gender identity.            Thank you!     Thank you for choosing [facility]  for your care. Our goal is always to provide you with excellent care. Hearing back from our patients is one way we can continue to improve our services. Please take a few minutes to complete the written survey that you may receive in the mail after your visit with us. Thank you!             Your Updated Medication List - Protect others around you: Learn how to safely use, store and throw away your medicines at www.disposemymeds.org.          This list is accurate as of 9/6/18  9:53 AM.  Always use your most recent med list.                   Brand Name Dispense Instructions for use Diagnosis    ALPRAZolam 0.25 MG tablet    XANAX    10 tablet    Take 1 tablet (0.25 mg) by mouth nightly as needed for anxiety    Panic attacks       cyclobenzaprine 10 MG tablet    FLEXERIL    30 tablet    TAKE ONE TABLET BY MOUTH THREE TIMES A DAY AS NEEDED FOR MUSCLE SPASMS    New daily persistent headache, Strain of neck muscle, subsequent encounter, Chronic midline low back pain without sciatica       hydrOXYzine 25 MG tablet    ATARAX    30 tablet    Take 1 tablet (25 mg) by mouth every 6 hours as needed for itching (and nausea)    Painful orthopaedic hardware (H)       ibuprofen 200  MG tablet    ADVIL/MOTRIN     Take 3 tablets (600 mg) by mouth every 6 hours as needed for pain        IRON SUPPLEMENT PO      Reported on 5/23/2017        Multi-vitamin Tabs tablet      Take 1 tablet by mouth daily Reported on 5/23/2017        norgestimate-ethinyl estradiol 0.25-35 MG-MCG per tablet    ORTHO-CYCLEN, SPRINTEC    84 tablet    Take 1 tablet by mouth daily    BCP (birth control pills) initiation       oxyCODONE IR 5 MG tablet    ROXICODONE    20 tablet    Take 1-2 tablets (5-10 mg) by mouth every 3 hours as needed for pain or other (Moderate to Severe)    Painful orthopaedic hardware (H)       oxyCODONE-acetaminophen 5-325 MG per tablet    PERCOCET    12 tablet    Take 1 tablet by mouth every 6 hours as needed for pain    Closed fracture of right ankle, sequela, Retained orthopedic hardware       senna-docusate 8.6-50 MG per tablet    SENOKOT-S;PERICOLACE    30 tablet    Take 1-2 tablets by mouth 2 times daily Take while on oral narcotics to prevent or treat constipation.    Painful orthopaedic hardware (H)       * varenicline 1 MG tablet    CHANTIX    56 tablet    Take 1 tablet (1 mg) by mouth 2 times daily    Tobacco use disorder       * varenicline 0.5 MG X 11 & 1 MG X 42 tablet    CHANTIX STARTING MONTH PRACHI    53 tablet    Take 0.5 mg tab daily for 3 days, then 0.5 mg tab twice daily for 4 days, then 1 mg twice daily.    Tobacco use disorder       venlafaxine 150 MG 24 hr capsule    EFFEXOR-XR    90 capsule    Take 1 capsule (150 mg) by mouth daily    Anxiety, Moderate episode of recurrent major depressive disorder (H)       VITAMIN D (CHOLECALCIFEROL) PO      Take by mouth daily        * Notice:  This list has 2 medication(s) that are the same as other medications prescribed for you. Read the directions carefully, and ask your doctor or other care provider to review them with you.

## 2018-09-06 NOTE — NURSING NOTE
Pt here for dressing change per order of Dr. Beck. Pt had right ankle hardware removal 8/31/18. Pt states she is having burning along suture line and itching. No edema in toes. Edema noted along lateral foot. CMS intact of right toes. No bruising noted. Suture line dry and intact. Pt holds her foot in extended position. States she is unable to flex her ankle. She is moving her toes and trying to flex her ankle.  New dressing placed after cleansing suture line and foot with adaptic over suture line, 4x4 gauze pads, kerlix roll, ace bandage. Pt will attempt to wear post op shoe.   There is discrepancy on the question of weight bearing. Pt was told NWB but that she would be told today of her limits. I have received no further instructions related to this. I asked Dr. Saldana for his opinion due to Dr. Beck's absence. Dr. Saldana feels the pt should stay NWB until her next appt. Pt is aware.  Requesting work note, which was written and emailed to her work email.  Pt also requesting refill of pain pills. I again asked Dr. Saldana, he denied this request. Pt will continue to take Tylenol 1000mg q6hrs and Ibuprofen 800mg q8hrs.KOREY Hartman

## 2018-09-18 ENCOUNTER — OFFICE VISIT (OUTPATIENT)
Dept: PODIATRY | Facility: OTHER | Age: 28
End: 2018-09-18
Payer: COMMERCIAL

## 2018-09-18 VITALS — BODY MASS INDEX: 37.35 KG/M2 | TEMPERATURE: 97.8 F | HEIGHT: 67 IN | WEIGHT: 238 LBS

## 2018-09-18 DIAGNOSIS — Z96.9 RETAINED ORTHOPEDIC HARDWARE: Primary | ICD-10-CM

## 2018-09-18 PROCEDURE — 99024 POSTOP FOLLOW-UP VISIT: CPT | Performed by: PODIATRIST

## 2018-09-18 ASSESSMENT — PAIN SCALES - GENERAL: PAINLEVEL: MILD PAIN (3)

## 2018-09-18 NOTE — MR AVS SNAPSHOT
"              After Visit Summary   9/18/2018    Yesika Grant    MRN: [id number]           Patient Information     Date Of Birth          1990        Visit Information        Provider Department      9/18/2018 10:15 AM Nate Beck DPM [facility]        Today's Diagnoses     Retained orthopedic hardware    -  1      Care Instructions    Follow-up as needed          Follow-ups after your visit        Who to contact     If you have questions or need follow up information about today's clinic visit or your schedule please contact [facility] directly at [phone].  Normal or non-critical lab and imaging results will be communicated to you by MyChart, letter or phone within 4 business days after the clinic has received the results. If you do not hear from us within 7 days, please contact the clinic through MyChart or phone. If you have a critical or abnormal lab result, we will notify you by phone as soon as possible.  Submit refill requests through [employer] or call your pharmacy and they will forward the refill request to us. Please allow 3 business days for your refill to be completed.          Additional Information About Your Visit        Care EveryWhere ID     This is your Care EveryWhere ID. This could be used by other organizations to access your [place] medical records  [id number]        Your Vitals Were     Temperature Height Last Period BMI (Body Mass Index)          97.8  F (36.6  C) (Temporal) 5' 7\" (1.702 m) 08/21/2018 37.28 kg/m2         Blood Pressure from Last 3 Encounters:   09/06/18 106/66   08/31/18 118/78   08/21/18 120/78    Weight from Last 3 Encounters:   09/18/18 238 lb (108 kg)   08/31/18 238 lb (108 kg)   08/21/18 238 lb (108 kg)              Today, you had the following     No orders found for display       Primary Care Provider Office Phone # Fax #    Young Fletcher [phone] [address] [place] " [address]        Equal Access to Services     [facility] : Hadii aad ku hadabhayrenita Calebali, wadashawnda luqivannaha, qaybta juan luistalipo newby, monica beckham. So [facility] [phone].    ATENCIÓN: Si habla español, tiene a palacios disposición servicios gratuitos de asistencia lingüística. Suzan al [phone].    We comply with applicable federal civil rights laws and [place] laws. We do not discriminate on the basis of race, color, national origin, age, disability, sex, sexual orientation, or gender identity.            Thank you!     Thank you for choosing [facility]  for your care. Our goal is always to provide you with excellent care. Hearing back from our patients is one way we can continue to improve our services. Please take a few minutes to complete the written survey that you may receive in the mail after your visit with us. Thank you!             Your Updated Medication List - Protect others around you: Learn how to safely use, store and throw away your medicines at www.disposemymeds.org.          This list is accurate as of 9/18/18 12:49 PM.  Always use your most recent med list.                   Brand Name Dispense Instructions for use Diagnosis    ALPRAZolam 0.25 MG tablet    XANAX    10 tablet    Take 1 tablet (0.25 mg) by mouth nightly as needed for anxiety    Panic attacks       cyclobenzaprine 10 MG tablet    FLEXERIL    30 tablet    TAKE ONE TABLET BY MOUTH THREE TIMES A DAY AS NEEDED FOR MUSCLE SPASMS    New daily persistent headache, Strain of neck muscle, subsequent encounter, Chronic midline low back pain without sciatica       hydrOXYzine 25 MG tablet    ATARAX    30 tablet    Take 1 tablet (25 mg) by mouth every 6 hours as needed for itching (and nausea)    Painful orthopaedic hardware (H)       ibuprofen 200 MG tablet    ADVIL/MOTRIN     Take 3 tablets (600 mg) by mouth every 6 hours as needed for pain        IRON SUPPLEMENT PO      Reported on 5/23/2017         Multi-vitamin Tabs tablet      Take 1 tablet by mouth daily Reported on 5/23/2017        norgestimate-ethinyl estradiol 0.25-35 MG-MCG per tablet    ORTHO-CYCLEN, SPRINTEC    84 tablet    Take 1 tablet by mouth daily    BCP (birth control pills) initiation       oxyCODONE IR 5 MG tablet    ROXICODONE    20 tablet    Take 1-2 tablets (5-10 mg) by mouth every 3 hours as needed for pain or other (Moderate to Severe)    Painful orthopaedic hardware (H)       oxyCODONE-acetaminophen 5-325 MG per tablet    PERCOCET    12 tablet    Take 1 tablet by mouth every 6 hours as needed for pain    Closed fracture of right ankle, sequela, Retained orthopedic hardware       senna-docusate 8.6-50 MG per tablet    SENOKOT-S;PERICOLACE    30 tablet    Take 1-2 tablets by mouth 2 times daily Take while on oral narcotics to prevent or treat constipation.    Painful orthopaedic hardware (H)       * varenicline 1 MG tablet    CHANTIX    56 tablet    Take 1 tablet (1 mg) by mouth 2 times daily    Tobacco use disorder       * varenicline 0.5 MG X 11 & 1 MG X 42 tablet    CHANTIX STARTING MONTH PRACHI    53 tablet    Take 0.5 mg tab daily for 3 days, then 0.5 mg tab twice daily for 4 days, then 1 mg twice daily.    Tobacco use disorder       venlafaxine 150 MG 24 hr capsule    EFFEXOR-XR    90 capsule    Take 1 capsule (150 mg) by mouth daily    Anxiety, Moderate episode of recurrent major depressive disorder (H)       VITAMIN D (CHOLECALCIFEROL) PO      Take by mouth daily        * Notice:  This list has 2 medication(s) that are the same as other medications prescribed for you. Read the directions carefully, and ask your doctor or other care provider to review them with you.

## 2018-09-18 NOTE — LETTER
[address] [address]  [phone]    2018      RE:  Yesika Grant  : 1990      To whom it may concern:    This patient may return to work now in a fracture boot, light duty and no restrictions after 2 weeks from today.    Sincerely,          Nate Beck DPM

## 2018-09-18 NOTE — PROGRESS NOTES
"Chief Complaint   Patient presents with     Surgical Followup     (2w4d) NWB w/ace wrap, pain 3,  ;DOS 8/31/2018 - Excision Hardware Right Ankle     Weight management plan: Patient was referred to their PCP to discuss a diet and exercise plan.    Subjective: Patient reports for followup of DOS 8/31/2018 - Excision Hardware Right Ankle procedure.  Patient continues pain medication but is tapering off. Patient denies nausea vomiting fever or chills.     EXAM:  No apparent distress, patient is relaxed and comfortable.   Vitals: Temp 97.8  F (36.6  C) (Temporal)  Ht 5' 7\" (1.702 m)  Wt 238 lb (108 kg)  LMP 08/21/2018  BMI 37.28 kg/m2  Vasc:  DP and PT pulses palpable bilateral, CFT immediate to all digits and surrounding the surgical site.  Neuro:  Light touch sensation intact about the digits. There is minimal to no appreciable numbness around the surgical incision with examination.  Derm: The incision is well approximated and dry. Sutures are intact. Mild edema as expected. There is no surrounding erythema, heat, drainage or other signs of infection or hematoma.  Musc: Adequate reduction of deformity identified. No complications.    Radiographs: Intraoperative/postoperative films reviewed with the patient. No complications noted.     Assessment:      ICD-10-CM    1. Retained orthopedic hardware Z96.9        Plan:    9/18/2018  The dressing was removed and surgical site inspected.   All sutures were removed.  A light gauze with compression was applied and she was instructed to return to weightbearing as tolerated in the fracture boot and discontinue the fracture boot in 2 weeks or less.  No work restrictions in 14 days from today.  All questions were answered she is pleased with her outcome.  Follow-up with any questions or concerns otherwise as needed    Nate Beck DPM      "

## 2018-09-28 ENCOUNTER — TRANSFERRED RECORDS (OUTPATIENT)
Dept: HEALTH INFORMATION MANAGEMENT | Facility: CLINIC | Age: 28
End: 2018-09-28

## 2018-10-02 ENCOUNTER — TELEPHONE (OUTPATIENT)
Dept: FAMILY MEDICINE | Facility: OTHER | Age: 28
End: 2018-10-02

## 2018-10-02 NOTE — TELEPHONE ENCOUNTER
I can open my C3PO time this would be at 320 if she can make that otherwise. I have no availability.     Thank you  Clarissa Melendrez CNP

## 2018-10-02 NOTE — TELEPHONE ENCOUNTER
Reason for Call:  Other appointment    Detailed comments: pt couldn't make it to appt today, wondering about being worked in tomorrow with WS after 3:30.  Please advise.     Phone Number Patient can be reached at: Home number on file [phone] (home)    Best Time: any     Can we leave a detailed message on this number? YES    Call taken on 10/2/2018 at 4:17 PM by Hilaria Wmyan
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
## 2018-10-02 NOTE — TELEPHONE ENCOUNTER
Spoke with patient appointment scheduled     Next 5 appointments (look out 90 days)     Oct 03, 2018  3:20 PM CDT   Office Visit with LASHAE Bell CNP   [facility]    [id number] [address] [phone]                Closing encounter  Iman Dias RT (R)

## 2018-10-03 ENCOUNTER — OFFICE VISIT (OUTPATIENT)
Dept: FAMILY MEDICINE | Facility: OTHER | Age: 28
End: 2018-10-03
Payer: COMMERCIAL

## 2018-10-03 VITALS
WEIGHT: 238 LBS | BODY MASS INDEX: 37.28 KG/M2 | HEART RATE: 78 BPM | SYSTOLIC BLOOD PRESSURE: 126 MMHG | RESPIRATION RATE: 16 BRPM | DIASTOLIC BLOOD PRESSURE: 80 MMHG | TEMPERATURE: 98.2 F
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
## 2018-10-03 DIAGNOSIS — R41.3 MEMORY CHANGES: ICD-10-CM

## 2018-10-03 DIAGNOSIS — M79.2 NEUROPATHIC PAIN OF UPPER EXTREMITY: ICD-10-CM

## 2018-10-03 DIAGNOSIS — S16.1XXD STRAIN OF NECK MUSCLE, SUBSEQUENT ENCOUNTER: ICD-10-CM

## 2018-10-03 DIAGNOSIS — R41.0 CONFUSION: ICD-10-CM

## 2018-10-03 PROCEDURE — 99214 OFFICE O/P EST MOD 30 MIN: CPT | Performed by: NURSE PRACTITIONER

## 2018-10-03 NOTE — PATIENT INSTRUCTIONS
Please continue treatment as you have been holding ibuprofen.     Return to clinic in 6 weeks if symptoms are not improving    Thank you  Clarissa Melendrez CNP
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
## 2018-10-05 ENCOUNTER — HOSPITAL ENCOUNTER (OUTPATIENT)
Dept: MRI IMAGING | Facility: CLINIC | Age: 28
Discharge: HOME | End: 2018-10-05
Attending: NURSE PRACTITIONER | Admitting: NURSE PRACTITIONER
Payer: COMMERCIAL

## 2018-10-05 DIAGNOSIS — S16.1XXD STRAIN OF NECK MUSCLE, SUBSEQUENT ENCOUNTER: ICD-10-CM
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
## 2018-10-05 PROCEDURE — 72141 MRI NECK SPINE W/O DYE: CPT

## 2018-10-06 NOTE — PROGRESS NOTES
Please advise Yesika Grant,  1990, that her MRI results were normal. No indication of nerve involvement   [phone] (home)   Thank you  Clarissa Melendrez CNP

## 2018-10-12 ENCOUNTER — HOSPITAL ENCOUNTER (OUTPATIENT)
Dept: PHYSICAL THERAPY | Facility: OTHER | Age: 28
Setting detail: THERAPIES SERIES
End: 2018-10-12
Attending: NURSE PRACTITIONER
Payer: COMMERCIAL

## 2018-10-12 DIAGNOSIS — S16.1XXD STRAIN OF NECK MUSCLE, SUBSEQUENT ENCOUNTER: ICD-10-CM
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
## 2018-10-12 PROCEDURE — 97162 PT EVAL MOD COMPLEX 30 MIN: CPT | Mod: GP | Performed by: PHYSICAL THERAPIST

## 2018-10-12 PROCEDURE — 97530 THERAPEUTIC ACTIVITIES: CPT | Mod: GP | Performed by: PHYSICAL THERAPIST

## 2018-10-12 PROCEDURE — 97014 ELECTRIC STIMULATION THERAPY: CPT | Mod: GP | Performed by: PHYSICAL THERAPIST

## 2018-10-12 PROCEDURE — 40000718 ZZHC STATISTIC PT DEPARTMENT ORTHO VISIT: Performed by: PHYSICAL THERAPIST
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
## 2018-10-18 DIAGNOSIS — F41.0 PANIC ATTACKS: ICD-10-CM

## 2018-10-18 NOTE — TELEPHONE ENCOUNTER
Requested Prescriptions   Pending Prescriptions Disp Refills     ALPRAZolam (XANAX) 0.25 MG tablet 10 tablet 1     Sig: Take 1 tablet (0.25 mg) by mouth nightly as needed for anxiety    There is no refill protocol information for this order        ALPRAZolam (XANAX) 0.25 MG tablet      Last Written Prescription Date:  04/12/2018  Last Fill Quantity: 10,   # refills: 1  Last Office Visit: 10/03/2018  Future Office visit:    Next 5 appointments (look out 90 days)     Oct 29, 2018  3:00 PM CDT   [facility] Visit with Young Fletcher MD   [facility]    [id number] [facility] [id number] [phone]                Routing refill request to provider for review/approval because:  Drug not on the [facility], [facility] or  Health refill protocol or controlled substance  Perla Jimenez RN, BSN
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
## 2018-10-21 RX ORDER — ALPRAZOLAM 0.25 MG
0.25 TABLET ORAL
Qty: 10 TABLET | Refills: 1
Start: 2018-10-21 | End: 2018-10-29 | Stop reason: ALTCHOICE

## 2018-10-22 ENCOUNTER — TELEPHONE (OUTPATIENT)
Dept: FAMILY MEDICINE | Facility: OTHER | Age: 28
End: 2018-10-22

## 2018-10-22 ENCOUNTER — HOSPITAL ENCOUNTER (OUTPATIENT)
Dept: PHYSICAL THERAPY | Facility: OTHER | Age: 28
Setting detail: THERAPIES SERIES
End: 2018-10-22
Attending: NURSE PRACTITIONER
Payer: COMMERCIAL

## 2018-10-22 DIAGNOSIS — S16.1XXD STRAIN OF NECK MUSCLE, SUBSEQUENT ENCOUNTER: Primary | ICD-10-CM
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
## 2018-10-22 PROCEDURE — 97112 NEUROMUSCULAR REEDUCATION: CPT | Mod: GP | Performed by: PHYSICAL THERAPIST

## 2018-10-22 PROCEDURE — 97035 APP MDLTY 1+ULTRASOUND EA 15: CPT | Mod: GP | Performed by: PHYSICAL THERAPIST

## 2018-10-22 PROCEDURE — 40000718 ZZHC STATISTIC PT DEPARTMENT ORTHO VISIT: Performed by: PHYSICAL THERAPIST

## 2018-10-22 PROCEDURE — 97530 THERAPEUTIC ACTIVITIES: CPT | Mod: GP | Performed by: PHYSICAL THERAPIST
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
## 2018-10-22 NOTE — TELEPHONE ENCOUNTER
Please let patient know that I have prescribed a tens unit for her she will need to find out where her insurance will cover this at. Would she like us to mail order or does she want to  in clinic.   [place]  Thank you  Clarissa Melendrez CNP

## 2018-10-22 NOTE — TELEPHONE ENCOUNTER
Rx called into the [facility] pharmacy and patient informed.  Daniel Qiu, CMA

## 2018-10-24 ENCOUNTER — OFFICE VISIT (OUTPATIENT)
Dept: FAMILY MEDICINE | Facility: OTHER | Age: 28
End: 2018-10-24
Payer: COMMERCIAL

## 2018-10-24 DIAGNOSIS — M79.2 NEUROPATHIC PAIN OF UPPER EXTREMITY: ICD-10-CM

## 2018-10-24 DIAGNOSIS — S13.4XXS WHIPLASH INJURIES, SEQUELA: ICD-10-CM

## 2018-10-24 DIAGNOSIS — R41.3 MEMORY CHANGES: ICD-10-CM
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
## 2018-10-24 PROCEDURE — 99214 OFFICE O/P EST MOD 30 MIN: CPT | Performed by: NURSE PRACTITIONER

## 2018-10-24 RX ORDER — BACLOFEN 20 MG/1
20 TABLET ORAL 3 TIMES DAILY
Qty: 270 TABLET | Refills: 0 | Status: SHIPPED | OUTPATIENT
Start: 2018-10-24 | End: 2018-12-28

## 2018-10-24 ASSESSMENT — PAIN SCALES - GENERAL: PAINLEVEL: SEVERE PAIN (7)
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
## 2018-10-26 VITALS
BODY MASS INDEX: 37.75 KG/M2 | WEIGHT: 241 LBS | SYSTOLIC BLOOD PRESSURE: 132 MMHG | DIASTOLIC BLOOD PRESSURE: 80 MMHG | TEMPERATURE: 97.8 F | HEART RATE: 74 BPM
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
## 2018-10-26 PROBLEM — S13.4XXS WHIPLASH INJURIES, SEQUELA: Status: ACTIVE | Noted: 2018-10-26

## 2018-10-26 PROBLEM — M79.2 NEUROPATHIC PAIN OF UPPER EXTREMITY: Status: ACTIVE | Noted: 2018-10-26

## 2018-10-26 PROBLEM — R41.3 MEMORY CHANGES: Status: ACTIVE | Noted: 2018-10-26

## 2018-10-29 ENCOUNTER — OFFICE VISIT (OUTPATIENT)
Dept: FAMILY MEDICINE | Facility: OTHER | Age: 28
End: 2018-10-29
Payer: COMMERCIAL

## 2018-10-29 VITALS
OXYGEN SATURATION: 97 % | WEIGHT: 240.4 LBS | SYSTOLIC BLOOD PRESSURE: 128 MMHG | HEART RATE: 82 BPM | TEMPERATURE: 97.6 F | DIASTOLIC BLOOD PRESSURE: 82 MMHG | BODY MASS INDEX: 37.65 KG/M2 | RESPIRATION RATE: 16 BRPM

## 2018-10-29 DIAGNOSIS — F41.9 ANXIETY: Primary | ICD-10-CM

## 2018-10-29 DIAGNOSIS — S13.4XXS WHIPLASH INJURIES, SEQUELA: ICD-10-CM

## 2018-10-29 DIAGNOSIS — Z23 NEED FOR PROPHYLACTIC VACCINATION AND INOCULATION AGAINST INFLUENZA: ICD-10-CM
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
## 2018-10-29 DIAGNOSIS — Z11.4 SCREENING FOR HIV (HUMAN IMMUNODEFICIENCY VIRUS): ICD-10-CM

## 2018-10-29 DIAGNOSIS — F33.1 MODERATE EPISODE OF RECURRENT MAJOR DEPRESSIVE DISORDER (H): ICD-10-CM

## 2018-10-29 PROCEDURE — 90686 IIV4 VACC NO PRSV 0.5 ML IM: CPT | Performed by: FAMILY MEDICINE

## 2018-10-29 PROCEDURE — 87389 HIV-1 AG W/HIV-1&-2 AB AG IA: CPT | Performed by: FAMILY MEDICINE

## 2018-10-29 PROCEDURE — 99214 OFFICE O/P EST MOD 30 MIN: CPT | Mod: 25 | Performed by: FAMILY MEDICINE

## 2018-10-29 PROCEDURE — 90471 IMMUNIZATION ADMIN: CPT | Performed by: FAMILY MEDICINE

## 2018-10-29 PROCEDURE — 36415 COLL VENOUS BLD VENIPUNCTURE: CPT | Performed by: FAMILY MEDICINE

## 2018-10-29 RX ORDER — VENLAFAXINE HYDROCHLORIDE 75 MG/1
225 CAPSULE, EXTENDED RELEASE ORAL DAILY
Qty: 270 CAPSULE | Refills: 1 | Status: SHIPPED | OUTPATIENT
Start: 2018-10-29 | End: 2019-05-28

## 2018-10-29 RX ORDER — LORAZEPAM 0.5 MG/1
0.5 TABLET ORAL EVERY 8 HOURS PRN
Qty: 20 TABLET | Refills: 1 | Status: SHIPPED | OUTPATIENT
Start: 2018-10-29 | End: 2018-12-28

## 2018-10-29 ASSESSMENT — PATIENT HEALTH QUESTIONNAIRE - PHQ9
10. IF YOU CHECKED OFF ANY PROBLEMS, HOW DIFFICULT HAVE THESE PROBLEMS MADE IT FOR YOU TO DO YOUR WORK, TAKE CARE OF THINGS AT HOME, OR GET ALONG WITH OTHER PEOPLE: SOMEWHAT DIFFICULT
SUM OF ALL RESPONSES TO PHQ QUESTIONS 1-9: 9
SUM OF ALL RESPONSES TO PHQ QUESTIONS 1-9: 9

## 2018-10-29 ASSESSMENT — ANXIETY QUESTIONNAIRES
6. BECOMING EASILY ANNOYED OR IRRITABLE: NOT AT ALL
5. BEING SO RESTLESS THAT IT IS HARD TO SIT STILL: NEARLY EVERY DAY
GAD7 TOTAL SCORE: 10
1. FEELING NERVOUS, ANXIOUS, OR ON EDGE: NEARLY EVERY DAY
4. TROUBLE RELAXING: NEARLY EVERY DAY
GAD7 TOTAL SCORE: 10
GAD7 TOTAL SCORE: 10
3. WORRYING TOO MUCH ABOUT DIFFERENT THINGS: NOT AT ALL
2. NOT BEING ABLE TO STOP OR CONTROL WORRYING: NOT AT ALL
7. FEELING AFRAID AS IF SOMETHING AWFUL MIGHT HAPPEN: SEVERAL DAYS
7. FEELING AFRAID AS IF SOMETHING AWFUL MIGHT HAPPEN: SEVERAL DAYS
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
## 2018-10-29 NOTE — PATIENT INSTRUCTIONS
Thank you for visiting [facility]    Increase Effexor XR to 225 mg to help with your mood.    Try lorazepam instead of Xanax.  This should last longer, hopefully be more effective.    Start your baclofen.  If not improving with this or with neurology, then we could consider an empiric trial of amitriptyline or something similar to help with sleep, neck pain, and anxiety.    Please Follow Up when indicated on the section below this one on your After-Visit Summary.      If you had imaging scheduled please refer to your radiology prep sheet.    Appointment    Date_______________     Time_____________    Day:   M TU W TH F    With____________________________    Location_________________________    If you need medication refills, please contact your pharmacy 3 days before your prescriptions runs out. If you are out of refills, your pharmacy will contact contact the clinic.    Contact us or return if questions or concerns.     -Your Care Team:  MD Justine Kunz PA-C Joel De Haan, PA-C Elizabeth McLean, APRN CNP  LASHAE Velazquez, LASHAE Andres, CNP     General information about your clinic      Clinic hours:     Lab hours:  Phone [phone]  Monday 7:30 am-7 pm    Monday 8:30 am-6:30 pm  Tuesday-Friday 7:30 am-5 pm   Tuesday-Friday 8:30 am-4:30 pm    Pharmacy hours:  Phone [phone]  Monday 8:30 am-7pm  Tuesday-Friday 8:30am-6 pm                                     Mychart assistance [phone]    We would like to hear from you, how was your visit today?    Solange Castillo  Patient Information Supervisor   Patient Care Supervisor  [facility] [place], and [facility]  [phone]

## 2018-10-29 NOTE — PROGRESS NOTES
Injectable Influenza Immunization Documentation    1.  Is the person to be vaccinated sick today?   No    2. Does the person to be vaccinated have an allergy to a component   of the vaccine?   No  Egg Allergy Algorithm Link    3. Has the person to be vaccinated ever had a serious reaction   to influenza vaccine in the past?   No    4. Has the person to be vaccinated ever had Guillain-Barré syndrome?   No    Form completed by Daniel Qiu CMA  Prior to injection verified patient identity using patient's name and date of birth.  Due to injection administration, patient instructed to remain in clinic for 15 minutes  afterwards, and to report any adverse reaction to me immediately.

## 2018-10-30 LAB — HIV 1+2 AB+HIV1 P24 AG SERPL QL IA: NONREACTIVE

## 2018-10-30 ASSESSMENT — PATIENT HEALTH QUESTIONNAIRE - PHQ9: SUM OF ALL RESPONSES TO PHQ QUESTIONS 1-9: 9

## 2018-10-30 ASSESSMENT — ANXIETY QUESTIONNAIRES: GAD7 TOTAL SCORE: 10

## 2018-10-31 ENCOUNTER — HOSPITAL ENCOUNTER (OUTPATIENT)
Dept: PHYSICAL THERAPY | Facility: OTHER | Age: 28
Setting detail: THERAPIES SERIES
End: 2018-10-31
Attending: NURSE PRACTITIONER
Payer: COMMERCIAL

## 2018-10-31 PROCEDURE — 97035 APP MDLTY 1+ULTRASOUND EA 15: CPT | Mod: GP | Performed by: PHYSICAL THERAPIST

## 2018-10-31 PROCEDURE — 40000718 ZZHC STATISTIC PT DEPARTMENT ORTHO VISIT: Performed by: PHYSICAL THERAPIST

## 2018-10-31 PROCEDURE — 97530 THERAPEUTIC ACTIVITIES: CPT | Mod: GP | Performed by: PHYSICAL THERAPIST

## 2018-11-07 ENCOUNTER — TELEPHONE (OUTPATIENT)
Dept: FAMILY MEDICINE | Facility: OTHER | Age: 28
End: 2018-11-07

## 2018-11-07 NOTE — TELEPHONE ENCOUNTER
Called and spoke with patient. She agreed this would be a good idea and cancelled appointment she will call back later to reschedule appointment after she sees Dr. Wilder.     Jason Begum,

## 2018-11-07 NOTE — TELEPHONE ENCOUNTER
WS would like pt rescheduled until after appt with Dr. Parry on 11/15. WS will likely not change plan of care until after this consult     Please reach out to pt to try to reschedule this .

## 2018-11-09 ENCOUNTER — HOSPITAL ENCOUNTER (OUTPATIENT)
Dept: PHYSICAL THERAPY | Facility: OTHER | Age: 28
Setting detail: THERAPIES SERIES
End: 2018-11-09
Attending: NURSE PRACTITIONER
Payer: COMMERCIAL

## 2018-11-09 PROCEDURE — 40000718 ZZHC STATISTIC PT DEPARTMENT ORTHO VISIT: Performed by: PHYSICAL THERAPIST

## 2018-11-09 PROCEDURE — 97035 APP MDLTY 1+ULTRASOUND EA 15: CPT | Mod: GP | Performed by: PHYSICAL THERAPIST

## 2018-11-09 PROCEDURE — 97140 MANUAL THERAPY 1/> REGIONS: CPT | Mod: GP | Performed by: PHYSICAL THERAPIST

## 2018-11-09 PROCEDURE — 97530 THERAPEUTIC ACTIVITIES: CPT | Mod: GP | Performed by: PHYSICAL THERAPIST

## 2018-11-14 ENCOUNTER — HOSPITAL ENCOUNTER (OUTPATIENT)
Dept: PHYSICAL THERAPY | Facility: OTHER | Age: 28
Setting detail: THERAPIES SERIES
End: 2018-11-14
Attending: NURSE PRACTITIONER
Payer: COMMERCIAL

## 2018-11-14 PROCEDURE — 97140 MANUAL THERAPY 1/> REGIONS: CPT | Mod: GP | Performed by: PHYSICAL THERAPIST

## 2018-11-14 PROCEDURE — 40000718 ZZHC STATISTIC PT DEPARTMENT ORTHO VISIT: Performed by: PHYSICAL THERAPIST

## 2018-11-14 PROCEDURE — 97035 APP MDLTY 1+ULTRASOUND EA 15: CPT | Mod: GP | Performed by: PHYSICAL THERAPIST

## 2018-11-15 ENCOUNTER — OFFICE VISIT (OUTPATIENT)
Dept: NEUROSURGERY | Facility: CLINIC | Age: 28
End: 2018-11-15
Attending: NURSE PRACTITIONER
Payer: COMMERCIAL

## 2018-11-15 VITALS
HEART RATE: 112 BPM | SYSTOLIC BLOOD PRESSURE: 127 MMHG | BODY MASS INDEX: 37.59 KG/M2 | DIASTOLIC BLOOD PRESSURE: 70 MMHG | WEIGHT: 240 LBS

## 2018-11-15 DIAGNOSIS — M54.2 CERVICALGIA: Primary | ICD-10-CM

## 2018-11-15 PROCEDURE — 99204 OFFICE O/P NEW MOD 45 MIN: CPT | Performed by: NEUROLOGICAL SURGERY

## 2018-11-15 ASSESSMENT — PAIN SCALES - GENERAL: PAINLEVEL: SEVERE PAIN (6)
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
## 2018-11-15 NOTE — NURSING NOTE
"Yesika rGant is a 27 year old female who presents for:  Chief Complaint   Patient presents with     Neurologic Problem        Initial Vitals:  /70 (BP Location: Right arm, Patient Position: Chair, Cuff Size: Adult Large)  Pulse 112  Wt 240 lb (108.9 kg)  LMP  (LMP Unknown)  BMI 37.59 kg/m2 Estimated body mass index is 37.59 kg/(m^2) as calculated from the following:    Height as of 9/18/18: 5' 7\" (1.702 m).    Weight as of this encounter: 240 lb (108.9 kg).. Body surface area is 2.27 meters squared. BP completed using cuff size: large  Severe Pain (6)    Do you feel safe in your environment?  Yes  Do you need any refills today? No    Nursing Comments:         Sadaf Moreau    "
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
## 2018-11-15 NOTE — PATIENT INSTRUCTIONS
Today's Visit    1. Ordered a cervical flexion/extensioin x-ray scan. To schedule, please call [phone]. You should schedule this within one week. We will call you with the results. If you don't hear from us 7 days after the scan, please call us.  2.  referral to neurology for headache evaluation and visual symptoms. Call Main Office Phone: [phone] to schedule. We do them here in the [facility] through [facility].  3.     Please call our clinic with any questions or concerns     Brianna NUÑEZ RN ([facility] Nurse)  [facility]  [address] [address]  T:  [phone]  F:  [phone]

## 2018-11-19 ENCOUNTER — TELEPHONE (OUTPATIENT)
Dept: FAMILY MEDICINE | Facility: OTHER | Age: 28
End: 2018-11-19

## 2018-11-19 NOTE — TELEPHONE ENCOUNTER
You placed a referral to neurology on 10/3/18.    It is unclear if the patient has scheduled yet, not finding a report showing they were seen.     Please forward to your team if further follow up is needed to see if they have made this appointment.      Thank you!   Rocio/Referral Representative for [facility]

## 2018-11-19 NOTE — LETTER
[facility]  [id number] [place] [address]  [place] [address] [phone]          November 20, 2018    Yesika Grant                                                                                                                     [address] [facility] [address]            Dear [place],      During your visit on 10/24/18 we placed a referral for you with Orthopedics and Sports Medicine. Our records do not show a completed appointment although you may have had an appointment or scheduled one already.     We wanted to make sure you had all the information you need in order to make this appointment if you haven't already, please see your referral information below:    Clarissa Melendrez  has referred you for the following:      [facility] is referring you to the [facility].      All areas ~ [phone]            Sincerely,    Your [facility] Care Team
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
## 2018-11-26 ENCOUNTER — OFFICE VISIT (OUTPATIENT)
Dept: FAMILY MEDICINE | Facility: OTHER | Age: 28
End: 2018-11-26
Payer: COMMERCIAL

## 2018-11-26 VITALS
SYSTOLIC BLOOD PRESSURE: 118 MMHG | TEMPERATURE: 97.4 F | RESPIRATION RATE: 16 BRPM | HEART RATE: 84 BPM | BODY MASS INDEX: 37.45 KG/M2 | DIASTOLIC BLOOD PRESSURE: 76 MMHG | WEIGHT: 239.1 LBS

## 2018-11-26 DIAGNOSIS — S13.4XXS WHIPLASH INJURIES, SEQUELA: ICD-10-CM

## 2018-11-26 DIAGNOSIS — M79.2 NEUROPATHIC PAIN OF UPPER EXTREMITY: ICD-10-CM

## 2018-11-26 DIAGNOSIS — R68.89 COLD INTOLERANCE: ICD-10-CM

## 2018-11-26 DIAGNOSIS — F33.1 MODERATE EPISODE OF RECURRENT MAJOR DEPRESSIVE DISORDER (H): Primary | ICD-10-CM

## 2018-11-26 DIAGNOSIS — F17.200 TOBACCO USE DISORDER: ICD-10-CM

## 2018-11-26 DIAGNOSIS — F51.04 PSYCHOPHYSIOLOGICAL INSOMNIA: ICD-10-CM
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
## 2018-11-26 DIAGNOSIS — F41.9 ANXIETY: Chronic | ICD-10-CM

## 2018-11-26 LAB
ERYTHROCYTE [DISTWIDTH] IN BLOOD BY AUTOMATED COUNT: 14.6 % (ref 10–15)
HCT VFR BLD AUTO: 40.4 % (ref 35–47)
HGB BLD-MCNC: 13 G/DL (ref 11.7–15.7)
MCH RBC QN AUTO: 29.5 PG (ref 26.5–33)
MCHC RBC AUTO-ENTMCNC: 32.2 G/DL (ref 31.5–36.5)
MCV RBC AUTO: 92 FL (ref 78–100)
PLATELET # BLD AUTO: 345 10E9/L (ref 150–450)
RBC # BLD AUTO: 4.41 10E12/L (ref 3.8–5.2)
TSH SERPL DL<=0.005 MIU/L-ACNC: 0.93 MU/L (ref 0.4–4)
WBC # BLD AUTO: 14.8 10E9/L (ref 4–11)

## 2018-11-26 PROCEDURE — 84443 ASSAY THYROID STIM HORMONE: CPT | Performed by: FAMILY MEDICINE

## 2018-11-26 PROCEDURE — 99214 OFFICE O/P EST MOD 30 MIN: CPT | Performed by: FAMILY MEDICINE

## 2018-11-26 PROCEDURE — 85027 COMPLETE CBC AUTOMATED: CPT | Performed by: FAMILY MEDICINE

## 2018-11-26 PROCEDURE — 36415 COLL VENOUS BLD VENIPUNCTURE: CPT | Performed by: FAMILY MEDICINE

## 2018-11-26 RX ORDER — QUETIAPINE FUMARATE 25 MG/1
25-50 TABLET, FILM COATED ORAL AT BEDTIME
Qty: 30 TABLET | Refills: 1 | Status: SHIPPED | OUTPATIENT
Start: 2018-11-26 | End: 2018-12-28

## 2018-11-26 ASSESSMENT — PAIN SCALES - GENERAL: PAINLEVEL: SEVERE PAIN (6)
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
## 2018-11-26 NOTE — PATIENT INSTRUCTIONS
Thank you for visiting [facility]    Let's see if we can get a little more anxiety control with the Seroquel at night.  This should also help a lot with sleep.      Let me know if problems.      We'll let you know your lab results as soon as we can.     Contact us or return if questions or concerns.     Have a nice day!    Dr. Fletcher     Please Follow Up when indicated on the section below this one on your After-Visit Summary.      If you had imaging scheduled please refer to your radiology prep sheet.    Appointment    Date_______________     Time_____________    Day:   M TU W TH F    With____________________________    Location_________________________    If you need medication refills, please contact your pharmacy 3 days before your prescriptions runs out. If you are out of refills, your pharmacy will contact contact the clinic.    Contact us or return if questions or concerns.     -Your Care Team:  MD Justine Kunz PA-C Joel De Haan, PA-C Elizabeth McLean, APRN CNP Wendy Sabinske, APRN, LASHAE Andres, CNP     General information about your clinic      Clinic hours:     Lab hours:  Phone [phone]  Monday 7:30 am-7 pm    Monday 8:30 am-6:30 pm  Tuesday-Friday 7:30 am-5 pm   Tuesday-Friday 8:30 am-4:30 pm    Pharmacy hours:  Phone [phone]  Monday 8:30 am-7pm  Tuesday-Friday 8:30am-6 pm                                     Mychart assistance [phone]    We would like to hear from you, how was your visit today?    Solange Castillo  Patient Information Supervisor   Patient Care Supervisor  [facility], and [facility], and [facility]  [phone]

## 2018-11-26 NOTE — LETTER
My Depression Action Plan  Name: Yesika Garnt   Date of Birth 1990  Date: 11/20/2018    My doctor: Young Fletcher   My clinic: [facility]  [address] [address] [phone]          GREEN    ZONE   Good Control    What it looks like:     Things are going generally well. You have normal up s and down s. You may even feel depressed from time to time, but bad moods usually last less than a day.   What you need to do:  1. Continue to care for yourself (see self care plan)  2. Check your depression survival kit and update it as needed  3. Follow your physician s recommendations including any medication.  4. Do not stop taking medication unless you consult with your physician first.           YELLOW         ZONE Getting Worse    What it looks like:     Depression is starting to interfere with your life.     It may be hard to get out of bed; you may be starting to isolate yourself from others.    Symptoms of depression are starting to last most all day and this has happened for several days.     You may have suicidal thoughts but they are not constant.   What you need to do:     1. Call your care team, your response to treatment will improve if you keep your care team informed of your progress. Yellow periods are signs an adjustment may need to be made.     2. Continue your self-care, even if you have to fake it!    3. Talk to someone in your support network    4. Open up your depression survival kit           RED    ZONE Medical Alert - Get Help    What it looks like:     Depression is seriously interfering with your life.     You may experience these or other symptoms: You can t get out of bed most days, can t work or engage in other necessary activities, you have trouble taking care of basic hygiene, or basic responsibilities, thoughts of suicide or death that will not go away, self-injurious behavior.     What you need to do:  1. Call your care  team and request a same-day appointment. If they are not available (weekends or after hours) call your local crisis line, emergency room or 911.            Depression Self Care Plan / Survival Kit    Self-Care for Depression  Here s the deal. Your body and mind are really not as separate as most people think.  What you do and think affects how you feel and how you feel influences what you do and think. This means if you do things that people who feel good do, it will help you feel better.  Sometimes this is all it takes.  There is also a place for medication and therapy depending on how severe your depression is, so be sure to consult with your medical provider and/ or Behavioral Health Consultant if your symptoms are worsening or not improving.     In order to better manage my stress, I will:    Exercise  Get some form of exercise, every day. This will help reduce pain and release endorphins, the  feel good  chemicals in your brain. This is almost as good as taking antidepressants!  This is not the same as joining a gym and then never going! (they count on that by the way ) It can be as simple as just going for a walk or doing some gardening, anything that will get you moving.      Hygiene   Maintain good hygiene (Get out of bed in the morning, Make your bed, Brush your teeth, Take a shower, and Get dressed like you were going to work, even if you are unemployed).  If your clothes don't fit try to get ones that do.    Diet  I will strive to eat foods that are good for me, drink plenty of water, and avoid excessive sugar, caffeine, alcohol, and other mood-altering substances.  Some foods that are helpful in depression are: complex carbohydrates, B vitamins, flaxseed, fish or fish oil, fresh fruits and vegetables.    Psychotherapy  I agree to participate in Individual Therapy (if recommended).    Medication  If prescribed medications, I agree to take them.  Missing doses can result in serious side effects.  I  understand that drinking alcohol, or other illicit drug use, may cause potential side effects.  I will not stop my medication abruptly without first discussing it with my provider.    Staying Connected With Others  I will stay in touch with my friends, family members, and my primary care provider/team.    Use your imagination  Be creative.  We all have a creative side; it doesn t matter if it s oil painting, sand castles, or mud pies! This will also kick up the endorphins.    Witness Beauty  (AKA stop and smell the roses) Take a look outside, even in mid-winter. Notice colors, textures. Watch the squirrels and birds.     Service to others  Be of service to others.  There is always someone else in need.  By helping others we can  get out of ourselves  and remember the really important things.  This also provides opportunities for practicing all the other parts of the program.    Humor  Laugh and be silly!  Adjust your TV habits for less news and crime-drama and more comedy.    Control your stress  Try breathing deep, massage therapy, biofeedback, and meditation. Find time to relax each day.     My support system    Clinic Contact:  Phone number:    Contact 1:  Phone number:    Contact 2:  Phone number:    Ep[employer]opalian/:  Phone number:    Therapist:  Phone number:    Local crisis center:    Phone number:    Other community support:  Phone number:

## 2018-11-27 ENCOUNTER — TELEPHONE (OUTPATIENT)
Dept: FAMILY MEDICINE | Facility: OTHER | Age: 28
End: 2018-11-27

## 2018-11-27 DIAGNOSIS — D72.829 LEUKOCYTOSIS: Primary | ICD-10-CM

## 2018-11-27 NOTE — PROGRESS NOTES
All of your labs were normal for you except your WBC.  Have you been ill or taking steroids recently?  If not, we should repeat this in a few weeks to ensure that it's getting back to normal.    Have a nice day!    Dr. Fletcher

## 2018-11-27 NOTE — TELEPHONE ENCOUNTER
Iman PALMER (R) contacted [place] on 11/27/18 and left a message. If patient calls back please inform patient of results below, thanks    Notes Recorded by Young Fletcher MD on 11/27/2018 at 9:59 AM  All of your labs were normal for you except your WBC.  Have you been ill or taking steroids recently?  If not, we should repeat this in a few weeks to ensure that it's getting back to normal.    Have a nice day!    Dr. Fletcher

## 2018-11-28 ENCOUNTER — HOSPITAL ENCOUNTER (OUTPATIENT)
Dept: PHYSICAL THERAPY | Facility: OTHER | Age: 28
Setting detail: THERAPIES SERIES
End: 2018-11-28
Attending: NURSE PRACTITIONER
Payer: COMMERCIAL

## 2018-11-28 PROCEDURE — 97035 APP MDLTY 1+ULTRASOUND EA 15: CPT | Mod: GP | Performed by: PHYSICAL THERAPIST

## 2018-11-28 PROCEDURE — 40000718 ZZHC STATISTIC PT DEPARTMENT ORTHO VISIT: Performed by: PHYSICAL THERAPIST

## 2018-11-28 PROCEDURE — 97140 MANUAL THERAPY 1/> REGIONS: CPT | Mod: GP | Performed by: PHYSICAL THERAPIST

## 2018-11-28 NOTE — TELEPHONE ENCOUNTER
Called and relayed message to patient. She has not been feeling ill or been on steroids. She will return in a few weeks to retest.   Jason Begum,

## 2018-12-10 ENCOUNTER — HOSPITAL ENCOUNTER (OUTPATIENT)
Dept: PHYSICAL THERAPY | Facility: OTHER | Age: 28
Setting detail: THERAPIES SERIES
End: 2018-12-10
Attending: NURSE PRACTITIONER
Payer: COMMERCIAL

## 2018-12-10 PROCEDURE — 97140 MANUAL THERAPY 1/> REGIONS: CPT | Mod: GP | Performed by: PHYSICAL THERAPIST

## 2018-12-10 PROCEDURE — 97035 APP MDLTY 1+ULTRASOUND EA 15: CPT | Mod: GP | Performed by: PHYSICAL THERAPIST

## 2018-12-21 ENCOUNTER — HOSPITAL ENCOUNTER (OUTPATIENT)
Dept: PHYSICAL THERAPY | Facility: OTHER | Age: 28
Setting detail: THERAPIES SERIES
End: 2018-12-21
Attending: NURSE PRACTITIONER
Payer: COMMERCIAL

## 2018-12-21 PROCEDURE — 97140 MANUAL THERAPY 1/> REGIONS: CPT | Mod: GP | Performed by: PHYSICAL THERAPIST

## 2018-12-21 PROCEDURE — 97035 APP MDLTY 1+ULTRASOUND EA 15: CPT | Mod: GP | Performed by: PHYSICAL THERAPIST

## 2018-12-28 ENCOUNTER — OFFICE VISIT (OUTPATIENT)
Dept: FAMILY MEDICINE | Facility: OTHER | Age: 28
End: 2018-12-28
Payer: COMMERCIAL

## 2018-12-28 VITALS
DIASTOLIC BLOOD PRESSURE: 70 MMHG | SYSTOLIC BLOOD PRESSURE: 106 MMHG | HEART RATE: 80 BPM | BODY MASS INDEX: 38.17 KG/M2 | OXYGEN SATURATION: 98 % | RESPIRATION RATE: 18 BRPM | HEIGHT: 67 IN | WEIGHT: 243.2 LBS | TEMPERATURE: 97.2 F

## 2018-12-28 DIAGNOSIS — F51.04 PSYCHOPHYSIOLOGICAL INSOMNIA: ICD-10-CM

## 2018-12-28 DIAGNOSIS — Z30.41 ENCOUNTER FOR BIRTH CONTROL PILLS MAINTENANCE: ICD-10-CM

## 2018-12-28 DIAGNOSIS — K21.9 GASTROESOPHAGEAL REFLUX DISEASE WITHOUT ESOPHAGITIS: Primary | ICD-10-CM

## 2018-12-28 DIAGNOSIS — D72.829 LEUKOCYTOSIS, UNSPECIFIED TYPE: ICD-10-CM

## 2018-12-28 DIAGNOSIS — R14.2 FLATULENCE, ERUCTATION AND GAS PAIN: ICD-10-CM

## 2018-12-28 DIAGNOSIS — R14.3 FLATULENCE, ERUCTATION AND GAS PAIN: ICD-10-CM

## 2018-12-28 DIAGNOSIS — S13.4XXS WHIPLASH INJURIES, SEQUELA: ICD-10-CM

## 2018-12-28 DIAGNOSIS — F33.1 MODERATE EPISODE OF RECURRENT MAJOR DEPRESSIVE DISORDER (H): ICD-10-CM

## 2018-12-28 DIAGNOSIS — R14.1 FLATULENCE, ERUCTATION AND GAS PAIN: ICD-10-CM
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
## 2018-12-28 DIAGNOSIS — F41.9 ANXIETY: ICD-10-CM

## 2018-12-28 PROCEDURE — 99214 OFFICE O/P EST MOD 30 MIN: CPT | Performed by: FAMILY MEDICINE

## 2018-12-28 RX ORDER — BACLOFEN 20 MG/1
20 TABLET ORAL 3 TIMES DAILY
Qty: 270 TABLET | Refills: 0 | Status: SHIPPED | OUTPATIENT
Start: 2018-12-28 | End: 2020-01-06

## 2018-12-28 RX ORDER — NORGESTIMATE AND ETHINYL ESTRADIOL 0.25-0.035
1 KIT ORAL DAILY
Qty: 84 TABLET | Refills: 0 | Status: SHIPPED | OUTPATIENT
Start: 2018-12-28 | End: 2019-05-01

## 2018-12-28 RX ORDER — LORAZEPAM 0.5 MG/1
0.5 TABLET ORAL EVERY 8 HOURS PRN
Qty: 20 TABLET | Refills: 1 | Status: SHIPPED | OUTPATIENT
Start: 2018-12-28 | End: 2020-01-06

## 2018-12-28 RX ORDER — QUETIAPINE FUMARATE 50 MG/1
50 TABLET, FILM COATED ORAL AT BEDTIME
Qty: 30 TABLET | Refills: 1 | Status: SHIPPED | OUTPATIENT
Start: 2018-12-28 | End: 2019-05-01

## 2018-12-28 ASSESSMENT — MIFFLIN-ST. JEOR: SCORE: 1864.65

## 2018-12-28 ASSESSMENT — ANXIETY QUESTIONNAIRES
2. NOT BEING ABLE TO STOP OR CONTROL WORRYING: NOT AT ALL
GAD7 TOTAL SCORE: 9
3. WORRYING TOO MUCH ABOUT DIFFERENT THINGS: SEVERAL DAYS
1. FEELING NERVOUS, ANXIOUS, OR ON EDGE: SEVERAL DAYS
6. BECOMING EASILY ANNOYED OR IRRITABLE: MORE THAN HALF THE DAYS
5. BEING SO RESTLESS THAT IT IS HARD TO SIT STILL: NEARLY EVERY DAY
IF YOU CHECKED OFF ANY PROBLEMS ON THIS QUESTIONNAIRE, HOW DIFFICULT HAVE THESE PROBLEMS MADE IT FOR YOU TO DO YOUR WORK, TAKE CARE OF THINGS AT HOME, OR GET ALONG WITH OTHER PEOPLE: SOMEWHAT DIFFICULT
7. FEELING AFRAID AS IF SOMETHING AWFUL MIGHT HAPPEN: NOT AT ALL

## 2018-12-28 ASSESSMENT — PATIENT HEALTH QUESTIONNAIRE - PHQ9
SUM OF ALL RESPONSES TO PHQ QUESTIONS 1-9: 11
5. POOR APPETITE OR OVEREATING: MORE THAN HALF THE DAYS

## 2018-12-28 NOTE — PATIENT INSTRUCTIONS
Thank you for visiting [facility]    Let's see if increasing your Seroquel helps more with mood and sleep.  Let me know if problems.    See a counselor to help with your mood.    Continue physical therapy.    Use lorazepam sparingly for panic.    Continue baclofen if helpful for now.      Contact us or return if questions or concerns.     Have a nice day!    Dr. Fletcher     Please Follow Up when indicated on the section below this one on your After-Visit Summary.      If you had imaging scheduled please refer to your radiology prep sheet.    Appointment    Date_______________     Time_____________    Day:   M TU W TH F    With____________________________    Location_________________________    If you need medication refills, please contact your pharmacy 3 days before your prescriptions runs out. If you are out of refills, your pharmacy will contact contact the clinic.    Contact us or return if questions or concerns.     -Your Care Team:  MD Justine Kunz PA-C Joel De Haan, PA-C Elizabeth McLean, APRN CNP Wendy Sabinske, APRN, LASHAE Andres, CNP     General information about your clinic      Clinic hours:     Lab hours:  Phone [phone]  Monday 7:30 am-7 pm    Monday 8:30 am-6:30 pm  Tuesday-Friday 7:30 am-5 pm   Tuesday-Friday 8:30 am-4:30 pm    Pharmacy hours:  Phone [phone]  Monday 8:30 am-7pm  Tuesday-Friday 8:30am-6 pm                                     Mychart assistance [phone]    We would like to hear from you, how was your visit today?    Solange Castillo  Patient Information Supervisor   Patient Care Supervisor  [facility], and [facility] [place], and [facility]  [phone]
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
## 2018-12-29 ASSESSMENT — ANXIETY QUESTIONNAIRES: GAD7 TOTAL SCORE: 9

## 2019-01-04 ENCOUNTER — HOSPITAL ENCOUNTER (OUTPATIENT)
Dept: PHYSICAL THERAPY | Facility: OTHER | Age: 29
Setting detail: THERAPIES SERIES
End: 2019-01-04
Attending: NURSE PRACTITIONER
Payer: COMMERCIAL

## 2019-01-04 PROCEDURE — 97035 APP MDLTY 1+ULTRASOUND EA 15: CPT | Mod: GP | Performed by: PHYSICAL THERAPIST

## 2019-01-04 PROCEDURE — 97140 MANUAL THERAPY 1/> REGIONS: CPT | Mod: GP | Performed by: PHYSICAL THERAPIST

## 2019-01-18 ENCOUNTER — HOSPITAL ENCOUNTER (OUTPATIENT)
Dept: PHYSICAL THERAPY | Facility: OTHER | Age: 29
Setting detail: THERAPIES SERIES
End: 2019-01-18
Attending: NURSE PRACTITIONER
Payer: COMMERCIAL

## 2019-01-18 PROCEDURE — 97035 APP MDLTY 1+ULTRASOUND EA 15: CPT | Mod: GP | Performed by: PHYSICAL THERAPIST

## 2019-01-18 PROCEDURE — 97140 MANUAL THERAPY 1/> REGIONS: CPT | Mod: GP | Performed by: PHYSICAL THERAPIST

## 2019-01-18 PROCEDURE — 97530 THERAPEUTIC ACTIVITIES: CPT | Mod: GP | Performed by: PHYSICAL THERAPIST
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
## 2019-02-16 NOTE — PROGRESS NOTES
Pap smear follow up noted.   Attempts for contacting patient noted.   Patient is considered lost to follow up  Saravanan Alejo MD

## 2019-04-22 NOTE — PATIENT INSTRUCTIONS
OK to try the lower dose of Seroquel.    Let's try  PT again for your neck symptoms.    If not responding, we could consider trigger point injections or other medications.  Imaging would also be an option if things are changing.    We'll let you know your lab results as soon as we can.     Contact us or return if questions or concerns.     Have a nice day!    Dr. Fletcher     Preventive Health Recommendations  Female Ages 26 - 39  Yearly exam:   See your health care provider every year in order to    Review health changes.     Discuss preventive care.      Review your medicines if you your doctor has prescribed any.    Until age 30: Get a Pap test every three years (more often if you have had an abnormal result).    After age 30: Talk to your doctor about whether you should have a Pap test every 3 years or have a Pap test with HPV screening every 5 years.   You do not need a Pap test if your uterus was removed (hysterectomy) and you have not had cancer.  You should be tested each year for STDs (sexually transmitted diseases), if you're at risk.   Talk to your provider about how often to have your cholesterol checked.  If you are at risk for diabetes, you should have a diabetes test (fasting glucose).  Shots: Get a flu shot each year. Get a tetanus shot every 10 years.   Nutrition:     Eat at least 5 servings of fruits and vegetables each day.    Eat whole-grain bread, whole-wheat pasta and brown rice instead of white grains and rice.    Get adequate Calcium and Vitamin D.     Lifestyle    Exercise at least 150 minutes a week (30 minutes a day, 5 days of the week). This will help you control your weight and prevent disease.    Limit alcohol to one drink per day.    No smoking.     Wear sunscreen to prevent skin cancer.    See your dentist every six months for an exam and cleaning.
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
## 2019-05-01 ENCOUNTER — OFFICE VISIT (OUTPATIENT)
Dept: FAMILY MEDICINE | Facility: OTHER | Age: 29
End: 2019-05-01
Payer: COMMERCIAL

## 2019-05-01 ENCOUNTER — RESULT FOLLOW UP (OUTPATIENT)
Dept: FAMILY MEDICINE | Facility: OTHER | Age: 29
End: 2019-05-01

## 2019-05-01 VITALS
OXYGEN SATURATION: 98 % | HEIGHT: 67 IN | BODY MASS INDEX: 38.14 KG/M2 | HEART RATE: 94 BPM | TEMPERATURE: 97.4 F | DIASTOLIC BLOOD PRESSURE: 70 MMHG | RESPIRATION RATE: 16 BRPM | SYSTOLIC BLOOD PRESSURE: 122 MMHG | WEIGHT: 243 LBS

## 2019-05-01 DIAGNOSIS — F33.1 MODERATE EPISODE OF RECURRENT MAJOR DEPRESSIVE DISORDER (H): ICD-10-CM

## 2019-05-01 DIAGNOSIS — S13.4XXS WHIPLASH INJURIES, SEQUELA: ICD-10-CM

## 2019-05-01 DIAGNOSIS — R87.810 ASCUS WITH POSITIVE HIGH RISK HPV CERVICAL: ICD-10-CM

## 2019-05-01 DIAGNOSIS — Z12.4 SCREENING FOR MALIGNANT NEOPLASM OF CERVIX: ICD-10-CM

## 2019-05-01 DIAGNOSIS — R87.610 ASCUS WITH POSITIVE HIGH RISK HPV CERVICAL: ICD-10-CM

## 2019-05-01 DIAGNOSIS — F51.04 PSYCHOPHYSIOLOGICAL INSOMNIA: ICD-10-CM

## 2019-05-01 DIAGNOSIS — M54.2 CERVICALGIA: ICD-10-CM

## 2019-05-01 DIAGNOSIS — F41.9 ANXIETY: ICD-10-CM

## 2019-05-01 DIAGNOSIS — Z00.00 PREVENTATIVE HEALTH CARE: Primary | ICD-10-CM

## 2019-05-01 DIAGNOSIS — Z30.41 ENCOUNTER FOR BIRTH CONTROL PILLS MAINTENANCE: ICD-10-CM

## 2019-05-01 DIAGNOSIS — F17.200 TOBACCO USE DISORDER: ICD-10-CM

## 2019-05-01 DIAGNOSIS — G44.52 NEW DAILY PERSISTENT HEADACHE: ICD-10-CM

## 2019-05-01 PROCEDURE — 99214 OFFICE O/P EST MOD 30 MIN: CPT | Mod: 25 | Performed by: FAMILY MEDICINE

## 2019-05-01 PROCEDURE — 87491 CHLMYD TRACH DNA AMP PROBE: CPT | Performed by: FAMILY MEDICINE

## 2019-05-01 PROCEDURE — G0145 SCR C/V CYTO,THINLAYER,RESCR: HCPCS | Performed by: FAMILY MEDICINE

## 2019-05-01 PROCEDURE — 90732 PPSV23 VACC 2 YRS+ SUBQ/IM: CPT | Performed by: FAMILY MEDICINE

## 2019-05-01 PROCEDURE — 87591 N.GONORRHOEAE DNA AMP PROB: CPT | Performed by: FAMILY MEDICINE

## 2019-05-01 PROCEDURE — 90471 IMMUNIZATION ADMIN: CPT | Performed by: FAMILY MEDICINE

## 2019-05-01 PROCEDURE — 99395 PREV VISIT EST AGE 18-39: CPT | Mod: 25 | Performed by: FAMILY MEDICINE

## 2019-05-01 PROCEDURE — 87624 HPV HI-RISK TYP POOLED RSLT: CPT | Performed by: FAMILY MEDICINE

## 2019-05-01 RX ORDER — NORGESTIMATE AND ETHINYL ESTRADIOL 0.25-0.035
1 KIT ORAL DAILY
Qty: 84 TABLET | Refills: 3 | Status: SHIPPED | OUTPATIENT
Start: 2019-05-01 | End: 2020-06-01

## 2019-05-01 RX ORDER — VARENICLINE TARTRATE 1 MG/1
1 TABLET, FILM COATED ORAL 2 TIMES DAILY
Qty: 60 TABLET | Refills: 3 | Status: SHIPPED | OUTPATIENT
Start: 2019-05-01 | End: 2020-01-06

## 2019-05-01 RX ORDER — QUETIAPINE FUMARATE 25 MG/1
25 TABLET, FILM COATED ORAL AT BEDTIME
Qty: 90 TABLET | Refills: 1 | Status: SHIPPED | OUTPATIENT
Start: 2019-05-01 | End: 2020-01-06

## 2019-05-01 ASSESSMENT — ANXIETY QUESTIONNAIRES
4. TROUBLE RELAXING: MORE THAN HALF THE DAYS
7. FEELING AFRAID AS IF SOMETHING AWFUL MIGHT HAPPEN: SEVERAL DAYS
GAD7 TOTAL SCORE: 8
3. WORRYING TOO MUCH ABOUT DIFFERENT THINGS: SEVERAL DAYS
7. FEELING AFRAID AS IF SOMETHING AWFUL MIGHT HAPPEN: SEVERAL DAYS
2. NOT BEING ABLE TO STOP OR CONTROL WORRYING: SEVERAL DAYS
5. BEING SO RESTLESS THAT IT IS HARD TO SIT STILL: SEVERAL DAYS
GAD7 TOTAL SCORE: 8
1. FEELING NERVOUS, ANXIOUS, OR ON EDGE: SEVERAL DAYS
6. BECOMING EASILY ANNOYED OR IRRITABLE: SEVERAL DAYS
GAD7 TOTAL SCORE: 8

## 2019-05-01 ASSESSMENT — ENCOUNTER SYMPTOMS
WEAKNESS: 1
NERVOUS/ANXIOUS: 1
COUGH: 1
BREAST MASS: 0
DIZZINESS: 1
EYE PAIN: 1
MYALGIAS: 1
HEADACHES: 1

## 2019-05-01 ASSESSMENT — PATIENT HEALTH QUESTIONNAIRE - PHQ9
SUM OF ALL RESPONSES TO PHQ QUESTIONS 1-9: 7
10. IF YOU CHECKED OFF ANY PROBLEMS, HOW DIFFICULT HAVE THESE PROBLEMS MADE IT FOR YOU TO DO YOUR WORK, TAKE CARE OF THINGS AT HOME, OR GET ALONG WITH OTHER PEOPLE: SOMEWHAT DIFFICULT
SUM OF ALL RESPONSES TO PHQ QUESTIONS 1-9: 7

## 2019-05-01 ASSESSMENT — PAIN SCALES - GENERAL: PAINLEVEL: SEVERE PAIN (7)

## 2019-05-01 ASSESSMENT — MIFFLIN-ST. JEOR: SCORE: 1860.61

## 2019-05-01 NOTE — LETTER
July 9, 2019      Yesika Grant  [address] [facility] [address]    Dear ,      At [facility], your health and wellness is our primary concern. That is why we are following up on a positive high risk HPV test from 5/1/19. Your provider had recommended that you have a Colposcopy  completed by 8/1/19. Our records do not show that this has been scheduled.    It is important to complete the follow up that your provider has suggested for you to ensure that there are no worsening changes which may, over time, develop into cancer.      Please contact our [facility] at  [phone] to schedule an appointment for a Colposcopy (this cannot be scheduled through [facility]) at your earliest convenience. If you have questions or concerns, please call the clinic and we will be happy to assist you.    If you have completed the tests outside of [facility], please have the results forwarded to our office. We will update the chart for your primary Physician to review before your next annual physical.     Thank you for choosing [facility]!    Sincerely,      Your [facility] Care Team/cain

## 2019-05-01 NOTE — LETTER
October 22, 2019      Yesika Grant  [address] [facility] [address]    Dear ,      At [facility], your health and wellness is our primary concern. That is why we are following up on a positive high risk HPV test from 5/1/19. Your provider had recommended that you have a Colposcopy  completed by 8/1/19. Our records do not show that this has been done or scheduled.      It is important to complete the follow up that your provider has suggested for you to ensure that there are no worsening changes which may, over time, develop into cancer.      If you have chosen not to do the recommended colposcopy, please contact our office at [phone] to schedule an appointment for a repeat PAP smear and HPV test at your earliest convenience.    If you have completed the tests outside of [facility], please have the results forwarded to our office. We will update the chart for your primary Physician to review before your next annual physical.     Thank you for choosing [facility]!    Sincerely,      Your [facility] Care Team/cain

## 2019-05-01 NOTE — NURSING NOTE
Screening Questionnaire for Adult Immunization    Are you sick today?   No   Do you have allergies to medications, food, a vaccine component or latex?   No   Have you ever had a serious reaction after receiving a vaccination?   No   Do you have a long-term health problem with heart disease, lung disease, asthma, kidney disease, metabolic disease (e.g. diabetes), anemia, or other blood disorder?   No   Do you have cancer, leukemia, HIV/AIDS, or any other immune system problem?   No   In the past 3 months, have you taken medications that affect  your immune system, such as prednisone, other steroids, or anticancer drugs; drugs for the treatment of rheumatoid arthritis, Crohn s disease, or psoriasis; or have you had radiation treatments?   No   Have you had a seizure, or a brain or other nervous system problem?   No   During the past year, have you received a transfusion of blood or blood     products, or been given immune (gamma) globulin or antiviral drug?   No   For women: Are you pregnant or is there a chance you could become        pregnant during the next month?   No   Have you received any vaccinations in the past 4 weeks?   No     Immunization questionnaire answers were all negative.        Per orders of Dr. Fletcher, injection of Pneumococcal 23 given by Jayne Yost. Patient instructed to remain in clinic for 15 minutes afterwards, and to report any adverse reaction to me immediately.       Screening performed by Jayen Yost on 5/1/2019 at 4:50 PM.

## 2019-05-02 LAB
C TRACH DNA SPEC QL NAA+PROBE: NEGATIVE
N GONORRHOEA DNA SPEC QL NAA+PROBE: NEGATIVE
SPECIMEN SOURCE: NORMAL
SPECIMEN SOURCE: NORMAL

## 2019-05-02 ASSESSMENT — PATIENT HEALTH QUESTIONNAIRE - PHQ9: SUM OF ALL RESPONSES TO PHQ QUESTIONS 1-9: 7

## 2019-05-02 ASSESSMENT — ANXIETY QUESTIONNAIRES: GAD7 TOTAL SCORE: 8

## 2019-05-03 LAB
COPATH REPORT: NORMAL
PAP: NORMAL

## 2019-05-09 LAB
FINAL DIAGNOSIS: ABNORMAL
HPV HR 12 DNA CVX QL NAA+PROBE: POSITIVE
HPV16 DNA SPEC QL NAA+PROBE: NEGATIVE
HPV18 DNA SPEC QL NAA+PROBE: NEGATIVE
SPECIMEN DESCRIPTION: ABNORMAL
SPECIMEN SOURCE CVX/VAG CYTO: ABNORMAL

## 2019-05-10 NOTE — PROGRESS NOTES
12/4/17 ASCUS pap, + HR HPV (not 16/18). Plan colp due by 3/4/18  1/4/18 [place]: ectocervical mucosa with reactive changes. ECC: negative. Plan: cotest in 1 yr  2/7/19 Lost to follow-up for pap tracking  5/1/19 NIL pap, + HR HPV (not 16 or 18). Plan colp.  (MRA)  05/10/19 Pt notified (ajk)  7/9/19 [facility] reminder letter sent (rlm)  10/22/19 [facility]/Pap reminder letter sent (rlm)  11/25/19 Pap Follow up reminder call placed, voicemail left (rlm)  12/24/19 Patient is lost to follow-up. Routed to provider as DENISSE. (rlm)

## 2019-05-22 DIAGNOSIS — F33.1 MODERATE EPISODE OF RECURRENT MAJOR DEPRESSIVE DISORDER (H): ICD-10-CM

## 2019-05-22 DIAGNOSIS — F41.9 ANXIETY: ICD-10-CM

## 2019-05-22 NOTE — LETTER
May 30, 2019          Yesika Grant,  [address] [facility] [address]        Dear Yesika Grant      Monitoring and managing your preventative and chronic health conditions are very important to us. Our records indicate that you have not scheduled for Depression Check  which was recommended by Dr. Fletcher      If you have received your health care elsewhere, please call the clinic so the information can be documented in your chart.    Please call [phone] or message us through your [facility] account to schedule an appointment or provide information for your chart.     Feel free to contact us if you have any questions or concerns!    I look forward to seeing you and working with you on your health care needs.     Sincerely,       Your [facility] Team

## 2019-05-23 DIAGNOSIS — F41.9 ANXIETY: ICD-10-CM

## 2019-05-23 DIAGNOSIS — F33.1 MODERATE EPISODE OF RECURRENT MAJOR DEPRESSIVE DISORDER (H): ICD-10-CM

## 2019-05-23 NOTE — TELEPHONE ENCOUNTER
Effexor  Routing refill request to provider for review/approval because:  Labs not current:  Creatinine    Marielos Teresa, RN, BSN

## 2019-05-24 NOTE — TELEPHONE ENCOUNTER
Venlafaxine 75mg and 150mg    PHQ-9 SCORE 10/29/2018 12/28/2018 5/1/2019   PHQ-9 Total Score - - -   PHQ-9 Total Score MyChart 9 (Mild depression) - 7 (Mild depression)   PHQ-9 Total Score 9 11 7     Routing refill request to provider for review/approval because:  Labs out of range:  PHQ9 and creatinine    Cathy Aranda, RN, BSN

## 2019-05-28 RX ORDER — VENLAFAXINE HYDROCHLORIDE 150 MG/1
CAPSULE, EXTENDED RELEASE ORAL
Qty: 90 CAPSULE | Refills: 1 | Status: SHIPPED | OUTPATIENT
Start: 2019-05-28 | End: 2020-01-06

## 2019-05-28 RX ORDER — VENLAFAXINE HYDROCHLORIDE 75 MG/1
225 CAPSULE, EXTENDED RELEASE ORAL DAILY
Qty: 270 CAPSULE | Refills: 1 | Status: SHIPPED | OUTPATIENT
Start: 2019-05-28 | End: 2019-12-09

## 2019-05-30 NOTE — TELEPHONE ENCOUNTER
Called patient and she asked me to mail out the questions.     Letter and questions sent.     Jayne Yost MA

## 2019-11-25 ENCOUNTER — TELEPHONE (OUTPATIENT)
Dept: FAMILY MEDICINE | Facility: OTHER | Age: 29
End: 2019-11-25

## 2019-11-25 NOTE — TELEPHONE ENCOUNTER
Pt is past due for Pap follow up  Reminder letter has been sent  LMTC her clinic with any questions or to schedule    Sherry Whittaker,   Pap Tracking

## 2019-12-09 DIAGNOSIS — F33.1 MODERATE EPISODE OF RECURRENT MAJOR DEPRESSIVE DISORDER (H): ICD-10-CM

## 2019-12-09 DIAGNOSIS — F41.9 ANXIETY: ICD-10-CM

## 2019-12-11 NOTE — TELEPHONE ENCOUNTER
Routing refill request to provider for review/approval because:  Labs not current:  Creatinine    Marielos Teresa, RN, BSN

## 2019-12-13 RX ORDER — VENLAFAXINE HYDROCHLORIDE 75 MG/1
CAPSULE, EXTENDED RELEASE ORAL
Qty: 90 CAPSULE | Refills: 0 | Status: SHIPPED | OUTPATIENT
Start: 2019-12-13 | End: 2020-01-06
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
## 2019-12-31 DIAGNOSIS — F17.200 TOBACCO USE DISORDER: Primary | ICD-10-CM

## 2019-12-31 RX ORDER — VARENICLINE TARTRATE 1 MG/1
TABLET, FILM COATED ORAL
Qty: 56 TABLET | Refills: 1 | Status: SHIPPED | OUTPATIENT
Start: 2019-12-31 | End: 2020-01-06

## 2019-12-31 NOTE — TELEPHONE ENCOUNTER
Pending Prescriptions:                       Disp   Refills    CHANTIX CONTINUING MONTH PRACHI 1 MG tablet *56 tab*1            Sig: TAKE ONE TABLET BY MOUTH TWICE A DAY    Prescription approved per [facility] Refill Protocol.    Cathy Aranda, RN, BSN

## 2020-01-06 ENCOUNTER — OFFICE VISIT (OUTPATIENT)
Dept: FAMILY MEDICINE | Facility: OTHER | Age: 30
End: 2020-01-06
Payer: COMMERCIAL

## 2020-01-06 VITALS
DIASTOLIC BLOOD PRESSURE: 86 MMHG | WEIGHT: 250 LBS | HEIGHT: 67 IN | SYSTOLIC BLOOD PRESSURE: 130 MMHG | TEMPERATURE: 97.6 F | HEART RATE: 104 BPM | RESPIRATION RATE: 16 BRPM | BODY MASS INDEX: 39.24 KG/M2

## 2020-01-06 DIAGNOSIS — S13.4XXS WHIPLASH INJURIES, SEQUELA: ICD-10-CM

## 2020-01-06 DIAGNOSIS — F17.200 TOBACCO USE DISORDER: ICD-10-CM

## 2020-01-06 DIAGNOSIS — M62.838 MUSCLE SPASM: ICD-10-CM

## 2020-01-06 DIAGNOSIS — G89.29 CHRONIC BILATERAL LOW BACK PAIN WITHOUT SCIATICA: ICD-10-CM

## 2020-01-06 DIAGNOSIS — F33.1 MODERATE EPISODE OF RECURRENT MAJOR DEPRESSIVE DISORDER (H): ICD-10-CM
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
## 2020-01-06 DIAGNOSIS — M54.2 CERVICALGIA: Primary | ICD-10-CM

## 2020-01-06 DIAGNOSIS — F41.9 ANXIETY: ICD-10-CM

## 2020-01-06 DIAGNOSIS — M54.50 CHRONIC BILATERAL LOW BACK PAIN WITHOUT SCIATICA: ICD-10-CM

## 2020-01-06 DIAGNOSIS — F51.04 PSYCHOPHYSIOLOGICAL INSOMNIA: ICD-10-CM

## 2020-01-06 DIAGNOSIS — Z23 NEED FOR PROPHYLACTIC VACCINATION AND INOCULATION AGAINST INFLUENZA: ICD-10-CM

## 2020-01-06 PROCEDURE — 96127 BRIEF EMOTIONAL/BEHAV ASSMT: CPT | Performed by: FAMILY MEDICINE

## 2020-01-06 PROCEDURE — 90471 IMMUNIZATION ADMIN: CPT | Performed by: FAMILY MEDICINE

## 2020-01-06 PROCEDURE — 90686 IIV4 VACC NO PRSV 0.5 ML IM: CPT | Performed by: FAMILY MEDICINE

## 2020-01-06 PROCEDURE — 99214 OFFICE O/P EST MOD 30 MIN: CPT | Mod: 25 | Performed by: FAMILY MEDICINE

## 2020-01-06 RX ORDER — METHOCARBAMOL 500 MG/1
500 TABLET, FILM COATED ORAL 4 TIMES DAILY PRN
Qty: 30 TABLET | Refills: 1 | Status: SHIPPED | OUTPATIENT
Start: 2020-01-06 | End: 2020-06-26

## 2020-01-06 RX ORDER — LORAZEPAM 0.5 MG/1
0.5 TABLET ORAL EVERY 8 HOURS PRN
Qty: 20 TABLET | Refills: 1 | Status: SHIPPED | OUTPATIENT
Start: 2020-01-06 | End: 2020-06-26

## 2020-01-06 RX ORDER — QUETIAPINE FUMARATE 25 MG/1
25 TABLET, FILM COATED ORAL AT BEDTIME
Qty: 90 TABLET | Refills: 1 | Status: SHIPPED | OUTPATIENT
Start: 2020-01-06 | End: 2020-06-26 | Stop reason: ALTCHOICE

## 2020-01-06 RX ORDER — BACLOFEN 20 MG/1
40 TABLET ORAL 2 TIMES DAILY
Qty: 360 TABLET | Refills: 1 | Status: SHIPPED | OUTPATIENT
Start: 2020-01-06 | End: 2020-06-26

## 2020-01-06 RX ORDER — VENLAFAXINE HYDROCHLORIDE 75 MG/1
CAPSULE, EXTENDED RELEASE ORAL
Qty: 270 CAPSULE | Refills: 1 | Status: SHIPPED | OUTPATIENT
Start: 2020-01-06 | End: 2020-06-26

## 2020-01-06 RX ORDER — VARENICLINE TARTRATE 1 MG/1
1 TABLET, FILM COATED ORAL 2 TIMES DAILY
Qty: 60 TABLET | Refills: 3 | Status: SHIPPED | OUTPATIENT
Start: 2020-01-06 | End: 2020-06-26

## 2020-01-06 ASSESSMENT — PATIENT HEALTH QUESTIONNAIRE - PHQ9
SUM OF ALL RESPONSES TO PHQ QUESTIONS 1-9: 6
SUM OF ALL RESPONSES TO PHQ QUESTIONS 1-9: 6
10. IF YOU CHECKED OFF ANY PROBLEMS, HOW DIFFICULT HAVE THESE PROBLEMS MADE IT FOR YOU TO DO YOUR WORK, TAKE CARE OF THINGS AT HOME, OR GET ALONG WITH OTHER PEOPLE: SOMEWHAT DIFFICULT

## 2020-01-06 ASSESSMENT — ANXIETY QUESTIONNAIRES
4. TROUBLE RELAXING: SEVERAL DAYS
GAD7 TOTAL SCORE: 3
1. FEELING NERVOUS, ANXIOUS, OR ON EDGE: SEVERAL DAYS
GAD7 TOTAL SCORE: 3
7. FEELING AFRAID AS IF SOMETHING AWFUL MIGHT HAPPEN: NOT AT ALL
6. BECOMING EASILY ANNOYED OR IRRITABLE: SEVERAL DAYS
GAD7 TOTAL SCORE: 3
2. NOT BEING ABLE TO STOP OR CONTROL WORRYING: NOT AT ALL
5. BEING SO RESTLESS THAT IT IS HARD TO SIT STILL: NOT AT ALL
7. FEELING AFRAID AS IF SOMETHING AWFUL MIGHT HAPPEN: NOT AT ALL
3. WORRYING TOO MUCH ABOUT DIFFERENT THINGS: NOT AT ALL

## 2020-01-06 ASSESSMENT — PAIN SCALES - GENERAL: PAINLEVEL: SEVERE PAIN (6)

## 2020-01-06 ASSESSMENT — MIFFLIN-ST. JEOR: SCORE: 1894.79

## 2020-01-06 NOTE — PATIENT INSTRUCTIONS
"Thank you for visiting [facility]    Let's see if the higher dose of baclofen helps with your spasms.    If needed, can try methocarbamol on occasion.    PT and/or massage may also help.      Keep control of your anxiety.    Keep working on quitting smoking.      Contact us or return if questions or concerns.     Have a nice day!    Dr. Fletcher     Return in about 6 months (around 7/6/2020) for Recheck.      If you had imaging scheduled please refer to your radiology prep sheet.      If you need medication refills, please contact your pharmacy 3 days before your prescriptions runs out or download the [facility] ben for your smart phone.   If you are out of refills, your pharmacy will contact contact the clinic.    Contact us or return if questions or concerns.     -Your [facility] Care Team:    MD Justine Kunz, MICHELLE Orta PA-C Elizabeth \"Arlet\" LASHAE Kim CNP, APRN, LASHAE Andres, ZIA Anderson, RN, BSN       General information about your   [facility]      Clinic hours:     Lab hours:  Phone [phone]  Monday 7:30 am-7 pm    Monday 8:30 am-6:30 pm  Tuesday-Friday 7:30 am-5 pm   Tuesday-Friday 8:30 am-4:30 pm    Pharmacy hours:  Phone [phone]  Monday 8:30 am-7pm  Tuesday-Friday 8:30am-6 pm                                     Mychart assistance [phone]    We would like to hear from you, how was your visit today?    Solange Sanchez  Patient Information Supervisor   Patient Care Supervisor  [facility], and hospitals, and [facility]            "

## 2020-01-07 ASSESSMENT — ANXIETY QUESTIONNAIRES: GAD7 TOTAL SCORE: 3

## 2020-01-23 ENCOUNTER — RESULT FOLLOW UP (OUTPATIENT)
Dept: FAMILY MEDICINE | Facility: OTHER | Age: 30
End: 2020-01-23

## 2020-01-23 ENCOUNTER — OFFICE VISIT (OUTPATIENT)
Dept: OBGYN | Facility: OTHER | Age: 30
End: 2020-01-23
Payer: COMMERCIAL

## 2020-01-23 VITALS
DIASTOLIC BLOOD PRESSURE: 82 MMHG | WEIGHT: 249.5 LBS | HEART RATE: 98 BPM | SYSTOLIC BLOOD PRESSURE: 126 MMHG | BODY MASS INDEX: 38.84 KG/M2

## 2020-01-23 DIAGNOSIS — R87.618 PAP SMEAR ABNORMALITY OF CERVIX/HUMAN PAPILLOMAVIRUS (HPV) POSITIVE: Primary | ICD-10-CM

## 2020-01-23 DIAGNOSIS — Z32.00 PREGNANCY EXAMINATION OR TEST, PREGNANCY UNCONFIRMED: ICD-10-CM

## 2020-01-23 LAB — HCG UR QL: NEGATIVE

## 2020-01-23 PROCEDURE — 57452 EXAM OF CERVIX W/SCOPE: CPT | Performed by: OBSTETRICS & GYNECOLOGY

## 2020-01-23 PROCEDURE — 88175 CYTOPATH C/V AUTO FLUID REDO: CPT | Performed by: OBSTETRICS & GYNECOLOGY

## 2020-01-23 PROCEDURE — 81025 URINE PREGNANCY TEST: CPT | Performed by: OBSTETRICS & GYNECOLOGY

## 2020-01-23 PROCEDURE — 99213 OFFICE O/P EST LOW 20 MIN: CPT | Mod: 25 | Performed by: OBSTETRICS & GYNECOLOGY

## 2020-01-23 PROCEDURE — 87624 HPV HI-RISK TYP POOLED RSLT: CPT | Performed by: OBSTETRICS & GYNECOLOGY

## 2020-01-23 NOTE — LETTER
January 31, 2020      Yesika HAMEED Rudy  [address] [facility] [address]  [place] [address]    Dear ,      This letter is in regards to the PAP smear and HPV (Human Papillomavirus) test you had done recently. Your PAP test result is normal, but your HPV (Human Papillomavirus) test was positive.     About 80 percent of women have been exposed to HPV virus throughout their lifetime. There is no medication for the treatment of HPV. Typically your own immune system gets rid of the virus before it does harm. HPV is spread by direct skin-to-skin contact, including sexual intercourse, oral sex, anal sex, or any other contact involving the genital area (example: hand to genital contact). It is not possible to become infected with HPV by touching an object, such as a toilet seat. Most people who are infected with HPV have no signs or symptoms.    Things that you can do to boost your immune system and help your body get rid of HPV: get plenty of rest, eat a well-balanced diet of healthy foods, stop smoking, exercise regularly and decrease stress.    Please return in 1 year to repeat your pap smear and HPV test.     If you have additional questions regarding this result, please call our registered nurse, Pricila at [phone].    Sincerely,      Becka Guerrero MD/cain

## 2020-01-23 NOTE — NURSING NOTE
"Chief Complaint   Patient presents with     Colposcopy       Initial /82 (BP Location: Left arm, Cuff Size: Adult Regular)   Pulse 98   Wt 113.2 kg (249 lb 8 oz)   LMP 2020   BMI 38.84 kg/m   Estimated body mass index is 38.84 kg/m  as calculated from the following:    Height as of 20: 1.707 m (5' 7.2\").    Weight as of this encounter: 113.2 kg (249 lb 8 oz).  BP completed using cuff size: regular        The following HM Due: NONE      The following patient reported/Care Every where data was sent to:  P ABSTRACT QUALITY INITIATIVES [[id number]]       Becka Amaro MA on 2020 at 2:07 PM           "

## 2020-01-23 NOTE — PROGRESS NOTES
29 year old  presents for colposcopy.      Indication for procedure: NIL pap, + HR HPV (not 16 or 18).  Prior history of cervical dysplasia:  17 ASCUS pap, + HR HPV (not 16/18). Plan colp due by 3/4/18  1/4/18 [place]: ectocervical mucosa with reactive changes. ECC: negative. Plan: cotest in 1 yr  19 Lost to follow-up for pap tracking  19 NIL pap, + HR HPV (not 16 or 18). Plan colp.   19 3 month [place] not done, updated to 6mo [place]/Pap due by 19 Lost to follow-up for pap tracking    Prior Colposcopy history: Yes   Prior LEEP:No  Patient's last menstrual period was 2020.    Tobacco: Yes recently started Chantix  Pregnancy test: Negative  She denies the possibility of pregnancy     Discussed nature of HPV related infection, natural history and association with cervical dysplasia.  Procedure for colposcopy and biopsy was explained to the patient and consent obtained.  All the patient's questions were answered.    PROCEDURE:  COLPOSCOPY  After a procedural timeout was taken, she was positioned in dorsal lithotomy and a speculum was inserted to allow visualization of the cervix. A 5% acetic acid solution was applied to the ectocervix with large swabs. Lugols solution was also applied.  Colposcopic examination was then undertaken of the cervix, distal vaginal canal and vaginal fornices.    FINDINGS:  Physical Exam    Procedures     Pap collected  Cervix: no visible lesions, no mosaicism, no punctation and no abnormal vasculature; SCJ visualized 360 degrees without lesions and no biopsies taken.    Vaginal inspection: vaginal colposcopy not performed.    Vulvar colposcopy: vulvar colposcopy not performed.    Procedure Summary: Patient tolerated procedure well and colposcopy adequate.    Colposcopic Impression: normal    20 minutes were spent in face to face discussion regarding her abnormal pap, separate from the procedure.     Plan: If Pap is mild or better, plan cotest 2021.   Stressed the importance of tobacco cessation and adherence to recommendations      Becka Guerrero MD

## 2020-01-28 LAB
COPATH REPORT: NORMAL
PAP: NORMAL

## 2020-01-30 NOTE — PROGRESS NOTES
12/4/17 ASCUS pap, + HR HPV (not 16/18). Plan colp due by 3/4/18  1/4/18 [place]: ectocervical mucosa with reactive changes. ECC: negative. Plan: cotest in 1 yr  2/7/19 Lost to follow-up for pap tracking  5/1/19 NIL pap, + HR HPV (not 16 or 18). Plan colp.   8/1/19 3 month [place] not done, updated to 6mo [place]/Pap due by 11/1/19 12/24/19 Lost to follow-up for pap tracking  1/23/2020: [place] and Pap.  No biopsies.  Clinically normal. Pap: NIL, + HR HPV (not 16 or 18). Plan: cotest due 1/23/21 1/31/20 Result letter sent per RN request (rlm)

## 2020-02-07 ASSESSMENT — PATIENT HEALTH QUESTIONNAIRE - PHQ9: SUM OF ALL RESPONSES TO PHQ QUESTIONS 1-9: 6

## 2020-02-08 NOTE — MR AVS SNAPSHOT
After Visit Summary   2/8/2017    Yesika Grant    MRN: [id number]           Patient Information     Date Of Birth          1990        Visit Information        Provider Department      2/8/2017 2:30 PM Young Fletcher MD [facility]        Today's Diagnoses     Postconcussion syndrome    -  1     Major depression in complete remission (H)         Tobacco use disorder         New daily persistent headache           Care Instructions    Thank you for visiting [facility] ([facility]]    Let's increase Wellbutrin to 300 mg daily to help with mood.    Start Topamax gradually to help with headaches.  You should also not increase dose each week if having concerning side effects.    See occupational therapy and speech therapy to help with concussion recovery.    Contact us or return if questions or concerns.     Please see me in 1  month for follow up.       If you had imaging scheduled please refer to your radiology prep sheet.    Appointment    Date_______________     Time_____________    Day:   M TU W TH F    With____________________________    Location_________________________    If you need medication refills, please contact your pharmacy 3 days before your prescriptions runs out. If you are out of refills, your pharmacy will contact contact the clinic.    Contact us or return if questions or concerns.     -Your Care Team:  MD Justine Kunz PA-C Folake Falaki, MD Anoshirvan Mazhari, MD Kelly White, CNP    General information about your clinic      Clinic hours:     Lab hours:  Phone [phone]  Monday 7:30 am-7 pm    Monday 8:30 am-6:30 pm  Tuesday-Friday 7:30 am-5 pm   Tuesday-Friday 8:30 am-4:30 pm    Pharmacy hours:  Phone [phone]  Monday 8:30 am-7pm  Tuesday-Friday 8:30am-6 pm                                       Mychart assistance [phone]        We would like to hear from you, how was your visit today?    Solange  Carlos Alberto Castillo  Patient Information Supervisor   Patient Care Supervisor  [facility] [place], and [facility] [place], and [facility]  [phone]           Follow-ups after your visit        Additional Services     CONCUSSION  REFERRAL       [facility] is referring you to the Concussion  service at [facility].      The  Representative will assist you in the coordination of your concussion care as prescribed by your physician.    The  Representative will contact you within one business day, or you may contact the  Representative at [phone].    Referral Options:  Non-Sports related concussion management    Coverage of these services are subject to the terms and limitations of your health insurance plan.  Please call member services at your health plan with any benefit or coverage questions.     If X-rays, CT or MRI's have been performed, please contact the facility where they were done, to arrange for  prior to your scheduled appointment.  Please bring this referral request to your appointment and present it to your specialist.            OCCUPATIONAL THERAPY REFERRAL       *This therapy referral will be filtered to a centralized scheduling office at [facility] and the patient will receive a call to schedule an appointment at a [place] location most convenient for them. *     [facility] provides Occupational Therapy evaluation and treatment and many specialty services across the [place] system.  If requesting a specialty program, please choose from the list below.    If you have not heard from the scheduling office within 2 business days, please call [phone] for all locations, with the exception of [place], please call [phone].     Treatment: Evaluation & Treatment  Special Instructions/Modalities: eval and  treat  Special Programs: as indicated    Please be aware that coverage of these services is subject to the terms and limitations of your health insurance plan.  Call member services at your health plan with any benefit or coverage questions.      **Note to Provider:  If you are referring outside of [place] for the therapy appointment, please list the name of the location in the  special instructions  above, print the referral and give to the patient to schedule the appointment.            SPEECH THERAPY REFERRAL       *This therapy referral will be filtered to a centralized scheduling office at [facility] and the patient will receive a call to schedule an appointment at a [place] location most convenient for them. *     [facility] provides Speech Therapy evaluation and treatment and many specialty services across the [place] system.  If requesting a specialty program, please choose from the list below.  If you have not heard from the scheduling office within 2 business days, please call [phone] for all locations, with the exception of [place], please call [phone].       Treatment: Evaluation & Treatment  Speech Treatment Diagnosis: Cognitive Deficits  Language Deficits  Special Instructions: eval and treat  Special Programs: as indicated    Please be aware that coverage of these services is subject to the terms and limitations of your health insurance plan.  Call member services at your health plan with any benefit or coverage questions.      **Note to Provider:  If you are referring outside of [place] for the therapy appointment, please list the name of the location in the  special instructions  above, print the referral and give to the patient to schedule the appointment.                  Your next 10 appointments already scheduled     Feb 10, 2017 10:15 AM   Treatment 45 with Young Dockery, PT   [facility] ([facility])     "[address] [address]   [phone]            2017  2:30 PM   Treatment 45 with Young Dockery, PT   [facility]    [address] [address] [phone]            2017  3:15 PM   Treatment 45 with Young Dockery, PT   [facility] ([facility])    [address] [place] [id number] [phone]              Who to contact     If you have questions or need follow up information about today's clinic visit or your schedule please contact [facility] directly at [phone].  Normal or non-critical lab and imaging results will be communicated to you by MyChart, letter or phone within 4 business days after the clinic has received the results. If you do not hear from us within 7 days, please contact the clinic through MyChart or phone. If you have a critical or abnormal lab result, we will notify you by phone as soon as possible.  Submit refill requests through [email or web address] or call your pharmacy and they will forward the refill request to us. Please allow 3 business days for your refill to be completed.          Additional Information About Your Visit        [employer] Information     [email or web address] lets you send messages to your doctor, view your test results, renew your prescriptions, schedule appointments and more. To sign up, go to [email or web address] . Click on \"Log in\" on the left side of the screen, which will take you to the Welcome page. Then click on \"Sign up Now\" on the right side of the page.     You will be asked to enter the access code listed below, as well as some personal information. Please follow the directions to create your username and password.     Your access code is: [email or web address]  Expires: 3/14/2017 11:23 AM     Your access code will  in 90 days. If you need help or a new code, please call your [facility] or [phone].        Care " "EveryWhere ID     This is your Care EveryWhere ID. This could be used by other organizations to access your [place] medical records  [id number]        Your Vitals Were     Pulse Temperature Respirations Height BMI (Body Mass Index) Last Period    92 98.4  F (36.9  C) (Temporal) 16 5' 7\" (1.702 m) 34.70 kg/m2 01/13/2017       Blood Pressure from Last 3 Encounters:   02/08/17 106/66   01/13/17 120/82   12/14/16 100/60    Weight from Last 3 Encounters:   02/08/17 221 lb 9.6 oz (100.517 kg)   01/13/17 224 lb (101.606 kg)   12/14/16 224 lb 4.8 oz (101.742 kg)              We Performed the Following     CONCUSSION  REFERRAL     OCCUPATIONAL THERAPY REFERRAL     SPEECH THERAPY REFERRAL          Today's Medication Changes          These changes are accurate as of: 2/8/17  3:14 PM.  If you have any questions, ask your nurse or doctor.               Start taking these medicines.        Dose/Directions    topiramate 25 MG tablet   Commonly known as:  TOPAMAX   Used for:  Postconcussion syndrome, New daily persistent headache   Started by:  Young Fletcher MD        Take 1 tablet at bedtime for 1 week, then 1 tablet twice daily for 1 week, then 1 tablet in AM and 2 in PM for 1 week, then 2 tablets BID.   Quantity:  70 tablet   Refills:  0         These medicines have changed or have updated prescriptions.        Dose/Directions    buPROPion 300 MG 24 hr tablet   Commonly known as:  WELLBUTRIN XL   This may have changed:    - medication strength  - how much to take   Used for:  Major depression in complete remission (H), Tobacco use disorder   Changed by:  Young Fletcher MD        Dose:  300 mg   Take 1 tablet (300 mg) by mouth every morning   Quantity:  30 tablet   Refills:  1            Where to get your medicines      These medications were sent to [facility] - [address]  [address] [place] [address] [address]     Phone:  [phone]    - buPROPion 300 MG " 24 hr tablet  - topiramate 25 MG tablet             Primary Care Provider Office Phone # Fax #    Justine Sánchez PA-C [phone]       [facility] [address] [place] [address]        Thank you!     Thank you for choosing [facility]  for your care. Our goal is always to provide you with excellent care. Hearing back from our patients is one way we can continue to improve our services. Please take a few minutes to complete the written survey that you may receive in the mail after your visit with us. Thank you!             Your Updated Medication List - Protect others around you: Learn how to safely use, store and throw away your medicines at www.disposemymeds.org.          This list is accurate as of: 2/8/17  3:14 PM.  Always use your most recent med list.                   Brand Name Dispense Instructions for use    ALPRAZolam 0.25 MG tablet    XANAX    10 tablet    Take 1 tablet (0.25 mg) by mouth nightly as needed for anxiety       buPROPion 300 MG 24 hr tablet    WELLBUTRIN XL    30 tablet    Take 1 tablet (300 mg) by mouth every morning       citalopram 40 MG tablet    celeXA    90 tablet    Take 1 tablet (40 mg) by mouth daily Due for office visit       IRON SUPPLEMENT PO          Multi-vitamin Tabs tablet      Take 1 tablet by mouth daily       tiZANidine 4 MG tablet    ZANAFLEX    90 tablet    Take 1 tablet (4 mg) by mouth 3 times daily as needed for muscle spasms       topiramate 25 MG tablet    TOPAMAX    70 tablet    Take 1 tablet at bedtime for 1 week, then 1 tablet twice daily for 1 week, then 1 tablet in AM and 2 in PM for 1 week, then 2 tablets BID.       VITAMIN D (CHOLECALCIFEROL) PO      Take by mouth daily          Statement Selected

## 2020-03-03 DIAGNOSIS — F17.200 TOBACCO USE DISORDER: ICD-10-CM

## 2020-03-03 RX ORDER — VARENICLINE TARTRATE 1 MG/1
TABLET, FILM COATED ORAL
Qty: 56 TABLET | Refills: 1 | OUTPATIENT
Start: 2020-03-03

## 2020-03-04 DIAGNOSIS — F17.200 TOBACCO USE DISORDER: ICD-10-CM

## 2020-03-05 RX ORDER — VARENICLINE TARTRATE 1 MG/1
TABLET, FILM COATED ORAL
Qty: 56 TABLET | Refills: 1 | OUTPATIENT
Start: 2020-03-05

## 2020-03-05 NOTE — TELEPHONE ENCOUNTER
Refused Prescriptions:                       Disp   Refills    CHANTIX 1 MG tablet [Pharmacy Med Name: CH*56 tab*1        Sig: TAKE ONE TABLET BY MOUTH TWICE A DAY    Sent 1/6/2020 with 4 month supply. Refill not appropriate at this time.     Gillian Dunlap RN BSN

## 2020-06-01 DIAGNOSIS — Z30.41 ENCOUNTER FOR BIRTH CONTROL PILLS MAINTENANCE: ICD-10-CM

## 2020-06-01 RX ORDER — NORGESTIMATE AND ETHINYL ESTRADIOL 0.25-0.035
KIT ORAL
Qty: 84 TABLET | Refills: 1 | Status: SHIPPED | OUTPATIENT
Start: 2020-06-01 | End: 2020-09-17

## 2020-06-01 NOTE — TELEPHONE ENCOUNTER
Prescription approved per [facility] Refill Protocol.    Marielos Teresa, RN, BSN

## 2020-06-07 DIAGNOSIS — F33.1 MODERATE EPISODE OF RECURRENT MAJOR DEPRESSIVE DISORDER (H): ICD-10-CM

## 2020-06-07 DIAGNOSIS — F41.9 ANXIETY: ICD-10-CM

## 2020-06-08 NOTE — TELEPHONE ENCOUNTER
Pending Prescriptions:                       Disp   Refills    venlafaxine (EFFEXOR-XR) 75 MG 24 hr capsu*270 ca*1        Sig: TAKE 3 CAPSULES (225 MG) BY MOUTH DAILY    Patient is due for mood check. Please call and schedule for video visit before the patient runs out of the medication. If they do not have enough to make it to their video visit, send back to the RN. Indicate quantity that patient will need to make it to their appointment.    Cathy Aranda, MSN, RN    PHQ 12/28/2018 5/1/2019 1/6/2020   PHQ-9 Total Score 11 7 6   Q9: Thoughts of better off dead/self-harm past 2 weeks Not at all Not at all Not at all

## 2020-06-09 NOTE — TELEPHONE ENCOUNTER
Called Patient and left a message today. Need to schedule for a video visit per note below. Thank you

## 2020-06-10 RX ORDER — VENLAFAXINE HYDROCHLORIDE 75 MG/1
CAPSULE, EXTENDED RELEASE ORAL
Qty: 270 CAPSULE | Refills: 1 | OUTPATIENT
Start: 2020-06-10

## 2020-06-26 ENCOUNTER — VIRTUAL VISIT (OUTPATIENT)
Dept: FAMILY MEDICINE | Facility: OTHER | Age: 30
End: 2020-06-26
Payer: COMMERCIAL

## 2020-06-26 DIAGNOSIS — F41.9 ANXIETY: ICD-10-CM

## 2020-06-26 DIAGNOSIS — M54.2 CERVICALGIA: ICD-10-CM

## 2020-06-26 DIAGNOSIS — M62.838 MUSCLE SPASM: ICD-10-CM

## 2020-06-26 DIAGNOSIS — F33.1 MODERATE EPISODE OF RECURRENT MAJOR DEPRESSIVE DISORDER (H): Primary | ICD-10-CM
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
## 2020-06-26 DIAGNOSIS — F33.1 MODERATE EPISODE OF RECURRENT MAJOR DEPRESSIVE DISORDER (H): ICD-10-CM

## 2020-06-26 DIAGNOSIS — S13.4XXS WHIPLASH INJURIES, SEQUELA: ICD-10-CM

## 2020-06-26 DIAGNOSIS — F17.200 TOBACCO USE DISORDER: ICD-10-CM

## 2020-06-26 PROCEDURE — 99214 OFFICE O/P EST MOD 30 MIN: CPT | Mod: TEL | Performed by: FAMILY MEDICINE

## 2020-06-26 RX ORDER — METHOCARBAMOL 500 MG/1
500 TABLET, FILM COATED ORAL 4 TIMES DAILY PRN
Qty: 30 TABLET | Refills: 1 | Status: SHIPPED | OUTPATIENT
Start: 2020-06-26 | End: 2021-08-25

## 2020-06-26 RX ORDER — VARENICLINE TARTRATE 1 MG/1
1 TABLET, FILM COATED ORAL 2 TIMES DAILY
Qty: 60 TABLET | Refills: 3 | Status: SHIPPED | OUTPATIENT
Start: 2020-06-26 | End: 2020-12-16

## 2020-06-26 RX ORDER — VENLAFAXINE HYDROCHLORIDE 75 MG/1
CAPSULE, EXTENDED RELEASE ORAL
Qty: 270 CAPSULE | Refills: 1 | Status: SHIPPED | OUTPATIENT
Start: 2020-06-26 | End: 2020-12-16

## 2020-06-26 RX ORDER — BACLOFEN 20 MG/1
40 TABLET ORAL 2 TIMES DAILY
Qty: 360 TABLET | Refills: 1 | Status: SHIPPED | OUTPATIENT
Start: 2020-06-26 | End: 2020-09-17

## 2020-06-26 RX ORDER — LORAZEPAM 0.5 MG/1
0.5 TABLET ORAL EVERY 8 HOURS PRN
Qty: 20 TABLET | Refills: 1 | Status: SHIPPED | OUTPATIENT
Start: 2020-06-26

## 2020-06-26 RX ORDER — ARIPIPRAZOLE 5 MG/1
2.5-5 TABLET ORAL DAILY
Qty: 30 TABLET | Refills: 1 | Status: SHIPPED | OUTPATIENT
Start: 2020-06-26 | End: 2020-09-17

## 2020-06-26 ASSESSMENT — ANXIETY QUESTIONNAIRES
IF YOU CHECKED OFF ANY PROBLEMS ON THIS QUESTIONNAIRE, HOW DIFFICULT HAVE THESE PROBLEMS MADE IT FOR YOU TO DO YOUR WORK, TAKE CARE OF THINGS AT HOME, OR GET ALONG WITH OTHER PEOPLE: SOMEWHAT DIFFICULT
7. FEELING AFRAID AS IF SOMETHING AWFUL MIGHT HAPPEN: NOT AT ALL
1. FEELING NERVOUS, ANXIOUS, OR ON EDGE: MORE THAN HALF THE DAYS
2. NOT BEING ABLE TO STOP OR CONTROL WORRYING: NOT AT ALL
5. BEING SO RESTLESS THAT IT IS HARD TO SIT STILL: NOT AT ALL
6. BECOMING EASILY ANNOYED OR IRRITABLE: MORE THAN HALF THE DAYS
3. WORRYING TOO MUCH ABOUT DIFFERENT THINGS: NOT AT ALL
GAD7 TOTAL SCORE: 7

## 2020-06-26 ASSESSMENT — PATIENT HEALTH QUESTIONNAIRE - PHQ9
SUM OF ALL RESPONSES TO PHQ QUESTIONS 1-9: 9
5. POOR APPETITE OR OVEREATING: NEARLY EVERY DAY

## 2020-06-26 NOTE — PATIENT INSTRUCTIONS
"Thank you for visiting [facility]    As we discussed, let us try Abilify to see if that helps with your mood without causing excessive sedation.  I would start the half tablet daily and then increase to 5 mg daily if needed.    For now, stay on the current dosing of Effexor.  We could consider some other approaches to if need be.    I would encourage you to quit smoking.  Let me know if you need help.  You seem to do well with Chantix last time, so hope that will work well again for you.    Contact us or return if questions or concerns.     Have a nice day!    Dr. Fletcher     Return in about 3 days (around 6/29/2020) for Physical Exam.      If you had imaging scheduled please refer to your radiology prep sheet.      If you need medication refills, please contact your pharmacy 3 days before your prescriptions runs out or download the [facility] ben for your smart phone.   If you are out of refills, your pharmacy will contact contact the clinic.    Contact us or return if questions or concerns.     -Your [facility] Care Team:    MD Justine Kunz PA-C Joel De Haan, PA-C Anna Niesen, PA-C Elizabeth \"Arlet\" LASHAE Kim CNP, APRN, LASHAE Andres, ZIA Anderson, RN, BSN       General information about your   [facility]      Clinic hours:     Lab hours:  Phone [phone]  Monday 7:30 am-7 pm    Monday 8:30 am-6:30 pm  Tuesday-Friday 7:30 am-5 pm   Tuesday-Friday 8:30 am-4:30 pm    Pharmacy hours:  Phone [phone]  Monday 8:30 am-7pm  Tuesday-Friday 8:30am-6 pm                                     Mychart assistance [phone]    We would like to hear from you, how was your visit today?    Solange Sanchez  Patient Information Supervisor   Patient Care Supervisor  [facility] [place], and [facility]            "
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
## 2020-06-27 ASSESSMENT — ANXIETY QUESTIONNAIRES: GAD7 TOTAL SCORE: 7

## 2020-06-29 ENCOUNTER — OFFICE VISIT (OUTPATIENT)
Dept: FAMILY MEDICINE | Facility: OTHER | Age: 30
End: 2020-06-29
Payer: COMMERCIAL

## 2020-06-29 VITALS
HEIGHT: 67 IN | RESPIRATION RATE: 18 BRPM | HEART RATE: 90 BPM | WEIGHT: 230 LBS | SYSTOLIC BLOOD PRESSURE: 128 MMHG | TEMPERATURE: 97.9 F | DIASTOLIC BLOOD PRESSURE: 72 MMHG | OXYGEN SATURATION: 98 % | BODY MASS INDEX: 36.1 KG/M2

## 2020-06-29 DIAGNOSIS — Z86.19 HISTORY OF VIRAL DISEASE: ICD-10-CM

## 2020-06-29 DIAGNOSIS — Z00.00 ENCOUNTER FOR PREVENTIVE CARE: Primary | ICD-10-CM

## 2020-06-29 LAB
ALBUMIN SERPL-MCNC: 3.5 G/DL (ref 3.4–5)
ALP SERPL-CCNC: 118 U/L (ref 40–150)
ALT SERPL W P-5'-P-CCNC: 24 U/L (ref 0–50)
ANION GAP SERPL CALCULATED.3IONS-SCNC: 6 MMOL/L (ref 3–14)
AST SERPL W P-5'-P-CCNC: 16 U/L (ref 0–45)
BILIRUB SERPL-MCNC: 0.4 MG/DL (ref 0.2–1.3)
BUN SERPL-MCNC: 6 MG/DL (ref 7–30)
CALCIUM SERPL-MCNC: 8.5 MG/DL (ref 8.5–10.1)
CHLORIDE SERPL-SCNC: 108 MMOL/L (ref 94–109)
CO2 SERPL-SCNC: 27 MMOL/L (ref 20–32)
CREAT SERPL-MCNC: 0.69 MG/DL (ref 0.52–1.04)
ERYTHROCYTE [DISTWIDTH] IN BLOOD BY AUTOMATED COUNT: 15.1 % (ref 10–15)
GFR SERPL CREATININE-BSD FRML MDRD: >90 ML/MIN/{1.73_M2}
GLUCOSE SERPL-MCNC: 104 MG/DL (ref 70–99)
HCT VFR BLD AUTO: 42.3 % (ref 35–47)
HGB BLD-MCNC: 13.9 G/DL (ref 11.7–15.7)
MCH RBC QN AUTO: 28.7 PG (ref 26.5–33)
MCHC RBC AUTO-ENTMCNC: 32.9 G/DL (ref 31.5–36.5)
MCV RBC AUTO: 87 FL (ref 78–100)
PLATELET # BLD AUTO: 363 10E9/L (ref 150–450)
POTASSIUM SERPL-SCNC: 3.7 MMOL/L (ref 3.4–5.3)
PROT SERPL-MCNC: 7.2 G/DL (ref 6.8–8.8)
RBC # BLD AUTO: 4.85 10E12/L (ref 3.8–5.2)
SODIUM SERPL-SCNC: 141 MMOL/L (ref 133–144)
TSH SERPL DL<=0.005 MIU/L-ACNC: 2.52 MU/L (ref 0.4–4)
WBC # BLD AUTO: 16.3 10E9/L (ref 4–11)

## 2020-06-29 PROCEDURE — 99212 OFFICE O/P EST SF 10 MIN: CPT | Mod: 25 | Performed by: FAMILY MEDICINE

## 2020-06-29 PROCEDURE — 84443 ASSAY THYROID STIM HORMONE: CPT | Performed by: FAMILY MEDICINE

## 2020-06-29 PROCEDURE — 87624 HPV HI-RISK TYP POOLED RSLT: CPT | Performed by: FAMILY MEDICINE

## 2020-06-29 PROCEDURE — 99395 PREV VISIT EST AGE 18-39: CPT | Performed by: FAMILY MEDICINE

## 2020-06-29 PROCEDURE — 80053 COMPREHEN METABOLIC PANEL: CPT | Performed by: FAMILY MEDICINE

## 2020-06-29 PROCEDURE — 85027 COMPLETE CBC AUTOMATED: CPT | Performed by: FAMILY MEDICINE

## 2020-06-29 PROCEDURE — 36415 COLL VENOUS BLD VENIPUNCTURE: CPT | Performed by: FAMILY MEDICINE

## 2020-06-29 PROCEDURE — 80061 LIPID PANEL: CPT | Performed by: FAMILY MEDICINE

## 2020-06-29 PROCEDURE — G0145 SCR C/V CYTO,THINLAYER,RESCR: HCPCS | Performed by: FAMILY MEDICINE

## 2020-06-29 PROCEDURE — G0124 SCREEN C/V THIN LAYER BY MD: HCPCS | Performed by: FAMILY MEDICINE

## 2020-06-29 RX ORDER — VENLAFAXINE HYDROCHLORIDE 75 MG/1
CAPSULE, EXTENDED RELEASE ORAL
Qty: 270 CAPSULE | Refills: 1 | OUTPATIENT
Start: 2020-06-29

## 2020-06-29 ASSESSMENT — MIFFLIN-ST. JEOR: SCORE: 1806.02
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
## 2020-06-30 LAB
CHOLEST SERPL-MCNC: 246 MG/DL
HDLC SERPL-MCNC: 45 MG/DL
LDLC SERPL CALC-MCNC: 166 MG/DL
NONHDLC SERPL-MCNC: 201 MG/DL
TRIGL SERPL-MCNC: 175 MG/DL

## 2020-07-02 LAB
COPATH REPORT: ABNORMAL
PAP: ABNORMAL

## 2020-07-02 NOTE — RESULT ENCOUNTER NOTE
Your cholesterol is higher than would be recommended.  I would recommend increased aerobic exercise, decreased saturated fat intake, weight loss, etc. to help with this.  I would recommend we recheck in 1 year.  If not improving, will need to consider cholesterol medication to help with this in the long-term.    Your blood sugar was mildly elevated.  The above interventions should help to prevent this from progressing to diabetes.    Your white count was elevated today.  Are you feeling sick at all?  If not, I would recommend that we recheck this in a few months to be sure that it is not getting higher.    Have a nice day!    Dr. Fletcher

## 2020-07-03 ENCOUNTER — TELEPHONE (OUTPATIENT)
Dept: FAMILY MEDICINE | Facility: OTHER | Age: 30
End: 2020-07-03

## 2020-07-03 NOTE — TELEPHONE ENCOUNTER
Left message for patient to return call to clinic. When call is returned please see results.    Jason Begum,     __      Young Fletcher MD   7/2/2020  4:20 PM       Your cholesterol is higher than would be recommended.  I would recommend increased aerobic exercise, decreased saturated fat intake, weight loss, etc. to help with this.  I would recommend we recheck in 1 year.  If not improving, will need to consider cholesterol medication to help with this in the long-term.       Your blood sugar was mildly elevated.  The above interventions should help to prevent this from progressing to diabetes.       Your white count was elevated today.  Are you feeling sick at all?  If not, I would recommend that we recheck this in a few months to be sure that it is not getting higher.       Have a nice day!       Dr. Fletcher

## 2020-07-06 NOTE — TELEPHONE ENCOUNTER
Patient returning message below. Informed patient of the the results and she understands with no further questions.

## 2020-07-09 ENCOUNTER — PATIENT OUTREACH (OUTPATIENT)
Dept: FAMILY MEDICINE | Facility: OTHER | Age: 30
End: 2020-07-09

## 2020-07-09 NOTE — TELEPHONE ENCOUNTER
6/29/20 ASCUS pap, + HR HPV (not 16 or 18). Plan: cotest in 1 yr, due 6/29/21 7/9/20 Pt notified by phone.

## 2020-07-22 DIAGNOSIS — F17.200 TOBACCO USE DISORDER: ICD-10-CM

## 2020-07-23 RX ORDER — VARENICLINE TARTRATE 1 MG/1
1 TABLET, FILM COATED ORAL 2 TIMES DAILY
Qty: 60 TABLET | Refills: 3 | OUTPATIENT
Start: 2020-07-23

## 2020-07-23 NOTE — TELEPHONE ENCOUNTER
Sent 6/26/20 with 4 month supply. Refill not appropriate at this time.     Marielos Teresa, RN, BSN

## 2020-09-10 DIAGNOSIS — F41.9 ANXIETY: ICD-10-CM

## 2020-09-10 DIAGNOSIS — F33.1 MODERATE EPISODE OF RECURRENT MAJOR DEPRESSIVE DISORDER (H): ICD-10-CM

## 2020-09-12 NOTE — TELEPHONE ENCOUNTER
Pending Prescriptions:                       Disp   Refills    ARIPiprazole (ABILIFY) 5 MG tablet [Pharma*30 tab*1        Sig: TAKE ONE-HALF TO ONE TABLET BY MOUTH EVERY DAY      Support staff:  Please call patient to schedule a visit. (F2F, video, phone, or E Visit) follow up from physical  Then ask if the patient will need a refill of the above medication before the visit.  Please reflag for RN and route to the Refill pool to give a 30 or 90 day vernon to get them to their next visit or to remove medication and to close the encounter.    After 3 attempts to reach patient, please route to provider to review and advise.    Thank you,  Hilaria Avalos RN

## 2020-09-17 ENCOUNTER — VIRTUAL VISIT (OUTPATIENT)
Dept: FAMILY MEDICINE | Facility: OTHER | Age: 30
End: 2020-09-17

## 2020-09-17 DIAGNOSIS — F41.9 ANXIETY: ICD-10-CM

## 2020-09-17 DIAGNOSIS — F33.1 MODERATE EPISODE OF RECURRENT MAJOR DEPRESSIVE DISORDER (H): ICD-10-CM

## 2020-09-17 DIAGNOSIS — Z30.41 ENCOUNTER FOR BIRTH CONTROL PILLS MAINTENANCE: ICD-10-CM

## 2020-09-17 PROCEDURE — 99214 OFFICE O/P EST MOD 30 MIN: CPT | Mod: 95 | Performed by: FAMILY MEDICINE

## 2020-09-17 PROCEDURE — 96127 BRIEF EMOTIONAL/BEHAV ASSMT: CPT | Performed by: FAMILY MEDICINE

## 2020-09-17 RX ORDER — NORGESTIMATE AND ETHINYL ESTRADIOL 0.25-0.035
1 KIT ORAL DAILY
Qty: 84 TABLET | Refills: 1 | Status: SHIPPED | OUTPATIENT
Start: 2020-09-17 | End: 2021-04-15

## 2020-09-17 RX ORDER — ALBUTEROL SULFATE 90 UG/1
2 AEROSOL, METERED RESPIRATORY (INHALATION) EVERY 6 HOURS
COMMUNITY
Start: 2020-09-17 | End: 2021-08-26

## 2020-09-17 RX ORDER — ARIPIPRAZOLE 10 MG/1
10 TABLET ORAL DAILY
Qty: 30 TABLET | Refills: 1 | Status: SHIPPED | OUTPATIENT
Start: 2020-09-17 | End: 2020-11-16

## 2020-09-17 RX ORDER — ARIPIPRAZOLE 5 MG/1
TABLET ORAL
Qty: 30 TABLET | Refills: 1 | OUTPATIENT
Start: 2020-09-17

## 2020-09-17 ASSESSMENT — ANXIETY QUESTIONNAIRES
3. WORRYING TOO MUCH ABOUT DIFFERENT THINGS: SEVERAL DAYS
1. FEELING NERVOUS, ANXIOUS, OR ON EDGE: SEVERAL DAYS
2. NOT BEING ABLE TO STOP OR CONTROL WORRYING: SEVERAL DAYS
7. FEELING AFRAID AS IF SOMETHING AWFUL MIGHT HAPPEN: NOT AT ALL
5. BEING SO RESTLESS THAT IT IS HARD TO SIT STILL: SEVERAL DAYS
IF YOU CHECKED OFF ANY PROBLEMS ON THIS QUESTIONNAIRE, HOW DIFFICULT HAVE THESE PROBLEMS MADE IT FOR YOU TO DO YOUR WORK, TAKE CARE OF THINGS AT HOME, OR GET ALONG WITH OTHER PEOPLE: VERY DIFFICULT
6. BECOMING EASILY ANNOYED OR IRRITABLE: MORE THAN HALF THE DAYS
GAD7 TOTAL SCORE: 7

## 2020-09-17 ASSESSMENT — PATIENT HEALTH QUESTIONNAIRE - PHQ9
5. POOR APPETITE OR OVEREATING: SEVERAL DAYS
SUM OF ALL RESPONSES TO PHQ QUESTIONS 1-9: 6

## 2020-09-17 NOTE — PATIENT INSTRUCTIONS
"Thank you for visiting [facility]    Let us try increasing the Abilify to 10 mg daily to see if that helps more with your mood.    Let me know if any concerning side effects.    Follow-up in about a month to ensure that things are improving.    I do recommend getting an influenza vaccine this fall.    Contact us or return if questions or concerns.     Have a nice day!    Dr. Fletcher     No follow-ups on file.      If you had imaging scheduled please refer to your radiology prep sheet.      If you need medication refills, please contact your pharmacy 3 days before your prescriptions runs out or download the [facility] ben for your smart phone.   If you are out of refills, your pharmacy will contact contact the clinic.    Contact us or return if questions or concerns.     -[facility] Care Team:    MD Justine Kunz PA-C Joel De Haan, PA-C Anna Niesen, PA-C Elizabeth \"Arlet\" LASHAE Kim CNP, APRN, LASHAE Andres, ZIA Anderson, INGEN, RN, PHN                                         [facility] assistance [phone]    We would like to hear from you, how was your visit today?    Solange Carcamo       Patient Information Supervisor     [place], [place], and [facility]             "

## 2020-09-17 NOTE — PROGRESS NOTES
"Yesika Grant is a 29 year old female who is being evaluated via a billable telephone visit.      The patient has been notified of following:     \"This telephone visit will be conducted via a call between you and your physician/provider. We have found that certain health care needs can be provided without the need for a physical exam.  This service lets us provide the care you need with a short phone conversation.  If a prescription is necessary we can send it directly to your pharmacy.  If lab work is needed we can place an order for that and you can then stop by our lab to have the test done at a later time.    Telephone visits are billed at different rates depending on your insurance coverage. During this emergency period, for some insurers they may be billed the same as an in-person visit.  Please reach out to your insurance provider with any questions.    If during the course of the call the physician/provider feels a telephone visit is not appropriate, you will not be charged for this service.\"    Patient has given verbal consent for Telephone visit?  Yes    What phone number would you like to be contacted at? [phone]    How would you like to obtain your AVS? Mail a copy    Subjective     Yesika Grant is a 29 year old female who presents via phone visit today for the following health issues:    HPI    Depression and Anxiety Follow-Up    How are you doing with your depression since your last visit? Improved slightly    How are you doing with your anxiety since your last visit?  Improved slightly     Are you having other symptoms that might be associated with depression or anxiety? No    Have you had a significant life event? No     Do you have any concerns with your use of alcohol or other drugs? No    Pt feels that  Her mood is slightly better with the Abilify.  No real side effects.  She's currently taking 5 mg of Abilify daily.      She isn't taking Chantix yet.  Wanting to wait until her " mood  Has stabilized a bit more.      Social History     Tobacco Use     Smoking status: Current Some Day Smoker     Packs/day: 0.50     Types: Cigarettes     Smokeless tobacco: Never Used   Substance Use Topics     Alcohol use: No     Comment: 1-2 drinks a month     Drug use: No     PHQ 1/6/2020 6/26/2020 9/17/2020   PHQ-9 Total Score 6 9 6   Q9: Thoughts of better off dead/self-harm past 2 weeks Not at all Not at all Not at all     ZAN-7 SCORE 1/6/2020 6/26/2020 9/17/2020   Total Score - - -   Total Score 3 (minimal anxiety) - -   Total Score 3 7 7     Last PHQ-9 9/17/2020   1.  Little interest or pleasure in doing things 2   2.  Feeling down, depressed, or hopeless 1   3.  Trouble falling or staying asleep, or sleeping too much 0   4.  Feeling tired or having little energy 0   5.  Poor appetite or overeating 1   6.  Feeling bad about yourself 0   7.  Trouble concentrating 2   8.  Moving slowly or restless 0   Q9: Thoughts of better off dead/self-harm past 2 weeks 0   PHQ-9 Total Score 6   Difficulty at work, home, or with people Very difficult     ZAN-7  9/17/2020   1. Feeling nervous, anxious, or on edge 1   2. Not being able to stop or control worrying 1   3. Worrying too much about different things 1   4. Trouble relaxing 1   5. Being so restless that it is hard to sit still 1   6. Becoming easily annoyed or irritable 2   7. Feeling afraid, as if something awful might happen 0   ZAN-7 Total Score 7   If you checked any problems, how difficult have they made it for you to do your work, take care of things at home, or get along with other people? Very difficult       Suicide Assessment Five-step Evaluation and Treatment (SAFE-T)    Has been seeing the chiropractor and PT regularly, which has allowed her to stop using the baclofen.  Only using methocarbamol occasionally.            Review of Systems   Constitutional, HEENT, cardiovascular, pulmonary, gi and gu systems are negative, except as otherwise noted.       Occasionally has a coughing fit.  Uses albuterol  Inhaler when this happens.      Objective          Vitals:  No vitals were obtained today due to virtual visit.      PSYCH: Alert and oriented times 3; coherent speech, normal   rate and volume, able to articulate logical thoughts, able   to abstract reason, no tangential thoughts, no hallucinations   or delusions  Her affect is normal  RESP: No cough, no audible wheezing, able to talk in full sentences  Remainder of exam unable to be completed due to telephone visits    Office Visit on 06/29/2020   Component Date Value Ref Range Status     HPV Source 06/29/2020 SurePath   Final     HPV 16 DNA 06/29/2020 Negative  NEG^Negative Final     HPV 18 DNA 06/29/2020 Negative  NEG^Negative Final     Other HR HPV 06/29/2020 Positive* NEG^Negative Final     Final Diagnosis 06/29/2020    Final                    Value:This patient's sample is positive for other HR HPV DNA (types 31, 33, 35, 39, 45, 51, 52,   56, 58, 59, 66 or 68), not HPV 16 or HPV 18 DNA. This result requires clinical correlation   with concurrent cytology findings.      Comment: This test was developed and its performance characteristics determined by the   [facility]. It   has not been cleared or approved by the FDA. The laboratory is regulated under   CLIA as qualified to perform high-complexity testing. This test is used for   clinical purposes. It should not be regarded as investigational or for   research.  (Note)  METHODOLOGY:  The Roche digna 4800 system uses automated extraction,   simultaneous amplification of HPV (L1 region) and beta-globin,    followed by  real time detection of fluorescent labeled HPV and beta   globin using specific oligonucleotide probes . The test specifically   identifies types HPV 16 DNA and HPV 18 DNA while concurrently   detecting the rest of the high risk types (31, 33, 35, 39, 45, 51,   52, 56, 58, 59, 66 or  68).  COMMENTS:  This test is not intended for use as a screening device   for women under age 30 with normal cervical cytology.  Results should   be correl                           ated with cytologic and histologic findings. Close clinical   followup is recommended.       Specimen Description 06/29/2020 Cervical Cells   Final    Comment: [id number]  [id number]       PAP 06/29/2020 ASC-US*  Final     Copath Report 06/29/2020    Final                    Value:  Patient Name: ENRIQUE BARROS  MR#: [id number]  Specimen #: [id number]  Collected: 6/29/2020  Received: 6/30/2020  Reported: 7/2/2020 11:38  Ordering Phy(s): MISHA MADSEN    For improved result formatting, select 'View Enhanced Report Format' under   Linked Documents section.    SPECIMEN/STAIN PROCESS:  Pap imaged thin layer prep screening (Surepath, FocalPoint with guided   screening)       Pap-Cyto x 1, HPV ordered x 1    SOURCE: Cervical, endocervical  ----------------------------------------------------------------   Pap imaged thin layer prep screening (Surepath, FocalPoint with guided   screening)  SPECIMEN ADEQUACY:  Satisfactory for evaluation.  -Transformation zone component present.    CYTOLOGIC INTERPRETATION:    Epithelial cell abnormality:  squamous cell:  atypical squamous cells-of   undetermined significance (ASC-US).    Electronically signed out by:    Jaxson Real M.D.    CLINICAL HISTORY:    Oral Birth Control Pill, A previous normal pap  Date of Last                           Pap: 1/23/20,    Papanicolaou Test Limitations:  Cervical cytology is a screening test with   limited sensitivity; regular  screening is critical for cancer prevention; Pap tests are primarily   effective for the diagnosis/prevention of  squamous cell carcinoma, not adenocarcinomas or other cancers.    COLLECTION SITE:  Client:  [facility]  Location: [facility] ([facility])    The technical component of this testing was completed at the [facility], with the professional component performed   at the [facility]  Laboratory, [address] [place] [address], [place] [address] ([phone])         Sodium 06/29/2020 141  133 - 144 mmol/L Final     Potassium 06/29/2020 3.7  3.4 - 5.3 mmol/L Final     Chloride 06/29/2020 108  94 - 109 mmol/L Final     Carbon Dioxide 06/29/2020 27  20 - 32 mmol/L Final     Anion Gap 06/29/2020 6  3 - 14 mmol/L Final     Glucose 06/29/2020 104* 70 - 99 mg/dL Final     Urea Nitrogen 06/29/2020 6* 7 - 30 mg/dL Final     Creatinine 06/29/2020 0.69  0.52 - 1.04 mg/dL Final     GFR Estimate 06/29/2020 >90  >60 mL/min/[1.73_m2] Final    Comment: Non  GFR Calc  Starting 12/18/2018, serum creatinine based estimated GFR (eGFR) will be   calculated using the Chronic Kidney Disease Epidemiology Collaboration   (CKD-EPI) equation.       GFR Estimate If Black 06/29/2020 >90  >60 mL/min/[1.73_m2] Final    Comment:  GFR Calc  Starting 12/18/2018, serum creatinine based estimated GFR (eGFR) will be   calculated using the Chronic Kidney Disease Epidemiology Collaboration   (CKD-EPI) equation.       Calcium 06/29/2020 8.5  8.5 - 10.1 mg/dL Final     Bilirubin Total 06/29/2020 0.4  0.2 - 1.3 mg/dL Final     Albumin 06/29/2020 3.5  3.4 - 5.0 g/dL Final     Protein Total 06/29/2020 7.2  6.8 - 8.8 g/dL Final     Alkaline Phosphatase 06/29/2020 118  40 - 150 U/L Final     ALT 06/29/2020 24  0 - 50 U/L Final     AST 06/29/2020 16  0 - 45 U/L Final     WBC 06/29/2020 16.3* 4.0 - 11.0 10e9/L Final     RBC Count 06/29/2020 4.85  3.8 - 5.2 10e12/L Final     Hemoglobin 06/29/2020 13.9  11.7 - 15.7 g/dL Final     Hematocrit 06/29/2020 42.3  35.0 - 47.0 % Final     MCV 06/29/2020 87  78 - 100 fl Final     MCH 06/29/2020 28.7  26.5 - 33.0 pg Final     MCHC 06/29/2020 32.9  31.5 - 36.5 g/dL Final     RDW 06/29/2020 15.1* 10.0 - 15.0 % Final     Platelet Count 06/29/2020 363  150 -  450 10e9/L Final     Cholesterol 06/29/2020 246* <200 mg/dL Final    Desirable:       <200 mg/dl     Triglycerides 06/29/2020 175* <150 mg/dL Final    Comment: Borderline high:  150-199 mg/dl  High:             200-499 mg/dl  Very high:       >499 mg/dl       HDL Cholesterol 06/29/2020 45* >49 mg/dL Final     LDL Cholesterol Calculated 06/29/2020 166* <100 mg/dL Final    Comment: Above desirable:  100-129 mg/dl  Borderline High:  130-159 mg/dL  High:             160-189 mg/dL  Very high:       >189 mg/dl       Non HDL Cholesterol 06/29/2020 201* <130 mg/dL Final    Comment: Above Desirable:  130-159 mg/dl  Borderline high:  160-189 mg/dl  High:             190-219 mg/dl  Very high:       >219 mg/dl       TSH 06/29/2020 2.52  0.40 - 4.00 mU/L Final           Assessment/Plan:    Assessment & Plan     Anxiety  Not fully controlled.  Patient does note some improvement with Abilify.  We discussed increasing her dose to 10 mg daily to help with this.  Patient is interested in pursuing this.  Follow-up in 1 to 2 months.  - ARIPiprazole (ABILIFY) 10 MG tablet; Take 1 tablet (10 mg) by mouth daily    Moderate episode of recurrent major depressive disorder (H)  We will increase Abilify as noted above.  Recheck in 1 to 2 months.  - ARIPiprazole (ABILIFY) 10 MG tablet; Take 1 tablet (10 mg) by mouth daily    Encounter for birth control pills maintenance  Renewed.  Patient is happy with current treatment regimen.  - norgestimate-ethinyl estradiol (ORTHO-CYCLEN) 0.25-35 MG-MCG tablet; Take 1 tablet by mouth daily       Portions of this note were completed using Dragon dictation software.  Although reviewed, there may be typographical and other inadvertent errors that remain.             Patient Instructions   Thank you for visiting [facility]    Let us try increasing the Abilify to 10 mg daily to see if that helps more with your mood.    Let me know if any concerning side effects.    Follow-up in about a month to  "ensure that things are improving.    I do recommend getting an influenza vaccine this fall.    Contact us or return if questions or concerns.     Have a nice day!    Dr. Fletcher     No follow-ups on file.      If you had imaging scheduled please refer to your radiology prep sheet.      If you need medication refills, please contact your pharmacy 3 days before your prescriptions runs out or download the [facility] ben for your smart phone.   If you are out of refills, your pharmacy will contact contact the clinic.    Contact us or return if questions or concerns.     -[facility] Care Team:    MD Justine Kunz PA-C Joel De Haan, PA-C Anna Niesen, PA-C Elizabeth \"Arlet\" LASHAE Kim CNP, APRN, LASHAE Andres, ZIA Anderson, INGEN, RN, PHN                                         [facility] assistance [phone]    We would like to hear from you, how was your visit today?    Solange Carcamo       Patient Information Supervisor     [place], [place], and [place]                 Return in about 4 weeks (around 10/15/2020) for Recheck, E-visit, video, or phone visit.    Young Fletcher MD, MD  [facility] [place]    Phone call duration:  7 minutes                "

## 2020-09-18 ASSESSMENT — ANXIETY QUESTIONNAIRES: GAD7 TOTAL SCORE: 7

## 2020-11-15 DIAGNOSIS — F33.1 MODERATE EPISODE OF RECURRENT MAJOR DEPRESSIVE DISORDER (H): ICD-10-CM

## 2020-11-15 DIAGNOSIS — F41.9 ANXIETY: ICD-10-CM

## 2020-11-16 RX ORDER — ARIPIPRAZOLE 10 MG/1
10 TABLET ORAL DAILY
Qty: 30 TABLET | Refills: 1 | Status: SHIPPED | OUTPATIENT
Start: 2020-11-16 | End: 2020-12-16

## 2020-11-16 NOTE — TELEPHONE ENCOUNTER
Pending Prescriptions:                       Disp   Refills    ARIPiprazole (ABILIFY) 10 MG tablet [Phar*30 tab*1            Sig: TAKE 1 TABLET (10 MG) BY MOUTH DAILY    Medication is being filled for 1 time vernon refill only due to:  Patient is due for med check    Please call and help schedule.  Thank you!

## 2020-12-13 DIAGNOSIS — F41.9 ANXIETY: ICD-10-CM

## 2020-12-13 DIAGNOSIS — F33.1 MODERATE EPISODE OF RECURRENT MAJOR DEPRESSIVE DISORDER (H): ICD-10-CM

## 2020-12-14 NOTE — PROGRESS NOTES
"Yesika Grant is a 30 year old female who is being evaluated via a billable telephone visit.       The patient has been notified of following:      \"This telephone visit will be conducted via a call between you and your physician/provider. We have found that certain health care needs can be provided without the need for a physical exam.  This service lets us provide the care you need with a short phone conversation.  If a prescription is necessary we can send it directly to your pharmacy.  If lab work is needed we can place an order for that and you can then stop by our lab to have the test done at a later time.     Telephone visits are billed at different rates depending on your insurance coverage. During this emergency period, for some insurers they may be billed the same as an in-person visit.  Please reach out to your insurance provider with any questions.     If during the course of the call the physician/provider feels a telephone visit is not appropriate, you will not be charged for this service.\"     Patient has given verbal consent for Telephone visit?  Yes     What phone number would you like to be contacted at? [phone]    1:37 PM.  12/16/2020      How would you like to obtain your AVS? Mail a copy        Subjective         Yesika Grant is a 30 year old female who presents via phone visit today for the following health issues:     HPI  Depression and Anxiety Follow-Up    How are you doing with your depression since your last visit? No change    How are you doing with your anxiety since your last visit?  No change    Are you having other symptoms that might be associated with depression or anxiety? No    Have you had a significant life event? No          Do you have any concerns with your use of alcohol or other drugs? No     Pt states her mood is \"pretty good\".  Has been off the Abilify due to cost for about 2-3 weeks.  Has been \"up and down\" (mood) and has been more tired.      Currently " without insurance.  Should have ability for coverage later this week.      She does feel that the previous dose she was taking was working well for her.        Social History            Tobacco Use     Smoking status: Current Some Day Smoker       Packs/day: 0.50       Types: Cigarettes     Smokeless tobacco: Never Used   Substance Use Topics     Alcohol use: No       Comment: 1-2 drinks a month     Drug use: No        PHQ 6/26/2020 9/17/2020 12/14/2020   PHQ-9 Total Score 9 6 9   Q9: Thoughts of better off dead/self-harm past 2 weeks Not at all Not at all Not at all     ZAN-7 SCORE 6/26/2020 9/17/2020 12/14/2020   Total Score - - -   Total Score - - -   Total Score 7 7 5          Suicide Assessment Five-step Evaluation and Treatment (SAFE-T)             Review of Systems   Constitutional, HEENT, cardiovascular, pulmonary, gi and gu systems are negative, except as otherwise noted.       Objective          Vitals:  No vitals were obtained today due to virtual visit.      PSYCH: Alert and oriented times 3; coherent speech, normal   rate and volume, able to articulate logical thoughts, able   to abstract reason, no tangential thoughts, no hallucinations   or delusions  Her affect is normal  RESP: No cough, no audible wheezing, able to talk in full sentences  Remainder of exam unable to be completed due to telephone visits    No results found for any visits on 12/16/20.  Office Visit on 06/29/2020   Component Date Value Ref Range Status     HPV Source 06/29/2020 SurePath   Final     HPV 16 DNA 06/29/2020 Negative  NEG^Negative Final     HPV 18 DNA 06/29/2020 Negative  NEG^Negative Final     Other HR HPV 06/29/2020 Positive* NEG^Negative Final     Final Diagnosis 06/29/2020    Final                    Value:This patient's sample is positive for other HR HPV DNA (types 31, 33, 35, 39, 45, 51, 52,   56, 58, 59, 66 or 68), not HPV 16 or HPV 18 DNA. This result requires clinical correlation   with concurrent cytology  findings.      Comment: This test was developed and its performance characteristics determined by the   [facility]. It   has not been cleared or approved by the FDA. The laboratory is regulated under   CLIA as qualified to perform high-complexity testing. This test is used for   clinical purposes. It should not be regarded as investigational or for   research.  (Note)  METHODOLOGY:  The Roche digna 4800 system uses automated extraction,   simultaneous amplification of HPV (L1 region) and beta-globin,    followed by  real time detection of fluorescent labeled HPV and beta   globin using specific oligonucleotide probes . The test specifically   identifies types HPV 16 DNA and HPV 18 DNA while concurrently   detecting the rest of the high risk types (31, 33, 35, 39, 45, 51,   52, 56, 58, 59, 66 or 68).  COMMENTS:  This test is not intended for use as a screening device   for women under age 30 with normal cervical cytology.  Results should   be correl                           ated with cytologic and histologic findings. Close clinical   followup is recommended.       Specimen Description 06/29/2020 Cervical Cells   Final    Comment: C20  [id number]       PAP 06/29/2020 ASC-US*  Final     Copath Report 06/29/2020    Final                    Value:  Patient Name: ENRIQUE BARROS  MR#: [id number]  Specimen #: [id number]  Collected: 6/29/2020  Received: 6/30/2020  Reported: 7/2/2020 11:38  Ordering Phy(s): MISHA MADSEN    For improved result formatting, select 'View Enhanced Report Format' under   Linked Documents section.    SPECIMEN/STAIN PROCESS:  Pap imaged thin layer prep screening (Surepath, FocalPoint with guided   screening)       Pap-Cyto x 1, HPV ordered x 1    SOURCE: Cervical, endocervical  ----------------------------------------------------------------   Pap imaged thin layer prep screening (Surepath, FocalPoint with guided    screening)  SPECIMEN ADEQUACY:  Satisfactory for evaluation.  -Transformation zone component present.    CYTOLOGIC INTERPRETATION:    Epithelial cell abnormality:  squamous cell:  atypical squamous cells-of   undetermined significance (ASC-US).    Electronically signed out by:    Jaxson Real M.D.    CLINICAL HISTORY:    Oral Birth Control Pill, A previous normal pap  Date of Last                           Pap: 1/23/20,    Papanicolaou Test Limitations:  Cervical cytology is a screening test with   limited sensitivity; regular  screening is critical for cancer prevention; Pap tests are primarily   effective for the diagnosis/prevention of  squamous cell carcinoma, not adenocarcinomas or other cancers.    COLLECTION SITE:  Client:  [facility]  Location: [facility] ()    The technical component of this testing was completed at the [facility], with the professional component performed   at the [facility]  [facility]atory, [address] [place] [id number] ([phone])         Sodium 06/29/2020 141  133 - 144 mmol/L Final     Potassium 06/29/2020 3.7  3.4 - 5.3 mmol/L Final     Chloride 06/29/2020 108  94 - 109 mmol/L Final     Carbon Dioxide 06/29/2020 27  20 - 32 mmol/L Final     Anion Gap 06/29/2020 6  3 - 14 mmol/L Final     Glucose 06/29/2020 104* 70 - 99 mg/dL Final     Urea Nitrogen 06/29/2020 6* 7 - 30 mg/dL Final     Creatinine 06/29/2020 0.69  0.52 - 1.04 mg/dL Final     GFR Estimate 06/29/2020 >90  >60 mL/min/[1.73_m2] Final    Comment: Non  GFR Calc  Starting 12/18/2018, serum creatinine based estimated GFR (eGFR) will be   calculated using the Chronic Kidney Disease Epidemiology Collaboration   (CKD-EPI) equation.       GFR Estimate If Black 06/29/2020 >90  >60 mL/min/[1.73_m2] Final    Comment:  GFR Calc  Starting 12/18/2018, serum creatinine based estimated GFR (eGFR) will  be   calculated using the Chronic Kidney Disease Epidemiology Collaboration   (CKD-EPI) equation.       Calcium 06/29/2020 8.5  8.5 - 10.1 mg/dL Final     Bilirubin Total 06/29/2020 0.4  0.2 - 1.3 mg/dL Final     Albumin 06/29/2020 3.5  3.4 - 5.0 g/dL Final     Protein Total 06/29/2020 7.2  6.8 - 8.8 g/dL Final     Alkaline Phosphatase 06/29/2020 118  40 - 150 U/L Final     ALT 06/29/2020 24  0 - 50 U/L Final     AST 06/29/2020 16  0 - 45 U/L Final     WBC 06/29/2020 16.3* 4.0 - 11.0 10e9/L Final     RBC Count 06/29/2020 4.85  3.8 - 5.2 10e12/L Final     Hemoglobin 06/29/2020 13.9  11.7 - 15.7 g/dL Final     Hematocrit 06/29/2020 42.3  35.0 - 47.0 % Final     MCV 06/29/2020 87  78 - 100 fl Final     MCH 06/29/2020 28.7  26.5 - 33.0 pg Final     MCHC 06/29/2020 32.9  31.5 - 36.5 g/dL Final     RDW 06/29/2020 15.1* 10.0 - 15.0 % Final     Platelet Count 06/29/2020 363  150 - 450 10e9/L Final     Cholesterol 06/29/2020 246* <200 mg/dL Final    Desirable:       <200 mg/dl     Triglycerides 06/29/2020 175* <150 mg/dL Final    Comment: Borderline high:  150-199 mg/dl  High:             200-499 mg/dl  Very high:       >499 mg/dl       HDL Cholesterol 06/29/2020 45* >49 mg/dL Final     LDL Cholesterol Calculated 06/29/2020 166* <100 mg/dL Final    Comment: Above desirable:  100-129 mg/dl  Borderline High:  130-159 mg/dL  High:             160-189 mg/dL  Very high:       >189 mg/dl       Non HDL Cholesterol 06/29/2020 201* <130 mg/dL Final    Comment: Above Desirable:  130-159 mg/dl  Borderline high:  160-189 mg/dl  High:             190-219 mg/dl  Very high:       >219 mg/dl       TSH 06/29/2020 2.52  0.40 - 4.00 mU/L Final           Assessment/Plan:    Assessment & Plan       ICD-10-CM    1. Anxiety  F41.9 ARIPiprazole (ABILIFY) 10 MG tablet     venlafaxine (EFFEXOR-XR) 75 MG 24 hr capsule   2. Moderate episode of recurrent major depressive disorder (H)  F33.1 ARIPiprazole (ABILIFY) 10 MG tablet     venlafaxine  (EFFEXOR-XR) 75 MG 24 hr capsule      Let patient resume her Abilify will continue Effexor XR.  Follow-up mood in 1 to 6 months depending upon her response.  It sounds like she was probably under fairly good control, but would like to see a PHQ-9/david score that reflects this more fully.    Portions of this note were completed using Dragon dictation software.  Although reviewed, there may be typographical and other inadvertent errors that remain.             Patient Instructions   Thank you for visiting [facility]    Let's get you back on your medication.      Please complete the mood questionnaire about 2 to 4 weeks after you are back on the medication.    Let me know if any side effects or complications.    I would recommend a flu shot.    Follow-up in 1 to 6 months, depending upon how your mood is.  Your Pap will be due in 6 months.    Contact us or return if questions or concerns.     Have a nice day!    Dr. Fletcher     Return in about 3 months (around 3/16/2021) for Recheck, E-visit, video, or phone visit.      If you need medication refills, please contact your pharmacy 3 days before your prescriptions runs out or download the [facility] ben for your smart phone.   If you are out of refills, your pharmacy will contact contact the clinic.                                     [employer] assistance [phone]                       Return in about 3 months (around 3/16/2021) for Recheck, E-visit, video, or phone visit.    Young Fletcher MD, MD  [facility]    Phone call duration:  8 minutes

## 2020-12-15 NOTE — TELEPHONE ENCOUNTER
Pending Prescriptions:                       Disp   Refills    venlafaxine (EFFEXOR-XR) 75 MG 24 hr capsu*270 ca*1        Sig: TAKE 3 CAPSULES (225 MG) BY MOUTH DAILY    Routing refill request to provider for review/approval because:  Labs out of range:    PHQ-9 score:    PHQ 12/14/2020   PHQ-9 Total Score 9   Q9: Thoughts of better off dead/self-harm past 2 weeks Not at all

## 2020-12-16 ENCOUNTER — VIRTUAL VISIT (OUTPATIENT)
Dept: FAMILY MEDICINE | Facility: OTHER | Age: 30
End: 2020-12-16

## 2020-12-16 DIAGNOSIS — F41.9 ANXIETY: ICD-10-CM

## 2020-12-16 DIAGNOSIS — F33.1 MODERATE EPISODE OF RECURRENT MAJOR DEPRESSIVE DISORDER (H): ICD-10-CM

## 2020-12-16 PROCEDURE — 99214 OFFICE O/P EST MOD 30 MIN: CPT | Mod: 95 | Performed by: FAMILY MEDICINE

## 2020-12-16 RX ORDER — ARIPIPRAZOLE 10 MG/1
10 TABLET ORAL DAILY
Qty: 30 TABLET | Refills: 1 | Status: SHIPPED | OUTPATIENT
Start: 2020-12-16 | End: 2021-03-29

## 2020-12-16 RX ORDER — VENLAFAXINE HYDROCHLORIDE 75 MG/1
CAPSULE, EXTENDED RELEASE ORAL
Qty: 270 CAPSULE | Refills: 1 | Status: SHIPPED | OUTPATIENT
Start: 2020-12-16 | End: 2021-07-27

## 2020-12-16 RX ORDER — VENLAFAXINE HYDROCHLORIDE 75 MG/1
CAPSULE, EXTENDED RELEASE ORAL
Qty: 270 CAPSULE | Refills: 1 | OUTPATIENT
Start: 2020-12-16

## 2020-12-16 NOTE — PATIENT INSTRUCTIONS
Thank you for visiting [facility]    Let's get you back on your medication.      Please complete the mood questionnaire about 2 to 4 weeks after you are back on the medication.    Let me know if any side effects or complications.    I would recommend a flu shot.    Follow-up in 1 to 6 months, depending upon how your mood is.  Your Pap will be due in 6 months.    Contact us or return if questions or concerns.     Have a nice day!    Dr. Fletcher     Return in about 3 months (around 3/16/2021) for Recheck, E-visit, video, or phone visit.      If you need medication refills, please contact your pharmacy 3 days before your prescriptions runs out or download the [facility] ben for your smart phone.   If you are out of refills, your pharmacy will contact contact the clinic.                                     [employer] assistance [phone]

## 2021-03-28 DIAGNOSIS — F33.1 MODERATE EPISODE OF RECURRENT MAJOR DEPRESSIVE DISORDER (H): ICD-10-CM

## 2021-03-28 DIAGNOSIS — F41.9 ANXIETY: ICD-10-CM

## 2021-03-29 RX ORDER — ARIPIPRAZOLE 10 MG/1
10 TABLET ORAL DAILY
Qty: 90 TABLET | Refills: 1 | Status: SHIPPED | OUTPATIENT
Start: 2021-03-29 | End: 2021-09-17

## 2021-03-29 NOTE — TELEPHONE ENCOUNTER
Prescription approved per [facility] Refill Protocol.    Iman Ackerman RN on 3/29/2021 at 3:13 PM

## 2021-04-15 DIAGNOSIS — Z30.41 ENCOUNTER FOR BIRTH CONTROL PILLS MAINTENANCE: ICD-10-CM

## 2021-04-15 RX ORDER — NORGESTIMATE AND ETHINYL ESTRADIOL 0.25-0.035
KIT ORAL
Qty: 84 TABLET | Refills: 1 | Status: SHIPPED | OUTPATIENT
Start: 2021-04-15 | End: 2021-09-17

## 2021-04-15 NOTE — TELEPHONE ENCOUNTER
Prescription approved per [facility] Refill Protocol.  Fredo Sharp RN, BSN  [place]  April 15, 2021    
present x 4 quadrants

## 2021-06-17 ENCOUNTER — PATIENT OUTREACH (OUTPATIENT)
Dept: FAMILY MEDICINE | Facility: OTHER | Age: 31
End: 2021-06-17

## 2021-06-17 NOTE — LETTER
June 17, 2021      Yesika Grant  [address] [facility] [address]        Dear ,    This letter is to remind you that you are due for your follow-up Pap smear and Human Papillomavirus (HPV) test.    Please call [phone] to schedule your appointment at your earliest convenience.    If you have completed the appointment outside of the [facility] system, please have the records forwarded to our office. We will update your chart for your provider to review before your next annual wellness visit.     Thank you for choosing [facility]!      Sincerely,    Your [facility] Care Team

## 2021-07-25 DIAGNOSIS — F33.1 MODERATE EPISODE OF RECURRENT MAJOR DEPRESSIVE DISORDER (H): ICD-10-CM

## 2021-07-25 DIAGNOSIS — F41.9 ANXIETY: ICD-10-CM

## 2021-07-27 RX ORDER — VENLAFAXINE HYDROCHLORIDE 75 MG/1
CAPSULE, EXTENDED RELEASE ORAL
Qty: 90 CAPSULE | Refills: 0 | Status: SHIPPED | OUTPATIENT
Start: 2021-07-27 | End: 2021-08-27

## 2021-07-27 NOTE — TELEPHONE ENCOUNTER
Pending Prescriptions:                       Disp   Refills    venlafaxine (EFFEXOR-XR) 75 MG 24 hr capsu*270 ca*1        Sig: TAKE 3 CAPSULES (225 MG) BY MOUTH DAILY      Routing refill request to provider for review/approval because:  Labs out of range:  phq9    Iman Ackerman RN on 7/27/2021 at 10:19 AM

## 2021-07-27 NOTE — TELEPHONE ENCOUNTER
I refilled 1 month supply, she needs a recheck with Dr. Fletcher, please help her schedule  Justine Sánchez PA-C

## 2021-07-28 NOTE — TELEPHONE ENCOUNTER
LM for patient to return call. See note below and schedule as needed.             Charline Last, CMA

## 2021-07-30 NOTE — TELEPHONE ENCOUNTER
Detailed message left on patients identified voicemail.    - appointment needed prior to further refills    Aisha WALDROPO/

## 2021-08-18 NOTE — TELEPHONE ENCOUNTER
FYI to provider - Patient is lost to pap tracking follow-up. Attempts to contact pt have been made per reminder process and there has been no reply and/or no appt scheduled.       12/4/17 ASCUS pap, + HR HPV (not 16/18). Plan colp due by 3/4/18  1/4/18 [place]: ectocervical mucosa with reactive changes. ECC: negative. Plan: cotest in 1 yr  2/7/19 Lost to follow-up for pap tracking  5/1/19 NIL pap, + HR HPV (not 16 or 18). Plan colp.   12/24/19 Lost to follow-up for pap tracking  1/23/2020: [place] and Pap.  No biopsies.  Clinically normal. Pap: NIL, + HR HPV (not 16 or 18). Plan: cotest due 1/23/21 6/29/20 ASCUS pap, + HR HPV (not 16 or 18). Plan: cotest in 1 yr, due 6/29/21 7/9/20 Pt notified by phone.  6/17/21 Reminder letter  7/16/21 Reminder call - LM  8/18/21 Lost to follow up

## 2021-08-24 ENCOUNTER — TRANSFERRED RECORDS (OUTPATIENT)
Dept: HEALTH INFORMATION MANAGEMENT | Facility: CLINIC | Age: 31
End: 2021-08-24

## 2021-08-25 RX ORDER — ONDANSETRON 4 MG/1
8 TABLET, ORALLY DISINTEGRATING ORAL PRN
COMMUNITY
Start: 2021-08-24 | End: 2021-08-30

## 2021-08-25 ASSESSMENT — ANXIETY QUESTIONNAIRES
2. NOT BEING ABLE TO STOP OR CONTROL WORRYING: SEVERAL DAYS
6. BECOMING EASILY ANNOYED OR IRRITABLE: SEVERAL DAYS
GAD7 TOTAL SCORE: 10
IF YOU CHECKED OFF ANY PROBLEMS ON THIS QUESTIONNAIRE, HOW DIFFICULT HAVE THESE PROBLEMS MADE IT FOR YOU TO DO YOUR WORK, TAKE CARE OF THINGS AT HOME, OR GET ALONG WITH OTHER PEOPLE: SOMEWHAT DIFFICULT
1. FEELING NERVOUS, ANXIOUS, OR ON EDGE: SEVERAL DAYS
7. FEELING AFRAID AS IF SOMETHING AWFUL MIGHT HAPPEN: NOT AT ALL
3. WORRYING TOO MUCH ABOUT DIFFERENT THINGS: SEVERAL DAYS
5. BEING SO RESTLESS THAT IT IS HARD TO SIT STILL: NEARLY EVERY DAY

## 2021-08-25 ASSESSMENT — PATIENT HEALTH QUESTIONNAIRE - PHQ9
5. POOR APPETITE OR OVEREATING: NEARLY EVERY DAY
SUM OF ALL RESPONSES TO PHQ QUESTIONS 1-9: 6

## 2021-08-25 NOTE — PROGRESS NOTES
"Virginia is a 30 year old who is being evaluated via a billable telephone visit.      What phone number would you like to be contacted at? [phone]  How would you like to obtain your AVS? Mail a copy    Assessment & Plan     Diarrhea of presumed infectious origin  Discussed brat diet and advancing diet slowly.  For now since she is still nauseated she will continue to have clear liquid diet which we discussed and Gatorade and other electrolyte replacement drinks.  We discussed not using Imodium or something to stop her diarrhea over-the-counter at this time.  We are awaiting her stool testing she may treat her fever with Tylenol as needed and I did refill her Zofran as below  - Enteric Bacteria and Virus Panel by JENI Stool; Future  - Clostridium difficile Toxin B PCR; Future  - Ova and Parasite Exam Routine; Future    Nausea and vomiting, intractability of vomiting not specified, unspecified vomiting type  Clear liquid diet, advancing diet as able  - ondansetron (ZOFRAN-ODT) 4 MG ODT tab; Take 1-2 tablets (4-8 mg) by mouth every 6 hours as needed for nausea    If symptoms acutely worsen or she is unable to keep liquids down she may need to go back to the ER for fluids             Return in about 3 days (around 8/29/2021), or if symptoms worsen or fail to improve.    MICHELLE Yan [facility]   Virginia is a 30 year old who presents for the following health issues     HPI     ED/UC Followup:    Facility:  [facility]   Date of visit: 8/25/2021  Reason for visit: enteritis.   Current Status: She was only given anti-nausea medication and she wanted to follow up sooner. She nausea, diarrhea, strange stomach pain. They didn't really tell her how to handle the rest of what is going on. She has had fevers - the highest temp she has had is 101 orally. She was told to \"starve herself\" from the ER doctor and she hasn't eaten at all. She has not been in contact with anyone " who has been sick. She had [employer] on Friday and about 12 hours later she got sick. They mentioned a stool sample test but was never given any kit to perform the testing.     No one else around her has gotten sick.  She has not vomited since yesterday but still feels very nauseated.  With the use of Zofran she is able to keep liquids down but she is not urinating much.  She has greater than 6 episodes of diarrhea a day, no blood in her stool.  She has abdominal pain all over her abdomen.  Having diarrhea does not necessarily lessen the pain.  I reviewed her documentation from her ER visit.  They did test her for Covid but that test is still pending.  Her white count was slightly elevated and her hemoglobin was normal.  They did do a CT scan of her abdomen.  IMPRESSION:   1.  Suggestion mild infectious/inflammatory enteritis distal ileum versus areas of contraction artifact. Suggest follow-up if persistent GI symptomatology to exclude underlying Crohn's disease.   2.  No obstruction, abscess, or evidence for penetrating disease.   3.  No other acute findings.             Review of Systems   As above      Objective           Vitals:  No vitals were obtained today due to virtual visit.    Physical Exam   healthy, alert and no distress  PSYCH: Alert and oriented times 3; coherent speech, normal   rate and volume, able to articulate logical thoughts, able   to abstract reason, no tangential thoughts, no hallucinations   or delusions  Her affect is normal and pleasant  RESP: No cough, no audible wheezing, able to talk in full sentences  Remainder of exam unable to be completed due to telephone visits    Results reviewed from her ER documentation            Phone call duration: 14 minutes

## 2021-08-26 ENCOUNTER — LAB (OUTPATIENT)
Dept: LAB | Facility: OTHER | Age: 31
End: 2021-08-26
Payer: COMMERCIAL

## 2021-08-26 ENCOUNTER — VIRTUAL VISIT (OUTPATIENT)
Dept: FAMILY MEDICINE | Facility: OTHER | Age: 31
End: 2021-08-26
Payer: COMMERCIAL

## 2021-08-26 DIAGNOSIS — R19.7 DIARRHEA OF PRESUMED INFECTIOUS ORIGIN: Primary | ICD-10-CM

## 2021-08-26 DIAGNOSIS — R19.7 DIARRHEA OF PRESUMED INFECTIOUS ORIGIN: ICD-10-CM

## 2021-08-26 DIAGNOSIS — R11.2 NAUSEA AND VOMITING, INTRACTABILITY OF VOMITING NOT SPECIFIED, UNSPECIFIED VOMITING TYPE: ICD-10-CM

## 2021-08-26 PROCEDURE — 87493 C DIFF AMPLIFIED PROBE: CPT | Mod: 59

## 2021-08-26 PROCEDURE — 99213 OFFICE O/P EST LOW 20 MIN: CPT | Mod: GT | Performed by: PHYSICIAN ASSISTANT

## 2021-08-26 PROCEDURE — 87209 SMEAR COMPLEX STAIN: CPT

## 2021-08-26 PROCEDURE — 87177 OVA AND PARASITES SMEARS: CPT

## 2021-08-26 PROCEDURE — 87506 IADNA-DNA/RNA PROBE TQ 6-11: CPT

## 2021-08-26 RX ORDER — ONDANSETRON 4 MG/1
4-8 TABLET, ORALLY DISINTEGRATING ORAL EVERY 6 HOURS PRN
Qty: 30 TABLET | Refills: 1 | Status: SHIPPED | OUTPATIENT
Start: 2021-08-26 | End: 2021-09-17

## 2021-08-26 ASSESSMENT — ANXIETY QUESTIONNAIRES: GAD7 TOTAL SCORE: 10

## 2021-08-27 ENCOUNTER — TELEPHONE (OUTPATIENT)
Dept: FAMILY MEDICINE | Facility: OTHER | Age: 31
End: 2021-08-27

## 2021-08-27 DIAGNOSIS — F33.1 MODERATE EPISODE OF RECURRENT MAJOR DEPRESSIVE DISORDER (H): ICD-10-CM

## 2021-08-27 DIAGNOSIS — F41.9 ANXIETY: ICD-10-CM

## 2021-08-27 LAB
C COLI+JEJUNI+LARI FUSA STL QL NAA+PROBE: NOT DETECTED
C DIFF TOX B STL QL: NEGATIVE
EC STX1 GENE STL QL NAA+PROBE: NOT DETECTED
EC STX2 GENE STL QL NAA+PROBE: NOT DETECTED
NOROV GI+II ORF1-ORF2 JNC STL QL NAA+PR: NOT DETECTED
O+P STL MICRO: NEGATIVE
RVA NSP5 STL QL NAA+PROBE: NOT DETECTED
SALMONELLA SP RPOD STL QL NAA+PROBE: NOT DETECTED
SHIGELLA SP+EIEC IPAH STL QL NAA+PROBE: NOT DETECTED
V CHOL+PARA RFBL+TRKH+TNAA STL QL NAA+PR: NOT DETECTED
Y ENTERO RECN STL QL NAA+PROBE: NOT DETECTED

## 2021-08-27 RX ORDER — VENLAFAXINE HYDROCHLORIDE 75 MG/1
CAPSULE, EXTENDED RELEASE ORAL
Qty: 90 CAPSULE | Refills: 0 | Status: SHIPPED | OUTPATIENT
Start: 2021-08-27 | End: 2021-09-17

## 2021-08-27 NOTE — TELEPHONE ENCOUNTER
Huddled with Justine Sánchez PA-C     Patient should wait to try imodium.  Continue fluids and call back Monday if she is not improved.    Patient notified.    Iman Ackerman RN on 8/27/2021 at 3:32 PM

## 2021-08-27 NOTE — TELEPHONE ENCOUNTER
Not discussed at her most recent virtual visit.  I will refill for 1 month she will need med recheck   Justine Sánchez PA-C

## 2021-08-27 NOTE — TELEPHONE ENCOUNTER
She has been continuing to have severe diarrhea and nausea.    Her fevers are getting better.  She is able to keep things down with antinausea medications.    She is staying hydrated. She is still urinating, just less than before.    Her symptoms have not gotten worse.    Can she take imodium?    Iman Ackerman RN on 8/27/2021 at 3:11 PM

## 2021-08-27 NOTE — TELEPHONE ENCOUNTER
Please call patient, her stool tests are negative for the infections that were tested for.  Please triage her and see how she is doing.  I will not be back in my inbasket today, you may text /call me if needed  Justine Sánchez PA-C

## 2021-08-27 NOTE — TELEPHONE ENCOUNTER
Pending Prescriptions:                       Disp   Refills    venlafaxine (EFFEXOR-XR) 75 MG 24 hr capsu*90 cap*0        Sig: TAKE 3 CAPSULES (225 MG) BY MOUTH DAILY      Routing refill request to provider for review/approval because:  Labs out of range:  phq9  Labs not current:  blood pressure, creatinine    Iman Ackerman RN on 8/27/2021 at 12:46 PM

## 2021-08-28 ENCOUNTER — HEALTH MAINTENANCE LETTER (OUTPATIENT)
Age: 31
End: 2021-08-28

## 2021-08-30 ENCOUNTER — VIRTUAL VISIT (OUTPATIENT)
Dept: FAMILY MEDICINE | Facility: OTHER | Age: 31
End: 2021-08-30
Payer: COMMERCIAL

## 2021-08-30 DIAGNOSIS — R19.7 DIARRHEA OF PRESUMED INFECTIOUS ORIGIN: Primary | ICD-10-CM

## 2021-08-30 DIAGNOSIS — R11.2 NAUSEA AND VOMITING, INTRACTABILITY OF VOMITING NOT SPECIFIED, UNSPECIFIED VOMITING TYPE: ICD-10-CM

## 2021-08-30 DIAGNOSIS — K52.9 ENTERITIS: ICD-10-CM

## 2021-08-30 DIAGNOSIS — K21.9 GASTROESOPHAGEAL REFLUX DISEASE, UNSPECIFIED WHETHER ESOPHAGITIS PRESENT: ICD-10-CM

## 2021-08-30 PROCEDURE — 99213 OFFICE O/P EST LOW 20 MIN: CPT | Mod: GT | Performed by: FAMILY MEDICINE

## 2021-08-30 RX ORDER — FAMOTIDINE 20 MG/1
20 TABLET, FILM COATED ORAL 2 TIMES DAILY
Qty: 60 TABLET | Refills: 1 | Status: SHIPPED | OUTPATIENT
Start: 2021-08-30 | End: 2021-09-17

## 2021-08-30 RX ORDER — PREDNISONE 20 MG/1
40 TABLET ORAL DAILY
Qty: 10 TABLET | Refills: 0 | Status: SHIPPED | OUTPATIENT
Start: 2021-08-30 | End: 2021-09-04

## 2021-08-30 NOTE — PATIENT INSTRUCTIONS
Thank you for visiting [facility]    Take the prednisone until its gone unless you do not notice any improvement after 3 days of taking it.    Try the famotidine to help with your reflux and nausea.    Let us also get you in with gastroenterology for consultation.  If you have full resolution of your symptoms prior to seeing them, then you could cancel this appointment.    If you are worsening, or just not improving over the next 3 days, please let me know.    I do strongly recommend Covid vaccination.  The risks of getting Covid greatly exceed the risks of vaccination.  Let me know what questions and concerns you have.    Contact us or return if questions or concerns.     Have a nice day!    Dr. Fletcher     Return in about 3 months (around 11/30/2021) for Physical Exam.      If you need medication refills, please contact your pharmacy 3 days before your prescriptions runs out or download the [facility] ben for your smart phone. If you are out of refills, your pharmacy will contact contact the clinic.                                     [facility] Assistance [phone]

## 2021-08-30 NOTE — PROGRESS NOTES
Virginia is a 30 year old who is being evaluated via a billable telephone visit.      What phone number would you like to be contacted at? [phone]  How would you like to obtain your AVS? MyChart    Assessment & Plan       ICD-10-CM    1. Diarrhea of presumed infectious origin  R19.7 Cryptosporidium/Giardia Immunoassay     Adult Gastro Ref - Consult Only   2. Nausea and vomiting, intractability of vomiting not specified, unspecified vomiting type  R11.2 Cryptosporidium/Giardia Immunoassay     Adult Gastro Ref - Consult Only   3. Enteritis  K52.9 predniSONE (DELTASONE) 20 MG tablet   4. Gastroesophageal reflux disease, unspecified whether esophagitis present  K21.9 famotidine (PEPCID) 20 MG tablet      1, 2, 3.  Reviewed her recent ER visit reports.  Also reviewed her recent lab results.  No clear infectious etiology for her symptoms.  Given her CT findings indicative of possible inflammatory enteritis, we discussed an empiric trial of prednisone.  I also recommended that she see gastroenterology for a more definitive diagnosis.  Patient was to proceed with a trial of prednisone.  Finally, will do some additional screening for Cryptosporidium as this was not evaluated by her previous lab work-up.  Continue other symptomatic therapies at this time.  Encouraged adequate fluid intake and gradual increase in her food intake as tolerated.  If not improving with this, we may need to consider hospitalization and possible tube feeds.  4.  Probably at least partially related to her vomiting.  Discussed trial of acid blockade.  Patient would like to try this.  We will do famotidine in an attempt to avoid the risk of diarrhea that can occur with PPIs.    Encouraged COVID vaccination.    Portions of this note were completed using Dragon dictation software.  Although reviewed, there may be typographical and other inadvertent errors that remain.     Review of prior external note(s) from - [facility] information from [facility]  reviewed  Ordering of each unique test  Prescription drug management         Patient Instructions   Thank you for visiting [facility]    Take the prednisone until its gone unless you do not notice any improvement after 3 days of taking it.    Try the famotidine to help with your reflux and nausea.    Let us also get you in with gastroenterology for consultation.  If you have full resolution of your symptoms prior to seeing them, then you could cancel this appointment.    If you are worsening, or just not improving over the next 3 days, please let me know.    I do strongly recommend Covid vaccination.  The risks of getting Covid greatly exceed the risks of vaccination.  Let me know what questions and concerns you have.    Contact us or return if questions or concerns.     Have a nice day!    Dr. Fletcher     Return in about 3 months (around 11/30/2021) for Physical Exam.      If you need medication refills, please contact your pharmacy 3 days before your prescriptions runs out or download the [facility] ben for your smart phone. If you are out of refills, your pharmacy will contact contact the clinic.                                     [facility] [phone]                       Return in about 3 months (around 11/30/2021) for Physical Exam.    Young Fletcher MD, MD  [facility] is a 30 year old who presents for the following health issues     HPI     Acute Illness  Acute illness concerns:   Onset/Duration: 10 days  Symptoms:  Fever: YES  Chills/Sweats: no  Headache (location?): no  Sinus Pressure: no  Conjunctivitis:  no  Ear Pain: no  Rhinorrhea: no  Congestion: no  Sore Throat: no  Cough: no  Wheeze: no  Decreased Appetite: no  Nausea: YES  Vomiting: YES  Diarrhea: YES  Dysuria/Freq.: no  Dysuria or Hematuria: no  Fatigue/Achiness: YES  Sick/Strep Exposure: no  Therapies tried and outcome: went ED and was Dx w/ gastroenteritis      Pt continues to have n/v/d.  Also some fever on occasion.  Nothing for about 24 hours.  Fever got up to 101 degrees.    Zofran has helped a bit.  Has only been able to drink fluids for the past 10 days.  Still having loose stools.  Denies family history of IBD.    Has lost about 10 lbs since this started.      No blood or melena noted except one blood clot once.    Review of Systems   Constitutional, HEENT, cardiovascular, pulmonary, gi and gu systems are negative, except as otherwise noted.      Objective           Vitals:  No vitals were obtained today due to virtual visit.    Physical Exam   healthy, alert and no distress  PSYCH: Alert and oriented times 3; coherent speech, normal   rate and volume, able to articulate logical thoughts, able   to abstract reason, no tangential thoughts, no hallucinations   or delusions  Her affect is normal  RESP: No cough, no audible wheezing, able to talk in full sentences  Remainder of exam unable to be completed due to telephone visits    Lab on 08/26/2021   Component Date Value Ref Range Status     OVA AND PARASITE EXAM 08/26/2021 Negative  Negative Final    A single negative specimen does not rule out parasitic infection.     C Difficile Toxin B by PCR 08/26/2021 Negative  Negative Final    A negative result does not exclude actual disease due to C. difficile and may be due to improper collection, handling and storage of the specimen or the number of organisms in the specimen is below the detection limit of the assay.     Campylobacter group 08/26/2021 Not Detected  Not Detected Final     Salmonella species 08/26/2021 Not Detected  Not Detected Final     Shigella species 08/26/2021 Not Detected  Not Detected Final     Vibrio group 08/26/2021 Not Detected  Not Detected Final     Rotavirus 08/26/2021 Not Detected  Not Detected Final     Shiga toxin 1 gene 08/26/2021 Not Detected  Not Detected Final     Shiga toxin 2 gene 08/26/2021 Not Detected  Not Detected Final      Norovirus I and II 08/26/2021 Not Detected  Not Detected Final     Yersinia enterocolitica 08/26/2021 Not Detected  Not Detected Final               Phone call duration: 11 minutes

## 2021-08-31 ENCOUNTER — TELEPHONE (OUTPATIENT)
Dept: GASTROENTEROLOGY | Facility: CLINIC | Age: 31
End: 2021-08-31

## 2021-08-31 DIAGNOSIS — K52.9 ENTERITIS: ICD-10-CM

## 2021-08-31 DIAGNOSIS — R19.7 DIARRHEA, UNSPECIFIED TYPE: Primary | ICD-10-CM

## 2021-08-31 DIAGNOSIS — K50.919 CROHN'S DISEASE WITH COMPLICATION, UNSPECIFIED GASTROINTESTINAL TRACT LOCATION (H): ICD-10-CM

## 2021-08-31 NOTE — LETTER

## 2021-08-31 NOTE — TELEPHONE ENCOUNTER
[facility]    Phone Message    May a detailed message be left on voicemail: yes     Reason for Call: Other: Patient being referred for nausea, vomiting and diarrhea and has lost 10lbs within 10 days. Please review per scheduling guidelines. Thanks!      Action Taken: Message routed to:  [facility] ([facility] GI    Travel Screening: Not Applicable

## 2021-09-02 NOTE — TELEPHONE ENCOUNTER
Review of chart:    First seen at ER on 8/24/2021 for symptoms of nausea, diarrhea, fatigue and abdominal pain x 4 days.      Stool tests for parasites, C.diff and Enteric Bacteria negative.  Cryptosporidium/Giardia ordered, but not yet completed.  COVID negative.  WBC on 8/24/2021 elevated at 16.1.      Weight documented at ER on 8/24/2021:  224 lb, no other recent weights recorded as all visits have been virtual, patient reports 10 lb weight loss.      CT results in [facility] note:    1.  Suggestion mild infectious/inflammatory enteritis distal ileum versus areas of contraction artifact. Suggest follow-up if persistent GI symptomatology to exclude underlying Crohn's disease.    Patient was prescribed prednisone burst, 40 mg daily x 5 days and referred to GI for more definitive diagnosis.      Forwarding to providers to advise on urgency of appointment.  Currently scheduled on 11/22/2021.    Jade Messer RN

## 2021-09-07 ENCOUNTER — MYC MEDICAL ADVICE (OUTPATIENT)
Dept: FAMILY MEDICINE | Facility: OTHER | Age: 31
End: 2021-09-07

## 2021-09-07 NOTE — TELEPHONE ENCOUNTER
Called patient to be rescheduled sooner. Scheduled 9/27/2021 with Liya Mcclellan PA-C. Will reach out to PCP to get colonoscopy scheduled.    Atiya Sung, RN, BSN  GI/Pulmonary Nurse Care Coordinator  Phone: [phone]  Fax: [phone]      Criss Perez, Jade Hartley, RN 4 days ago   KM  I would recommend a colonoscopy and a visit within the next 4 weeks.  Her PCP could order the colonoscopy so it can at least be scheduled at the time of her appointment.  If this is an infectious enteritis - it should start to get better within 1-2 weeks   Criss Milligan

## 2021-09-13 ENCOUNTER — HOSPITAL ENCOUNTER (OUTPATIENT)
Facility: CLINIC | Age: 31
End: 2021-09-13
Attending: SPECIALIST | Admitting: SPECIALIST
Payer: COMMERCIAL

## 2021-09-13 DIAGNOSIS — Z11.59 ENCOUNTER FOR SCREENING FOR OTHER VIRAL DISEASES: ICD-10-CM

## 2021-09-13 NOTE — TELEPHONE ENCOUNTER
Date of procedure: 10/8  Colonoscopy  Surgeon: Dr. Harris  Prep:Miralax  Packet:Colonoscopy/EGD instructions were sent to the patient in [facility].   Date: 9/13/2021      Surgery Scheduler

## 2021-09-17 ENCOUNTER — OFFICE VISIT (OUTPATIENT)
Dept: FAMILY MEDICINE | Facility: OTHER | Age: 31
End: 2021-09-17
Payer: COMMERCIAL

## 2021-09-17 VITALS
SYSTOLIC BLOOD PRESSURE: 100 MMHG | TEMPERATURE: 98.3 F | DIASTOLIC BLOOD PRESSURE: 80 MMHG | RESPIRATION RATE: 17 BRPM | HEART RATE: 80 BPM | OXYGEN SATURATION: 97 % | WEIGHT: 236.2 LBS | HEIGHT: 67 IN | BODY MASS INDEX: 37.07 KG/M2

## 2021-09-17 DIAGNOSIS — Z11.59 NEED FOR HEPATITIS C SCREENING TEST: ICD-10-CM

## 2021-09-17 DIAGNOSIS — Z30.41 ENCOUNTER FOR BIRTH CONTROL PILLS MAINTENANCE: ICD-10-CM

## 2021-09-17 DIAGNOSIS — R53.83 FATIGUE, UNSPECIFIED TYPE: ICD-10-CM

## 2021-09-17 DIAGNOSIS — K21.9 GASTROESOPHAGEAL REFLUX DISEASE, UNSPECIFIED WHETHER ESOPHAGITIS PRESENT: ICD-10-CM

## 2021-09-17 DIAGNOSIS — Z80.41 FAMILY HISTORY OF MALIGNANT NEOPLASM OF OVARY: ICD-10-CM

## 2021-09-17 DIAGNOSIS — R10.2 SUPRAPUBIC ABDOMINAL PAIN: ICD-10-CM

## 2021-09-17 DIAGNOSIS — K92.1 HEMATOCHEZIA: ICD-10-CM

## 2021-09-17 DIAGNOSIS — F41.9 ANXIETY: ICD-10-CM

## 2021-09-17 DIAGNOSIS — Z80.3 FAMILY HISTORY OF MALIGNANT NEOPLASM OF BREAST: ICD-10-CM

## 2021-09-17 DIAGNOSIS — Z00.00 ROUTINE GENERAL MEDICAL EXAMINATION AT A HEALTH CARE FACILITY: Primary | ICD-10-CM

## 2021-09-17 DIAGNOSIS — R35.0 URINARY FREQUENCY: ICD-10-CM

## 2021-09-17 DIAGNOSIS — F33.1 MODERATE EPISODE OF RECURRENT MAJOR DEPRESSIVE DISORDER (H): ICD-10-CM

## 2021-09-17 DIAGNOSIS — Z11.3 SCREEN FOR STD (SEXUALLY TRANSMITTED DISEASE): ICD-10-CM

## 2021-09-17 LAB
ALBUMIN SERPL-MCNC: 3 G/DL (ref 3.4–5)
ALBUMIN UR-MCNC: 30 MG/DL
ALP SERPL-CCNC: 102 U/L (ref 40–150)
ALT SERPL W P-5'-P-CCNC: 37 U/L (ref 0–50)
ANION GAP SERPL CALCULATED.3IONS-SCNC: 4 MMOL/L (ref 3–14)
APPEARANCE UR: CLEAR
AST SERPL W P-5'-P-CCNC: 17 U/L (ref 0–45)
BILIRUB SERPL-MCNC: 0.2 MG/DL (ref 0.2–1.3)
BILIRUB UR QL STRIP: NEGATIVE
BUN SERPL-MCNC: 9 MG/DL (ref 7–30)
CALCIUM SERPL-MCNC: 8.9 MG/DL (ref 8.5–10.1)
CHLORIDE BLD-SCNC: 107 MMOL/L (ref 94–109)
CO2 SERPL-SCNC: 28 MMOL/L (ref 20–32)
COLOR UR AUTO: YELLOW
CREAT SERPL-MCNC: 0.7 MG/DL (ref 0.52–1.04)
ERYTHROCYTE [SEDIMENTATION RATE] IN BLOOD BY WESTERGREN METHOD: 15 MM/HR (ref 0–20)
GFR SERPL CREATININE-BSD FRML MDRD: >90 ML/MIN/1.73M2
GLUCOSE BLD-MCNC: 138 MG/DL (ref 70–99)
GLUCOSE UR STRIP-MCNC: 100 MG/DL
HGB UR QL STRIP: ABNORMAL
KETONES UR STRIP-MCNC: NEGATIVE MG/DL
LEUKOCYTE ESTERASE UR QL STRIP: NEGATIVE
MUCOUS THREADS #/AREA URNS LPF: PRESENT /LPF
NITRATE UR QL: NEGATIVE
PH UR STRIP: 7 [PH] (ref 5–7)
POTASSIUM BLD-SCNC: 4 MMOL/L (ref 3.4–5.3)
PROT SERPL-MCNC: 7 G/DL (ref 6.8–8.8)
RBC #/AREA URNS AUTO: ABNORMAL /HPF
SODIUM SERPL-SCNC: 139 MMOL/L (ref 133–144)
SP GR UR STRIP: 1.02 (ref 1–1.03)
SQUAMOUS #/AREA URNS AUTO: ABNORMAL /LPF
TSH SERPL DL<=0.005 MIU/L-ACNC: 2.45 MU/L (ref 0.4–4)
UROBILINOGEN UR STRIP-ACNC: 0.2 E.U./DL
WBC #/AREA URNS AUTO: ABNORMAL /HPF

## 2021-09-17 PROCEDURE — 87624 HPV HI-RISK TYP POOLED RSLT: CPT | Performed by: FAMILY MEDICINE

## 2021-09-17 PROCEDURE — 86803 HEPATITIS C AB TEST: CPT | Performed by: FAMILY MEDICINE

## 2021-09-17 PROCEDURE — 99395 PREV VISIT EST AGE 18-39: CPT | Performed by: FAMILY MEDICINE

## 2021-09-17 PROCEDURE — 84443 ASSAY THYROID STIM HORMONE: CPT | Performed by: FAMILY MEDICINE

## 2021-09-17 PROCEDURE — 87491 CHLMYD TRACH DNA AMP PROBE: CPT | Performed by: FAMILY MEDICINE

## 2021-09-17 PROCEDURE — 99214 OFFICE O/P EST MOD 30 MIN: CPT | Mod: 25 | Performed by: FAMILY MEDICINE

## 2021-09-17 PROCEDURE — 36415 COLL VENOUS BLD VENIPUNCTURE: CPT | Performed by: FAMILY MEDICINE

## 2021-09-17 PROCEDURE — 87591 N.GONORRHOEAE DNA AMP PROB: CPT | Performed by: FAMILY MEDICINE

## 2021-09-17 PROCEDURE — 81001 URINALYSIS AUTO W/SCOPE: CPT | Performed by: FAMILY MEDICINE

## 2021-09-17 PROCEDURE — 85652 RBC SED RATE AUTOMATED: CPT | Performed by: FAMILY MEDICINE

## 2021-09-17 PROCEDURE — 80053 COMPREHEN METABOLIC PANEL: CPT | Performed by: FAMILY MEDICINE

## 2021-09-17 PROCEDURE — G0145 SCR C/V CYTO,THINLAYER,RESCR: HCPCS | Performed by: FAMILY MEDICINE

## 2021-09-17 RX ORDER — ARIPIPRAZOLE 10 MG/1
10 TABLET ORAL DAILY
Qty: 90 TABLET | Refills: 1 | Status: SHIPPED | OUTPATIENT
Start: 2021-09-17 | End: 2022-01-12

## 2021-09-17 RX ORDER — NORGESTIMATE AND ETHINYL ESTRADIOL 0.25-0.035
1 KIT ORAL DAILY
Qty: 84 TABLET | Refills: 3 | Status: SHIPPED | OUTPATIENT
Start: 2021-09-17

## 2021-09-17 RX ORDER — BUPROPION HYDROCHLORIDE 150 MG/1
150 TABLET ORAL EVERY MORNING
Qty: 30 TABLET | Refills: 1 | Status: SHIPPED | OUTPATIENT
Start: 2021-09-17 | End: 2021-11-06

## 2021-09-17 RX ORDER — VENLAFAXINE HYDROCHLORIDE 75 MG/1
CAPSULE, EXTENDED RELEASE ORAL
Qty: 270 CAPSULE | Refills: 1 | Status: SHIPPED | OUTPATIENT
Start: 2021-09-17 | End: 2022-04-20

## 2021-09-17 RX ORDER — FAMOTIDINE 20 MG/1
20 TABLET, FILM COATED ORAL 2 TIMES DAILY
Qty: 180 TABLET | Refills: 3 | Status: SHIPPED | OUTPATIENT
Start: 2021-09-17 | End: 2022-01-12

## 2021-09-17 ASSESSMENT — ENCOUNTER SYMPTOMS
DYSURIA: 0
DIZZINESS: 0
ARTHRALGIAS: 0
HEMATOCHEZIA: 1
HEMATURIA: 0
HEARTBURN: 0
HEADACHES: 1
COUGH: 0
CHILLS: 0
MYALGIAS: 0
EYE PAIN: 0
FEVER: 0
PARESTHESIAS: 0
SORE THROAT: 0
NAUSEA: 0
ABDOMINAL PAIN: 1
WEAKNESS: 0
SHORTNESS OF BREATH: 0
NERVOUS/ANXIOUS: 1
PALPITATIONS: 0
FREQUENCY: 1
CONSTIPATION: 0
DIARRHEA: 0
BREAST MASS: 0
JOINT SWELLING: 0

## 2021-09-17 ASSESSMENT — MIFFLIN-ST. JEOR: SCORE: 1824.03

## 2021-09-17 ASSESSMENT — PATIENT HEALTH QUESTIONNAIRE - PHQ9
10. IF YOU CHECKED OFF ANY PROBLEMS, HOW DIFFICULT HAVE THESE PROBLEMS MADE IT FOR YOU TO DO YOUR WORK, TAKE CARE OF THINGS AT HOME, OR GET ALONG WITH OTHER PEOPLE: SOMEWHAT DIFFICULT
SUM OF ALL RESPONSES TO PHQ QUESTIONS 1-9: 14
SUM OF ALL RESPONSES TO PHQ QUESTIONS 1-9: 14
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
## 2021-09-17 NOTE — PATIENT INSTRUCTIONS
Let's try adding Wellbutrin to your regimen to help with fatigue and smoking.  Let us know if side effects.    We can discuss other options for helping with weight control if Wellbutrin isn't providing some benefit.    We'll let you know your lab results as soon as we can.     I do strongly recommend Covid vaccination.  The risks of getting Covid greatly exceed the risks of vaccination.  Let me know what questions and concerns you have.    Contact us or return if questions or concerns.     Have a nice day!    Dr. Fletcher         Preventive Health Recommendations  Female Ages 26 - 39  Yearly exam:   See your health care provider every year in order to    Review health changes.     Discuss preventive care.      Review your medicines if you your doctor has prescribed any.    Until age 30: Get a Pap test every three years (more often if you have had an abnormal result).    After age 30: Talk to your doctor about whether you should have a Pap test every 3 years or have a Pap test with HPV screening every 5 years.   You do not need a Pap test if your uterus was removed (hysterectomy) and you have not had cancer.  You should be tested each year for STDs (sexually transmitted diseases), if you're at risk.   Talk to your provider about how often to have your cholesterol checked.  If you are at risk for diabetes, you should have a diabetes test (fasting glucose).  Shots: Get a flu shot each year. Get a tetanus shot every 10 years.   Nutrition:     Eat at least 5 servings of fruits and vegetables each day.    Eat whole-grain bread, whole-wheat pasta and brown rice instead of white grains and rice.    Get adequate Calcium and Vitamin D.     Lifestyle    Exercise at least 150 minutes a week (30 minutes a day, 5 days of the week). This will help you control your weight and prevent disease.    Limit alcohol to one drink per day.    No smoking.     Wear sunscreen to prevent skin cancer.    See your dentist every six months for an  exam and cleaning.

## 2021-09-18 LAB
C TRACH DNA SPEC QL NAA+PROBE: NEGATIVE
N GONORRHOEA DNA SPEC QL NAA+PROBE: NEGATIVE

## 2021-09-18 ASSESSMENT — PATIENT HEALTH QUESTIONNAIRE - PHQ9: SUM OF ALL RESPONSES TO PHQ QUESTIONS 1-9: 14

## 2021-09-18 NOTE — RESULT ENCOUNTER NOTE
[place],    Your labs are mostly normal, but there is some evidence that you may have developed diabetes.  Your blood sugar is in the diabetic range, and you are spilling sugar into your urine.  If you would like, we can confirm this with another blood test.  If you are noticing significant improvement in your weight within the next few weeks with the Wellbutrin, we should definitely confirm the diagnosis and consider treatments.    Have a nice day!    Dr. Fletcher

## 2021-09-20 LAB — HCV AB SERPL QL IA: NONREACTIVE

## 2021-09-22 LAB
BKR LAB AP GYN ADEQUACY: NORMAL
BKR LAB AP GYN INTERPRETATION: NORMAL
BKR LAB AP HPV REFLEX: NORMAL
BKR LAB AP PREVIOUS ABNL DX: NORMAL
BKR LAB AP PREVIOUS ABNORMAL: NORMAL
PATH REPORT.COMMENTS IMP SPEC: NORMAL
PATH REPORT.RELEVANT HX SPEC: NORMAL

## 2021-09-23 LAB
HUMAN PAPILLOMA VIRUS 16 DNA: NEGATIVE
HUMAN PAPILLOMA VIRUS 18 DNA: NEGATIVE
HUMAN PAPILLOMA VIRUS FINAL DIAGNOSIS: NORMAL
HUMAN PAPILLOMA VIRUS OTHER HR: NEGATIVE

## 2021-09-27 ENCOUNTER — VIRTUAL VISIT (OUTPATIENT)
Dept: GASTROENTEROLOGY | Facility: CLINIC | Age: 31
End: 2021-09-27
Payer: COMMERCIAL

## 2021-09-27 DIAGNOSIS — R19.7 DIARRHEA, UNSPECIFIED TYPE: ICD-10-CM

## 2021-09-27 PROCEDURE — 99204 OFFICE O/P NEW MOD 45 MIN: CPT | Mod: GT | Performed by: PHYSICIAN ASSISTANT

## 2021-09-27 NOTE — PROGRESS NOTES
GASTROENTEROLOGY NEW PATIENT VIDEO VISIT     Video Start Time: 2:03 PM    CC/REFERRING MD:    Young Fletcher    REASON FOR CONSULTATION:   Referred by Young Fletcher for New Patient (Diarrhea and rectal bleeding /// pain when bowel movements)    HISTORY OF PRESENT ILLNESS:    Yesika Grant is 30 year old female who presents for GI consult.     She developed sudden onsent of nausea, vomiting and diarrhea last month. Symptoms started about 12 hours after eating at [employer]. Symptoms led her to the ED after several days. She was noted to have diffuse abdominal tenderness on exam and elevated wbc as well as signs of mild infectious/inflammatory enteritis to distal ileum on CT. Symptoms were thought to be infectious enteritis. Follow up with her primary care consisted of work up with stool studies which were negative. Due to persistent symptoms she was given a course of prednisone by her primary care. This significantly improved her symptoms.     She no longer has nausea or vomiting. She also reports diarrhea to be resolved. She does have about 3 BM's a day. She has some abdominal pain with her bowel movements that typically resolve afterwards. She also reports having some rectal bleeding with bowel movements.    She is not taking any NSAID's. There is no pertinent family hx.       [place]ia  has a past medical history of ASCUS with positive high risk HPV cervical (12/04/2017), Moderate major depression (H) (5/3/2012), Motion sickness, Ovarian cyst (11/05), PONV (postoperative nausea and vomiting), Tonsillitis, and Urticaria.    She  has a past surgical history that includes Tonsillectomy, adenoidectomy adult, combined (12/1/2011); Turbinoplasty (12/1/2011); TOOTH EXTRACTION W/FORCEP (Bilateral); Open reduction internal fixation ankle (Right, 6/9/2017); and Remove hardware ankle (Right, 8/31/2018).    She  reports that she has quit smoking. Her smoking use included cigarettes.  [facility] Call Center    Phone Message    May a detailed message be left on voicemail: yes     Reason for Call: Medication Refill Request    Has the patient contacted the pharmacy for the refill? Yes   Name of medication being requested: Scopolamine HBr POWD  Provider who prescribed the medication: Dr. Bermudez  Pharmacy: [facility] [place] [address]   Date medication is needed: 11/25/20     Keyon's mother Savannah states that he is out of this medication. Please give them a call back if there are any questions.      Action Taken: Message routed to:  [facility]    Travel Screening: Not Applicable                                                                       She smoked 0.50 packs per day. She has never used smokeless tobacco. She reports that she does not drink alcohol and does not use drugs.    Her family history includes Alzheimer Disease in her maternal grandmother; Arthritis in her maternal grandfather; Cancer in her paternal grandmother; Diabetes in her paternal uncle; Genitourinary Problems in her paternal grandmother and sister; Heart Disease in her paternal grandfather; Hypertension in her maternal grandmother and mother; Respiratory (age of onset: 49) in her father; Thyroid Disease in her maternal grandmother.    ALLERGIES:  Atarax [hydroxyzine], Vicodin [hydrocodone-acetaminophen], and Zithromax [azithromycin dihydrate]        PERTINENT MEDICATIONS:    Current Outpatient Medications:      ARIPiprazole (ABILIFY) 10 MG tablet, Take 1 tablet (10 mg) by mouth daily, Disp: 90 tablet, Rfl: 1     buPROPion (WELLBUTRIN XL) 150 MG 24 hr tablet, Take 1 tablet (150 mg) by mouth every morning, Disp: 30 tablet, Rfl: 1     famotidine (PEPCID) 20 MG tablet, Take 1 tablet (20 mg) by mouth 2 times daily, Disp: 180 tablet, Rfl: 3     LORazepam (ATIVAN) 0.5 MG tablet, Take 1 tablet (0.5 mg) by mouth every 8 hours as needed for agitation, anxiety or sleep, Disp: 20 tablet, Rfl: 1     multivitamin, therapeutic with minerals (MULTI-VITAMIN) TABS, Take 1 tablet by mouth daily Reported on 5/23/2017, Disp: , Rfl:      norgestimate-ethinyl estradiol (ESTARYLLA) 0.25-35 MG-MCG tablet, Take 1 tablet by mouth daily, Disp: 84 tablet, Rfl: 3     order for DME, Equipment being ordered: TENS, Disp: 1 Units, Rfl: 0     venlafaxine (EFFEXOR-XR) 75 MG 24 hr capsule, TAKE 3 CAPSULES (225 MG) BY MOUTH DAILY, Disp: 270 capsule, Rfl: 1        PHYSICAL EXAMINATION:  Constitutional: aaox3, cooperative, pleasant, not dyspneic/diaphoretic, no acute distress      GENERAL: Healthy, alert and no distress  EYES: Eyes grossly normal to inspection.  No discharge or erythema, or obvious scleral/conjunctival  abnormalities.  RESP: No audible wheeze, cough, or visible cyanosis.  No visible retractions or increased work of breathing.    SKIN: Visible skin clear. No significant rash, abnormal pigmentation or lesions.  NEURO: Cranial nerves grossly intact.  Mentation and speech appropriate for age.  PSYCH: Mentation appears normal, affect normal/bright, judgement and insight intact, normal speech and appearance well-groomed.      RADIOLOGY:     EXAM: CT ABDOMEN PELVIS W   LOCATION: [facility]   DATE/TIME: 8/24/2021 9:01 AM     INDICATION: Emesis, gastroenteritis, fever, weak   COMPARISON: None.   TECHNIQUE: CT scan of the abdomen and pelvis was performed following injection of IV contrast. Multiplanar reformats were obtained. Dose reduction techniques were used.   CONTRAST: Omnipaque [id number]     FINDINGS:   LOWER CHEST: Lung bases are clear.     HEPATOBILIARY: Small subcentimeter cyst. Otherwise normal. No calcified gallstones or bile duct dilatation.     PANCREAS: Normal.     SPLEEN: Normal.     ADRENAL GLANDS: Normal.     KIDNEYS/BLADDER: Normal.     BOWEL: Normal caliber without obstruction. Mild fluid throughout small bowel loops and colon. Distal small bowel decompressed with suggestion of mild short segment wall thickening and mucosal enhancement measuring about 4 cm just proximal to the terminal ileum raising suspicion for mild inflammatory/infectious enteritis versus contraction artifact. Possible couple additional areas of mild wall thickening and mucosal enhancement versus contraction artifact. No fistulae, abscess, free air, or free fluid. Normal appendix. No evidence for active colitis or diverticulitis.     LYMPH NODES: No lymphadenopathy.     VASCULATURE: Unremarkable.     PELVIC ORGANS: No pelvic masses.     MUSCULOSKELETAL: Unremarkable.     IMPRESSION:   1.  Suggestion mild infectious/inflammatory enteritis distal ileum versus areas of contraction artifact. Suggest follow-up if persistent GI  symptomatology to exclude underlying Crohn's disease.   2.  No obstruction, abscess, or evidence for penetrating disease.   3.  No other acute findings.     Date: 08/24/21   Received From: [facility]              ASSESSMENT/PLAN:    1. Diarrhea of presumed infectious origin  2. Nausea and vomiting, intractability of vomiting not specified, unspecified vomiting type        Yesika Grant is a 30 year old female who presents for GI consult. She developed sudden onset of nausea, vomiting and diarrhea after eating at [employer]. Work up through the ED revealed an elevated wbc and signs of an infectious vs inflammatory enteritis. Her symptoms were thought to be related to an infectious origin. Work up with stool studies however were negative. She ultimately did improve after a course of prednisone given by her primary care. She is overall improved now but still with some abdominal discomfort with bowel movements and rectal bleeding. She is scheduled for a colonoscopy next week. Recommended continuing with colonoscopy to r/o underlying inflammatory bowel disease such as crohn's disease. Will also perform celiac screening to be thorough.     Further recommendations after work up.       Thank you for this consultation.  It was a pleasure to participate in the care of this patient; please contact us with any further questions.      This note was created with voice recognition software, and while reviewed for accuracy, typos may remain.         45 minutes spent on the date of the encounter doing chart review, history and exam, documentation and further activities per the note    Liya Mcclellan PA-C  Gastroenterology  [facility]       Video-Visit Details    Type of service:  Video Visit    Video End Time:2:14 PM    Originating Location (pt. Location): Home    Distant Location (provider location):  [facility]     Platform used for Video  Visit: Eileen

## 2021-09-27 NOTE — PROGRESS NOTES
[place]ia is a 30 year old who is being evaluated via a billable video visit.      How would you like to obtain your AVS? MyChart  If the video visit is dropped, the invitation should be resent by: Send to e-mail at: [email or web address]  Will anyone else be joining your video visit? No      Video Start Time:   Video-Visit Details    Type of service:  Video Visit    Video End Time:    Originating Location (pt. Location):     Distant Location (provider location):  [facility]     Platform used for Video Visit:   Shalom Garnett CMA

## 2021-10-23 ENCOUNTER — HEALTH MAINTENANCE LETTER (OUTPATIENT)
Age: 31
End: 2021-10-23

## 2021-11-04 DIAGNOSIS — R53.83 FATIGUE, UNSPECIFIED TYPE: ICD-10-CM

## 2021-11-05 NOTE — TELEPHONE ENCOUNTER
Pending Prescriptions:                       Disp   Refills    buPROPion (WELLBUTRIN XL) 150 MG 24 hr tab*30 tab*1        Sig: Take 1 tablet (150 mg) by mouth every morning  Routing refill request to provider for review/approval because:  Patient needs to be seen because:  Provider wanted a return visit on approximately 10/15/21.   Please call to schedule.

## 2021-11-05 NOTE — TELEPHONE ENCOUNTER
LM for patient to call back.  Please inform her of message below.   I also sent her a my chart message with info as well.  Thank you

## 2021-11-06 RX ORDER — BUPROPION HYDROCHLORIDE 150 MG/1
150 TABLET ORAL EVERY MORNING
Qty: 30 TABLET | Refills: 0 | Status: SHIPPED | OUTPATIENT
Start: 2021-11-06 | End: 2021-12-10

## 2021-11-29 ENCOUNTER — TELEPHONE (OUTPATIENT)
Dept: ONCOLOGY | Facility: CLINIC | Age: 31
End: 2021-11-29
Payer: COMMERCIAL

## 2021-11-29 NOTE — TELEPHONE ENCOUNTER
11/29/2021     Yesika Grant did not join the video invite or answer her phone for her Cancer Risk [facility] Program Genetic Counseling appointment today. Multiple attempts were made to try to contact her during her appointment time without success. If she would like to reschedule, appointments can be made by calling [phone].     Aman Castillo MS [facility]  Licensed Genetic Counselor  Phone: [phone]  Fax: [phone]

## 2021-12-07 DIAGNOSIS — R53.83 FATIGUE, UNSPECIFIED TYPE: ICD-10-CM

## 2021-12-10 RX ORDER — BUPROPION HYDROCHLORIDE 150 MG/1
TABLET ORAL
Qty: 30 TABLET | Refills: 0 | Status: SHIPPED | OUTPATIENT
Start: 2021-12-10 | End: 2022-01-12

## 2021-12-10 NOTE — TELEPHONE ENCOUNTER
Pending Prescriptions:                       Disp   Refills    buPROPion (WELLBUTRIN XL) 150 MG 24 hr tab*30 tab*0        Sig: TAKE ONE TABLET BY MOUTH EVERY MORNING        Routing refill request to provider for review/approval because:  Shaniqua given x1 and patient did not follow up, please advise    Fredo Sharp, INGEN, RN, PHN  [place]  December 10, 2021

## 2022-01-11 DIAGNOSIS — R53.83 FATIGUE, UNSPECIFIED TYPE: ICD-10-CM

## 2022-01-11 NOTE — TELEPHONE ENCOUNTER
Pending Prescriptions:                       Disp   Refills    buPROPion (WELLBUTRIN XL) 150 MG 24 hr tab*30 tab*0        Sig: TAKE ONE TABLET BY MOUTH EVERY MORNING    Routing refill request to provider for review/approval because:  Shaniqua given x2 and patient did not follow up, please advise    Please call and help schedule.

## 2022-01-12 ENCOUNTER — VIRTUAL VISIT (OUTPATIENT)
Dept: FAMILY MEDICINE | Facility: OTHER | Age: 32
End: 2022-01-12
Payer: COMMERCIAL

## 2022-01-12 DIAGNOSIS — U07.1 INFECTION DUE TO 2019 NOVEL CORONAVIRUS: ICD-10-CM

## 2022-01-12 DIAGNOSIS — F33.1 MODERATE EPISODE OF RECURRENT MAJOR DEPRESSIVE DISORDER (H): Primary | ICD-10-CM

## 2022-01-12 DIAGNOSIS — F41.9 ANXIETY: ICD-10-CM

## 2022-01-12 DIAGNOSIS — R73.9 HYPERGLYCEMIA: ICD-10-CM

## 2022-01-12 DIAGNOSIS — R53.83 FATIGUE, UNSPECIFIED TYPE: ICD-10-CM

## 2022-01-12 DIAGNOSIS — R05.9 COUGH: ICD-10-CM

## 2022-01-12 PROCEDURE — 99214 OFFICE O/P EST MOD 30 MIN: CPT | Mod: GT | Performed by: FAMILY MEDICINE

## 2022-01-12 RX ORDER — ALBUTEROL SULFATE 90 UG/1
2 AEROSOL, METERED RESPIRATORY (INHALATION) EVERY 6 HOURS
Qty: 18 G | Refills: 1 | Status: SHIPPED | OUTPATIENT
Start: 2022-01-12 | End: 2022-03-04

## 2022-01-12 RX ORDER — ARIPIPRAZOLE 15 MG/1
15 TABLET ORAL DAILY
Qty: 30 TABLET | Refills: 1 | Status: SHIPPED | OUTPATIENT
Start: 2022-01-12

## 2022-01-12 ASSESSMENT — ANXIETY QUESTIONNAIRES
IF YOU CHECKED OFF ANY PROBLEMS ON THIS QUESTIONNAIRE, HOW DIFFICULT HAVE THESE PROBLEMS MADE IT FOR YOU TO DO YOUR WORK, TAKE CARE OF THINGS AT HOME, OR GET ALONG WITH OTHER PEOPLE: EXTREMELY DIFFICULT
7. FEELING AFRAID AS IF SOMETHING AWFUL MIGHT HAPPEN: MORE THAN HALF THE DAYS
5. BEING SO RESTLESS THAT IT IS HARD TO SIT STILL: NOT AT ALL
1. FEELING NERVOUS, ANXIOUS, OR ON EDGE: SEVERAL DAYS
3. WORRYING TOO MUCH ABOUT DIFFERENT THINGS: NOT AT ALL
GAD7 TOTAL SCORE: 6
2. NOT BEING ABLE TO STOP OR CONTROL WORRYING: SEVERAL DAYS
6. BECOMING EASILY ANNOYED OR IRRITABLE: MORE THAN HALF THE DAYS

## 2022-01-12 ASSESSMENT — PATIENT HEALTH QUESTIONNAIRE - PHQ9
5. POOR APPETITE OR OVEREATING: NOT AT ALL
SUM OF ALL RESPONSES TO PHQ QUESTIONS 1-9: 22

## 2022-01-12 NOTE — PATIENT INSTRUCTIONS
Thank you for visiting [facility]    Go ahead and stop Wellbutrin since this has not been beneficial for you.    Lets increase Abilify to 15 mg daily.  Let me know if any side effects or problems.    I also put in a referral for collaborative care psychiatry.  They should contact you to set up this visit in the near future.    Follow-up with me regarding your mood in 1 month if you have not heard from psychiatry.    Continue on the Effexor XR.    Try albuterol to help with your cough and other symptoms related to COVID.  If you are continuing to not improve, let me know.  There are other approaches we could take to try to manage her symptoms.    Consider taking a baby aspirin daily to help reduce your risk of blood clots related to COVID.    I do recommend that we recheck your blood work in the near future to ensure that you do not have diabetes based upon your recent labs from September.    I do strongly recommend Covid vaccination.  The risks of getting Covid greatly exceed the risks of vaccination.  Let me know what questions and concerns you have.     Contact us or return if questions or concerns.     Have a nice day!    Dr. Fletcher     Return in about 4 weeks (around 2/9/2022) for Recheck, E-visit, video, or phone visit.      If you need medication refills, please contact your pharmacy 3 days before your prescriptions runs out or download the [facility] ben for your smart phone. If you are out of refills, your pharmacy will contact contact the clinic.                                     MyChart Assistance [phone]

## 2022-01-12 NOTE — PROGRESS NOTES
Virginia is a 31 year old who is being evaluated via a billable telephone visit.      What phone number would you like to be contacted at? [phone]  How would you like to obtain your AVS? MyChart    Assessment & Plan       ICD-10-CM    1. Moderate episode of recurrent major depressive disorder (H)  F33.1 ARIPiprazole (ABILIFY) 15 MG tablet     Adult Mental Health Referral   2. Anxiety  F41.9 ARIPiprazole (ABILIFY) 15 MG tablet     Adult Mental Health Referral   3. Infection due to 2019 novel coronavirus  U07.1 albuterol (PROAIR HFA/PROVENTIL HFA/VENTOLIN HFA) 108 (90 Base) MCG/ACT inhaler   4. Cough  R05.9 albuterol (PROAIR HFA/PROVENTIL HFA/VENTOLIN HFA) 108 (90 Base) MCG/ACT inhaler   5. Hyperglycemia  R73.9 Hemoglobin A1c     Basic metabolic panel  (Ca, Cl, CO2, Creat, Gluc, K, Na, BUN)   6. Fatigue, unspecified type  R53.83 Adult Mental Health Referral      1, 2.  Not well controlled.  Unfortunately, patient did not respond well to Wellbutrin.  And she did not follow-up with me when requested.  Discussed options with patient including increasing Wellbutrin versus increasing her Abilify or adding back an SSRI.  Also discussed possible referral to psychiatry.  Patient wished to proceed with psychiatry referral.  We will also increase Abilify to help with mood in the meantime.  Follow-up in 1 month if she has not established with psychiatry at that time.  Continue Effexor XR at current dosing but will stop Wellbutrin since she did not have any positive response to this.  3, 4.  Patient was unvaccinated.  Happily, her symptoms are still relatively mild.  Recommended trial of albuterol.  Could consider empiric steroids.  Could also consider aspirin for DVT prophylaxis.  Recommended vaccination when she has fully recovered.  Patient is not interested in this.  Discussed risks of not vaccinating including possible reinfection.  5.  Previously noted at her last visit.  Unfortunately, patient never responded to my  recommendations to get further testing.  We will order labs today and have her get these as soon as she is healthy enough to come to the clinic for lab testing.  6.  Not responsive to Wellbutrin.  Previous lab work-up for this was fairly unremarkable.  Certainly worse with current COVID infection.  Will check CBC with her next blood draw.  If Abilify is not helpful for her energy level, will need to consider further evaluation.    Portions of this note were completed using Dragon dictation software.  Although reviewed, there may be typographical and other inadvertent errors that remain.       Review of the result(s) of each unique test - Glucose, urinalysis, TSH, etc.  Ordering of each unique test  Prescription drug management         Tobacco Cessation:   reports that she has been smoking cigarettes. She has been smoking about 0.50 packs per day. She has never used smokeless tobacco.  Tobacco Cessation Action Plan: Information offered: Patient not interested at this time    Patient Instructions   Thank you for visiting [facility]    Go ahead and stop Wellbutrin since this has not been beneficial for you.    Lets increase Abilify to 15 mg daily.  Let me know if any side effects or problems.    I also put in a referral for collaborative care psychiatry.  They should contact you to set up this visit in the near future.    Follow-up with me regarding your mood in 1 month if you have not heard from psychiatry.    Continue on the Effexor XR.    Try albuterol to help with your cough and other symptoms related to COVID.  If you are continuing to not improve, let me know.  There are other approaches we could take to try to manage her symptoms.    Consider taking a baby aspirin daily to help reduce your risk of blood clots related to COVID.    I do recommend that we recheck your blood work in the near future to ensure that you do not have diabetes based upon your recent labs from September.    I do strongly  recommend Covid vaccination.  The risks of getting Covid greatly exceed the risks of vaccination.  Let me know what questions and concerns you have.     Contact us or return if questions or concerns.     Have a nice day!    Dr. Fletcher     Return in about 4 weeks (around 2/9/2022) for Recheck, E-visit, video, or phone visit.      If you need medication refills, please contact your pharmacy 3 days before your prescriptions runs out or download the [facility] ben for your smart phone. If you are out of refills, your pharmacy will contact contact the clinic.                                     Kelechi Hugo [phone]                       Return in about 4 weeks (around 2/9/2022) for Recheck, E-visit, video, or phone visit.    Young Fletcher MD, MD  [facility] is a 31 year old who presents for the following health issues     HPI     Depression and Anxiety Follow-Up    How are you doing with your depression since your last visit? Worsened     How are you doing with your anxiety since your last visit?  Controled per pt    Are you having other symptoms that might be associated with depression or anxiety? Yes:  not getting work done, no energy, not wanting to get out of bed, no excitment, doesnt want to do anything    Have you had a significant life event? OTHER: is engaged and planning her wedding     Do you have any concerns with your use of alcohol or other drugs? No    Pt hasn't noted any benefit with Wellbutrin.  Her depression is worse.  She has been taking this for 4 months now.      She just was diagnosed with COVID one week ago.  Still with cough, RN, ST, fatigue.  She has just been drinking more caffeine, tylenol, Vitamin E for her symptoms.  Cough isn't terrible.  She does think her oxygen levels are lower than they should be.  She had one episode of lightheadedness and nearly collapsed.  She doesn't have a pulse ox.      Pt has resumed  smoking.  Trying to decrease over time.  This started back about 1-2 months ago.      Social History     Tobacco Use     Smoking status: Current Every Day Smoker     Packs/day: 0.50     Types: Cigarettes     Smokeless tobacco: Never Used   Vaping Use     Vaping Use: Never used   Substance Use Topics     Alcohol use: No     Comment: 1-2 drinks a month     Drug use: No     PHQ 8/25/2021 9/17/2021 1/12/2022   PHQ-9 Total Score 6 14 22   Q9: Thoughts of better off dead/self-harm past 2 weeks Not at all Not at all Not at all     ZAN-7 SCORE 12/14/2020 8/25/2021 1/12/2022   Total Score - - -   Total Score - - -   Total Score 5 10 6     Last PHQ-9 1/12/2022   1.  Little interest or pleasure in doing things 3   2.  Feeling down, depressed, or hopeless 3   3.  Trouble falling or staying asleep, or sleeping too much 3   4.  Feeling tired or having little energy 3   5.  Poor appetite or overeating 3   6.  Feeling bad about yourself 3   7.  Trouble concentrating 3   8.  Moving slowly or restless 1   Q9: Thoughts of better off dead/self-harm past 2 weeks 0   PHQ-9 Total Score 22   Difficulty at work, home, or with people Extremely dIfficult     ZAN-7  1/12/2022   1. Feeling nervous, anxious, or on edge 1   2. Not being able to stop or control worrying 1   3. Worrying too much about different things 0   4. Trouble relaxing 0   5. Being so restless that it is hard to sit still 0   6. Becoming easily annoyed or irritable 2   7. Feeling afraid, as if something awful might happen 2   ZAN-7 Total Score 6   If you checked any problems, how difficult have they made it for you to do your work, take care of things at home, or get along with other people? Extremely difficult       Suicide Assessment Five-step Evaluation and Treatment (SAFE-T)        Review of Systems   Constitutional, HEENT, cardiovascular, pulmonary, gi and gu systems are negative, except as otherwise noted.      Objective           Vitals:  No vitals were obtained today  due to virtual visit.    Physical Exam   healthy, alert and no distress  PSYCH: Alert and oriented times 3; coherent speech, normal   rate and volume, able to articulate logical thoughts, able   to abstract reason, no tangential thoughts, no hallucinations   or delusions  Her affect is normal  RESP: No cough, no audible wheezing, able to talk in full sentences  Remainder of exam unable to be completed due to telephone visits    Office Visit on 09/17/2021   Component Date Value Ref Range Status     Interpretation 09/17/2021 Negative for Intraepithelial Lesion or Malignancy (NILM)    Final     Specimen Adequacy 09/17/2021 Satisfactory for evaluation, endocervical/transformation zone component present   Final     Clinical Information 09/17/2021    Final                    Value:This result contains rich text formatting which cannot be displayed here.     Reflex Testing 09/17/2021 Yes regardless of result   Final     Previous Abnormal? 09/17/2021    Final                    Value:This result contains rich text formatting which cannot be displayed here.     Previous Abnormal Diagnosis 09/17/2021    Final                    Value:This result contains rich text formatting which cannot be displayed here.     Performing Labs 09/17/2021    Final                    Value:This result contains rich text formatting which cannot be displayed here.     Neisseria gonorrhoeae 09/17/2021 Negative  Negative Final    Negative for N. gonorrhoeae rRNA by transcription mediated amplification. A negative result by transcription mediated amplification does not preclude the presence of C. trachomatis infection because results are dependent on proper and adequate collection, absence of inhibitors and sufficient rRNA to be detected.     Chlamydia trachomatis 09/17/2021 Negative  Negative Final    A negative result by transcription mediated amplification does not preclude the presence of C. trachomatis infection because results are dependent on  proper and adequate collection, absence of inhibitors and sufficient rRNA to be detected.     Erythrocyte Sedimentation Rate 09/17/2021 15  0 - 20 mm/hr Final     Sodium 09/17/2021 139  133 - 144 mmol/L Final     Potassium 09/17/2021 4.0  3.4 - 5.3 mmol/L Final     Chloride 09/17/2021 107  94 - 109 mmol/L Final     Carbon Dioxide (CO2) 09/17/2021 28  20 - 32 mmol/L Final     Anion Gap 09/17/2021 4  3 - 14 mmol/L Final     Urea Nitrogen 09/17/2021 9  7 - 30 mg/dL Final     Creatinine 09/17/2021 0.70  0.52 - 1.04 mg/dL Final     Calcium 09/17/2021 8.9  8.5 - 10.1 mg/dL Final     Glucose 09/17/2021 138* 70 - 99 mg/dL Final     Alkaline Phosphatase 09/17/2021 102  40 - 150 U/L Final     AST 09/17/2021 17  0 - 45 U/L Final     ALT 09/17/2021 37  0 - 50 U/L Final     Protein Total 09/17/2021 7.0  6.8 - 8.8 g/dL Final     Albumin 09/17/2021 3.0* 3.4 - 5.0 g/dL Final     Bilirubin Total 09/17/2021 0.2  0.2 - 1.3 mg/dL Final     GFR Estimate 09/17/2021 >90  >60 mL/min/1.73m2 Final    As of July 11, 2021, eGFR is calculated by the CKD-EPI creatinine equation, without race adjustment. eGFR can be influenced by muscle mass, exercise, and diet. The reported eGFR is an estimation only and is only applicable if the renal function is stable.     Color Urine 09/17/2021 Yellow  Colorless, Straw, Light Yellow, Yellow Final     Appearance Urine 09/17/2021 Clear  Clear Final     Glucose Urine 09/17/2021 100 * Negative mg/dL Final     Bilirubin Urine 09/17/2021 Negative  Negative Final     Ketones Urine 09/17/2021 Negative  Negative mg/dL Final     Specific Gravity Urine 09/17/2021 1.020  1.003 - 1.035 Final     Blood Urine 09/17/2021 Small* Negative Final     pH Urine 09/17/2021 7.0  5.0 - 7.0 Final     Protein Albumin Urine 09/17/2021 30 * Negative mg/dL Final     Urobilinogen Urine 09/17/2021 0.2  0.2, 1.0 E.U./dL Final     Nitrite Urine 09/17/2021 Negative  Negative Final     Leukocyte Esterase Urine 09/17/2021 Negative  Negative  Final     TSH 09/17/2021 2.45  0.40 - 4.00 mU/L Final     Hepatitis C Antibody 09/17/2021 Nonreactive  Nonreactive Final     RBC Urine 09/17/2021 2-5* 0-2 /HPF /HPF Final     WBC Urine 09/17/2021 0-5  0-5 /HPF /HPF Final     Squamous Epithelials Urine 09/17/2021 Few* None Seen /LPF Final     Mucus Urine 09/17/2021 Present* None Seen /LPF Final     Other HR HPV 09/17/2021 Negative  Negative Final     HPV16 DNA 09/17/2021 Negative  Negative Final     HPV18 DNA 09/17/2021 Negative  Negative Final     FINAL DIAGNOSIS 09/17/2021    Final                    Value:This result contains rich text formatting which cannot be displayed here.     No results found for any visits on 01/12/22.  No results found for this or any previous visit (from the past 24 hour(s)).            Phone call duration: 15 minutes

## 2022-01-13 RX ORDER — BUPROPION HYDROCHLORIDE 150 MG/1
TABLET ORAL
Qty: 30 TABLET | Refills: 0 | OUTPATIENT
Start: 2022-01-13

## 2022-01-13 ASSESSMENT — ANXIETY QUESTIONNAIRES: GAD7 TOTAL SCORE: 6

## 2022-03-04 DIAGNOSIS — R05.9 COUGH: ICD-10-CM

## 2022-03-04 DIAGNOSIS — U07.1 INFECTION DUE TO 2019 NOVEL CORONAVIRUS: ICD-10-CM

## 2022-03-04 RX ORDER — ALBUTEROL SULFATE 90 UG/1
2 AEROSOL, METERED RESPIRATORY (INHALATION) EVERY 6 HOURS
Qty: 8.5 G | Refills: 3 | Status: SHIPPED | OUTPATIENT
Start: 2022-03-04

## 2022-03-09 DIAGNOSIS — F33.1 MODERATE EPISODE OF RECURRENT MAJOR DEPRESSIVE DISORDER (H): ICD-10-CM

## 2022-03-09 DIAGNOSIS — F41.9 ANXIETY: ICD-10-CM

## 2022-03-09 RX ORDER — ARIPIPRAZOLE 15 MG/1
TABLET ORAL
Qty: 30 TABLET | Refills: 1 | OUTPATIENT
Start: 2022-03-09

## 2022-03-09 NOTE — TELEPHONE ENCOUNTER
Pending Prescriptions:                       Disp   Refills    ARIPiprazole (ABILIFY) 15 MG tablet [Pharm*30 tab*1        Sig: TAKE ONE TABLET BY MOUTH ONCE DAILY    Routing refill request to provider for review/approval because:  D[place] [facility] [place]     Requested Prescriptions   Pending Prescriptions Disp Refills    ARIPiprazole (ABILIFY) 15 MG tablet [Pharmacy Med Name: ARIPIPRAZOLE 15MG TABS] 30 tablet 1     Sig: TAKE ONE TABLET BY MOUTH ONCE DAILY        Antipsychotic Medications Failed - 3/9/2022  5:02 AM        Failed - Lipid panel on file within the past 12 months       Recent Labs   Lab Test 06/29/20  1618   CHOL 246*   TRIG 175*   HDL 45*   *   NHDL 201*               Failed - CBC on file in past 12 months     Recent Labs   Lab Test 06/29/20  1618   WBC 16.3*   RBC 4.85   HGB 13.9   HCT 42.3                      Passed - Blood pressure under 140/90 in past 12 months       BP Readings from Last 3 Encounters:   09/17/21 100/80   06/29/20 128/72   01/23/20 126/82                 Passed - Patient is 12 years of age or older        Passed - Heart Rate on file within past 12 months       Pulse Readings from Last 3 Encounters:   09/17/21 80   06/29/20 90   01/23/20 98               Passed - A1c or Glucose on file in past 12 months       Recent Labs   Lab Test 09/17/21  1534 06/29/20  1618 06/08/17  1525   *   < >  --    A1C  --   --  5.2    < > = values in this interval not displayed.       Please review patients last 3 weights. If a weight gain of >10 lbs exists, you may refill the prescription once after instructing the patient to schedule an appointment within the next 30 days.      Wt Readings from Last 3 Encounters:   09/17/21 107.1 kg (236 lb 3.2 oz)   06/29/20 104.3 kg (230 lb)   01/23/20 113.2 kg (249 lb 8 oz)             Passed - Medication is active on med list        Passed - Patient is not pregnant        Passed - No positve pregnancy test on file in past 12 months  "       Passed - Recent (6 mo) or future (30 days) visit within the authorizing provider's specialty     Patient had office visit in the last 6 months or has a visit in the next 30 days with authorizing provider or within the authorizing provider's specialty.  See \"Patient Info\" tab in inbasket, or \"Choose Columns\" in Meds & Orders section of the refill encounter.                        "

## 2022-03-10 NOTE — TELEPHONE ENCOUNTER
Spoke with patient.  Due to her insurance, she has established care else where.   Okay to close this message.  Thank you

## 2022-03-10 NOTE — TELEPHONE ENCOUNTER
In January, patient had been instructed to follow-up with me in 1 month if she has not yet established with psychiatry.  If she is established with psychiatry, she should request this from them.  If she has not, she needs to schedule follow-up with me.  Once scheduled, I can refill enough to get her to that appointment.    Finally, it appears that patient has transferred care.  If this is the case, she should request this from her new provider.

## 2022-04-11 DIAGNOSIS — F41.9 ANXIETY: ICD-10-CM

## 2022-04-11 DIAGNOSIS — F33.1 MODERATE EPISODE OF RECURRENT MAJOR DEPRESSIVE DISORDER (H): ICD-10-CM

## 2022-04-12 RX ORDER — ARIPIPRAZOLE 15 MG/1
TABLET ORAL
Qty: 30 TABLET | Refills: 1 | OUTPATIENT
Start: 2022-04-12

## 2022-04-12 NOTE — TELEPHONE ENCOUNTER
Patient should either request this from psychiatry if she has established or schedule a follow up visit (virtual visit OK).  Once scheduled, can refill enough medication to get patient to the visit.

## 2022-04-12 NOTE — TELEPHONE ENCOUNTER
Pending Prescriptions:                       Disp   Refills    ARIPiprazole (ABILIFY) 15 MG tablet [Pharm*30 tab*1        Sig: TAKE ONE TABLET BY MOUTH ONCE DAILY    Routing refill request to provider for review/approval because:  Patient needs to be seen because:  due for recheck visit with labs

## 2022-04-19 DIAGNOSIS — F41.9 ANXIETY: ICD-10-CM

## 2022-04-19 DIAGNOSIS — F33.1 MODERATE EPISODE OF RECURRENT MAJOR DEPRESSIVE DISORDER (H): ICD-10-CM

## 2022-04-20 RX ORDER — VENLAFAXINE HYDROCHLORIDE 75 MG/1
CAPSULE, EXTENDED RELEASE ORAL
Qty: 90 CAPSULE | Refills: 0 | Status: SHIPPED | OUTPATIENT
Start: 2022-04-20

## 2022-04-20 NOTE — TELEPHONE ENCOUNTER
Pending Prescriptions:                       Disp   Refills    venlafaxine (EFFEXOR-XR) 75 MG 24 hr capsu*270 ca*1        Sig: TAKE 3 CAPSULES (225 MG) BY MOUTH DAILY    Routing refill request to provider for review/approval because:  Labs out of range:  PHQ9  PHQ-9 score:    PHQ 1/12/2022   PHQ-9 Total Score 22   Q9: Thoughts of better off dead/self-harm past 2 weeks Not at all     Due for recheck visit

## 2022-04-20 NOTE — TELEPHONE ENCOUNTER
Your medication has been refilled.  Please schedule a visit (virtual OK) to follow up on how this medication is working for you before your next refill.  We need to be sure everything is working well and stable prior to further refills.

## 2022-10-09 ENCOUNTER — HEALTH MAINTENANCE LETTER (OUTPATIENT)
Age: 32
End: 2022-10-09

## 2022-11-26 ENCOUNTER — HEALTH MAINTENANCE LETTER (OUTPATIENT)
Age: 32
End: 2022-11-26

## 2023-04-25 NOTE — TELEPHONE ENCOUNTER
Summary:    Patient is due/failing the following:   PHQ9    Action needed:   Patient needs to do PHQ9.    Type of outreach:    Sent letter.    Questions for provider review:    None                                                                                                                                    Riya Dunn       Chart routed to Care Team .          Panel Management Review      Patient has the following on her problem list:     Depression / Dysthymia review    Measure:  Needs PHQ-9 score of 4 or less during index window.  Administer PHQ-9 and if score is 5 or more, send encounter to provider for next steps.        PHQ-9 SCORE 12/5/2017 1/11/2018 4/12/2018   Total Score - - -   Total Score MyChart - - 8 (Mild depression)   Total Score 12 18 8       If PHQ-9 recheck is 5 or more, route to provider for next steps.    Patient is due for:  PHQ9      Composite cancer screening  Chart review shows that this patient is due/due soon for the following None   Yes

## 2023-09-12 NOTE — LETTER
[address] [address]  [phone]    2017      RE:  Yesika Grant  : 1990      To whom it may concern:    This patient must be released from work until next visit in about 2 weeks.  She can work from home light duty seated work up to 4 hours a day as she may require narcotic pain medication for more 1-2 weeks.     Sincerely,          Nate Beck DPM       show

## 2024-04-13 NOTE — PATIENT INSTRUCTIONS
Care Due:                  Date            Visit Type   Department     Provider  --------------------------------------------------------------------------------                                EP -                              PRIMARY      [facility] INTERNAL  Last Visit: 12-      CARE ([facility])   SAMANTHA Meade                              EP -                              PRIMARY      [facility] INTERNAL  Next Visit: 06-      CARE ([facility])   SAMANTHA Meade                                                            Last  Test          Frequency    Reason                     Performed    Due Date  --------------------------------------------------------------------------------    Vitamin D...  12 months..  ergocalciferol...........  05-   05-    [facility] Embedded Care Due Messages. Reference number: [id number].   4/13/2024 12:48:25 AM CDT   It was a pleasure visiting with you today.     Please continue with your scheduled colonoscopy.    Liya Mcclellan PA-C  Gastroenterology  [facility]

## 2024-05-25 ENCOUNTER — HEALTH MAINTENANCE LETTER (OUTPATIENT)
Age: 34
End: 2024-05-25

## (undated) DEVICE — DRSG GAUZE 4X4" 3033

## (undated) DEVICE — SYR 10ML FINGER CONTROL W/O NDL 309695

## (undated) DEVICE — GLOVE PROTEXIS W/NEU-THERA 8.0  2D73TE80

## (undated) DEVICE — NDL ECLIPSE 22GA 1.5"

## (undated) DEVICE — BNDG ELASTIC 4"X5YDS UNSTERILE 6611-40

## (undated) DEVICE — DRAPE CONVERTORS U-DRAPE 60X72" 8476

## (undated) DEVICE — DRSG GAUZE 4X4" TRAY

## (undated) DEVICE — PACK EXTREMITY SOP15EXFSD

## (undated) DEVICE — SU VICRYL 0 CP-1 27" UND J267H

## (undated) DEVICE — SU PROLENE 4-0 PS-2 18" 8682G

## (undated) DEVICE — DRSG ADAPTIC 3X3" 6112

## (undated) DEVICE — BNDG ESMARK 4" STERILE

## (undated) DEVICE — DRAPE C-ARM 60X42" 1013

## (undated) DEVICE — DRSG KERLIX 4 1/2"X4YDS ROLL 6730

## (undated) DEVICE — BB-TAK, THREADED

## (undated) DEVICE — CAST PADDING 4" WEBRIL UNSTERILE

## (undated) DEVICE — BNDG ELASTIC 6"X5YDS UNSTERILE 6611-60

## (undated) DEVICE — SU VICRYL 4-0 PS-2 27" UND J426H

## (undated) DEVICE — SPLINT FIBERGLASS 4X30" PRE-CUT RESIN 76430

## (undated) DEVICE — BNDG COBAN 4"X5YDS STERILE

## (undated) DEVICE — Device

## (undated) DEVICE — SOL NACL 0.9% IRRIG 1000ML BOTTLE 07138-09

## (undated) DEVICE — SU VICRYL 3-0 PS-2 27" UND J427H

## (undated) DEVICE — ESU GROUND PAD UNIVERSAL W/O CORD

## (undated) DEVICE — SU VICRYL 3-0 CP-2 27" UND J868H

## (undated) DEVICE — DRAPE C-ARM

## (undated) RX ORDER — DIMENHYDRINATE 50 MG/ML
INJECTION, SOLUTION INTRAMUSCULAR; INTRAVENOUS
Status: DISPENSED
Start: 2017-06-09

## (undated) RX ORDER — BUPIVACAINE HYDROCHLORIDE 5 MG/ML
INJECTION, SOLUTION EPIDURAL; INTRACAUDAL
Status: DISPENSED
Start: 2017-06-09

## (undated) RX ORDER — KETOROLAC TROMETHAMINE 30 MG/ML
INJECTION, SOLUTION INTRAMUSCULAR; INTRAVENOUS
Status: DISPENSED
Start: 2018-08-31

## (undated) RX ORDER — LIDOCAINE HYDROCHLORIDE 10 MG/ML
INJECTION, SOLUTION EPIDURAL; INFILTRATION; INTRACAUDAL; PERINEURAL
Status: DISPENSED
Start: 2018-08-31

## (undated) RX ORDER — FENTANYL CITRATE 50 UG/ML
INJECTION, SOLUTION INTRAMUSCULAR; INTRAVENOUS
Status: DISPENSED
Start: 2018-08-31

## (undated) RX ORDER — PROPOFOL 10 MG/ML
INJECTION, EMULSION INTRAVENOUS
Status: DISPENSED
Start: 2018-08-31

## (undated) RX ORDER — BUPIVACAINE HYDROCHLORIDE 5 MG/ML
INJECTION, SOLUTION EPIDURAL; INTRACAUDAL
Status: DISPENSED
Start: 2018-08-31

## (undated) RX ORDER — LIDOCAINE HYDROCHLORIDE 20 MG/ML
INJECTION, SOLUTION EPIDURAL; INFILTRATION; INTRACAUDAL; PERINEURAL
Status: DISPENSED
Start: 2017-06-09

## (undated) RX ORDER — DEXAMETHASONE SODIUM PHOSPHATE 10 MG/ML
INJECTION INTRAMUSCULAR; INTRAVENOUS
Status: DISPENSED
Start: 2017-06-09

## (undated) RX ORDER — FENTANYL CITRATE 50 UG/ML
INJECTION, SOLUTION INTRAMUSCULAR; INTRAVENOUS
Status: DISPENSED
Start: 2017-06-09

## (undated) RX ORDER — ONDANSETRON 2 MG/ML
INJECTION INTRAMUSCULAR; INTRAVENOUS
Status: DISPENSED
Start: 2017-06-09

## (undated) RX ORDER — EPHEDRINE SULFATE 50 MG/ML
INJECTION, SOLUTION INTRAMUSCULAR; INTRAVENOUS; SUBCUTANEOUS
Status: DISPENSED
Start: 2017-06-09